# Patient Record
Sex: FEMALE | Race: WHITE | Employment: OTHER | URBAN - METROPOLITAN AREA
[De-identification: names, ages, dates, MRNs, and addresses within clinical notes are randomized per-mention and may not be internally consistent; named-entity substitution may affect disease eponyms.]

---

## 2017-02-03 DIAGNOSIS — I25.83 CORONARY ARTERY DISEASE DUE TO LIPID RICH PLAQUE: Primary | ICD-10-CM

## 2017-02-03 DIAGNOSIS — I25.10 CORONARY ARTERY DISEASE DUE TO LIPID RICH PLAQUE: Primary | ICD-10-CM

## 2017-02-06 RX ORDER — LISINOPRIL 20 MG/1
20 TABLET ORAL DAILY
Qty: 90 TABLET | Refills: 1 | Status: SHIPPED | OUTPATIENT
Start: 2017-02-06 | End: 2017-07-18

## 2017-02-20 DIAGNOSIS — J44.9 CHRONIC OBSTRUCTIVE PULMONARY DISEASE, UNSPECIFIED COPD TYPE (H): Primary | ICD-10-CM

## 2017-02-20 NOTE — TELEPHONE ENCOUNTER
tiotropium (SPIRIVA HANDIHALER) 18 MCG inhalation capsule       Last Written Prescription Date: 8/11/16  Last Fill Quantity: 90, # refills: 1    Last Office Visit with G, P or [facility] prescribing provider:  10/17/16   Future Office Visit:       Date of Last Asthma Action Plan Letter:   There are no preventive care reminders to display for this patient.   Asthma Control Test: No flowsheet data found.    Date of Last Spirometry Test:   No results found for this or any previous visit.

## 2017-02-21 RX ORDER — TIOTROPIUM BROMIDE 18 UG/1
CAPSULE ORAL; RESPIRATORY (INHALATION)
Qty: 90 CAPSULE | Refills: 1 | Status: SHIPPED | OUTPATIENT
Start: 2017-02-21 | End: 2017-09-09

## 2017-03-28 ENCOUNTER — TRANSFERRED RECORDS (OUTPATIENT)
Dept: HEALTH INFORMATION MANAGEMENT | Facility: CLINIC | Age: 67
End: 2017-03-28

## 2017-05-16 ENCOUNTER — TRANSFERRED RECORDS (OUTPATIENT)
Dept: HEALTH INFORMATION MANAGEMENT | Facility: CLINIC | Age: 67
End: 2017-05-16

## 2017-07-18 DIAGNOSIS — I25.10 CORONARY ARTERY DISEASE DUE TO LIPID RICH PLAQUE: ICD-10-CM

## 2017-07-18 DIAGNOSIS — I25.83 CORONARY ARTERY DISEASE DUE TO LIPID RICH PLAQUE: ICD-10-CM

## 2017-07-18 NOTE — TELEPHONE ENCOUNTER
LAST VISIT    11/4/15  NEXT VISIT     9/19/17  PLAN                ASSESSMENT AND PLAN:   1. CAD: Single vessel CAD, dating back to 2003 at which time she had PCI of RCA. Since that time, she has been asymptomatic. We are continuing aspirin and plavix given her history of PAD and CAD. She is on a statin.  2. LE PAD: Stable mild claudication s/p bilateral aorto-iliac stenting years ago. ABIs in 2012 showed mild PAD. We're continuing with rehab, there is no claudication with her current exercise, she is [place] stage 0 - 1  3. Carotid disease.  Repeat NICS today with <70% disease in her common carotids, L subclavian with >70% stenosis as known before. No change.  4. HTN: BP in left arm is FALSELY low, presumably due to left subclavian artery stenosis. She is hypertensive today, she was hospitalized in July for hypovolemic hyponatremia and MICHELLE and her ACEI and HCTZ were held and never restarted, her creatinine improved to 1.22, we will repeat it today and add on lisinopril 20mg again and then have her repeat her BP and BMP in one week with her PCP.  5. Left subclavian artery stenosis. There is a 57 mm Hg pressure gradient between the right and left arms.  CT angiogram showed subtotal occlusion of the ostium of the left subclavian artery.   She is asymptomatic and there is no evidence of a subclavian steal phenomenon, no need for intervention at this time, we will continue to monitor  6. HLD:  Repeat lipid panel pending. She is on crestor.  7. Tobacco use: counseled, will rx nicotine patch today     Follow up 1 year  Linwood Will  Cardiology fellow     Cardiology Staff: I supervised the cardiology fellow and reviewed the issues above with the patient.  Plan on NICS to reassess LF subclavian artery stenosis.  The emphasis at this time will have to be smoking cessation as this is clearly worsening the PAD / carotid disease.     NICS (11/4/15):  Impression:  1. Right side: 50-69% stenosis in the proximal ICA,  slightly progressed since prior study.  2. Left side: Less than 50% stenosis in the ICA.  3. Persistent stenosis at the origin of the left subclavian artery. Retrograde flow in the vertebral artery, previously bidirectional  suggestive of worsening subclavian steal.     ADDENDUM (11/6/15): NICS noted.  The patient has LF subclavian artery stenosis but does not demonstrate neurologic symptoms to suggest subclavian steal.  Similarly, there is 50-60% stenosis in the RT ICA but there are no neurologic symptoms.  I would therefore not recommend carotid endarterectomy / stenting.

## 2017-07-19 RX ORDER — LISINOPRIL 20 MG/1
20 TABLET ORAL DAILY
Qty: 90 TABLET | Refills: 1 | Status: SHIPPED | OUTPATIENT
Start: 2017-07-19 | End: 2017-09-19

## 2017-08-16 ENCOUNTER — RADIANT APPOINTMENT (OUTPATIENT)
Dept: MAMMOGRAPHY | Facility: CLINIC | Age: 67
End: 2017-08-16
Attending: PHYSICIAN ASSISTANT
Payer: COMMERCIAL

## 2017-08-16 DIAGNOSIS — Z12.31 VISIT FOR SCREENING MAMMOGRAM: ICD-10-CM

## 2017-08-16 PROCEDURE — G0202 SCR MAMMO BI INCL CAD: HCPCS

## 2017-09-09 DIAGNOSIS — J44.9 CHRONIC OBSTRUCTIVE PULMONARY DISEASE, UNSPECIFIED COPD TYPE (H): ICD-10-CM

## 2017-09-10 NOTE — TELEPHONE ENCOUNTER
SPIRIVA 18 MCG CP-HANDIHALER         Last Written Prescription Date:Medication Detail      Disp Refills Start End DAYAN   tiotropium (SPIRIVA HANDIHALER) 18 MCG capsule 90 capsule 1 2/21/2017  No   Sig: Inhale contents of one capsule daily.     Last Office Visit with [facility], [facility] or [facility] prescribing provider:  10/17/16   Future Office Visit:   N/A    Date of Last Asthma Action Plan Letter:   There are no preventive care reminders to display for this patient.   Asthma Control Test: No flowsheet data found.    Date of Last Spirometry Test:   No results found for this or any previous visit.

## 2017-09-11 RX ORDER — TIOTROPIUM BROMIDE 18 UG/1
CAPSULE ORAL; RESPIRATORY (INHALATION)
Qty: 60 CAPSULE | Refills: 0 | Status: SHIPPED | OUTPATIENT
Start: 2017-09-11 | End: 2017-09-19

## 2017-09-13 ENCOUNTER — PRE VISIT (OUTPATIENT)
Dept: CARDIOLOGY | Facility: CLINIC | Age: 67
End: 2017-09-13

## 2017-09-13 DIAGNOSIS — E78.5 HYPERLIPIDEMIA LDL GOAL <100: ICD-10-CM

## 2017-09-13 DIAGNOSIS — I10 HYPERTENSION GOAL BP (BLOOD PRESSURE) < 140/90: Primary | ICD-10-CM

## 2017-09-19 ENCOUNTER — OFFICE VISIT (OUTPATIENT)
Dept: CARDIOLOGY | Facility: CLINIC | Age: 67
End: 2017-09-19
Attending: INTERNAL MEDICINE
Payer: COMMERCIAL

## 2017-09-19 VITALS
WEIGHT: 200.5 LBS | HEIGHT: 62 IN | HEART RATE: 63 BPM | DIASTOLIC BLOOD PRESSURE: 79 MMHG | SYSTOLIC BLOOD PRESSURE: 161 MMHG | OXYGEN SATURATION: 95 % | BODY MASS INDEX: 36.9 KG/M2

## 2017-09-19 DIAGNOSIS — E78.5 HYPERLIPIDEMIA LDL GOAL <70: ICD-10-CM

## 2017-09-19 DIAGNOSIS — I73.9 PERIPHERAL VASCULAR DISEASE, UNSPECIFIED (H): ICD-10-CM

## 2017-09-19 DIAGNOSIS — I10 HYPERTENSION GOAL BP (BLOOD PRESSURE) < 140/90: ICD-10-CM

## 2017-09-19 DIAGNOSIS — E78.5 HYPERLIPIDEMIA LDL GOAL <100: ICD-10-CM

## 2017-09-19 DIAGNOSIS — I25.2 OLD MYOCARDIAL INFARCTION: ICD-10-CM

## 2017-09-19 DIAGNOSIS — J44.9 CHRONIC OBSTRUCTIVE PULMONARY DISEASE, UNSPECIFIED COPD TYPE (H): ICD-10-CM

## 2017-09-19 DIAGNOSIS — I25.83 CORONARY ARTERY DISEASE DUE TO LIPID RICH PLAQUE: Primary | ICD-10-CM

## 2017-09-19 DIAGNOSIS — I25.10 CORONARY ARTERY DISEASE DUE TO LIPID RICH PLAQUE: Primary | ICD-10-CM

## 2017-09-19 LAB
CHOLEST SERPL-MCNC: 142 MG/DL
HDLC SERPL-MCNC: 63 MG/DL
LDLC SERPL CALC-MCNC: 49 MG/DL
NONHDLC SERPL-MCNC: 78 MG/DL
TRIGL SERPL-MCNC: 144 MG/DL

## 2017-09-19 PROCEDURE — 93005 ELECTROCARDIOGRAM TRACING: CPT | Mod: ZF

## 2017-09-19 PROCEDURE — 99213 OFFICE O/P EST LOW 20 MIN: CPT | Mod: ZF

## 2017-09-19 PROCEDURE — 93010 ELECTROCARDIOGRAM REPORT: CPT | Mod: ZP | Performed by: INTERNAL MEDICINE

## 2017-09-19 PROCEDURE — 99214 OFFICE O/P EST MOD 30 MIN: CPT | Mod: ZP | Performed by: INTERNAL MEDICINE

## 2017-09-19 PROCEDURE — 80061 LIPID PANEL: CPT | Performed by: INTERNAL MEDICINE

## 2017-09-19 PROCEDURE — 36415 COLL VENOUS BLD VENIPUNCTURE: CPT | Performed by: INTERNAL MEDICINE

## 2017-09-19 RX ORDER — DILTIAZEM HYDROCHLORIDE 240 MG/1
240 CAPSULE, COATED, EXTENDED RELEASE ORAL DAILY
Qty: 90 CAPSULE | Refills: 3 | Status: SHIPPED | OUTPATIENT
Start: 2017-09-19 | End: 2019-02-27

## 2017-09-19 RX ORDER — TIOTROPIUM BROMIDE 18 UG/1
18 CAPSULE ORAL; RESPIRATORY (INHALATION) DAILY
Qty: 90 CAPSULE | Refills: 3 | Status: SHIPPED | OUTPATIENT
Start: 2017-09-19 | End: 2018-12-03

## 2017-09-19 RX ORDER — CLOPIDOGREL BISULFATE 75 MG/1
75 TABLET ORAL DAILY
Qty: 90 TABLET | Refills: 3 | Status: SHIPPED | OUTPATIENT
Start: 2017-09-19 | End: 2018-10-07

## 2017-09-19 RX ORDER — ROSUVASTATIN CALCIUM 40 MG/1
40 TABLET, COATED ORAL DAILY
Qty: 90 TABLET | Refills: 3 | Status: SHIPPED | OUTPATIENT
Start: 2017-09-19 | End: 2018-12-03

## 2017-09-19 RX ORDER — LISINOPRIL 20 MG/1
20 TABLET ORAL DAILY
Qty: 90 TABLET | Refills: 3 | Status: SHIPPED | OUTPATIENT
Start: 2017-09-19 | End: 2018-03-03

## 2017-09-19 RX ORDER — METOPROLOL SUCCINATE 100 MG/1
100 TABLET, EXTENDED RELEASE ORAL DAILY
Qty: 90 TABLET | Refills: 3 | Status: ON HOLD | OUTPATIENT
Start: 2017-09-19 | End: 2018-05-11

## 2017-09-19 ASSESSMENT — PAIN SCALES - GENERAL: PAINLEVEL: NO PAIN (0)

## 2017-09-19 NOTE — NURSING NOTE
Chief Complaint   Patient presents with     Follow Up For     management of CAD,PAD and HTN/Last visit 11/4/15 /EKG and labs prior     Vitals were taken and medication were reconciled. EKG performed.    Gloria Cook CMA    10:23 AM

## 2017-09-19 NOTE — PROGRESS NOTES
CARDIOLOGY CLINIC FOLLOW UP    HPI: Porsche Manning is a 67 year old female, being seen today for recheck of CAD/PAD.   Her risk factors include hypertension, hypercholesteraemia, and tobacco abuse.  Her cardiac hx dates back to [facility] in October 2003 with an acute coronary syndrome. She underwent a coronary angiogram which revealed a single-vessel coronary disease in her right coronary artery with a 90% stenosis in the mid-segment. She underwent successful bare metal stenting in this right coronary artery. During that procedure we discovered a 90% stenosis of the ostium of the right common iliac artery and 70% stenosis of the ostium of the left common iliac artery.   The patient underwent an MRA that revealed no significant renal artery disease and bilateral focal stenosis of both common iliac arteries, which was worse on the right side. There was a good runoff with both lower extremities having significant disease only in the distal anterior tibial artery bilaterally.   Mrs. Manning underwent aortography, peripheral angiography and bilateral aorto-iliac stenting on 4/30/05. She underwent kissing stenting of the aorto-iliac vessels with a Lumidex 8x60 on the right and a Lumidex 10x60 on the left. Following the procedure, the ABIs were 0.88 on the Rt PT, 1.00 on the Rt DP, 1.00 on the Lf DP, and 1.00 on the Lf PT.   In Oct 2006, she presented to [facility] with recurrent claudication and underwent repeat angiography and intervention. The left common iliac artery stent was thrombosed and she was given thrombolytic therapy. She underwent stenting of left common iliac artery with 8 x 60 mm covered stent. She subsequently underwent mechanical embolectomy of left peroneal trunk and placement of infusion catheter in left posterior tibial artery with infusion of thrombolytic therapy (10/20/2006).   She had subsequent ABIs performed in 2012 that showed 0.93 on both legs at rest and 0.86 and 0.79 on the right and  left, respectively, with exercise. Currently, she denies any CP, MORALES/SOB, PND, orthopnea, LE edema, palpitations, syncope.  She has stable claudication, with absolute claudication distance of 3 blocks. It is primarily in left buttocks.  She is iraj Class 1.  No change  She has known subclavian stenosis on the left, she has no evidence of steal phenomenon and asymptomatic with this. NICS today with <70% in the carotids, left subclavian > 70% as known before.  She resumed tobacco use in the fall of 2012 but QUIT again and has not smoked since March 29, 2013.  She resumed tobacco and has a pattern of quitting and resuming.  She is able to hold off on smoking around her friends who smoke.  However, she has difficulty when alone and continues to have craving.    Her BP has been under good control in the ambulatory setting (i.e. When she donates blood).     No neurologic symptoms related to Lf subclavian disease.      She is headed to [place], [place] for the winter.    PAST MEDICAL HISTORY:  Past Medical History:   Diagnosis Date     Brachial neuritis or radiculitis NOS      Chronic obstructive pulmonary disease, unspecified COPD type (H) 3/8/2016     Coronary artery disease     Doing fine     H/O tobacco use, presenting hazards to health      Hyperlipidemia LDL goal < 100      Hypertension goal BP (blood pressure) < 140/90      Malignant neoplasm of other specified sites of cervix      Old myocardial infarction      Osteopenia      Peripheral vascular disease, unspecified (H)      Type 2 diabetes, HbA1c goal < 7% (H)        CURRENT MEDICATIONS:  Current Outpatient Prescriptions   Medication Sig Dispense Refill     SPIRIVA HANDIHALER 18 MCG capsule INHALE 1 CAPSULE DAILY. 60 capsule 0     lisinopril (PRINIVIL/ZESTRIL) 20 MG tablet Take 1 tablet (20 mg) by mouth daily Patient needs to see cardiology for further refills. 90 tablet 1     rosuvastatin (CRESTOR) 40 MG tablet Take 1 tablet (40 mg) by mouth daily 90 tablet 3      diltiazem (CARDIZEM CD) 240 MG 24 hr capsule Take 1 capsule (240 mg) by mouth daily 90 capsule 3     metoprolol (LOPRESSOR) 100 MG tablet Take 1 tablet (100 mg) by mouth daily 90 tablet 3     clopidogrel (PLAVIX) 75 MG tablet Take 1 tablet (75 mg) by mouth daily 90 tablet 3     Cholecalciferol (VITAMIN D) 1000 UNIT capsule Take 1 capsule by mouth daily. Take one tablet daily       ASPIRIN 325 MG OR TBEC 1 tab po QD (Once per day) 100 3     order for DME Equipment being ordered: Nebulizer (Patient not taking: Reported on 9/19/2017) 1 Device 0     nicotine (NICODERM CQ) 21 MG/24HR patch 2h hr Place 1 patch onto the skin every 24 hours (Patient not taking: Reported on 9/19/2017) 30 patch 0       PAST SURGICAL HISTORY:  Past Surgical History:   Procedure Laterality Date     BIOPSY      Sample taken from back     CARDIAC SURGERY      Stents     COLONOSCOPY  1996    Results were ok     HYSTERECTOMY, PAP NO LONGER INDICATED  1989    ovaries only, no cervix per pt     SURGICAL HISTORY OF -   1995    bunionectomy     SURGICAL HISTORY OF -   10/10/03    cardiac stenting     SURGICAL HISTORY OF -       stent placed in both of her legs for pad     VASCULAR SURGERY      PAD       ALLERGIES  Morphine and Penicillins    FAMILY HX:  Family History   Problem Relation Age of Onset     C.A.D. Mother      Breast Cancer Mother      C.A.D. Father      DIABETES Father      Hypertension Father      C.A.D. Maternal Grandfather      C.A.D. Paternal Grandfather      Neurologic Disorder Brother      epilepsy       SOCIAL HX:  History     Social History     Marital Status: Single     Spouse Name: N/A     Number of Children: 0     Years of Education: 12     Occupational History     telecom assoc      [employer]     Social History Main Topics     Smoking status: Former Smoker -- 0.25 packs/day for 40 years     Types: Cigarettes     Quit date: 09/28/2006     Smokeless tobacco: Not on file     Alcohol Use: Yes      Comment: 2 to  "4 beers or mixed drinks weekly     Drug Use: No     Sexual Activity: Not Currently     Other Topics Concern     Parent/Sibling W/ Cabg, Mi Or Angioplasty Before 65f 55m? No     Social History Narrative    12/22/09    Balanced Diet - No    Osteoporosis Prevention Measures - Dairy servings per day: 0-1 and Medication/Supplements (See current meds)    Regular Exercise -  No Describe None    Dental Exam - YES - Date: 12/2009    Eye Exam - YES - Date: 2 years ago    Self Breast Exam - Yes, on a monthly basis    Abuse: Current or Past (Physical, Sexual or Emotional)- No    Do you feel safe in your environment - Yes    Guns stored in the home - No    Sunscreen used - Yes    Seatbelts used - Yes    Lipids -  YES - Date: 10/23/08    Glucose -  YES - Date: 10/23/08    Colon Cancer Screening - Colonoscopy 7/27/06(date completed)    Hemoccults - YES - Date: 7/11/06    Pap Test -  YES - Date: 10/23/08, Pt has Hx of abnormal paps.    Do you have any concerns about STD's -  No    Mammography - YES - Date: 10/23/08    DEXA - YES - Date: 11/29/07    Immunizations reviewed and up to date - Yes, Tdap given 10/18/07    Phillip Loo MA                       ROS:  Answers for HPI/ROS submitted by the patient on 9/16/2017   General Symptoms: No  Skin Symptoms: No  HENT Symptoms: No  EYE SYMPTOMS: No  HEART SYMPTOMS: No  LUNG SYMPTOMS: No  INTESTINAL SYMPTOMS: No  URINARY SYMPTOMS: No  GYNECOLOGIC SYMPTOMS: No  BREAST SYMPTOMS: No  SKELETAL SYMPTOMS: No  BLOOD SYMPTOMS: No  NERVOUS SYSTEM SYMPTOMS: No  MENTAL HEALTH SYMPTOMS: No    VITAL SIGNS:  /79 (BP Location: Right arm, Patient Position: Chair, Cuff Size: Adult Large)  Pulse 63  Ht 1.575 m (5' 2\")  Wt 90.9 kg (200 lb 8 oz)  SpO2 95%  BMI 36.67 kg/m2  Body mass index is 36.67 kg/(m^2).  Wt Readings from Last 2 Encounters:   09/19/17 90.9 kg (200 lb 8 oz)   10/17/16 86.9 kg (191 lb 8 oz)       PHYSICAL EXAM  Porsche Manning is a 64 year old female.in no acute distress.  " HEENT: Eyes Nonicteric.  Neck: JVP normal.  Carotids +3/3 bilaterally without bruits.  Lungs: CTA.  Cor: RRR. Normal S1 and S2.  No murmur, rub, or gallop.  PMI in Lf 5th ICS.  Abd: Soft, nontender, nondistended.  NABS.  No pulsatile mass.  Extremities: No C/C/E.  Pulses +1/3 symmetric at PT bilaterally.  Doppler signals Rt fem +2 with bruit.  Rt pop +2 RT DP 0, Rt PT +2.  Rt DP  Weakdoppler signal.  Lf fem +1  Lf pop +1. Lf DP 0  Lf PT 0.  Lf DP moderate signal.  Lf PT moderate signal.  Neuro: Grossly intact.  Psych: A&O x 3.  Skin: No rash.    LABS  Recent Labs   Lab Test  10/21/06   0720  10/20/06   0755   WBC  9.0  8.7   HGB  11.4*  11.7   HCT  32.5*  33.5*   PLT  201  200     Recent Labs   Lab Test  13   0927  11   0720   NA  140  139   POTASSIUM  4.2  4.4   CHLORIDE  97  99   CO2  26  26   GLC  127*  160*   BUN  15  18   CR  0.84  1.07*   VIANEY  9.6  10.1     Recent Labs   Lab Test  10/17/16   0825  11/04/15   1142  07/09/15   1013   CHOL  159  141  125   HDL  62  71  61   LDL  66  48  35   TRIG  153*  112  145   CHOLHDLRATIO   --   2.0  2.0       EC17  NSR with 1st degree AV block.  AL 0.23  No ST shift, TWI, or Q waves.    PROCEDURES:   CT angiogram of right and left upper extremities (2013)  1. Subtotal stenosis of the origin of the left subclavian artery. Remainder of the upper extremity vasculature is patent.   2. 70% stenosis of the right internal carotid artery. Less than 10% stenosis of the left internal carotid artery. Retropharyngeal course of the carotid arteries.  US CAROTID BILAT (2012)  1. Right side: 50-69% stenosis.  2. Left side: <50% stenosis.   3. Elevated velocity in the left subclavian artery origin with post obstructive waveforms and bidirectional waveforms in the vertebral artery. Findings concerning for subclavian artery stenosis at the origin of the vessel.     REST and EXERCISE CLAUDINE (2012)  1. Right lower extremity: Resting CLAUDINE 0.93, which is in the  borderline category. Decrease in CLAUDINE to 0.80 at 3 minutes post exercise, with recovery of CLAUDINE to 0.86 at 10 minutes.   2. Left lower extremity: Resting CLAUDINE 0.93, which is in the borderline category. Decrease in CLAUDINE to 0.79 at 2 minutes post exercise with recovery back to baseline by 10 minutes post exercise.    Coronary Angiography with PCI (10/10/2003)  1. Pt has a normal L Main and has about 25-50% lesions in the PLAD which are considered not be significant hemodynamically.  2. Pt has mild disease in his Cx  3. RCA had a 70-90% lesion in the Mid segement which was stented - 3.0 x 23 mm Cypher drug eluting stent was placed and post dilated using a 3.0 x 20mm power sail balloon.  Aortgraphy reveals a 90% stenosis at the origin of the right common iliac and a 70% stenosis at the origin of the left common iliac.    NICS (11/4/15):  Impression:  1. Right side: 50-69% stenosis in the proximal ICA, slightly progressed since prior study.  2. Left side: Less than 50% stenosis in the ICA.  3. Persistent stenosis at the origin of the left subclavian artery. Retrograde flow in the vertebral artery, previously bidirectional  suggestive of worsening subclavian steal.    ASSESSMENT AND PLAN:   1. CAD: Single vessel CAD, dating back to 2003 at which time she had PCI of RCA. Since that time, she has been asymptomatic. We are continuing aspirin and plavix given her history of PAD and CAD. She is on a statin.  2. LE PAD: Stable mild claudication s/p bilateral aorto-iliac stenting years ago. ABIs in 2012 showed mild PAD. We're continuing with rehab, there is no claudication with her current exercise, she is [place] stage 0 - 1.  No change.  I recommended repeat rest and exercise ABIs and ultrasound to assess pelvic arteries and BLE runoff.  PAtient would like to defer to next year but will notify us if claudication worsens.  3. Carotid disease.  NICS (2016) with <70% disease in her common carotids, L subclavian with >70% stenosis  "as known before. No change.  4. HTN: BP in left arm is FALSELY low, presumably due to left subclavian artery stenosis. We will need to check if patient is on Toprol XL or Lopressor.  She will continue on Lisinopril 20 qd and Diltiazem 240 CD qd.  Check ambulatory BP  5. Left subclavian artery stenosis. There was a 57 mm Hg pressure gradient between the right and left arms.  CT angiogram showed subtotal occlusion of the ostium of the left subclavian artery.   She is asymptomatic and there is no evidence of a subclavian steal phenomenon, no need for intervention at this time, we will continue to monitor  6. HLD:  Repeat lipid panel pending. She is on crestor 40 mg qd  7. Tobacco use: Counseled.  She would like to quit on her own and does not want to retry nicotine patches today    Follow up 1 year    ADDENDUM  (10/27/18) :She will follow up with up pulmonary regarding follow up CT scan for evaluation of pulmonary nodule.      CTA Abdomen/Pelvis:  10/24/18  CTA: \"Double barrel shotgun\" kissing aortobiiliac stents extend from the distal aorta at the level of L3 into their respective common iliac arteries. The right iliac stent starts to the left of the left iliac stent and crosses anteriorly over the left iliac stent into the right iliac artery. The top of the right iliac stent is compressed by the adjacent left iliac stent (series 4, image 37) and the mid right iliac stent is compressed as it crosses anterior to the left iliac stent, but the right iliac stent appears widely patent in the right common iliac artery. Common iliac portions of the bilateral stents are widely patent with symmetric opacification. No large collaterals to suggest significant stenosis.    Diffuse and eccentric atherosclerotic calcified plaques from the aorta through the femoral arteries without more than 50% diameter narrowing.    Multifocal calcified plaques cause less than 50% diameter narrowing through the iliac and femoral arteries. " Bilateral common, internal, and external iliac arteries are patent. Bilateral common femoral arteries are patent.     Celiac, superior mesenteric, single right renal, and inferior mesenteric arteries are patent. Calcified plaque at the left renal artery ostium causes less than 50% diameter narrowing.    CT: 12 mm segment 5 hepatic hypodensity was diagnosed as a simple cyst by MRI. Colonic diverticulosis without CT evidence for diverticulitis.    6 x 9 mm left lower lobe pleural-based posterior pulmonary nodule, unchanged from 5/9/2018. Upper lobe nodules were excluded from this study.    Spine degenerative changes are similar in appearance.    IMPRESSION:  1. Aortobiiliac stents. Right iliac stent compressed superiorly by the adjacent left iliac stent. Mid right iliac stent compressed as it crosses anterior to the left iliac stent. Common iliac portions of the stents are widely patent with symmetric opacification. No enlarged collaterals to suggest hemodynamically significant stenosis.    2. Unchanged 6 x 9 mm left lower lobe pleural-based pulmonary nodule. Upper pole nodules were excluded from this study. Continued follow-up to document two-year stability from the 5/9/2018 CT suggested.        Brayan Uriostegui MD    Divisions of Cardiology  [facility]  Patient Care Team:  Danuta Tan PA-C as PCP - General (Physician Assistant)  REFERRING DR, UNKNOWN    Answers for HPI/ROS submitted by the patient on 9/16/2017   General Symptoms: No  Skin Symptoms: No  HENT Symptoms: No  EYE SYMPTOMS: No  HEART SYMPTOMS: No  LUNG SYMPTOMS: No  INTESTINAL SYMPTOMS: No  URINARY SYMPTOMS: No  GYNECOLOGIC SYMPTOMS: No  BREAST SYMPTOMS: No  SKELETAL SYMPTOMS: No  BLOOD SYMPTOMS: No  NERVOUS SYSTEM SYMPTOMS: No  MENTAL HEALTH SYMPTOMS: No

## 2017-09-19 NOTE — MR AVS SNAPSHOT
After Visit Summary   9/19/2017    Porsche Manning    MRN: [id number]           Patient Information     Date Of Birth          1950        Visit Information        Provider Department      9/19/2017 10:30 AM Brayan Uriostegui MD [facility]        Today's Diagnoses     Coronary artery disease due to lipid rich plaque    -  1    Hypertension: MEASURE BP IN RIGHT ARM ONLY        OLD MYOCARDIAL INFARCT        Hyperlipidemia LDL goal <70        Peripheral vascular disease, unspecified (H)        Chronic obstructive pulmonary disease, unspecified COPD type (H)          Care Instructions      It was a pleasure to see you in the cardiology clinic today.    If you have any questions, you can reach my nurse, Minerva Chawla, at [phone].  Press Option #1 for the [facility], and then press Option #3 for nursing.    Note the new medications: Toprol XL (Metroprolol) 100 mg, once a day    Stop the following medications: Lopressor (Metroprolol) 100 mg (short acting)    The results from today include: None    Tests ordered: Fasting Lipid Panel      I would like you to follow up with Dr. Uriostegui in 1 year.    I refilled all of your prescriptions.    Sincerely,      Brayan Uriostegui MD               Follow-ups after your visit        Your next 10 appointments already scheduled     Sep 19, 2017 11:25 AM CDT   Lab with  LAB   [facility] ([facility])    [address] [place] [id number] [phone]              Future tests that were ordered for you today     Open Future Orders        Priority Expected Expires Ordered    Lipid panel reflex to direct LDL Routine  9/19/2018 9/19/2017            Who to contact     If you have questions or need follow up information about today's clinic visit or your schedule please contact [facility] directly at [phone].  Normal or non-critical lab and imaging results will  "be communicated to you by Domobioshart, letter or phone within 4 business days after the clinic has received the results. If you do not hear from us within 7 days, please contact the clinic through Reclamador or phone. If you have a critical or abnormal lab result, we will notify you by phone as soon as possible.  Submit refill requests through Reclamador or call your pharmacy and they will forward the refill request to us. Please allow 3 business days for your refill to be completed.          Additional Information About Your Visit        Reclamador Information     Reclamador gives you secure access to your electronic health record. If you see a primary care provider, you can also send messages to your care team and make appointments. If you have questions, please call your primary care clinic.  If you do not have a primary care provider, please call [phone] and they will assist you.        Care EveryWhere ID     This is your Care EveryWhere ID. This could be used by other organizations to access your [facility] medical records  [id number]        Your Vitals Were     Pulse Height Pulse Oximetry BMI (Body Mass Index)          63 1.575 m (5' 2\") 95% 36.67 kg/m2         Blood Pressure from Last 3 Encounters:   09/19/17 161/79   10/17/16 136/82   04/19/16 130/80    Weight from Last 3 Encounters:   09/19/17 90.9 kg (200 lb 8 oz)   10/17/16 86.9 kg (191 lb 8 oz)   04/19/16 85.7 kg (189 lb)              We Performed the Following     EKG 12-lead, tracing only (Future)          Today's Medication Changes          These changes are accurate as of: 9/19/17 11:15 AM.  If you have any questions, ask your nurse or doctor.               Start taking these medicines.        Dose/Directions    metoprolol 100 MG 24 hr tablet   Commonly known as:  TOPROL-XL   Used for:  Hypertension goal BP (blood pressure) < 140/90, Coronary artery disease due to lipid rich plaque   Started by:  Brayan Uriostegui MD        Dose:  100 mg   Take 1 tablet (100 " mg) by mouth daily   Quantity:  90 tablet   Refills:  3         These medicines have changed or have updated prescriptions.        Dose/Directions    lisinopril 20 MG tablet   Commonly known as:  PRINIVIL/ZESTRIL   This may have changed:  additional instructions   Used for:  Coronary artery disease due to lipid rich plaque   Changed by:  Brayan Uriostegui MD        Dose:  20 mg   Take 1 tablet (20 mg) by mouth daily   Quantity:  90 tablet   Refills:  3       tiotropium 18 MCG capsule   Commonly known as:  SPIRIVA HANDIHALER   This may have changed:  See the new instructions.   Used for:  Chronic obstructive pulmonary disease, unspecified COPD type (H)   Changed by:  Brayan Uriostegui MD        Dose:  18 mcg   Inhale 1 capsule (18 mcg) into the lungs daily   Quantity:  90 capsule   Refills:  3         Stop taking these medicines if you haven't already. Please contact your care team if you have questions.     metoprolol 100 MG tablet   Commonly known as:  LOPRESSOR   Stopped by:  Brayan Uriostegui MD                Where to get your medicines      These medications were sent to [facility]/pharmacy #[id number] [place], [place]  [address] [place] [address]     Phone:  [phone]     clopidogrel 75 MG tablet    diltiazem 240 MG 24 hr capsule    lisinopril 20 MG tablet    metoprolol 100 MG 24 hr tablet    rosuvastatin 40 MG tablet    tiotropium 18 MCG capsule                Primary Care Provider Office Phone # Fax #    Danuta Tan PA-C [phone]       [address] [facility] [address]        Equal Access to Services     [facility]: Hadii steph ku hadasho Soomaali, waaxda luqadaha, qaybta kaalmada adeegyada, waxay olga weaver. So [facility] [phone].    ATENCIÓN: Si habla español, tiene a antunez disposición servicios gratuitos de asistencia lingüística. Llame al [phone].    We comply with applicable [facility] civil  rights laws and [place] laws. We do not discriminate on the basis of race, color, national origin, age, disability sex, sexual orientation or gender identity.            Thank you!     Thank you for choosing [facility]  for your care. Our goal is always to provide you with excellent care. Hearing back from our patients is one way we can continue to improve our services. Please take a few minutes to complete the written survey that you may receive in the mail after your visit with us. Thank you!             Your Updated Medication List - Protect others around you: Learn how to safely use, store and throw away your medicines at www.disposemymeds.org.          This list is accurate as of: 9/19/17 11:15 AM.  Always use your most recent med list.                   Brand Name Dispense Instructions for use Diagnosis    aspirin 325 MG EC tablet     100    1 tab po QD (Once per day)    Other specified pre-operative examination       clopidogrel 75 MG tablet    PLAVIX    90 tablet    Take 1 tablet (75 mg) by mouth daily    Peripheral vascular disease, unspecified (H)       diltiazem 240 MG 24 hr capsule    CARDIZEM CD    90 capsule    Take 1 capsule (240 mg) by mouth daily    Old myocardial infarction       lisinopril 20 MG tablet    PRINIVIL/ZESTRIL    90 tablet    Take 1 tablet (20 mg) by mouth daily    Coronary artery disease due to lipid rich plaque       metoprolol 100 MG 24 hr tablet    TOPROL-XL    90 tablet    Take 1 tablet (100 mg) by mouth daily    Hypertension goal BP (blood pressure) < 140/90, Coronary artery disease due to lipid rich plaque       nicotine 21 MG/24HR 24 hr patch    NICODERM CQ    30 patch    Place 1 patch onto the skin every 24 hours    Cigarette nicotine dependence without complication       order for DME     1 Device    Equipment being ordered: Nebulizer    COPD exacerbation (H), SOB (shortness of breath)       rosuvastatin 40 MG tablet    CRESTOR    90 tablet    Take 1 tablet (40 mg)  by mouth daily    Hyperlipidemia LDL goal <70       tiotropium 18 MCG capsule    SPIRIVA HANDIHALER    90 capsule    Inhale 1 capsule (18 mcg) into the lungs daily    Chronic obstructive pulmonary disease, unspecified COPD type (H)       vitamin D 1000 UNITS capsule      Take 1 capsule by mouth daily. Take one tablet daily

## 2017-09-19 NOTE — PATIENT INSTRUCTIONS
It was a pleasure to see you in the cardiology clinic today.    If you have any questions, you can reach my nurse, Minerva Chawla, at [phone].  Press Option #1 for the [facility], and then press Option #3 for nursing.    Note the new medications: Toprol XL (Metroprolol) 100 mg, once a day    Stop the following medications: Lopressor (Metroprolol) 100 mg (short acting)    The results from today include: None    Tests ordered: Fasting Lipid Panel      I would like you to follow up with Dr. Uriostegui in 1 year.    I refilled all of your prescriptions.    Sincerely,      Brayan Uriostegui MD

## 2017-09-19 NOTE — LETTER
9/19/2017      RE: Porsche Manning  [address]  [facility] [address]       Dear Colleague,    Thank you for the opportunity to participate in the care of your patient, Porsche Manning, at the [facility]. Please see a copy of my visit note below.    CARDIOLOGY CLINIC FOLLOW UP    HPI: Porsche Manning is a 67 year old female, being seen today for recheck of CAD/PAD.   Her risk factors include hypertension, hypercholesteraemia, and tobacco abuse.  Her cardiac hx dates back to [facility] in October 2003 with an acute coronary syndrome. She underwent a coronary angiogram which revealed a single-vessel coronary disease in her right coronary artery with a 90% stenosis in the mid-segment. She underwent successful bare metal stenting in this right coronary artery. During that procedure we discovered a 90% stenosis of the ostium of the right common iliac artery and 70% stenosis of the ostium of the left common iliac artery.   The patient underwent an MRA that revealed no significant renal artery disease and bilateral focal stenosis of both common iliac arteries, which was worse on the right side. There was a good runoff with both lower extremities having significant disease only in the distal anterior tibial artery bilaterally.   Mrs. Manning underwent aortography, peripheral angiography and bilateral aorto-iliac stenting on 4/30/05. She underwent kissing stenting of the aorto-iliac vessels with a Lumidex 8x60 on the right and a Lumidex 10x60 on the left. Following the procedure, the ABIs were 0.88 on the Rt PT, 1.00 on the Rt DP, 1.00 on the Lf DP, and 1.00 on the Lf PT.   In Oct 2006, she presented to [facility] with recurrent claudication and underwent repeat angiography and intervention. The left common iliac artery stent was thrombosed and she was given thrombolytic therapy. She underwent stenting of left common iliac artery with 8 x 60 mm  covered stent. She subsequently underwent mechanical embolectomy of left peroneal trunk and placement of infusion catheter in left posterior tibial artery with infusion of thrombolytic therapy (10/20/2006).   She had subsequent ABIs performed in 2012 that showed 0.93 on both legs at rest and 0.86 and 0.79 on the right and left, respectively, with exercise. Currently, she denies any CP, MORALES/SOB, PND, orthopnea, LE edema, palpitations, syncope.  She has stable claudication, with absolute claudication distance of 3 blocks. It is primarily in left buttocks.  She is iraj Class 1.  No change  She has known subclavian stenosis on the left, she has no evidence of steal phenomenon and asymptomatic with this. NICS today with <70% in the carotids, left subclavian > 70% as known before.  She resumed tobacco use in the fall of 2012 but QUIT again and has not smoked since March 29, 2013.  She resumed tobacco and has a pattern of quitting and resuming.  She is able to hold off on smoking around her friends who smoke.  However, she has difficulty when alone and continues to have craving.    Her BP has been under good control in the ambulatory setting (i.e. When she donates blood).     No neurologic symptoms related to Lf subclavian disease.      She is headed to [place], [place] for the winter.    PAST MEDICAL HISTORY:  Past Medical History:   Diagnosis Date     Brachial neuritis or radiculitis NOS      Chronic obstructive pulmonary disease, unspecified COPD type (H) 3/8/2016     Coronary artery disease     Doing fine     H/O tobacco use, presenting hazards to health      Hyperlipidemia LDL goal < 100      Hypertension goal BP (blood pressure) < 140/90      Malignant neoplasm of other specified sites of cervix      Old myocardial infarction      Osteopenia      Peripheral vascular disease, unspecified (H)      Type 2 diabetes, HbA1c goal < 7% (H)        CURRENT MEDICATIONS:  Current Outpatient Prescriptions   Medication Sig Dispense  Refill     SPIRIVA HANDIHALER 18 MCG capsule INHALE 1 CAPSULE DAILY. 60 capsule 0     lisinopril (PRINIVIL/ZESTRIL) 20 MG tablet Take 1 tablet (20 mg) by mouth daily Patient needs to see cardiology for further refills. 90 tablet 1     rosuvastatin (CRESTOR) 40 MG tablet Take 1 tablet (40 mg) by mouth daily 90 tablet 3     diltiazem (CARDIZEM CD) 240 MG 24 hr capsule Take 1 capsule (240 mg) by mouth daily 90 capsule 3     metoprolol (LOPRESSOR) 100 MG tablet Take 1 tablet (100 mg) by mouth daily 90 tablet 3     clopidogrel (PLAVIX) 75 MG tablet Take 1 tablet (75 mg) by mouth daily 90 tablet 3     Cholecalciferol (VITAMIN D) 1000 UNIT capsule Take 1 capsule by mouth daily. Take one tablet daily       ASPIRIN 325 MG OR TBEC 1 tab po QD (Once per day) 100 3     order for DME Equipment being ordered: Nebulizer (Patient not taking: Reported on 9/19/2017) 1 Device 0     nicotine (NICODERM CQ) 21 MG/24HR patch 2h hr Place 1 patch onto the skin every 24 hours (Patient not taking: Reported on 9/19/2017) 30 patch 0       PAST SURGICAL HISTORY:  Past Surgical History:   Procedure Laterality Date     BIOPSY      Sample taken from back     CARDIAC SURGERY      Stents     COLONOSCOPY  1996    Results were ok     HYSTERECTOMY, PAP NO LONGER INDICATED  1989    ovaries only, no cervix per pt     SURGICAL HISTORY OF -   1995    bunionectomy     SURGICAL HISTORY OF -   10/10/03    cardiac stenting     SURGICAL HISTORY OF -       stent placed in both of her legs for pad     VASCULAR SURGERY      PAD       ALLERGIES  Morphine and Penicillins    FAMILY HX:  Family History   Problem Relation Age of Onset     C.A.D. Mother      Breast Cancer Mother      C.A.D. Father      DIABETES Father      Hypertension Father      C.A.D. Maternal Grandfather      C.A.D. Paternal Grandfather      Neurologic Disorder Brother      epilepsy       SOCIAL HX:  History     Social History     Marital Status: Single     Spouse Name: N/A     Number of  "Children: 0     Years of Education: 12     Occupational History     telecom assoc      [employer]     Social History Main Topics     Smoking status: Former Smoker -- 0.25 packs/day for 40 years     Types: Cigarettes     Quit date: 09/28/2006     Smokeless tobacco: Not on file     Alcohol Use: Yes      Comment: 2 to 4 beers or mixed drinks weekly     Drug Use: No     Sexual Activity: Not Currently     Other Topics Concern     Parent/Sibling W/ Cabg, Mi Or Angioplasty Before 65f 55m? No     Social History Narrative    12/22/09    Balanced Diet - No    Osteoporosis Prevention Measures - Dairy servings per day: 0-1 and Medication/Supplements (See current meds)    Regular Exercise -  No Describe None    Dental Exam - YES - Date: 12/2009    Eye Exam - YES - Date: 2 years ago    Self Breast Exam - Yes, on a monthly basis    Abuse: Current or Past (Physical, Sexual or Emotional)- No    Do you feel safe in your environment - Yes    Guns stored in the home - No    Sunscreen used - Yes    Seatbelts used - Yes    Lipids -  YES - Date: 10/23/08    Glucose -  YES - Date: 10/23/08    Colon Cancer Screening - Colonoscopy 7/27/06(date completed)    Hemoccults - YES - Date: 7/11/06    Pap Test -  YES - Date: 10/23/08, Pt has Hx of abnormal paps.    Do you have any concerns about STD's -  No    Mammography - YES - Date: 10/23/08    DEXA - YES - Date: 11/29/07    Immunizations reviewed and up to date - Yes, Tdap given 10/18/07    Phillip Loo MA                       ROS:    VITAL SIGNS:  /79 (BP Location: Right arm, Patient Position: Chair, Cuff Size: Adult Large)  Pulse 63  Ht 1.575 m (5' 2\")  Wt 90.9 kg (200 lb 8 oz)  SpO2 95%  BMI 36.67 kg/m2  Body mass index is 36.67 kg/(m^2).  Wt Readings from Last 2 Encounters:   09/19/17 90.9 kg (200 lb 8 oz)   10/17/16 86.9 kg (191 lb 8 oz)       PHYSICAL EXAM  Porsche Manning is a 64 year old female.in no acute distress.  HEENT: Eyes Nonicteric.  Neck: JVP normal.  " Carotids +3/3 bilaterally without bruits.  Lungs: CTA.  Cor: RRR. Normal S1 and S2.  No murmur, rub, or gallop.  PMI in Lf 5th ICS.  Abd: Soft, nontender, nondistended.  NABS.  No pulsatile mass.  Extremities: No C/C/E.  Pulses +1/3 symmetric at PT bilaterally.  Doppler signals Rt fem +2 with bruit.  Rt pop +2 RT DP 0, Rt PT +2.  Rt DP  Weakdoppler signal.  Lf fem +1  Lf pop +1. Lf DP 0  Lf PT 0.  Lf DP moderate signal.  Lf PT moderate signal.  Neuro: Grossly intact.  Psych: A&O x 3.  Skin: No rash.    LABS  Recent Labs   Lab Test  10/21/06   0720  10/20/06   0755   WBC  9.0  8.7   HGB  11.4*  11.7   HCT  32.5*  33.5*   PLT  201  200     Recent Labs   Lab Test  13   0927  11   0720   NA  140  139   POTASSIUM  4.2  4.4   CHLORIDE  97  99   CO2  26  26   GLC  127*  160*   BUN  15  18   CR  0.84  1.07*   VIANEY  9.6  10.1     Recent Labs   Lab Test  10/17/16   0825  11/04/15   1142  07/09/15   1013   CHOL  159  141  125   HDL  62  71  61   LDL  66  48  35   TRIG  153*  112  145   CHOLHDLRATIO   --   2.0  2.0       EC17  NSR with 1st degree AV block.  OH 0.23  No ST shift, TWI, or Q waves.    PROCEDURES:   CT angiogram of right and left upper extremities (2013)  1. Subtotal stenosis of the origin of the left subclavian artery. Remainder of the upper extremity vasculature is patent.   2. 70% stenosis of the right internal carotid artery. Less than 10% stenosis of the left internal carotid artery. Retropharyngeal course of the carotid arteries.  US CAROTID BILAT (2012)  1. Right side: 50-69% stenosis.  2. Left side: <50% stenosis.   3. Elevated velocity in the left subclavian artery origin with post obstructive waveforms and bidirectional waveforms in the vertebral artery. Findings concerning for subclavian artery stenosis at the origin of the vessel.     REST and EXERCISE CLAUDINE (2012)  1. Right lower extremity: Resting CLAUDINE 0.93, which is in the borderline category. Decrease in CLAUDINE to 0.80 at  3 minutes post exercise, with recovery of CLAUDINE to 0.86 at 10 minutes.   2. Left lower extremity: Resting CLAUDINE 0.93, which is in the borderline category. Decrease in CLAUDINE to 0.79 at 2 minutes post exercise with recovery back to baseline by 10 minutes post exercise.    Coronary Angiography with PCI (10/10/2003)  1. Pt has a normal L Main and has about 25-50% lesions in the PLAD which are considered not be significant hemodynamically.  2. Pt has mild disease in his Cx  3. RCA had a 70-90% lesion in the Mid segement which was stented - 3.0 x 23 mm Cypher drug eluting stent was placed and post dilated using a 3.0 x 20mm power sail balloon.  Aortgraphy reveals a 90% stenosis at the origin of the right common iliac and a 70% stenosis at the origin of the left common iliac.    NICS (11/4/15):  Impression:  1. Right side: 50-69% stenosis in the proximal ICA, slightly progressed since prior study.  2. Left side: Less than 50% stenosis in the ICA.  3. Persistent stenosis at the origin of the left subclavian artery. Retrograde flow in the vertebral artery, previously bidirectional  suggestive of worsening subclavian steal.    ASSESSMENT AND PLAN:   1. CAD: Single vessel CAD, dating back to 2003 at which time she had PCI of RCA. Since that time, she has been asymptomatic. We are continuing aspirin and plavix given her history of PAD and CAD. She is on a statin.  2. LE PAD: Stable mild claudication s/p bilateral aorto-iliac stenting years ago. ABIs in 2012 showed mild PAD. We're continuing with rehab, there is no claudication with her current exercise, she is Edmunds stage 0 - 1.  No change.  I recommended repeat rest and exercise ABIs and ultrasound to assess pelvic arteries and BLE runoff.  PAtient would like to defer to next year but will notify us if claudication worsens.  3. Carotid disease.  NICS (2016) with <70% disease in her common carotids, L subclavian with >70% stenosis as known before. No change.  4. HTN: BP in left  arm is FALSELY low, presumably due to left subclavian artery stenosis. We will need to check if patient is on Toprol XL or Lopressor.  She will continue on Lisinopril 20 qd and Diltiazem 240 CD qd.  Check ambulatory BP  5. Left subclavian artery stenosis. There was a 57 mm Hg pressure gradient between the right and left arms.  CT angiogram showed subtotal occlusion of the ostium of the left subclavian artery.   She is asymptomatic and there is no evidence of a subclavian steal phenomenon, no need for intervention at this time, we will continue to monitor  6. HLD:  Repeat lipid panel pending. She is on crestor 40 mg qd  7. Tobacco use: Counseled.  She would like to quit on her own and does not want to retry nicotine patches today    Follow up 1 year  .  Brayan Uriostegui MD    Divisions of Cardiology  [place], [place]      CC  Patient Care Team:  Danuta Tan PA-C as PCP - General (Physician Assistant)  REFERRING HERBER LONG

## 2017-09-20 LAB — INTERPRETATION ECG - MUSE: NORMAL

## 2017-10-24 ENCOUNTER — OFFICE VISIT (OUTPATIENT)
Dept: FAMILY MEDICINE | Facility: CLINIC | Age: 67
End: 2017-10-24
Payer: COMMERCIAL

## 2017-10-24 ENCOUNTER — RADIANT APPOINTMENT (OUTPATIENT)
Dept: BONE DENSITY | Facility: CLINIC | Age: 67
End: 2017-10-24
Attending: PHYSICIAN ASSISTANT
Payer: COMMERCIAL

## 2017-10-24 VITALS
BODY MASS INDEX: 36.8 KG/M2 | WEIGHT: 200 LBS | OXYGEN SATURATION: 94 % | DIASTOLIC BLOOD PRESSURE: 78 MMHG | RESPIRATION RATE: 16 BRPM | HEART RATE: 60 BPM | HEIGHT: 62 IN | TEMPERATURE: 97.6 F | SYSTOLIC BLOOD PRESSURE: 162 MMHG

## 2017-10-24 DIAGNOSIS — E11.22 TYPE 2 DIABETES MELLITUS WITH STAGE 3 CHRONIC KIDNEY DISEASE, WITHOUT LONG-TERM CURRENT USE OF INSULIN (H): ICD-10-CM

## 2017-10-24 DIAGNOSIS — Z23 NEED FOR PROPHYLACTIC VACCINATION AND INOCULATION AGAINST INFLUENZA: ICD-10-CM

## 2017-10-24 DIAGNOSIS — Z23 NEED FOR VACCINATION: ICD-10-CM

## 2017-10-24 DIAGNOSIS — M85.89 OSTEOPENIA OF MULTIPLE SITES: ICD-10-CM

## 2017-10-24 DIAGNOSIS — Z00.00 ROUTINE GENERAL MEDICAL EXAMINATION AT A HEALTH CARE FACILITY: Primary | ICD-10-CM

## 2017-10-24 DIAGNOSIS — I10 HYPERTENSION GOAL BP (BLOOD PRESSURE) < 140/90: ICD-10-CM

## 2017-10-24 DIAGNOSIS — Z12.11 SPECIAL SCREENING FOR MALIGNANT NEOPLASMS, COLON: ICD-10-CM

## 2017-10-24 DIAGNOSIS — J44.9 CHRONIC OBSTRUCTIVE PULMONARY DISEASE, UNSPECIFIED COPD TYPE (H): ICD-10-CM

## 2017-10-24 DIAGNOSIS — N18.30 TYPE 2 DIABETES MELLITUS WITH STAGE 3 CHRONIC KIDNEY DISEASE, WITHOUT LONG-TERM CURRENT USE OF INSULIN (H): ICD-10-CM

## 2017-10-24 LAB
CREAT UR-MCNC: 13 MG/DL
HBA1C MFR BLD: 6.3 % (ref 4.3–6)
MICROALBUMIN UR-MCNC: 70 MG/L
MICROALBUMIN/CREAT UR: 540 MG/G CR (ref 0–25)

## 2017-10-24 PROCEDURE — 82043 UR ALBUMIN QUANTITATIVE: CPT | Performed by: PHYSICIAN ASSISTANT

## 2017-10-24 PROCEDURE — 90736 HZV VACCINE LIVE SUBQ: CPT | Performed by: PHYSICIAN ASSISTANT

## 2017-10-24 PROCEDURE — G0008 ADMIN INFLUENZA VIRUS VAC: HCPCS | Performed by: PHYSICIAN ASSISTANT

## 2017-10-24 PROCEDURE — 77080 DXA BONE DENSITY AXIAL: CPT | Performed by: INTERNAL MEDICINE

## 2017-10-24 PROCEDURE — 90732 PPSV23 VACC 2 YRS+ SUBQ/IM: CPT | Performed by: PHYSICIAN ASSISTANT

## 2017-10-24 PROCEDURE — 83036 HEMOGLOBIN GLYCOSYLATED A1C: CPT | Performed by: PHYSICIAN ASSISTANT

## 2017-10-24 PROCEDURE — 36415 COLL VENOUS BLD VENIPUNCTURE: CPT | Performed by: PHYSICIAN ASSISTANT

## 2017-10-24 PROCEDURE — 99207 C FOOT EXAM  NO CHARGE: CPT | Mod: 25 | Performed by: PHYSICIAN ASSISTANT

## 2017-10-24 PROCEDURE — 90662 IIV NO PRSV INCREASED AG IM: CPT | Performed by: PHYSICIAN ASSISTANT

## 2017-10-24 PROCEDURE — G0009 ADMIN PNEUMOCOCCAL VACCINE: HCPCS | Performed by: PHYSICIAN ASSISTANT

## 2017-10-24 PROCEDURE — 90472 IMMUNIZATION ADMIN EACH ADD: CPT | Performed by: PHYSICIAN ASSISTANT

## 2017-10-24 PROCEDURE — 80053 COMPREHEN METABOLIC PANEL: CPT | Performed by: PHYSICIAN ASSISTANT

## 2017-10-24 PROCEDURE — 90715 TDAP VACCINE 7 YRS/> IM: CPT | Performed by: PHYSICIAN ASSISTANT

## 2017-10-24 PROCEDURE — 90471 IMMUNIZATION ADMIN: CPT | Performed by: PHYSICIAN ASSISTANT

## 2017-10-24 PROCEDURE — 99397 PER PM REEVAL EST PAT 65+ YR: CPT | Mod: 25 | Performed by: PHYSICIAN ASSISTANT

## 2017-10-24 PROCEDURE — 84443 ASSAY THYROID STIM HORMONE: CPT | Performed by: PHYSICIAN ASSISTANT

## 2017-10-24 NOTE — MR AVS SNAPSHOT
After Visit Summary   10/24/2017    Porsche Manning    MRN: [id number]           Patient Information     Date Of Birth          1950        Visit Information        Provider Department      10/24/2017 9:40 AM Danuta Tan PA-C [facility]        Today's Diagnoses     Routine general medical examination at a health care facility    -  1    Type 2 diabetes mellitus with stage 3 chronic kidney disease, without long-term current use of insulin (H)        Hypertension: MEASURE BP IN RIGHT ARM ONLY        Chronic obstructive pulmonary disease, unspecified COPD type (H)        Need for vaccination        Special screening for malignant neoplasms, colon          Care Instructions    Encouraged mucinex/guafenisin, warm salt water gargles, cepacol spray, soothers/lozenges, sinus rinses (neilmed), vitamin c, fluids and rest.  May alternate tylenol and NSAIDS (ibuprofen, advil, aleve type products) every 4-6 hours for the next few days as needed.    Return to clinic with any worsening or changes in symptoms.         Preventive Health Recommendations    Female Ages 65 +    Yearly exam:     See your health care provider every year in order to  o Review health changes.   o Discuss preventive care.    o Review your medicines if your doctor has prescribed any.      You no longer need a yearly Pap test unless you've had an abnormal Pap test in the past 10 years. If you have vaginal symptoms, such as bleeding or discharge, be sure to talk with your provider about a Pap test.      Every 1 to 2 years, have a mammogram.  If you are over 69, talk with your health care provider about whether or not you want to continue having screening mammograms.      Every 10 years, have a colonoscopy. Or, have a yearly FIT test (stool test). These exams will check for colon cancer.       Have a cholesterol test every 5 years, or more often if your doctor advises it.       Have a diabetes test (fasting  glucose) every three years. If you are at risk for diabetes, you should have this test more often.       At age 65, have a bone density scan (DEXA) to check for osteoporosis (brittle bone disease).    Shots:    Get a flu shot each year.    Get a tetanus shot every 10 years.    Talk to your doctor about your pneumonia vaccines. There are now two you should receive - Pneumovax (PPSV 23) and Prevnar (PCV 13).    Talk to your doctor about the shingles vaccine.    Talk to your doctor about the hepatitis B vaccine.    Nutrition:     Eat at least 5 servings of fruits and vegetables each day.      Eat whole-grain bread, whole-wheat pasta and brown rice instead of white grains and rice.      Talk to your provider about Calcium and Vitamin D.     Lifestyle    Exercise at least 150 minutes a week (30 minutes a day, 5 days a week). This will help you control your weight and prevent disease.      Limit alcohol to one drink per day.      No smoking.       Wear sunscreen to prevent skin cancer.       See your dentist twice a year for an exam and cleaning.      See your eye doctor every 1 to 2 years to screen for conditions such as glaucoma, macular degeneration and cataracts.    Call Central Scheduling [phone] to schedule your colonoscopy.            Follow-ups after your visit        Additional Services     GASTROENTEROLOGY ADULT REF PROCEDURE ONLY       Last Lab Result: Creatinine (mg/dL)       Date                     Value                 10/17/2016               1.07 (H)         ----------  Body mass index is 36.58 kg/(m^2).     Needed:  No  Language:  English    Patient will be contacted to schedule procedure.     Please be aware that coverage of these services is subject to the terms and limitations of your health insurance plan.  Call member services at your health plan with any benefit or coverage questions.  Any procedures must be performed at a [facility] facility OR coordinated by your clinic's referral  office.    Please bring the following with you to your appointment:    (1) Any X-Rays, CTs or MRIs which have been performed.  Contact the facility where they were done to arrange for  prior to your scheduled appointment.    (2) List of current medications   (3) This referral request   (4) Any documents/labs given to you for this referral                  Your next 10 appointments already scheduled     Oct 24, 2017 10:30 AM CDT   DX HIP/PELVIS/SPINE with HWDX1   [facility] ([facility])    [address] [place] [id number] [phone]           Please do not take any of the following 24 hours prior to the day of your exam: vitamins, calcium tablets, antacids.  If possible, please wear clothes without metal (snaps, zippers). A sweatsuit works well.              Who to contact     If you have questions or need follow up information about today's clinic visit or your schedule please contact [facility] directly at [phone].  Normal or non-critical lab and imaging results will be communicated to you by MyChart, letter or phone within 4 business days after the clinic has received the results. If you do not hear from us within 7 days, please contact the clinic through [facility] or phone. If you have a critical or abnormal lab result, we will notify you by phone as soon as possible.  Submit refill requests through [facility] or call your pharmacy and they will forward the refill request to us. Please allow 3 business days for your refill to be completed.          Additional Information About Your Visit        [facility] Information     [facility] gives you secure access to your electronic health record. If you see a primary care provider, you can also send messages to your care team and make appointments. If you have questions, please call your primary care clinic.  If you do not have a primary care provider, please call [phone] and they will assist you.    "     Care EveryWhere ID     This is your Care EveryWhere ID. This could be used by other organizations to access your [facility] medical records  [id number]        Your Vitals Were     Pulse Temperature Respirations Height Pulse Oximetry BMI (Body Mass Index)    66 97.6  F (36.4  C) (Oral) 16 5' 2\" (1.575 m) 94% 36.58 kg/m2       Blood Pressure from Last 3 Encounters:   10/24/17 177/82   09/19/17 161/79   10/17/16 136/82    Weight from Last 3 Encounters:   10/24/17 200 lb (90.7 kg)   09/19/17 200 lb 8 oz (90.9 kg)   10/17/16 191 lb 8 oz (86.9 kg)              We Performed the Following     Albumin Random Urine Quantitative with Creat Ratio     Comprehensive metabolic panel     FOOT EXAM     GASTROENTEROLOGY ADULT REF PROCEDURE ONLY     Hemoglobin A1c     PAF COMPLETED     TDAP (ADACEL)     TSH with free T4 reflex     ZOSTER VACC LIVE SUBQ NJX          Today's Medication Changes          These changes are accurate as of: 10/24/17 10:22 AM.  If you have any questions, ask your nurse or doctor.               Stop taking these medicines if you haven't already. Please contact your care team if you have questions.     nicotine 21 MG/24HR 24 hr patch   Commonly known as:  NICODERM CQ   Stopped by:  Danuta Tan PA-C           order for DME   Stopped by:  Danuta Tan PA-C                    Primary Care Provider Office Phone # Fax #    Danuta Tan PA-C [phone]       [address]  [place] [address]        Equal Access to Services     RONAN NIELSEN : Hadii steph ku hadasho Soomaali, waaxda luqadaha, qaybta kaalmada adeegyada, waxrandy idiin hayaan adeeg kharash la'aan . So [facility] [phone].    ATENCIÓN: Si habla español, tiene a antunez disposición servicios gratuitos de asistencia lingüística. Llame al [phone].    We comply with applicable federal civil rights laws and [place] laws. We do not discriminate on the basis of race, color, national origin, age, " disability, sex, sexual orientation, or gender identity.            Thank you!     Thank you for choosing [facility]  for your care. Our goal is always to provide you with excellent care. Hearing back from our patients is one way we can continue to improve our services. Please take a few minutes to complete the written survey that you may receive in the mail after your visit with us. Thank you!             Your Updated Medication List - Protect others around you: Learn how to safely use, store and throw away your medicines at www.disposemymeds.org.          This list is accurate as of: 10/24/17 10:22 AM.  Always use your most recent med list.                   Brand Name Dispense Instructions for use Diagnosis    aspirin 325 MG EC tablet     100    1 tab po QD (Once per day)    Other specified pre-operative examination       clopidogrel 75 MG tablet    PLAVIX    90 tablet    Take 1 tablet (75 mg) by mouth daily    Peripheral vascular disease, unspecified       diltiazem 240 MG 24 hr capsule    CARDIZEM CD    90 capsule    Take 1 capsule (240 mg) by mouth daily    Old myocardial infarction       lisinopril 20 MG tablet    PRINIVIL/ZESTRIL    90 tablet    Take 1 tablet (20 mg) by mouth daily    Coronary artery disease due to lipid rich plaque       metoprolol 100 MG 24 hr tablet    TOPROL-XL    90 tablet    Take 1 tablet (100 mg) by mouth daily    Hypertension goal BP (blood pressure) < 140/90, Coronary artery disease due to lipid rich plaque       rosuvastatin 40 MG tablet    CRESTOR    90 tablet    Take 1 tablet (40 mg) by mouth daily    Hyperlipidemia LDL goal <70       tiotropium 18 MCG capsule    SPIRIVA HANDIHALER    90 capsule    Inhale 1 capsule (18 mcg) into the lungs daily    Chronic obstructive pulmonary disease, unspecified COPD type (H)       vitamin D 1000 UNITS capsule      Take 1 capsule by mouth daily. Take one tablet daily

## 2017-10-24 NOTE — NURSING NOTE
"Chief Complaint   Patient presents with     Physical       Initial /82 (BP Location: Right arm, Patient Position: Sitting, Cuff Size: Adult Large)  Pulse 66  Temp 97.6  F (36.4  C) (Oral)  Resp 16  Ht 5' 2\" (1.575 m)  Wt 200 lb (90.7 kg)  SpO2 94%  BMI 36.58 kg/m2 Estimated body mass index is 36.58 kg/(m^2) as calculated from the following:    Height as of this encounter: 5' 2\" (1.575 m).    Weight as of this encounter: 200 lb (90.7 kg).  Medication Reconciliation: complete     Yan Meier CMA  "

## 2017-10-24 NOTE — PROGRESS NOTES
SUBJECTIVE:   Porsche Manning is a 67 year old female who presents for Preventive Visit.    Are you in the first 12 months of your Medicare coverage?  Yes,  Visual Acuity:  Right Eye:    Left Eye:   Both Eyes:  (Patient had her eye exam done at her eye doctor 3/28/17 and deferred eye exam today in clinic.)    Physical   Annual:     Getting at least 3 servings of Calcium per day::  Yes    Bi-annual eye exam::  Yes    Dental care twice a year::  Yes    Sleep apnea or symptoms of sleep apnea::  None    Diet::  Low fat/cholesterol and Breakfast skipped    Frequency of exercise::  2-3 days/week    Duration of exercise::  30-45 minutes    Taking medications regularly::  Yes    Medication side effects::  None    Additional concerns today::  YES    Other:  Left arm pain for a few months, hurts when pulling and reaching; getting better slowly but wondering what it could be.    COGNITIVE SCREEN  1) Repeat 3 items (Banana, Sunrise, Chair)    2) Clock draw: ABNORMAL  3) 3 item recall: Recalls 3 objects  Results: ABNORMAL clock, 3 items recalled: PROBABLE COGNITIVE IMPAIRMENT, **INFORM PROVIDER**    Mini-CogTM Copyright S Janie. Licensed by the author for use in [facility]; reprinted with permission (jaime@[facility]). All rights reserved.          Reviewed and updated as needed this visit by clinical staffTobacco  Allergies  Meds  Problems  Med Hx  Surg Hx  Fam Hx  Soc Hx          Reviewed and updated as needed this visit by Provider  Allergies  Meds  Problems        Social History   Substance Use Topics     Smoking status: Current Some Day Smoker     Packs/day: 0.50     Years: 40.00     Types: Cigarettes     Last attempt to quit: 3/5/2016     Smokeless tobacco: Never Used     Alcohol use Yes      Comment: 2 to 4 beers or mixed drinks weekly       The patient does not drink >3 drinks per day nor >7 drinks per week.          Today's PHQ-2 Score:   PHQ-2 ( 1999 Pfizer) 10/23/2017   Q1: Little interest or  pleasure in doing things 0   Q2: Feeling down, depressed or hopeless 0   PHQ-2 Score 0   Q1: Little interest or pleasure in doing things Not at all   Q2: Feeling down, depressed or hopeless Not at all   PHQ-2 Score 0       Do you feel safe in your environment - Yes    Do you have a Health Care Directive?: Patient said she had one on file years ago but it is not in patient's chart.     Current providers sharing in care for this patient include:   Patient Care Team:  Danuta Tan PA-C as PCP - General (Physician Assistant)      Hearing impairment: No    Ability to successfully perform activities of daily living: Yes, no assistance needed     Fall risk:  Fallen 2 or more times in the past year?: No  Any fall with injury in the past year?: No      Home safety:  none identified      The following health maintenance items are reviewed in Epic and correct as of today:  Health Maintenance   Topic Date Due     DEXA SCAN SCREENING (SYSTEM ASSIGNED)  03/28/2016     COLON CANCER SCREEN (SYSTEM ASSIGNED)  07/27/2016     ADVANCE DIRECTIVE PLANNING Q5 YRS  12/15/2016     COPD ACTION PLAN Q1 YR  03/08/2017     FALL RISK ASSESSMENT  10/17/2017     EYE EXAM Q1 YEAR  03/28/2018     A1C Q6 MO  04/24/2018     LIPID MONITORING Q1 YEAR  09/19/2018     FOOT EXAM Q1 YEAR  10/24/2018     CREATININE Q1 YEAR  10/24/2018     MICROALBUMIN Q1 YEAR  10/24/2018     MAMMO SCREEN Q2 YR (SYSTEM ASSIGNED)  08/16/2019     TSH W/ FREE T4 REFLEX Q2 YEAR  10/24/2019     TETANUS IMMUNIZATION (SYSTEM ASSIGNED)  10/24/2027     INFLUENZA VACCINE (SYSTEM ASSIGNED)  Completed     SPIROMETRY ONETIME  Completed     PNEUMOCOCCAL  Completed     HEPATITIS C SCREENING  Completed     Labs reviewed in EPIC  Patient Active Problem List   Diagnosis     Old myocardial infarction     Malignant neoplasm of other specified sites of cervix     Peripheral vascular disease (H)     HYPERLIPIDEMIA LDL GOAL <100     Hypertension: MEASURE BP IN RIGHT ARM ONLY      Osteopenia     Vitamin D deficiency disease     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)     Health Care Home     Advanced directives, counseling/discussion     Subclavian artery stenosis, left (H)     Hyponatremia     CKD (chronic kidney disease) stage 3, GFR 30-59 ml/min     Chronic obstructive pulmonary disease, unspecified COPD type (H)     Past Surgical History:   Procedure Laterality Date     BIOPSY      Sample taken from back     CARDIAC SURGERY      Stents     COLONOSCOPY  1996    Results were ok     HYSTERECTOMY, PAP NO LONGER INDICATED  1989    ovaries only, no cervix per pt     SURGICAL HISTORY OF - 1995    bunionectomy     SURGICAL HISTORY OF -   10/10/03    cardiac stenting     SURGICAL HISTORY OF -     stent placed in both of her legs for pad     VASCULAR SURGERY      PAD       Social History   Substance Use Topics     Smoking status: Current Some Day Smoker     Packs/day: 0.50     Years: 40.00     Types: Cigarettes     Last attempt to quit: 3/5/2016     Smokeless tobacco: Never Used     Alcohol use Yes      Comment: 2 to 4 beers or mixed drinks weekly     Family History   Problem Relation Age of Onset     C.A.D. Mother      Breast Cancer Mother      C.A.D. Father      DIABETES Father      Hypertension Father      C.A.D. Maternal Grandfather      C.A.D. Paternal Grandfather      Neurologic Disorder Brother      epilepsy         Current Outpatient Prescriptions   Medication Sig Dispense Refill     metoprolol (TOPROL-XL) 100 MG 24 hr tablet Take 1 tablet (100 mg) by mouth daily 90 tablet 3     diltiazem (CARDIZEM CD) 240 MG 24 hr capsule Take 1 capsule (240 mg) by mouth daily 90 capsule 3     lisinopril (PRINIVIL/ZESTRIL) 20 MG tablet Take 1 tablet (20 mg) by mouth daily 90 tablet 3     rosuvastatin (CRESTOR) 40 MG tablet Take 1 tablet (40 mg) by mouth daily 90 tablet 3     clopidogrel (PLAVIX) 75 MG tablet Take 1 tablet (75 mg) by mouth daily 90 tablet 3     tiotropium (SPIRIVA  "HANDIHALER) 18 MCG capsule Inhale 1 capsule (18 mcg) into the lungs daily 90 capsule 3     Cholecalciferol (VITAMIN D) 1000 UNIT capsule Take 1 capsule by mouth daily. Take one tablet daily       ASPIRIN 325 MG OR TBEC 1 tab po QD (Once per day) 100 3     Allergies   Allergen Reactions     Morphine Nausea and Vomiting     Penicillins Nausea and Vomiting         Pneumonia Vaccine:Adults age 65+ who have not received previous Pneumovax (PPSV23) or PCV13 as an adult: Should first be given PCV13 AND then should be given PPSV23 6-12 months after PCV13  Mammogram Screening: Patient over age 50, mutual decision to screen reflected in health maintenance.  Review of Systems  Constitutional, HEENT, cardiovascular, pulmonary, gi and gu systems are negative, except as otherwise noted.      OBJECTIVE:   /82 (BP Location: Right arm, Patient Position: Sitting, Cuff Size: Adult Large)  Pulse 66  Temp 97.6  F (36.4  C) (Oral)  Resp 16  Ht 5' 2\" (1.575 m)  Wt 200 lb (90.7 kg)  SpO2 94%  BMI 36.58 kg/m2 Estimated body mass index is 36.58 kg/(m^2) as calculated from the following:    Height as of this encounter: 5' 2\" (1.575 m).    Weight as of this encounter: 200 lb (90.7 kg).  Physical Exam  GENERAL: healthy, alert and no distress  EYES: Eyes grossly normal to inspection, PERRL and conjunctivae and sclerae normal  HENT: ear canals and TM's normal, nose and mouth without ulcers or lesions  NECK: no adenopathy, no asymmetry, masses, or scars and thyroid normal to palpation  RESP: lungs clear to auscultation - no rales, rhonchi or wheezes  BREAST: normal without masses, tenderness or nipple discharge and no palpable axillary masses or adenopathy  CV: regular rate and rhythm, normal S1 S2, no S3 or S4, no murmur, click or rub, no peripheral edema and peripheral pulses strong  ABDOMEN: soft, nontender, no hepatosplenomegaly, no masses and bowel sounds normal  MS: no gross musculoskeletal defects noted, no edema  SKIN: no " "suspicious lesions or rashes  NEURO: Normal strength and tone, mentation intact and speech normal  PSYCH: mentation appears normal, affect normal/bright    ASSESSMENT / PLAN:       ICD-10-CM    1. Routine general medical examination at a health care facility Z00.00    2. Type 2 diabetes mellitus with stage 3 chronic kidney disease, without long-term current use of insulin (H) E11.22 Hemoglobin A1c    N18.3 Comprehensive metabolic panel     Albumin Random Urine Quantitative with Creat Ratio     TSH with free T4 reflex     FOOT EXAM   3. Hypertension: MEASURE BP IN RIGHT ARM ONLY I10 Albumin Random Urine Quantitative with Creat Ratio   4. Chronic obstructive pulmonary disease, unspecified COPD type (H) J44.9    5. Need for vaccination Z23 TDAP (ADACEL)     ZOSTER VACC LIVE SUBQ NJX     PNEUMOCOCCAL VACCINE,ADULT,SQ OR IM     EA ADD'L VACCINE     CANCELED: EA ADD'L VACCINE   6. Need for prophylactic vaccination and inoculation against influenza Z23 FLU VACCINE, INCREASED ANTIGEN, PRESV FREE, AGE 65+ [32770]     Vaccine Administration, Initial [50386]   7. Special screening for malignant neoplasms, colon Z12.11 GASTROENTEROLOGY ADULT REF PROCEDURE ONLY       End of Life Planning:  Patient currently has an advanced directive: No.  I have verified the patient's ablity to prepare an advanced directive/make health care decisions.  Literature was provided to assist patient in preparing an advanced directive.    COUNSELING:  Reviewed preventive health counseling, as reflected in patient instructions       Regular exercise       Healthy diet/nutrition       Vision screening       Colon cancer screening        Estimated body mass index is 36.58 kg/(m^2) as calculated from the following:    Height as of this encounter: 5' 2\" (1.575 m).    Weight as of this encounter: 200 lb (90.7 kg).  Weight management plan: Discussed healthy diet and exercise guidelines and patient will follow up in 6 months in clinic to re-evaluate.   reports " that she has been smoking Cigarettes.  She has a 20.00 pack-year smoking history. She has never used smokeless tobacco.  Tobacco Cessation Action Plan: Information offered: Patient not interested at this time    Appropriate preventive services were discussed with this patient, including applicable screening as appropriate for cardiovascular disease, diabetes, osteopenia/osteoporosis, and glaucoma.  As appropriate for age/gender, discussed screening for colorectal cancer, prostate cancer, breast cancer, and cervical cancer. Checklist reviewing preventive services available has been given to the patient.    Reviewed patients plan of care and provided an AVS. The Basic Care Plan (routine screening as documented in Health Maintenance) for Porsche meets the Care Plan requirement. This Care Plan has been established and reviewed with the Patient.    Counseling Resources:  ATP IV Guidelines  Pooled Cohorts Equation Calculator  Breast Cancer Risk Calculator  FRAX Risk Assessment  ICSI Preventive Guidelines  Dietary Guidelines for Americans, 2010  [employer]'s MyPlate  ASA Prophylaxis  Lung CA Screening    Danuta Tan PA-C  [facility]  Answers for HPI/ROS submitted by the patient on 10/23/2017   PHQ-2 Score: 0    Injectable Influenza Immunization Documentation    1.  Is the person to be vaccinated sick today?   No    2. Does the person to be vaccinated have an allergy to a component   of the vaccine?   No  Egg Allergy Algorithm Link    3. Has the person to be vaccinated ever had a serious reaction   to influenza vaccine in the past?   No    4. Has the person to be vaccinated ever had Guillain-Barré syndrome?   No    Form completed by patient    Yan Meier CMA

## 2017-10-24 NOTE — NURSING NOTE
"Vitals:    10/24/17 0946 10/24/17 1123   BP: 177/82 162/78   BP Location: Right arm Right arm   Patient Position: Sitting Sitting   Cuff Size: Adult Large Adult Large   Pulse: 66 60   Resp: 16    Temp: 97.6  F (36.4  C)    TempSrc: Oral    SpO2: 94%    Weight: 200 lb (90.7 kg)    Height: 5' 2\" (1.575 m)          Yan Meier CMA  "

## 2017-10-24 NOTE — PATIENT INSTRUCTIONS
Encouraged mucinex/guafenisin, warm salt water gargles, cepacol spray, soothers/lozenges, sinus rinses (neilmed), vitamin c, fluids and rest.  May alternate tylenol and NSAIDS (ibuprofen, advil, aleve type products) every 4-6 hours for the next few days as needed.    Return to clinic with any worsening or changes in symptoms.         Preventive Health Recommendations    Female Ages 65 +    Yearly exam:     See your health care provider every year in order to  o Review health changes.   o Discuss preventive care.    o Review your medicines if your doctor has prescribed any.      You no longer need a yearly Pap test unless you've had an abnormal Pap test in the past 10 years. If you have vaginal symptoms, such as bleeding or discharge, be sure to talk with your provider about a Pap test.      Every 1 to 2 years, have a mammogram.  If you are over 69, talk with your health care provider about whether or not you want to continue having screening mammograms.      Every 10 years, have a colonoscopy. Or, have a yearly FIT test (stool test). These exams will check for colon cancer.       Have a cholesterol test every 5 years, or more often if your doctor advises it.       Have a diabetes test (fasting glucose) every three years. If you are at risk for diabetes, you should have this test more often.       At age 65, have a bone density scan (DEXA) to check for osteoporosis (brittle bone disease).    Shots:    Get a flu shot each year.    Get a tetanus shot every 10 years.    Talk to your doctor about your pneumonia vaccines. There are now two you should receive - Pneumovax (PPSV 23) and Prevnar (PCV 13).    Talk to your doctor about the shingles vaccine.    Talk to your doctor about the hepatitis B vaccine.    Nutrition:     Eat at least 5 servings of fruits and vegetables each day.      Eat whole-grain bread, whole-wheat pasta and brown rice instead of white grains and rice.      Talk to your provider about Calcium and  Vitamin D.     Lifestyle    Exercise at least 150 minutes a week (30 minutes a day, 5 days a week). This will help you control your weight and prevent disease.      Limit alcohol to one drink per day.      No smoking.       Wear sunscreen to prevent skin cancer.       See your dentist twice a year for an exam and cleaning.      See your eye doctor every 1 to 2 years to screen for conditions such as glaucoma, macular degeneration and cataracts.    Call Central Scheduling [phone] to schedule your colonoscopy.

## 2017-10-24 NOTE — NURSING NOTE
Screening Questionnaire for Adult Immunization     Are you sick today?   No    Do you have allergies to medications, food or any vaccine?   Yes    Have you ever had a serious reaction after receiving a vaccination?   No    Do you have a long-term health problem with heart disease, lung disease,  asthma, kidney disease, diabetes, anemia, metabolic or blood disease?   Yes    Do you have cancer, leukemia, AIDS, or any immune system problem?   No    Do you take cortisone, prednisone, other steroids, or anticancer drugs, or  have you had any x-ray (radiation) treatments?   No    Have you had a seizure, brain, or other nervous system problem?   No    During the past year, have you received a transfusion of blood or blood       products, or been given a medicine called immune (gamma) globulin?   No    For women: Are you pregnant or is there a chance you could become         pregnant during the next month?   No    Have you received any vaccinations in the past 4 weeks?   No     Immunization questionnaire was positive for at least one answer.  Notified PLosser.      MNVFC doesn't apply on this patient      Per orders of Plosser, injection of tdap, shingle, flu and pneumovax 23 given by Yan Meier. Patient instructed to remain in clinic for 20 minutes afterwards, and to report any adverse reaction to me immediately.    Prior to injection verified patient identity using patient's name and date of birth.         Screening performed by Yan Meier, CMA

## 2017-10-25 LAB
ALBUMIN SERPL-MCNC: 3.6 G/DL (ref 3.4–5)
ALP SERPL-CCNC: 108 U/L (ref 40–150)
ALT SERPL W P-5'-P-CCNC: 21 U/L (ref 0–50)
ANION GAP SERPL CALCULATED.3IONS-SCNC: 6 MMOL/L (ref 3–14)
AST SERPL W P-5'-P-CCNC: 15 U/L (ref 0–45)
BILIRUB SERPL-MCNC: 0.4 MG/DL (ref 0.2–1.3)
BUN SERPL-MCNC: 10 MG/DL (ref 7–30)
CALCIUM SERPL-MCNC: 9.4 MG/DL (ref 8.5–10.1)
CHLORIDE SERPL-SCNC: 102 MMOL/L (ref 94–109)
CO2 SERPL-SCNC: 27 MMOL/L (ref 20–32)
CREAT SERPL-MCNC: 0.95 MG/DL (ref 0.52–1.04)
GFR SERPL CREATININE-BSD FRML MDRD: 59 ML/MIN/1.7M2
GLUCOSE SERPL-MCNC: 121 MG/DL (ref 70–99)
POTASSIUM SERPL-SCNC: 4.1 MMOL/L (ref 3.4–5.3)
PROT SERPL-MCNC: 7.3 G/DL (ref 6.8–8.8)
SODIUM SERPL-SCNC: 135 MMOL/L (ref 133–144)
TSH SERPL DL<=0.005 MIU/L-ACNC: 0.93 MU/L (ref 0.4–4)

## 2017-10-26 ENCOUNTER — TELEPHONE (OUTPATIENT)
Dept: FAMILY MEDICINE | Facility: CLINIC | Age: 67
End: 2017-10-26

## 2017-10-26 NOTE — TELEPHONE ENCOUNTER
10/26/2017      Patient is aware and will call back to schedule Colon in spring 2018. Provider is fine with decision per patient, no more calls required.        Outreach ,  Ranjeet Herring

## 2017-10-26 NOTE — PROGRESS NOTES
"Daisy Porsche  Your attached labs are normal/stable, but your HgA1C, also called glycosylated hemoglobin, which measures the level of sugar in your blood over the past few months, is a bit more elevated, so we need to find a way to control your blood sugar better.  It is still in the prediabetes range though.    Approximately 57 million Americans have pre-diabetes - blood glucose levels that are higher than normal but not yet high enough to be diagnosed as diabetes.  By taking action to manage blood sugar and other contributing risk factors, those with pre-diabetes can delay or prevent type 2 diabetes from developing.       We'll also continue to monitor your blood sugar and Hemoglobin A1C (a test assessing overall blood sugar control for the last 3 months).    Your microalbumen is still elevated. This means you have some protein in the urine. It indicates that your kidneys are being affected by diabetes and/or hypertension. Keeping blood pressure and blood fats low is the best treatment in order to keep this stable. People with microalbumen should avoid anti-inflamatory agents such as motrin, alleve and ibuprofen as much as possible. Anybody that has this should be on lisinopril-as you already are-since this is protective for the kidney's. We will continue to monitor this regularly.     Please contact the office with any questions or concerns.    Danuta Martinez \"Eduard\" PHILL Tan  "

## 2017-11-02 NOTE — PROGRESS NOTES
"Daisy Morrell  Your attached DEXA scan is suggestive of Osteopenia.  This is an intermediate category that is in between normal and osteoporosis.  People with osteopenia should work on taking at least 0857-1108 mg of calcium with vitamin D daily. They should also be getting daily weight bearing exercise (walking works).     Please contact the office with any questions or concerns.    Danuta Martinez \"Eduard\" PHILL Tan  "

## 2018-01-09 ENCOUNTER — ALLIED HEALTH/NURSE VISIT (OUTPATIENT)
Dept: NURSING | Facility: CLINIC | Age: 68
End: 2018-01-09
Payer: COMMERCIAL

## 2018-01-09 VITALS — SYSTOLIC BLOOD PRESSURE: 156 MMHG | DIASTOLIC BLOOD PRESSURE: 70 MMHG | HEART RATE: 78 BPM

## 2018-01-09 DIAGNOSIS — I10 HYPERTENSION GOAL BP (BLOOD PRESSURE) < 140/90: Primary | ICD-10-CM

## 2018-01-09 PROCEDURE — 99207 ZZC NO CHARGE NURSE ONLY: CPT

## 2018-01-09 NOTE — PROGRESS NOTES
"Patient followed by cardiology, Dr. Uriostegui, last seen 9/2017.  Patient will get in contact with them to determine next best step for medicine adjustment.  Consider increase in lisinopril vs diltiazem, but defer to cardiology.  Knows to continue to work on diet and exercise as well though.  Thanks  Danuta \"Eduard\" PHILL Tan   "

## 2018-01-09 NOTE — MR AVS SNAPSHOT
After Visit Summary   1/9/2018    Porsche Manning    MRN: [id number]           Patient Information     Date Of Birth          1950        Visit Information        Provider Department      1/9/2018 2:00 PM HW MEDICAL ASSISTANT [facility]        Today's Diagnoses     Hypertension: MEASURE BP IN RIGHT ARM ONLY    -  1       Follow-ups after your visit        Who to contact     If you have questions or need follow up information about today's clinic visit or your schedule please contact [facility] directly at [phone].  Normal or non-critical lab and imaging results will be communicated to you by Tradehill[employer], letter or phone within 4 business days after the clinic has received the results. If you do not hear from us within 7 days, please contact the clinic through [employer] or phone. If you have a critical or abnormal lab result, we will notify you by phone as soon as possible.  Submit refill requests through [employer] or call your pharmacy and they will forward the refill request to us. Please allow 3 business days for your refill to be completed.          Additional Information About Your Visit        MyChart Information     [employer] gives you secure access to your electronic health record. If you see a primary care provider, you can also send messages to your care team and make appointments. If you have questions, please call your primary care clinic.  If you do not have a primary care provider, please call [phone] and they will assist you.        Care EveryWhere ID     This is your Care EveryWhere ID. This could be used by other organizations to access your [place] medical records  [id number]        Your Vitals Were     Pulse                   78            Blood Pressure from Last 3 Encounters:   01/09/18 156/70   10/24/17 162/78   09/19/17 161/79    Weight from Last 3 Encounters:   10/24/17 200 lb (90.7 kg)   09/19/17 200 lb 8 oz (90.9 kg)   10/17/16 191 lb 8  oz (86.9 kg)              Today, you had the following     No orders found for display       Primary Care Provider Office Phone # Fax #    Danuta Tan PA-C [phone]       [address]  [facility] [address]        Equal Access to Services     RONAN NIELSEN : Hadii aad ku hadasho Soomaali, waaxda luqadaha, qaybta kaalmada adeegyada, waxay pilloin hayaan adeeg ermachelsy lavilma weaver. So [facility] [phone].    ATENCIÓN: Si habla español, tiene a antunez disposición servicios gratuitos de asistencia lingüística. Junaid al [phone].    We comply with applicable federal civil rights laws and [place] laws. We do not discriminate on the basis of race, color, national origin, age, disability, sex, sexual orientation, or gender identity.            Thank you!     Thank you for choosing [facility]  for your care. Our goal is always to provide you with excellent care. Hearing back from our patients is one way we can continue to improve our services. Please take a few minutes to complete the written survey that you may receive in the mail after your visit with us. Thank you!             Your Updated Medication List - Protect others around you: Learn how to safely use, store and throw away your medicines at www.disposemymeds.org.          This list is accurate as of: 1/9/18  2:22 PM.  Always use your most recent med list.                   Brand Name Dispense Instructions for use Diagnosis    aspirin 325 MG EC tablet     100    1 tab po QD (Once per day)    Other specified pre-operative examination       clopidogrel 75 MG tablet    PLAVIX    90 tablet    Take 1 tablet (75 mg) by mouth daily    Peripheral vascular disease, unspecified       diltiazem 240 MG 24 hr capsule    CARDIZEM CD    90 capsule    Take 1 capsule (240 mg) by mouth daily    Old myocardial infarction       lisinopril 20 MG tablet    PRINIVIL/ZESTRIL    90 tablet    Take 1 tablet (20 mg) by mouth daily    Coronary  artery disease due to lipid rich plaque       metoprolol 100 MG 24 hr tablet    TOPROL-XL    90 tablet    Take 1 tablet (100 mg) by mouth daily    Hypertension goal BP (blood pressure) < 140/90, Coronary artery disease due to lipid rich plaque       rosuvastatin 40 MG tablet    CRESTOR    90 tablet    Take 1 tablet (40 mg) by mouth daily    Hyperlipidemia LDL goal <70       tiotropium 18 MCG capsule    SPIRIVA HANDIHALER    90 capsule    Inhale 1 capsule (18 mcg) into the lungs daily    Chronic obstructive pulmonary disease, unspecified COPD type (H)       vitamin D 1000 UNITS capsule      Take 1 capsule by mouth daily. Take one tablet daily

## 2018-01-15 DIAGNOSIS — I25.2 OLD MYOCARDIAL INFARCTION: ICD-10-CM

## 2018-01-15 RX ORDER — METOPROLOL TARTRATE 100 MG
TABLET ORAL
Qty: 90 TABLET | Refills: 3 | Status: SHIPPED | OUTPATIENT
Start: 2018-01-15 | End: 2018-03-03 | Stop reason: ALTCHOICE

## 2018-02-24 DIAGNOSIS — I25.2 OLD MYOCARDIAL INFARCTION: ICD-10-CM

## 2018-02-26 RX ORDER — DILTIAZEM HYDROCHLORIDE 240 MG/1
CAPSULE, COATED, EXTENDED RELEASE ORAL
Qty: 90 CAPSULE | Refills: 1 | Status: SHIPPED | OUTPATIENT
Start: 2018-02-26 | End: 2018-05-09

## 2018-03-03 ENCOUNTER — CARE COORDINATION (OUTPATIENT)
Dept: CARDIOLOGY | Facility: CLINIC | Age: 68
End: 2018-03-03

## 2018-03-03 DIAGNOSIS — I25.2 OLD MYOCARDIAL INFARCTION: ICD-10-CM

## 2018-03-03 DIAGNOSIS — I25.10 CORONARY ARTERY DISEASE DUE TO LIPID RICH PLAQUE: ICD-10-CM

## 2018-03-03 DIAGNOSIS — I25.83 CORONARY ARTERY DISEASE DUE TO LIPID RICH PLAQUE: ICD-10-CM

## 2018-03-03 RX ORDER — LISINOPRIL 40 MG/1
40 TABLET ORAL DAILY
Qty: 90 TABLET | Refills: 3 | Status: SHIPPED | OUTPATIENT
Start: 2018-03-03 | End: 2018-05-09

## 2018-03-03 NOTE — PROGRESS NOTES
Date: 3/3/2018    Time of Call: 9:24 AM     Diagnosis:  hypertension     [ TORB ] Ordering provider: Dr. Brayan Uriostegui   Order: increase lisinopril to 40 mg every day     Order received by: Minerva Chawla RN     Follow-up/additional notes: patient understands and agrees to the plan.

## 2018-03-13 ENCOUNTER — MYC MEDICAL ADVICE (OUTPATIENT)
Dept: CARDIOLOGY | Facility: CLINIC | Age: 68
End: 2018-03-13

## 2018-03-20 ENCOUNTER — TELEPHONE (OUTPATIENT)
Dept: GASTROENTEROLOGY | Facility: OUTPATIENT CENTER | Age: 68
End: 2018-03-20

## 2018-03-20 NOTE — TELEPHONE ENCOUNTER
Patient taking any blood thinners ? Advised patient to contact her provider about stopping Plavix 5 days prior to exam    Heart disease ? Stenting x 7 years ago    Lung disease ? denies      Sleep apnea ? denies    Diabetic ? denies    Kidney disease ? denies    Dialysis ? n/a    Electronic implanted medical devices ? denies    Are you taking any narcotic pain medication ?  no What is your daily dosage ?    PTSD ? n/a    Prep instructions reviewed with patient ? Instructions,  policy, MAC sedation plan reviewed. Advised patient to have someone stay with her post exam    Pharm[facility] : [facility]    Indication for procedure : Screening colonoscopy    Referring provider :Danuta Tan PA-C     Arrival Time : Patient will arrive at 10 AM

## 2018-03-27 ENCOUNTER — TRANSFERRED RECORDS (OUTPATIENT)
Dept: HEALTH INFORMATION MANAGEMENT | Facility: CLINIC | Age: 68
End: 2018-03-27

## 2018-03-27 ENCOUNTER — DOCUMENTATION ONLY (OUTPATIENT)
Dept: GASTROENTEROLOGY | Facility: OUTPATIENT CENTER | Age: 68
End: 2018-03-27
Payer: COMMERCIAL

## 2018-03-27 DIAGNOSIS — Z12.11 ENCOUNTER FOR SCREENING COLONOSCOPY: Primary | ICD-10-CM

## 2018-04-04 ENCOUNTER — TRANSFERRED RECORDS (OUTPATIENT)
Dept: HEALTH INFORMATION MANAGEMENT | Facility: CLINIC | Age: 68
End: 2018-04-04

## 2018-05-01 ENCOUNTER — OFFICE VISIT (OUTPATIENT)
Dept: FAMILY MEDICINE | Facility: CLINIC | Age: 68
End: 2018-05-01
Payer: COMMERCIAL

## 2018-05-01 ENCOUNTER — TELEPHONE (OUTPATIENT)
Dept: FAMILY MEDICINE | Facility: CLINIC | Age: 68
End: 2018-05-01

## 2018-05-01 VITALS
RESPIRATION RATE: 18 BRPM | TEMPERATURE: 98.9 F | DIASTOLIC BLOOD PRESSURE: 70 MMHG | OXYGEN SATURATION: 92 % | SYSTOLIC BLOOD PRESSURE: 134 MMHG | HEART RATE: 101 BPM

## 2018-05-01 DIAGNOSIS — J20.9 ACUTE BRONCHITIS, UNSPECIFIED ORGANISM: ICD-10-CM

## 2018-05-01 DIAGNOSIS — I10 HYPERTENSION GOAL BP (BLOOD PRESSURE) < 140/90: ICD-10-CM

## 2018-05-01 DIAGNOSIS — J44.9 CHRONIC OBSTRUCTIVE PULMONARY DISEASE, UNSPECIFIED COPD TYPE (H): Primary | ICD-10-CM

## 2018-05-01 PROCEDURE — 99214 OFFICE O/P EST MOD 30 MIN: CPT | Mod: 25 | Performed by: PHYSICIAN ASSISTANT

## 2018-05-01 PROCEDURE — 94640 AIRWAY INHALATION TREATMENT: CPT | Performed by: PHYSICIAN ASSISTANT

## 2018-05-01 RX ORDER — ALBUTEROL SULFATE 90 UG/1
2 AEROSOL, METERED RESPIRATORY (INHALATION) EVERY 6 HOURS PRN
Qty: 1 INHALER | Refills: 3 | Status: SHIPPED | OUTPATIENT
Start: 2018-05-01 | End: 2019-01-29

## 2018-05-01 RX ORDER — IPRATROPIUM BROMIDE AND ALBUTEROL SULFATE 2.5; .5 MG/3ML; MG/3ML
1 SOLUTION RESPIRATORY (INHALATION) ONCE
Qty: 3 ML | Refills: 0 | Status: CANCELLED | OUTPATIENT
Start: 2018-05-01 | End: 2018-05-01

## 2018-05-01 RX ORDER — AZITHROMYCIN 250 MG/1
TABLET, FILM COATED ORAL
Qty: 6 TABLET | Refills: 0 | Status: SHIPPED | OUTPATIENT
Start: 2018-05-01 | End: 2018-05-09

## 2018-05-01 RX ORDER — ALBUTEROL SULFATE 0.83 MG/ML
1 SOLUTION RESPIRATORY (INHALATION) EVERY 6 HOURS PRN
Qty: 25 VIAL | Refills: 1 | Status: SHIPPED | OUTPATIENT
Start: 2018-05-01 | End: 2018-09-20

## 2018-05-01 RX ORDER — IPRATROPIUM BROMIDE AND ALBUTEROL SULFATE 2.5; .5 MG/3ML; MG/3ML
1 SOLUTION RESPIRATORY (INHALATION) ONCE
Qty: 3 ML | Refills: 0
Start: 2018-05-01 | End: 2018-05-01

## 2018-05-01 RX ORDER — PREDNISONE 20 MG/1
40 TABLET ORAL DAILY
Qty: 10 TABLET | Refills: 0 | Status: SHIPPED | OUTPATIENT
Start: 2018-05-01 | End: 2018-05-06

## 2018-05-01 NOTE — TELEPHONE ENCOUNTER
Pt got a cough on 4/28/18.  Wheezing heard on phone.  Chills.  PT describes weakness.    I discussed limitations of treatment in clinic.  Based on sx, pt may need to go to ER.  PT didn't want to go to ER.    PT was sick like this a few years ago, she needed a neb.  Pt made appt to be seen in one hour.  PT doesn't have anyone to drive her. She will drive herself.  DONNA Lazar

## 2018-05-01 NOTE — PATIENT INSTRUCTIONS
Encouraged mucinex/guafenisin, warm salt water gargles, cepacol spray, soothers/lozenges, sinus rinses (neilmed), vitamin c, fluids and rest.  May alternate tylenol and NSAIDS (ibuprofen, advil, aleve type products) every 4-6 hours for the next few days as needed.    Prescription for zpack (antibiotic) and oral prednisone (x 3-5 days) sent to pharmacy - TAKE WITH FOOD and earlier in the day.  Prescription for nebulizer printed as well.  Return to clinic with any worsening or changes in symptoms.

## 2018-05-01 NOTE — MR AVS SNAPSHOT
After Visit Summary   5/1/2018    Porsche Manning    MRN: [id number]           Patient Information     Date Of Birth          1950        Visit Information        Provider Department      5/1/2018 3:20 PM Danuta Tan PA-C [facility]        Today's Diagnoses     Chronic obstructive pulmonary disease, unspecified COPD type (H)    -  1    Acute bronchitis, unspecified organism        Hypertension: MEASURE BP IN RIGHT ARM ONLY          Care Instructions    Encouraged mucinex/guafenisin, warm salt water gargles, cepacol spray, soothers/lozenges, sinus rinses (neilmed), vitamin c, fluids and rest.  May alternate tylenol and NSAIDS (ibuprofen, advil, aleve type products) every 4-6 hours for the next few days as needed.    Prescription for zpack (antibiotic) and oral prednisone (x 3-5 days) sent to pharmacy - TAKE WITH FOOD and earlier in the day.  Prescription for nebulizer printed as well.  Return to clinic with any worsening or changes in symptoms.           Follow-ups after your visit        Follow-up notes from your care team     Return if symptoms worsen or fail to improve.      Your next 10 appointments already scheduled     Sep 25, 2018  8:30 AM CDT   (Arrive by 8:15 AM)   Return Visit with Brayan Uriostegui MD   [facility] ([facility])    [address] [place] [address]  [address] [place] [address] [phone]              Who to contact     If you have questions or need follow up information about today's clinic visit or your schedule please contact [facility] directly at [phone].  Normal or non-critical lab and imaging results will be communicated to you by MyChart, letter or phone within 4 business days after the clinic has received the results. If you do not hear from us within 7 days, please contact the clinic through MyChart or phone. If you have a critical or abnormal lab result, we will notify you by  phone as soon as possible.  Submit refill requests through HRBoss or call your pharmacy and they will forward the refill request to us. Please allow 3 business days for your refill to be completed.          Additional Information About Your Visit        [email or web address] Information     HR[employer] gives you secure access to your electronic health record. If you see a primary care provider, you can also send messages to your care team and make appointments. If you have questions, please call your primary care clinic.  If you do not have a primary care provider, please call [phone] and they will assist you.        Care EveryWhere ID     This is your Care EveryWhere ID. This could be used by other organizations to access your [place] medical records  [id number]        Your Vitals Were     Pulse Temperature Respirations Pulse Oximetry          101 98.9  F (37.2  C) (Oral) 18 86%         Blood Pressure from Last 3 Encounters:   05/01/18 134/70   01/09/18 156/70   10/24/17 162/78    Weight from Last 3 Encounters:   10/24/17 200 lb (90.7 kg)   09/19/17 200 lb 8 oz (90.9 kg)   10/17/16 191 lb 8 oz (86.9 kg)              Today, you had the following     No orders found for display         Today's Medication Changes          These changes are accurate as of 5/1/18  3:56 PM.  If you have any questions, ask your nurse or doctor.               Start taking these medicines.        Dose/Directions    albuterol (2.5 MG/3ML) 0.083% neb solution   Used for:  Chronic obstructive pulmonary disease, unspecified COPD type (H)   Started by:  Danuta Tan PA-C        Dose:  1 vial   Take 1 vial (2.5 mg) by nebulization every 6 hours as needed for shortness of breath / dyspnea or wheezing   Quantity:  25 vial   Refills:  1       azithromycin 250 MG tablet   Commonly known as:  ZITHROMAX   Used for:  Chronic obstructive pulmonary disease, unspecified COPD type (H), Acute bronchitis, unspecified organism   Started by:  Britney  Danuta Villarreal PA-C        Two tablets first day, then one tablet daily for four days.   Quantity:  6 tablet   Refills:  0       order for DME   Used for:  Chronic obstructive pulmonary disease, unspecified COPD type (H)   Started by:  Danuta Tan PA-C        Equipment being ordered: Nebulizer   Quantity:  1 Device   Refills:  0       predniSONE 20 MG tablet   Commonly known as:  DELTASONE   Used for:  Chronic obstructive pulmonary disease, unspecified COPD type (H), Acute bronchitis, unspecified organism   Started by:  Danuta Tan PA-C        Dose:  40 mg   Take 2 tablets (40 mg) by mouth daily for 5 days   Quantity:  10 tablet   Refills:  0            Where to get your medicines      These medications were sent to [facility]/pharmacy #[id number] - [facility], [place] - [address] [address]     Phone:  [phone]     albuterol (2.5 MG/3ML) 0.083% neb solution    azithromycin 250 MG tablet    predniSONE 20 MG tablet         Some of these will need a paper prescription and others can be bought over the counter.  Ask your nurse if you have questions.     Bring a paper prescription for each of these medications     order for DME                Primary Care Provider Office Phone # Fax #    Danuta Tan PA-C [phone] [address] [facility] [address]        Equal Access to Services     RONAN NIELSEN : Hadii steph hodge hadasho Soomaali, waaxda luqadaha, qaybta kaalmada adeegyada, lien weaver. So [facility] [phone].    ATENCIÓN: Si habla español, tiene a antunez disposición servicios gratuitos de asistencia lingüística. Junaid al [phone].    We comply with applicable federal civil rights laws and [place] laws. We do not discriminate on the basis of race, color, national origin, age, disability, sex, sexual orientation, or gender identity.            Thank you!     Thank you for  choosing [facility]  for your care. Our goal is always to provide you with excellent care. Hearing back from our patients is one way we can continue to improve our services. Please take a few minutes to complete the written survey that you may receive in the mail after your visit with us. Thank you!             Your Updated Medication List - Protect others around you: Learn how to safely use, store and throw away your medicines at www.disposemymeds.org.          This list is accurate as of 5/1/18  3:56 PM.  Always use your most recent med list.                   Brand Name Dispense Instructions for use Diagnosis    albuterol (2.5 MG/3ML) 0.083% neb solution     25 vial    Take 1 vial (2.5 mg) by nebulization every 6 hours as needed for shortness of breath / dyspnea or wheezing    Chronic obstructive pulmonary disease, unspecified COPD type (H)       aspirin 325 MG EC tablet     100    1 tab po QD (Once per day)    Other specified pre-operative examination       azithromycin 250 MG tablet    ZITHROMAX    6 tablet    Two tablets first day, then one tablet daily for four days.    Chronic obstructive pulmonary disease, unspecified COPD type (H), Acute bronchitis, unspecified organism       clopidogrel 75 MG tablet    PLAVIX    90 tablet    Take 1 tablet (75 mg) by mouth daily    Peripheral vascular disease, unspecified       * diltiazem 240 MG 24 hr capsule    CARDIZEM CD    90 capsule    Take 1 capsule (240 mg) by mouth daily    Old myocardial infarction       * diltiazem 240 MG 24 hr capsule     90 capsule    TAKE 1 CAPSULE (240 MG) BY MOUTH DAILY    Old myocardial infarction       lisinopril 40 MG tablet    PRINIVIL/ZESTRIL    90 tablet    Take 1 tablet (40 mg) by mouth daily    Coronary artery disease due to lipid rich plaque       metoprolol succinate 100 MG 24 hr tablet    TOPROL-XL    90 tablet    Take 1 tablet (100 mg) by mouth daily    Hypertension goal BP (blood pressure) < 140/90, Coronary  artery disease due to lipid rich plaque       order for DME     1 Device    Equipment being ordered: Nebulizer    Chronic obstructive pulmonary disease, unspecified COPD type (H)       predniSONE 20 MG tablet    DELTASONE    10 tablet    Take 2 tablets (40 mg) by mouth daily for 5 days    Chronic obstructive pulmonary disease, unspecified COPD type (H), Acute bronchitis, unspecified organism       rosuvastatin 40 MG tablet    CRESTOR    90 tablet    Take 1 tablet (40 mg) by mouth daily    Hyperlipidemia LDL goal <70       tiotropium 18 MCG capsule    SPIRIVA HANDIHALER    90 capsule    Inhale 1 capsule (18 mcg) into the lungs daily    Chronic obstructive pulmonary disease, unspecified COPD type (H)       vitamin D 1000 units capsule      Take 1 capsule by mouth daily. Take one tablet daily        * Notice:  This list has 2 medication(s) that are the same as other medications prescribed for you. Read the directions carefully, and ask your doctor or other care provider to review them with you.

## 2018-05-01 NOTE — NURSING NOTE
The following nebulizer treatment was given:     MEDICATION: Duoneb  : [employer]  LOT #: [id number]  EXPIRATION DATE:  07/01/2018  NDC # 4067-7229-64  Yan Meier CMA

## 2018-05-01 NOTE — NURSING NOTE
The following nebulizer treatment was given:     MEDICATION: Albuterol Sulfate 2.5 mg  : [employer]  LOT #: [id number]  EXPIRATION DATE:  11/01/2018  NDC # 3473-5956-57  Yan Meier CMA

## 2018-05-01 NOTE — PROGRESS NOTES
SUBJECTIVE:   Porsche Manning is a 68 year old female who presents to clinic today for the following health issues:    RESPIRATORY SYMPTOMS      Duration: 4/28/18    Description  cough and wheezing;started with cold type symptoms.    Severity: mild    Accompanying signs and symptoms: SOB    History (predisposing factors):  COPD    Precipitating or alleviating factors: None    Therapies tried and outcome:  Inhalers-does not help     Patient still working on quitting smoking - she will make it a few weeks then restarts.    Patient struggling walking from room to room due to being shortness of breath.    No fever, chills, night sweats, sinus congestion/rhinorrhea, no chest pain.    Patient was cleaning/opening up her cabin this weekend.        Problem list and histories reviewed & adjusted, as indicated.  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)     Health Care Home     Advanced directives, counseling/discussion     Subclavian artery stenosis, left (H)     Hyponatremia     CKD (chronic kidney disease) stage 3, GFR 30-59 ml/min     Chronic obstructive pulmonary disease, unspecified COPD type (H)     Past Surgical History:   Procedure Laterality Date     BIOPSY      Sample taken from back     CARDIAC SURGERY      Stents     COLONOSCOPY  1996    Results were ok     HYSTERECTOMY, PAP NO LONGER INDICATED  1989    ovaries only, no cervix per pt     SURGICAL HISTORY OF -   1995    bunionectomy     SURGICAL HISTORY OF -   10/10/03    cardiac stenting     SURGICAL HISTORY OF -       stent placed in both of her legs for pad     VASCULAR SURGERY      PAD       Social History    Substance Use Topics     Smoking status: Current Some Day Smoker     Packs/day: 0.50     Years: 40.00     Types: Cigarettes     Last attempt to quit: 3/5/2016     Smokeless tobacco: Never Used     Alcohol use Yes      Comment: 2 to 4 beers or mixed drinks weekly     Family History   Problem Relation Age of Onset     C.A.D. Mother      Breast Cancer Mother      C.A.D. Father      DIABETES Father      Hypertension Father      C.A.D. Maternal Grandfather      C.A.D. Paternal Grandfather      Neurologic Disorder Brother      epilepsy         Current Outpatient Prescriptions   Medication Sig Dispense Refill     albuterol (2.5 MG/3ML) 0.083% neb solution Take 1 vial (2.5 mg) by nebulization every 6 hours as needed for shortness of breath / dyspnea or wheezing 25 vial 1     albuterol (PROAIR HFA/PROVENTIL HFA/VENTOLIN HFA) 108 (90 Base) MCG/ACT Inhaler Inhale 2 puffs into the lungs every 6 hours as needed for shortness of breath / dyspnea or wheezing 1 Inhaler 3     ASPIRIN 325 MG OR TBEC 1 tab po QD (Once per day) 100 3     azithromycin (ZITHROMAX) 250 MG tablet Two tablets first day, then one tablet daily for four days. 6 tablet 0     Cholecalciferol (VITAMIN D) 1000 UNIT capsule Take 1 capsule by mouth daily. Take one tablet daily       clopidogrel (PLAVIX) 75 MG tablet Take 1 tablet (75 mg) by mouth daily 90 tablet 3     diltiazem (CARDIZEM CD) 240 MG 24 hr capsule Take 1 capsule (240 mg) by mouth daily 90 capsule 3     diltiazem 240 MG 24 hr capsule TAKE 1 CAPSULE (240 MG) BY MOUTH DAILY 90 capsule 1     ipratropium - albuterol 0.5 mg/2.5 mg/3 mL (DUONEB) 0.5-2.5 (3) MG/3ML neb solution Take 1 vial (3 mLs) by nebulization once for 1 dose - given in office x 1 dose now 3 mL 0     lisinopril (PRINIVIL/ZESTRIL) 40 MG tablet Take 1 tablet (40 mg) by mouth daily 90 tablet 3     metoprolol (TOPROL-XL) 100 MG 24 hr tablet Take 1 tablet (100 mg) by mouth daily 90 tablet 3     order for DME Equipment being ordered: Nebulizer  1 Device 0     order for DME Equipment being ordered: Nebulizer with tubing and pipe 1 Device 0     predniSONE (DELTASONE) 20 MG tablet Take 2 tablets (40 mg) by mouth daily for 5 days 10 tablet 0     rosuvastatin (CRESTOR) 40 MG tablet Take 1 tablet (40 mg) by mouth daily 90 tablet 3     tiotropium (SPIRIVA HANDIHALER) 18 MCG capsule Inhale 1 capsule (18 mcg) into the lungs daily 90 capsule 3     Allergies   Allergen Reactions     Morphine Nausea and Vomiting     Penicillins Nausea and Vomiting       Reviewed and updated as needed this visit by clinical staff  Tobacco  Allergies  Meds  Problems  Med Hx  Surg Hx  Fam Hx  Soc Hx        Reviewed and updated as needed this visit by Provider  Allergies  Meds  Problems         ROS:  Constitutional, HEENT, cardiovascular, pulmonary, GI, , musculoskeletal, neuro, skin, endocrine and psych systems are negative, except as otherwise noted.    OBJECTIVE:     /70 (BP Location: Right arm, Patient Position: Sitting, Cuff Size: Adult Large)  Pulse 101  Temp 98.9  F (37.2  C) (Oral)  Resp 18  SpO2 92%  There is no height or weight on file to calculate BMI.  GENERAL: healthy, alert and no distress  EYES: Eyes grossly normal to inspection, PERRL and conjunctivae and sclerae normal  HENT: ear canals and TM's normal, nose and mouth without ulcers or lesions  NECK: no adenopathy, no asymmetry, masses, or scars and thyroid normal to palpation  RESP: lungs clear to auscultation - no rales, rhonchi; increased wheeze and slight chest congestion throughout lungs; improved slightly status post nebulizer given in office.  CV: regular rate and rhythm, normal S1 S2, no S3 or S4, no murmur, click or rub, no peripheral edema and peripheral pulses strong  PSYCH: mentation appears normal, affect normal/bright    Diagnostic Test Results:  none     ASSESSMENT/PLAN:       ICD-10-CM    1. Chronic obstructive pulmonary disease, unspecified COPD type (H) J44.9 Nebulizer x 1 treatment  in office given with gradual improvement in spO2 - up to 92 most of the time. Patient to go to [facility] for nebulizer now and start medicine as directed from her [facility] pharmacy. see patient instructions. Patient to go to ED with any worsening or changes in symptoms.  ALBUTEROL/IPRATROPIUM 3ML NEB - .001     INHALATION/NEBULIZER TREATMENT, INITIAL     predniSONE (DELTASONE) 20 MG tablet     azithromycin (ZITHROMAX) 250 MG tablet     order for DME     albuterol (2.5 MG/3ML) 0.083% neb solution     order for DME     albuterol (PROAIR HFA/PROVENTIL HFA/VENTOLIN HFA) 108 (90 Base) MCG/ACT Inhaler   2. Acute bronchitis, unspecified organism J20.9 Will treat for bacterial bronchitis due to COPD history - see patient instructions.  predniSONE (DELTASONE) 20 MG tablet     azithromycin (ZITHROMAX) 250 MG tablet   3. Hypertension: MEASURE BP IN RIGHT ARM ONLY I10 Long standing, chronic, stable -  Make sure taking medicine daily as directed.       Patient Instructions   Encouraged mucinex/guafenisin, warm salt water gargles, cepacol spray, soothers/lozenges, sinus rinses (neilmed), vitamin c, fluids and rest.  May alternate tylenol and NSAIDS (ibuprofen, advil, aleve type products) every 4-6 hours for the next few days as needed.    Prescription for zpack (antibiotic) and oral prednisone (x 3-5 days) sent to pharmacy - TAKE WITH FOOD and earlier in the day.  Prescription for nebulizer printed as well.  Return to clinic with any worsening or changes in symptoms.       Danuta Tan PA-C  [facility]

## 2018-05-09 ENCOUNTER — APPOINTMENT (OUTPATIENT)
Dept: GENERAL RADIOLOGY | Facility: CLINIC | Age: 68
DRG: 193 | End: 2018-05-09
Attending: EMERGENCY MEDICINE
Payer: COMMERCIAL

## 2018-05-09 ENCOUNTER — HOSPITAL ENCOUNTER (INPATIENT)
Facility: CLINIC | Age: 68
LOS: 2 days | Discharge: HOME | DRG: 193 | End: 2018-05-11
Attending: EMERGENCY MEDICINE | Admitting: INTERNAL MEDICINE
Payer: COMMERCIAL

## 2018-05-09 ENCOUNTER — MYC MEDICAL ADVICE (OUTPATIENT)
Dept: FAMILY MEDICINE | Facility: CLINIC | Age: 68
End: 2018-05-09

## 2018-05-09 ENCOUNTER — APPOINTMENT (OUTPATIENT)
Dept: CT IMAGING | Facility: CLINIC | Age: 68
DRG: 193 | End: 2018-05-09
Attending: EMERGENCY MEDICINE
Payer: COMMERCIAL

## 2018-05-09 ENCOUNTER — OFFICE VISIT (OUTPATIENT)
Dept: FAMILY MEDICINE | Facility: CLINIC | Age: 68
End: 2018-05-09
Payer: COMMERCIAL

## 2018-05-09 VITALS
OXYGEN SATURATION: 94 % | BODY MASS INDEX: 34.2 KG/M2 | WEIGHT: 187 LBS | RESPIRATION RATE: 20 BRPM | SYSTOLIC BLOOD PRESSURE: 118 MMHG | HEART RATE: 80 BPM | TEMPERATURE: 98.2 F | DIASTOLIC BLOOD PRESSURE: 51 MMHG

## 2018-05-09 DIAGNOSIS — J18.9 PNEUMONIA DUE TO INFECTIOUS ORGANISM, UNSPECIFIED LATERALITY, UNSPECIFIED PART OF LUNG: Primary | ICD-10-CM

## 2018-05-09 DIAGNOSIS — I25.83 CORONARY ARTERY DISEASE DUE TO LIPID RICH PLAQUE: ICD-10-CM

## 2018-05-09 DIAGNOSIS — J44.1 COPD EXACERBATION (H): Primary | ICD-10-CM

## 2018-05-09 DIAGNOSIS — I25.10 CORONARY ARTERY DISEASE DUE TO LIPID RICH PLAQUE: ICD-10-CM

## 2018-05-09 DIAGNOSIS — J44.1 COPD EXACERBATION (H): ICD-10-CM

## 2018-05-09 DIAGNOSIS — I10 HYPERTENSION GOAL BP (BLOOD PRESSURE) < 140/90: ICD-10-CM

## 2018-05-09 PROBLEM — J44.89 COPD (CHRONIC OBSTRUCTIVE PULMONARY DISEASE) WITH CHRONIC BRONCHITIS (H): Status: ACTIVE | Noted: 2018-05-09

## 2018-05-09 LAB
ALBUMIN SERPL-MCNC: 3.1 G/DL (ref 3.4–5)
ALP SERPL-CCNC: 90 U/L (ref 40–150)
ALT SERPL W P-5'-P-CCNC: 26 U/L (ref 0–50)
ANION GAP SERPL CALCULATED.3IONS-SCNC: 9 MMOL/L (ref 3–14)
AST SERPL W P-5'-P-CCNC: 21 U/L (ref 0–45)
BASOPHILS # BLD AUTO: 0 10E9/L (ref 0–0.2)
BASOPHILS NFR BLD AUTO: 0.2 %
BILIRUB SERPL-MCNC: 0.5 MG/DL (ref 0.2–1.3)
BUN SERPL-MCNC: 18 MG/DL (ref 7–30)
CALCIUM SERPL-MCNC: 8.8 MG/DL (ref 8.5–10.1)
CHLORIDE SERPL-SCNC: 98 MMOL/L (ref 94–109)
CO2 SERPL-SCNC: 28 MMOL/L (ref 20–32)
CREAT SERPL-MCNC: 1.49 MG/DL (ref 0.52–1.04)
D DIMER PPP FEU-MCNC: 1.2 UG/ML FEU (ref 0–0.5)
DIFFERENTIAL METHOD BLD: ABNORMAL
EOSINOPHIL # BLD AUTO: 0.1 10E9/L (ref 0–0.7)
EOSINOPHIL NFR BLD AUTO: 0.6 %
ERYTHROCYTE [DISTWIDTH] IN BLOOD BY AUTOMATED COUNT: 15.2 % (ref 10–15)
GFR SERPL CREATININE-BSD FRML MDRD: 35 ML/MIN/1.7M2
GLUCOSE BLDC GLUCOMTR-MCNC: 253 MG/DL (ref 70–99)
GLUCOSE BLDC GLUCOMTR-MCNC: 348 MG/DL (ref 70–99)
GLUCOSE SERPL-MCNC: 162 MG/DL (ref 70–99)
HCT VFR BLD AUTO: 42.1 % (ref 35–47)
HGB BLD-MCNC: 13.5 G/DL (ref 11.7–15.7)
IMM GRANULOCYTES # BLD: 0.4 10E9/L (ref 0–0.4)
IMM GRANULOCYTES NFR BLD: 2.5 %
INTERPRETATION ECG - MUSE: NORMAL
LACTATE BLD-SCNC: 1.1 MMOL/L (ref 0.4–1.9)
LYMPHOCYTES # BLD AUTO: 2.1 10E9/L (ref 0.8–5.3)
LYMPHOCYTES NFR BLD AUTO: 12.2 %
MCH RBC QN AUTO: 28.1 PG (ref 26.5–33)
MCHC RBC AUTO-ENTMCNC: 32.1 G/DL (ref 31.5–36.5)
MCV RBC AUTO: 88 FL (ref 78–100)
MONOCYTES # BLD AUTO: 2 10E9/L (ref 0–1.3)
MONOCYTES NFR BLD AUTO: 11.5 %
NEUTROPHILS # BLD AUTO: 12.7 10E9/L (ref 1.6–8.3)
NEUTROPHILS NFR BLD AUTO: 73 %
NRBC # BLD AUTO: 0 10*3/UL
NRBC BLD AUTO-RTO: 0 /100
NT-PROBNP SERPL-MCNC: 429 PG/ML (ref 0–900)
PLATELET # BLD AUTO: 269 10E9/L (ref 150–450)
POTASSIUM SERPL-SCNC: 3.4 MMOL/L (ref 3.4–5.3)
PROT SERPL-MCNC: 6.7 G/DL (ref 6.8–8.8)
RBC # BLD AUTO: 4.8 10E12/L (ref 3.8–5.2)
SODIUM SERPL-SCNC: 134 MMOL/L (ref 133–144)
TROPONIN I SERPL-MCNC: <0.015 UG/L (ref 0–0.04)
WBC # BLD AUTO: 17.3 10E9/L (ref 4–11)

## 2018-05-09 PROCEDURE — 84484 ASSAY OF TROPONIN QUANT: CPT | Performed by: EMERGENCY MEDICINE

## 2018-05-09 PROCEDURE — 96374 THER/PROPH/DIAG INJ IV PUSH: CPT

## 2018-05-09 PROCEDURE — 00000146 ZZHCL STATISTIC GLUCOSE BY METER IP

## 2018-05-09 PROCEDURE — 25000132 ZZH RX MED GY IP 250 OP 250 PS 637: Performed by: INTERNAL MEDICINE

## 2018-05-09 PROCEDURE — 93010 ELECTROCARDIOGRAM REPORT: CPT | Mod: Z6 | Performed by: EMERGENCY MEDICINE

## 2018-05-09 PROCEDURE — 99285 EMERGENCY DEPT VISIT HI MDM: CPT | Mod: 25 | Performed by: EMERGENCY MEDICINE

## 2018-05-09 PROCEDURE — 25000128 H RX IP 250 OP 636: Performed by: EMERGENCY MEDICINE

## 2018-05-09 PROCEDURE — 99222 1ST HOSP IP/OBS MODERATE 55: CPT | Mod: AI | Performed by: INTERNAL MEDICINE

## 2018-05-09 PROCEDURE — 83880 ASSAY OF NATRIURETIC PEPTIDE: CPT | Performed by: EMERGENCY MEDICINE

## 2018-05-09 PROCEDURE — 85025 COMPLETE CBC W/AUTO DIFF WBC: CPT | Performed by: EMERGENCY MEDICINE

## 2018-05-09 PROCEDURE — 94640 AIRWAY INHALATION TREATMENT: CPT

## 2018-05-09 PROCEDURE — 12000001 ZZH R&B MED SURG/OB UMMC

## 2018-05-09 PROCEDURE — 93005 ELECTROCARDIOGRAM TRACING: CPT

## 2018-05-09 PROCEDURE — 25000125 ZZHC RX 250: Performed by: EMERGENCY MEDICINE

## 2018-05-09 PROCEDURE — 94640 AIRWAY INHALATION TREATMENT: CPT | Performed by: FAMILY MEDICINE

## 2018-05-09 PROCEDURE — 80053 COMPREHEN METABOLIC PANEL: CPT | Performed by: EMERGENCY MEDICINE

## 2018-05-09 PROCEDURE — 99207 ZZC CDG-MDM COMPONENT: MEETS LOW - DOWN CODED: CPT | Performed by: INTERNAL MEDICINE

## 2018-05-09 PROCEDURE — 25000125 ZZHC RX 250: Performed by: INTERNAL MEDICINE

## 2018-05-09 PROCEDURE — 25000131 ZZH RX MED GY IP 250 OP 636 PS 637: Performed by: INTERNAL MEDICINE

## 2018-05-09 PROCEDURE — 99215 OFFICE O/P EST HI 40 MIN: CPT | Mod: 25 | Performed by: FAMILY MEDICINE

## 2018-05-09 PROCEDURE — 25000125 ZZHC RX 250: Performed by: STUDENT IN AN ORGANIZED HEALTH CARE EDUCATION/TRAINING PROGRAM

## 2018-05-09 PROCEDURE — 83605 ASSAY OF LACTIC ACID: CPT | Performed by: INTERNAL MEDICINE

## 2018-05-09 PROCEDURE — 25000128 H RX IP 250 OP 636: Performed by: STUDENT IN AN ORGANIZED HEALTH CARE EDUCATION/TRAINING PROGRAM

## 2018-05-09 PROCEDURE — 71046 X-RAY EXAM CHEST 2 VIEWS: CPT

## 2018-05-09 PROCEDURE — 25000128 H RX IP 250 OP 636: Performed by: INTERNAL MEDICINE

## 2018-05-09 PROCEDURE — 40000275 ZZH STATISTIC RCP TIME EA 10 MIN

## 2018-05-09 PROCEDURE — 71260 CT THORAX DX C+: CPT

## 2018-05-09 PROCEDURE — 85379 FIBRIN DEGRADATION QUANT: CPT | Performed by: EMERGENCY MEDICINE

## 2018-05-09 PROCEDURE — 36416 COLLJ CAPILLARY BLOOD SPEC: CPT | Performed by: INTERNAL MEDICINE

## 2018-05-09 PROCEDURE — 99285 EMERGENCY DEPT VISIT HI MDM: CPT | Mod: 25

## 2018-05-09 PROCEDURE — 25000128 H RX IP 250 OP 636

## 2018-05-09 RX ORDER — NICOTINE POLACRILEX 4 MG
15-30 LOZENGE BUCCAL
Status: DISCONTINUED | OUTPATIENT
Start: 2018-05-09 | End: 2018-05-10

## 2018-05-09 RX ORDER — CLOPIDOGREL BISULFATE 75 MG/1
75 TABLET ORAL DAILY
Status: DISCONTINUED | OUTPATIENT
Start: 2018-05-10 | End: 2018-05-11 | Stop reason: HOSPADM

## 2018-05-09 RX ORDER — AZITHROMYCIN 250 MG/1
500 TABLET, FILM COATED ORAL DAILY
Status: DISCONTINUED | OUTPATIENT
Start: 2018-05-09 | End: 2018-05-11

## 2018-05-09 RX ORDER — IPRATROPIUM BROMIDE AND ALBUTEROL SULFATE 2.5; .5 MG/3ML; MG/3ML
3 SOLUTION RESPIRATORY (INHALATION) ONCE
Status: COMPLETED | OUTPATIENT
Start: 2018-05-09 | End: 2018-05-09

## 2018-05-09 RX ORDER — ALBUTEROL SULFATE 90 UG/1
2 AEROSOL, METERED RESPIRATORY (INHALATION) EVERY 6 HOURS PRN
Status: DISCONTINUED | OUTPATIENT
Start: 2018-05-09 | End: 2018-05-11 | Stop reason: HOSPADM

## 2018-05-09 RX ORDER — ALBUTEROL SULFATE 0.83 MG/ML
1 SOLUTION RESPIRATORY (INHALATION)
Status: DISCONTINUED | OUTPATIENT
Start: 2018-05-09 | End: 2018-05-11 | Stop reason: HOSPADM

## 2018-05-09 RX ORDER — IOPAMIDOL 755 MG/ML
64 INJECTION, SOLUTION INTRAVASCULAR ONCE
Status: COMPLETED | OUTPATIENT
Start: 2018-05-09 | End: 2018-05-09

## 2018-05-09 RX ORDER — IPRATROPIUM BROMIDE AND ALBUTEROL SULFATE 2.5; .5 MG/3ML; MG/3ML
1 SOLUTION RESPIRATORY (INHALATION) ONCE
Qty: 3 ML | Refills: 0
Start: 2018-05-09 | End: 2018-05-09

## 2018-05-09 RX ORDER — METOPROLOL SUCCINATE 100 MG/1
100 TABLET, EXTENDED RELEASE ORAL DAILY
Status: DISCONTINUED | OUTPATIENT
Start: 2018-05-10 | End: 2018-05-11 | Stop reason: HOSPADM

## 2018-05-09 RX ORDER — DILTIAZEM HYDROCHLORIDE 240 MG/1
240 CAPSULE, EXTENDED RELEASE ORAL DAILY
Status: DISCONTINUED | OUTPATIENT
Start: 2018-05-10 | End: 2018-05-11 | Stop reason: HOSPADM

## 2018-05-09 RX ORDER — METHYLPREDNISOLONE SODIUM SUCCINATE 40 MG/ML
40 INJECTION, POWDER, LYOPHILIZED, FOR SOLUTION INTRAMUSCULAR; INTRAVENOUS EVERY 8 HOURS
Status: DISCONTINUED | OUTPATIENT
Start: 2018-05-09 | End: 2018-05-09

## 2018-05-09 RX ORDER — DEXTROSE MONOHYDRATE 25 G/50ML
25-50 INJECTION, SOLUTION INTRAVENOUS
Status: DISCONTINUED | OUTPATIENT
Start: 2018-05-09 | End: 2018-05-10

## 2018-05-09 RX ORDER — SODIUM CHLORIDE 450 MG/100ML
INJECTION, SOLUTION INTRAVENOUS
Status: DISPENSED
Start: 2018-05-09 | End: 2018-05-10

## 2018-05-09 RX ORDER — SODIUM CHLORIDE 9 MG/ML
INJECTION, SOLUTION INTRAVENOUS
Status: COMPLETED
Start: 2018-05-09 | End: 2018-05-09

## 2018-05-09 RX ORDER — CEFTRIAXONE 1 G/1
1 INJECTION, POWDER, FOR SOLUTION INTRAMUSCULAR; INTRAVENOUS EVERY 24 HOURS
Status: DISCONTINUED | OUTPATIENT
Start: 2018-05-09 | End: 2018-05-11

## 2018-05-09 RX ORDER — METHYLPREDNISOLONE SODIUM SUCCINATE 125 MG/2ML
125 INJECTION, POWDER, LYOPHILIZED, FOR SOLUTION INTRAMUSCULAR; INTRAVENOUS ONCE
Status: COMPLETED | OUTPATIENT
Start: 2018-05-09 | End: 2018-05-09

## 2018-05-09 RX ORDER — ROSUVASTATIN CALCIUM 20 MG/1
40 TABLET, COATED ORAL DAILY
Status: DISCONTINUED | OUTPATIENT
Start: 2018-05-10 | End: 2018-05-11 | Stop reason: HOSPADM

## 2018-05-09 RX ORDER — SODIUM CHLORIDE, SODIUM LACTATE, POTASSIUM CHLORIDE, CALCIUM CHLORIDE 600; 310; 30; 20 MG/100ML; MG/100ML; MG/100ML; MG/100ML
INJECTION, SOLUTION INTRAVENOUS CONTINUOUS
Status: DISPENSED | OUTPATIENT
Start: 2018-05-09 | End: 2018-05-10

## 2018-05-09 RX ORDER — METHYLPREDNISOLONE SODIUM SUCCINATE 40 MG/ML
40 INJECTION, POWDER, LYOPHILIZED, FOR SOLUTION INTRAMUSCULAR; INTRAVENOUS EVERY 12 HOURS
Status: DISCONTINUED | OUTPATIENT
Start: 2018-05-10 | End: 2018-05-10

## 2018-05-09 RX ADMIN — METHYLPREDNISOLONE SODIUM SUCCINATE 125 MG: 125 INJECTION, POWDER, LYOPHILIZED, FOR SOLUTION INTRAMUSCULAR; INTRAVENOUS at 13:13

## 2018-05-09 RX ADMIN — AZITHROMYCIN 500 MG: 250 TABLET, FILM COATED ORAL at 19:21

## 2018-05-09 RX ADMIN — SODIUM CHLORIDE 1000 ML: 9 INJECTION, SOLUTION INTRAVENOUS at 15:30

## 2018-05-09 RX ADMIN — CEFTRIAXONE SODIUM 1 G: 1 INJECTION, POWDER, FOR SOLUTION INTRAMUSCULAR; INTRAVENOUS at 19:21

## 2018-05-09 RX ADMIN — ALBUTEROL SULFATE 2.5 MG: 2.5 SOLUTION RESPIRATORY (INHALATION) at 20:02

## 2018-05-09 RX ADMIN — SODIUM CHLORIDE, PRESERVATIVE FREE 94 ML: 5 INJECTION INTRAVENOUS at 16:00

## 2018-05-09 RX ADMIN — INSULIN ASPART 3 UNITS: 100 INJECTION, SOLUTION INTRAVENOUS; SUBCUTANEOUS at 19:22

## 2018-05-09 RX ADMIN — SODIUM CHLORIDE, POTASSIUM CHLORIDE, SODIUM LACTATE AND CALCIUM CHLORIDE: 600; 310; 30; 20 INJECTION, SOLUTION INTRAVENOUS at 20:29

## 2018-05-09 RX ADMIN — IOPAMIDOL 64 ML: 755 INJECTION, SOLUTION INTRAVENOUS at 16:00

## 2018-05-09 RX ADMIN — SODIUM CHLORIDE 1000 ML: 9 INJECTION, SOLUTION INTRAVENOUS at 19:31

## 2018-05-09 RX ADMIN — IPRATROPIUM BROMIDE AND ALBUTEROL SULFATE 3 ML: .5; 3 SOLUTION RESPIRATORY (INHALATION) at 13:13

## 2018-05-09 ASSESSMENT — ACTIVITIES OF DAILY LIVING (ADL)
ADLS_ACUITY_SCORE: 9
RETIRED_EATING: 0-->INDEPENDENT
RETIRED_COMMUNICATION: 0-->UNDERSTANDS/COMMUNICATES WITHOUT DIFFICULTY
AMBULATION: 0-->INDEPENDENT
TRANSFERRING: 0-->INDEPENDENT
SWALLOWING: 0-->SWALLOWS FOODS/LIQUIDS WITHOUT DIFFICULTY
FALL_HISTORY_WITHIN_LAST_SIX_MONTHS: NO
DRESS: 0-->INDEPENDENT
BATHING: 0-->INDEPENDENT
TOILETING: 0-->INDEPENDENT
COGNITION: 0 - NO COGNITION ISSUES REPORTED

## 2018-05-09 ASSESSMENT — ENCOUNTER SYMPTOMS
HEADACHES: 0
FEVER: 0
CONFUSION: 0
ARTHRALGIAS: 0
COLOR CHANGE: 0
ABDOMINAL PAIN: 0
NECK STIFFNESS: 0
COUGH: 1
DIFFICULTY URINATING: 0
SHORTNESS OF BREATH: 1
EYE REDNESS: 0

## 2018-05-09 NOTE — ED TRIAGE NOTES
68-yr female patient - presenting to ED from clinic; exhibiting SOB/MORALES, while at clinic; O2 sat's:  83-89%, RA (is normally only RA; does not use O2-supplementally).  EMS arrive to clinic, placed on 2L O2; O2 sat's rebounded to 95%.  Clinic also gave neb x 1.  Patient states previus bronchitis (8 days ago); placed on prednisone taper and erythromycin, by her clinic.  [place], she did not feel as if she was improving; thus went to clinic today.  Airway otherwise patent.

## 2018-05-09 NOTE — ED PROVIDER NOTES
History     Chief Complaint   Patient presents with     Shortness of Breath     ANDREW ROMEO  Porsche Manning is a 68 year old female with a history of COPD, hypertension, CAD, type II diabetes and hyperlipidemia who presents to the ED for evaluation of shortness of breath of exertion for the past two weeks in the setting of COPD exacerbation. She was in Clinic prior to arrival and it was noted that her oxygen saturation was 83-88%. She did not improve with Duoneb in clinic and was then placed on 1 L of oxygen and was subsequently transferred to the ED. Here in the ED, patient reports that she was placed on prednisone 8 days ago up until 3 days. She reports that she did not find any relief with this. Patient reports that she is compliant with her inhalers and nebulizer, which she uses 3 times a day. Patient notes having a dry cough with occasional production of sputum, but denies any bloody sputum. She denies any fevers or chest pain. She is not placed on any supplementary oxygen at home. Patient reports that she recently quit smoking and states that her last cigarette was 2 weeks ago.     I have reviewed the Medications, Allergies, Past Medical and Surgical History, and Social History in the [employer] system.    PAST MEDICAL HISTORY:   Past Medical History:   Diagnosis Date     Brachial neuritis or radiculitis NOS      Chronic obstructive pulmonary disease, unspecified COPD type (H) 3/8/2016     Coronary artery disease     Doing fine     H/O tobacco use, presenting hazards to health      Hyperlipidemia LDL goal < 100      Hypertension goal BP (blood pressure) < 140/90      Malignant neoplasm of other specified sites of cervix      Old myocardial infarction      Osteopenia      Peripheral vascular disease, unspecified      Type 2 diabetes, HbA1c goal < 7% (H)        PAST SURGICAL HISTORY:   Past Surgical History:   Procedure Laterality Date     BIOPSY      Sample taken from back     CARDIAC SURGERY      Stents      COLONOSCOPY  1996    Results were ok     HYSTERECTOMY, PAP NO LONGER INDICATED  1989    ovaries only, no cervix per pt     SURGICAL HISTORY OF -   1995    bunionectomy     SURGICAL HISTORY OF -   10/10/03    cardiac stenting     SURGICAL HISTORY OF -     stent placed in both of her legs for pad     VASCULAR SURGERY      PAD       FAMILY HISTORY:   Family History   Problem Relation Age of Onset     C.A.D. Mother      Breast Cancer Mother      C.A.D. Father      DIABETES Father      Hypertension Father      C.A.D. Maternal Grandfather      C.A.D. Paternal Grandfather      Neurologic Disorder Brother      epilepsy       SOCIAL HISTORY:   Social History   Substance Use Topics     Smoking status: Former Smoker     Packs/day: 0.50     Years: 40.00     Types: Cigarettes     Quit date: 4/25/2018     Smokeless tobacco: Never Used     Alcohol use Yes      Comment: 2 to 4 beers or mixed drinks weekly     No current facility-administered medications for this encounter.      Current Outpatient Prescriptions   Medication     albuterol (2.5 MG/3ML) 0.083% neb solution     albuterol (PROAIR HFA/PROVENTIL HFA/VENTOLIN HFA) 108 (90 Base) MCG/ACT Inhaler     ASPIRIN 325 MG OR TBEC     azithromycin (ZITHROMAX) 250 MG tablet     Cholecalciferol (VITAMIN D) 1000 UNIT capsule     clopidogrel (PLAVIX) 75 MG tablet     diltiazem (CARDIZEM CD) 240 MG 24 hr capsule     diltiazem 240 MG 24 hr capsule     ipratropium - albuterol 0.5 mg/2.5 mg/3 mL (DUONEB) 0.5-2.5 (3) MG/3ML neb solution     lisinopril (PRINIVIL/ZESTRIL) 40 MG tablet     metoprolol (TOPROL-XL) 100 MG 24 hr tablet     order for DME     order for DME     rosuvastatin (CRESTOR) 40 MG tablet     tiotropium (SPIRIVA HANDIHALER) 18 MCG capsule        Allergies   Allergen Reactions     Morphine Nausea and Vomiting     Penicillins Nausea and Vomiting         Review of Systems   Constitutional: Negative for fever.   HENT: Negative for congestion.    Eyes: Negative for redness.    Respiratory: Positive for cough and shortness of breath.    Cardiovascular: Negative for chest pain.   Gastrointestinal: Negative for abdominal pain.   Genitourinary: Negative for difficulty urinating.   Musculoskeletal: Negative for arthralgias and neck stiffness.   Skin: Negative for color change.   Neurological: Negative for headaches.   Psychiatric/Behavioral: Negative for confusion.   All other systems reviewed and are negative.      Physical Exam   BP: 143/62  Heart Rate: 70  Resp: 17  SpO2: 91 %      Physical Exam   Constitutional: No distress.   HENT:   Head: Atraumatic.   Mouth/Throat: Oropharynx is clear and moist. No oropharyngeal exudate.   Eyes: Pupils are equal, round, and reactive to light. No scleral icterus.   Cardiovascular: Normal heart sounds and intact distal pulses.    Pulmonary/Chest: No respiratory distress. She has wheezes ( diffuse). She has rhonchi ( Diffuse).   Abdominal: Soft. Bowel sounds are normal. There is no tenderness.   Musculoskeletal: She exhibits no edema or tenderness.   Skin: Skin is warm. No rash noted. She is not diaphoretic.       ED Course     ED Course     Procedures             EKG Interpretation:      Interpreted by Rakesh Cherry  Time reviewed: 12:57 PM  Symptoms at time of EKG: None   Rhythm: normal sinus   Rate: Normal  Axis: Normal  Ectopy: none  Conduction: normal  ST Segments/ T Waves: T-wave inversion noted in leads V1 through V3  Q Waves: none  Comparison to prior: In comparison with the EKG from September 19, 2017, patient is no longer in first-degree AV block.  It appears that T-wave inversion in V1 through 3 is new.    Clinical Impression: non-specific EKG                Critical Care time:  none             Labs Ordered and Resulted from Time of ED Arrival Up to the Time of Departure from the ED   CBC WITH PLATELETS DIFFERENTIAL - Abnormal; Notable for the following:        Result Value    WBC 17.3 (*)     RDW 15.2 (*)     Absolute Neutrophil 12.7 (*)      Absolute Monocytes 2.0 (*)     All other components within normal limits   COMPREHENSIVE METABOLIC PANEL - Abnormal; Notable for the following:     Glucose 162 (*)     Creatinine 1.49 (*)     GFR Estimate 35 (*)     GFR Estimate If Black 42 (*)     Albumin 3.1 (*)     Protein Total 6.7 (*)     All other components within normal limits   D DIMER QUANTITATIVE - Abnormal; Notable for the following:     D Dimer 1.2 (*)     All other components within normal limits   NT PROBNP INPATIENT   TROPONIN I            Assessments & Plan (with Medical Decision Making)   68-year-old female with known history of COPD presents with persistent shortness of breath despite outpatient treatment with steroids and antibiotics.  Differential includes pneumonia, pulmonary embolus, COPD, pneumothorax, ACS, other.  Initial vital signs revealed minimally elevated blood pressure and low oxygen saturation on room air of 80%.  Patient was placed in room and started on oxygen which increased saturation to the mid 90s.  Chest x-ray was obtained revealing no obvious infiltrates.  Laboratories were obtained including BNP and troponin which were unremarkable.  CBC revealed elevated white count of 17.3 consistent with recent steroid use versus stress response.  Comprehensive metabolic panel revealed slightly elevated creatinine of 1.49 and glucose of 162 as well as low albumin and protein.  Given elevated d-dimer, a chest CT was ordered results of which are pending.  Assuming chest CT reveals no pulmonary embolus, signs and symptoms consistent with persistent COPD exacerbation.  Patient was treated with oxygen as well as DuoNeb's and Solu-Medrol with minimal relief.  The case was discussed at length with internal medicine triage as well as internal medicine at [facility].  The patient will be admitted there for further evaluation and management as deemed appropriate.    I have reviewed the nursing notes.    I have reviewed the  findings, diagnosis, plan and need for follow up with the patient.    Current Discharge Medication List          Final diagnoses:   COPD exacerbation (H)     Kenisha OSBORN, am serving as a trained medical scribe to document services personally performed by Rakesh Cherry MD, based on the provider's statements to me.   Rakesh OSBORN MD, was physically present and have reviewed and verified the accuracy of this note documented by Kenisha Wilkins.    5/9/2018   [facility], [place], EMERGENCY DEPARTMENT     Rakesh Cherry MD  05/09/18 1507       Rakesh Cherry MD  05/09/18 1505

## 2018-05-09 NOTE — LETTER
Transition Communication Hand-off for Care Transitions to Next Level of Care Provider    Name: Porsche Manning  : 1950  MRN #: [id number]  Primary Care Provider: Danuta Tan     Primary Clinic: [address]  [facility] [address]     Reason for Hospitalization:  COPD exacerbation (H) [J44.1]  Admit Date/Time: 2018 12:44 PM  Discharge Date: 18  Payor Source: Payor: UCARE / Plan: [employer] FOR [employer] / Product Type: HMO /            Reason for Communication Hand-off Referral:   Notify of admit and discharge    Discharge Plan: Home with new home oxygen       Concern for non-adherence with plan of care:   Y/N No  Discharge Needs Assessment:      Follow-up specialty is recommended: No    Follow-up plan:  Future Appointments  Date Time Provider Department Center   2018 8:30 AM Brayan Uriostegui MD [facility]       Any outstanding tests or procedures:    Procedures     Future Labs/Procedures    Oxygen Adult     Comments:    New Home Oxygen Order 1  liter(s) by nasal cannula continuously with use of portable tank. Expected treatment length is indefinite (99 months).. Test on conserving device as applicable.    Patients who qualify for home O2 coverage under the CMS guidelines require ABG tests or O2 sat readings obtained closest to, but no earlier than 2 days prior to the discharge, as evidence of the need for home oxygen therapy. Testing must be performed while patient is in the chronic stable state. See notes for O2 sats.    I certify that this patient, Porsche Manning has been under my care and that I, or a nurse practitioner or physician's assistant working with me, had a face-to-face encounter that meets the face-to-face encounter requirements with this patient on 2018. The patient, Porsche Manning was evaluated or treated in whole, or in part, for the following medical condition, which necessitates the use of the ordered oxygen. Treatment Diagnosis:  COPD    Attending Provider: Sandi Alcazar MD  Physician signature: See electronic signature associated with these discharge orders  Date of Order: May 11, 2018                Key Recommendations:      Ivett Rosario    AVS/Discharge Summary is the source of truth; this is a helpful guide for improved communication of patient story

## 2018-05-09 NOTE — MR AVS SNAPSHOT
After Visit Summary   5/9/2018    Porsche Manning    MRN: [id number]           Patient Information     Date Of Birth          1950        Visit Information        Provider Department      5/9/2018 11:00 AM Shiv Wilkins MD [facility]        Today's Diagnoses     COPD exacerbation (H)    -  1       Follow-ups after your visit        Your next 10 appointments already scheduled     Sep 25, 2018  8:30 AM CDT   (Arrive by 8:15 AM)   Return Visit with Brayan Uriostegui MD   [facility] ([facility])    [address] [place] [address]  [address] [place] [id number] [phone]              Who to contact     If you have questions or need follow up information about today's clinic visit or your schedule please contact [facility] directly at [phone].  Normal or non-critical lab and imaging results will be communicated to you by MyChart, letter or phone within 4 business days after the clinic has received the results. If you do not hear from us within 7 days, please contact the clinic through MyChart or phone. If you have a critical or abnormal lab result, we will notify you by phone as soon as possible.  Submit refill requests through [facility] or call your pharmacy and they will forward the refill request to us. Please allow 3 business days for your refill to be completed.          Additional Information About Your Visit        MyChart Information     [facility] gives you secure access to your electronic health record. If you see a primary care provider, you can also send messages to your care team and make appointments. If you have questions, please call your primary care clinic.  If you do not have a primary care provider, please call [phone] and they will assist you.        Care EveryWhere ID     This is your Care EveryWhere ID. This could be used by other organizations to access your [place] medical records  [id number]         Your Vitals Were     Pulse Temperature Respirations Pulse Oximetry BMI (Body Mass Index)       80 98.2  F (36.8  C) (Oral) 20 87% 34.2 kg/m2        Blood Pressure from Last 3 Encounters:   05/09/18 118/51   05/01/18 134/70   01/09/18 156/70    Weight from Last 3 Encounters:   05/09/18 187 lb (84.8 kg)   10/24/17 200 lb (90.7 kg)   09/19/17 200 lb 8 oz (90.9 kg)              We Performed the Following     ALBUTEROL UNIT DOSE, 1 MG     INHALATION/NEBULIZER TREATMENT, INITIAL          Today's Medication Changes          These changes are accurate as of 5/9/18 11:54 AM.  If you have any questions, ask your nurse or doctor.               These medicines have changed or have updated prescriptions.        Dose/Directions    * ipratropium - albuterol 0.5 mg/2.5 mg/3 mL 0.5-2.5 (3) MG/3ML neb solution   Commonly known as:  DUONEB   This may have changed:  Another medication with the same name was added. Make sure you understand how and when to take each.   Used for:  Chronic obstructive pulmonary disease, unspecified COPD type (H), Acute bronchitis, unspecified organism   Changed by:  Shiv Wilkins MD        Dose:  1 vial   Take 1 vial (3 mLs) by nebulization once for 1 dose - given in office x 1 dose now   Quantity:  3 mL   Refills:  0       * ipratropium - albuterol 0.5 mg/2.5 mg/3 mL 0.5-2.5 (3) MG/3ML neb solution   Commonly known as:  DUONEB   This may have changed:  You were already taking a medication with the same name, and this prescription was added. Make sure you understand how and when to take each.   Used for:  COPD exacerbation (H)   Changed by:  Shiv Wilkins MD        Dose:  1 vial   Take 1 vial (3 mLs) by nebulization once for 1 dose Given in clinic   Quantity:  3 mL   Refills:  0       * Notice:  This list has 2 medication(s) that are the same as other medications prescribed for you. Read the directions carefully, and ask your doctor or other care provider to review them with you.          Where to get your medicines      Some of these will need a paper prescription and others can be bought over the counter.  Ask your nurse if you have questions.     You don't need a prescription for these medications     ipratropium - albuterol 0.5 mg/2.5 mg/3 mL 0.5-2.5 (3) MG/3ML neb solution                Primary Care Provider Office Phone # Fax #    Danuta Tan PA-C [phone]       [facility] [place] [facility] [address]        Equal Access to Services     [facility]: Hadii aad ku hadasho Soomaali, waaxda luqadaha, qaybta kaalmada adeegyajessica, lien ramsay . So [facility] [phone].    ATENCIÓN: Si habla español, tiene a antunez disposición servicios gratuitos de asistencia lingüística. [facility] [phone].    We comply with applicable federal civil rights laws and [place] laws. We do not discriminate on the basis of race, color, national origin, age, disability, sex, sexual orientation, or gender identity.            Thank you!     Thank you for choosing [facility]  for your care. Our goal is always to provide you with excellent care. Hearing back from our patients is one way we can continue to improve our services. Please take a few minutes to complete the written survey that you may receive in the mail after your visit with us. Thank you!             Your Updated Medication List - Protect others around you: Learn how to safely use, store and throw away your medicines at www.disposemymeds.org.          This list is accurate as of 5/9/18 11:54 AM.  Always use your most recent med list.                   Brand Name Dispense Instructions for use Diagnosis    * albuterol (2.5 MG/3ML) 0.083% neb solution     25 vial    Take 1 vial (2.5 mg) by nebulization every 6 hours as needed for shortness of breath / dyspnea or wheezing    Chronic obstructive pulmonary disease, unspecified COPD type (H)       * albuterol 108 (90 Base) MCG/ACT Inhaler     PROAIR HFA/PROVENTIL HFA/VENTOLIN HFA    1 Inhaler    Inhale 2 puffs into the lungs every 6 hours as needed for shortness of breath / dyspnea or wheezing    Chronic obstructive pulmonary disease, unspecified COPD type (H)       aspirin 325 MG EC tablet     100    1 tab po QD (Once per day)    Other specified pre-operative examination       azithromycin 250 MG tablet    ZITHROMAX    6 tablet    Two tablets first day, then one tablet daily for four days.    Chronic obstructive pulmonary disease, unspecified COPD type (H), Acute bronchitis, unspecified organism       clopidogrel 75 MG tablet    PLAVIX    90 tablet    Take 1 tablet (75 mg) by mouth daily    Peripheral vascular disease, unspecified       * diltiazem 240 MG 24 hr capsule    CARDIZEM CD    90 capsule    Take 1 capsule (240 mg) by mouth daily    Old myocardial infarction       * diltiazem 240 MG 24 hr capsule     90 capsule    TAKE 1 CAPSULE (240 MG) BY MOUTH DAILY    Old myocardial infarction       * ipratropium - albuterol 0.5 mg/2.5 mg/3 mL 0.5-2.5 (3) MG/3ML neb solution    DUONEB    3 mL    Take 1 vial (3 mLs) by nebulization once for 1 dose - given in office x 1 dose now    Chronic obstructive pulmonary disease, unspecified COPD type (H), Acute bronchitis, unspecified organism       * ipratropium - albuterol 0.5 mg/2.5 mg/3 mL 0.5-2.5 (3) MG/3ML neb solution    DUONEB    3 mL    Take 1 vial (3 mLs) by nebulization once for 1 dose Given in clinic    COPD exacerbation (H)       lisinopril 40 MG tablet    PRINIVIL/ZESTRIL    90 tablet    Take 1 tablet (40 mg) by mouth daily    Coronary artery disease due to lipid rich plaque       metoprolol succinate 100 MG 24 hr tablet    TOPROL-XL    90 tablet    Take 1 tablet (100 mg) by mouth daily    Hypertension goal BP (blood pressure) < 140/90, Coronary artery disease due to lipid rich plaque       order for DME     1 Device    Equipment being ordered: Nebulizer    Chronic obstructive pulmonary disease,  unspecified COPD type (H)       order for DME     1 Device    Equipment being ordered: Nebulizer with tubing and pipe    Chronic obstructive pulmonary disease, unspecified COPD type (H)       rosuvastatin 40 MG tablet    CRESTOR    90 tablet    Take 1 tablet (40 mg) by mouth daily    Hyperlipidemia LDL goal <70       tiotropium 18 MCG capsule    SPIRIVA HANDIHALER    90 capsule    Inhale 1 capsule (18 mcg) into the lungs daily    Chronic obstructive pulmonary disease, unspecified COPD type (H)       vitamin D 1000 units capsule      Take 1 capsule by mouth daily. Take one tablet daily        * Notice:  This list has 6 medication(s) that are the same as other medications prescribed for you. Read the directions carefully, and ask your doctor or other care provider to review them with you.

## 2018-05-09 NOTE — ED NOTES
Bed: ED04  Expected date: 5/9/18  Expected time: 12:38 PM  Means of arrival: Ambulance  Comments:  Nanci Philippe    67 y/o Female    SOB    Triaged:  Yellow

## 2018-05-09 NOTE — IP AVS SNAPSHOT
MRN:[id number]                      After Visit Summary   5/9/2018    Porsche Manning    MRN: [id number]           Thank you!     Thank you for choosing [facility] for your care. Our goal is always to provide you with excellent care. Hearing back from our patients is one way we can continue to improve our services. Please take a few minutes to complete the written survey that you may receive in the mail after you visit with us. Thank you!        Patient Information     Date Of Birth          1950        Designated Caregiver       Most Recent Value    Caregiver    Will someone help with your care after discharge? no      About your hospital stay     You were admitted on:  May 9, 2018 You last received care in the:  [facility]    You were discharged on:  May 11, 2018        Reason for your hospital stay       You were admitted with shortness of breath and pneumonia . You had low 02 and required 02 . You have had high Blood sugars in the hospital                  Who to Call     For medical emergencies, please call 911.  For non-urgent questions about your medical care, please call your primary care provider or clinic, [phone]          Attending Provider     Provider Specialty    Rakesh Cherry MD Emergency Medicine    Han, Jud Read MD Emergency Medicine    Morgan, Valerio Godoy MD Internal Medicine    [facility] Sandi Carrillo MD Internal Medicine       Primary Care Provider Office Phone # Fax #    Danuta Tan PA-C [phone]      After Care Instructions     Activity       As tolerated            Diet       Regular diet            Oxygen Adult       [facility] Order 1  liter(s) by nasal cannula continuously with use of portable tank. Expected treatment length is indefinite (99 months).. Test on conserving device as applicable.    Patients who qualify for home O2 coverage under the CMS guidelines require ABG tests or O2 sat readings obtained closest  to, but no earlier than 2 days prior to the discharge, as evidence of the need for home oxygen therapy. Testing must be performed while patient is in the chronic stable state. See notes for O2 sats.    I certify that this patient, Porsche Manning has been under my care and that I, or a nurse practitioner or physician's assistant working with me, had a face-to-face encounter that meets the face-to-face encounter requirements with this patient on 5/11/2018. The patient, Porsche Manning was evaluated or treated in whole, or in part, for the following medical condition, which necessitates the use of the ordered oxygen. Treatment Diagnosis: COPD    Attending Provider: Sandi Alcazar MD  Physician signature: See electronic signature associated with these discharge orders  Date of Order: May 11, 2018            Oxygen Adult       1-2 liters                  Follow-up Appointments     Adult [facility] Follow-up and recommended labs and tests       PCP follow up in 3-5 days for post hospital dc follow up   Cbc and bmp at PCP visit  .   Pulmonary rehab referral   PFTs as out patient      Appointments on [place] and/or [facility] (with [facility] or [facility] provider or service). Call [phone] if you haven't heard regarding these appointments within 7 days of discharge.                  Your next 10 appointments already scheduled     Sep 25, 2018  8:30 AM CDT   (Arrive by 8:15 AM)   Return Visit with Brayan Uriostegui MD   [facility] ([facility] [place])    [address] [place] [address]  [address] [place] [id number] [phone]              Pending Results     No orders found from 5/7/2018 to 5/10/2018.            Statement of Approval     Ordered          05/11/18 1144  I have reviewed and agree with all the recommendations and orders detailed in this document.  EFFECTIVE NOW     Approved and electronically signed by:  Sandi Alcazar MD             Admission Information     Date &  "Time Provider Department Dept. Phone    5/9/2018 Sandi Alcazar MD [facility] [phone]      Your Vitals Were     Blood Pressure Pulse Temperature Respirations Height Weight    158/66 (BP Location: Right arm) 78 96.6  F (35.9  C) (Oral) 18 1.575 m (5' 2\") 84.8 kg (187 lb)    Pulse Oximetry BMI (Body Mass Index)                97% 34.2 kg/m2          [employer] Information     [employer] gives you secure access to your electronic health record. If you see a primary care provider, you can also send messages to your care team and make appointments. If you have questions, please call your primary care clinic.  If you do not have a primary care provider, please call [phone] and they will assist you.        Care EveryWhere ID     This is your Care EveryWhere ID. This could be used by other organizations to access your [facility] medical records  [id number]        Equal Access to Services     RONAN NIELSEN AH: Dickson Martinez, wachantelda shai, qaybta kaalmajessica fatima, lien weaver. So [facility] [phone].    ATENCIÓN: Si habla español, tiene a antunez disposición servicios gratuitos de asistencia lingüística. Llame al [phone].    We comply with applicable federal civil rights laws and [place] laws. We do not discriminate on the basis of race, color, national origin, age, disability, sex, sexual orientation, or gender identity.               Review of your medicines      START taking        Dose / Directions    doxycycline 100 MG capsule   Commonly known as:  VIBRAMYCIN   Indication:  Community Acquired Pneumonia   Used for:  Pneumonia due to infectious organism, unspecified laterality, unspecified part of lung        Dose:  100 mg   Take 1 capsule (100 mg) by mouth every 12 hours   Quantity:  20 capsule   Refills:  0       fluticasone-vilanterol 100-25 MCG/INH oral inhaler   Commonly known as:  BREO ELLIPTA   Used for:  COPD exacerbation (H)        Dose:  1 puff   Inhale " 1 puff into the lungs daily   Quantity:  1 Inhaler   Refills:  1       nicotine 14 MG/24HR 24 hr patch   Commonly known as:  NICODERM CQ   Used for:  COPD exacerbation (H)        Dose:  1 patch   Place 1 patch onto the skin daily   Quantity:  30 patch   Refills:  0       predniSONE 20 MG tablet   Commonly known as:  DELTASONE   Used for:  COPD exacerbation (H)        Dose:  40 mg   Take 2 tablets (40 mg) by mouth daily for 5 days   Quantity:  10 tablet   Refills:  0       varenicline 0.5 MG tablet   Commonly known as:  CHANTIX   Used for:  COPD exacerbation (H)        Dose:  0.5 mg   Take 1 tablet (0.5 mg) by mouth daily   Quantity:  30 tablet   Refills:  0         CONTINUE these medicines which may have CHANGED, or have new prescriptions. If we are uncertain of the size of tablets/capsules you have at home, strength may be listed as something that might have changed.        Dose / Directions    metoprolol succinate 100 MG 24 hr tablet   Commonly known as:  TOPROL-XL   This may have changed:  how much to take   Used for:  Hypertension goal BP (blood pressure) < 140/90, Coronary artery disease due to lipid rich plaque        Dose:  150 mg   Take 1.5 tablets (150 mg) by mouth daily   Quantity:  90 tablet   Refills:  3         CONTINUE these medicines which have NOT CHANGED        Dose / Directions    * albuterol (2.5 MG/3ML) 0.083% neb solution   Used for:  Chronic obstructive pulmonary disease, unspecified COPD type (H)        Dose:  1 vial   Take 1 vial (2.5 mg) by nebulization every 6 hours as needed for shortness of breath / dyspnea or wheezing   Quantity:  25 vial   Refills:  1       * albuterol 108 (90 Base) MCG/ACT Inhaler   Commonly known as:  PROAIR HFA/PROVENTIL HFA/VENTOLIN HFA   Used for:  Chronic obstructive pulmonary disease, unspecified COPD type (H)        Dose:  2 puff   Inhale 2 puffs into the lungs every 6 hours as needed for shortness of breath / dyspnea or wheezing   Quantity:  1 Inhaler    Refills:  3       aspirin 325 MG EC tablet   Used for:  Other specified pre-operative examination        1 tab po QD (Once per day)   Quantity:  100   Refills:  3       CALCIUM 1200 PO        Dose:  1200 mg   Take 1,200 mg by mouth daily   Refills:  0       clopidogrel 75 MG tablet   Commonly known as:  PLAVIX   Used for:  Peripheral vascular disease, unspecified        Dose:  75 mg   Take 1 tablet (75 mg) by mouth daily   Quantity:  90 tablet   Refills:  3       diltiazem 240 MG 24 hr capsule   Commonly known as:  CARDIZEM CD   Used for:  Old myocardial infarction        Dose:  240 mg   Take 1 capsule (240 mg) by mouth daily   Quantity:  90 capsule   Refills:  3       order for DME   Used for:  Chronic obstructive pulmonary disease, unspecified COPD type (H)        Equipment being ordered: Nebulizer   Quantity:  1 Device   Refills:  0       order for DME   Used for:  Chronic obstructive pulmonary disease, unspecified COPD type (H)        Equipment being ordered: Nebulizer with tubing and pipe   Quantity:  1 Device   Refills:  0       rosuvastatin 40 MG tablet   Commonly known as:  CRESTOR   Used for:  Hyperlipidemia LDL goal <70        Dose:  40 mg   Take 1 tablet (40 mg) by mouth daily   Quantity:  90 tablet   Refills:  3       tiotropium 18 MCG capsule   Commonly known as:  SPIRIVA HANDIHALER   Used for:  Chronic obstructive pulmonary disease, unspecified COPD type (H)        Dose:  18 mcg   Inhale 1 capsule (18 mcg) into the lungs daily   Quantity:  90 capsule   Refills:  3       vitamin D 1000 units capsule        Dose:  1 capsule   Take 1 capsule by mouth daily. Take one tablet daily   Refills:  0       * Notice:  This list has 2 medication(s) that are the same as other medications prescribed for you. Read the directions carefully, and ask your doctor or other care provider to review them with you.      STOP taking     LISINOPRIL PO                Where to get your medicines      These medications were sent  to [facility]/pharmacy #[id number] - [place], [place] - [address] [facility]  [address] [place], [place] [address] [address]     Phone:  [phone]     doxycycline 100 MG capsule    fluticasone-vilanterol 100-25 MCG/INH oral inhaler    metoprolol succinate 100 MG 24 hr tablet    nicotine 14 MG/24HR 24 hr patch    predniSONE 20 MG tablet    varenicline 0.5 MG tablet                Protect others around you: Learn how to safely use, store and throw away your medicines at www.disposemymeds.org.        ANTIBIOTIC INSTRUCTION     You've Been Prescribed an Antibiotic - Now What?  Your healthcare team thinks that you or your loved one might have an infection. Some infections can be treated with antibiotics, which are powerful, life-saving drugs. Like all medications, antibiotics have side effects and should only be used when necessary. There are some important things you should know about your antibiotic treatment.      Your healthcare team may run tests before you start taking an antibiotic.    Your team may take samples (e.g., from your blood, urine or other areas) to run tests to look for bacteria. These test can be important to determine if you need an antibiotic at all and, if you do, which antibiotic will work best.      Within a few days, your healthcare team might change or even stop your antibiotic.    Your team may start you on an antibiotic while they are working to find out what is making you sick.    Your team might change your antibiotic because test results show that a different antibiotic would be better to treat your infection.    In some cases, once your team has more information, they learn that you do not need an antibiotic at all. They may find out that you don't have an infection, or that the antibiotic you're taking won't work against your infection. For example, an infection caused by a virus can't be treated with antibiotics. Staying on an antibiotic when you don't need it is more likely to be harmful  than helpful.      You may experience side effects from your antibiotic.    Like all medications, antibiotics have side effects. Some of these can be serious.    Let you healthcare team know if you have any known allergies when you are admitted to the hospital.    One significant side effect of nearly all antibiotics is the risk of severe and sometimes deadly diarrhea caused by Clostridium difficile (C. Difficile). This occurs when a person takes antibiotics because some good germs are destroyed. Antibiotic use allows C. diificile to take over, putting patients at high risk for this serious infection.    As a patient or caregiver, it is important to understand your or your loved one's antibiotic treatment. It is especially important for caregivers to speak up when patients can't speak for themselves. Here are some important questions to ask your healthcare team.    What infection is this antibiotic treating and how do you know I have that infection?    What side effects might occur from this antibiotic?    How long will I need to take this antibiotic?    Is it safe to take this antibiotic with other medications or supplements (e.g., vitamins) that I am taking?     Are there any special directions I need to know about taking this antibiotic? For example, should I take it with food?    How will I be monitored to know whether my infection is responding to the antibiotic?    What tests may help to make sure the right antibiotic is prescribed for me?      Information provided by:  www.cdc.gov/getsmart  U.S. Department of Health and Human Services  Centers for disease Control and Prevention  National Center for Emerging and Zoonotic Infectious Diseases  Division of Healthcare Quality Promotion             Medication List: This is a list of all your medications and when to take them. Check marks below indicate your daily home schedule. Keep this list as a reference.      Medications           Morning Afternoon Evening  Bedtime As Needed    * albuterol (2.5 MG/3ML) 0.083% neb solution   Take 1 vial (2.5 mg) by nebulization every 6 hours as needed for shortness of breath / dyspnea or wheezing   Last time this was given:  2.5 mg on 5/11/2018  7:57 AM                                * albuterol 108 (90 Base) MCG/ACT Inhaler   Commonly known as:  PROAIR HFA/PROVENTIL HFA/VENTOLIN HFA   Inhale 2 puffs into the lungs every 6 hours as needed for shortness of breath / dyspnea or wheezing   Last time this was given:  2 puffs on 5/11/2018  3:10 AM                                aspirin 325 MG EC tablet   1 tab po QD (Once per day)   Last time this was given:  325 mg on 5/11/2018  8:35 AM                                CALCIUM 1200 PO   Take 1,200 mg by mouth daily                                clopidogrel 75 MG tablet   Commonly known as:  PLAVIX   Take 1 tablet (75 mg) by mouth daily   Last time this was given:  75 mg on 5/11/2018  8:35 AM                                diltiazem 240 MG 24 hr capsule   Commonly known as:  CARDIZEM CD   Take 1 capsule (240 mg) by mouth daily                                doxycycline 100 MG capsule   Commonly known as:  VIBRAMYCIN   Take 1 capsule (100 mg) by mouth every 12 hours   Last time this was given:  100 mg on 5/11/2018  9:16 AM                                fluticasone-vilanterol 100-25 MCG/INH oral inhaler   Commonly known as:  BREO ELLIPTA   Inhale 1 puff into the lungs daily                                metoprolol succinate 100 MG 24 hr tablet   Commonly known as:  TOPROL-XL   Take 1.5 tablets (150 mg) by mouth daily   Last time this was given:  100 mg on 5/11/2018  8:35 AM                                nicotine 14 MG/24HR 24 hr patch   Commonly known as:  NICODERM CQ   Place 1 patch onto the skin daily                                order for DME   Equipment being ordered: Nebulizer                                order for DME   Equipment being ordered: Nebulizer with tubing and pipe                                 predniSONE 20 MG tablet   Commonly known as:  DELTASONE   Take 2 tablets (40 mg) by mouth daily for 5 days   Last time this was given:  40 mg on 5/11/2018  8:35 AM                                rosuvastatin 40 MG tablet   Commonly known as:  CRESTOR   Take 1 tablet (40 mg) by mouth daily   Last time this was given:  40 mg on 5/11/2018  8:35 AM                                tiotropium 18 MCG capsule   Commonly known as:  SPIRIVA HANDIHALER   Inhale 1 capsule (18 mcg) into the lungs daily                                varenicline 0.5 MG tablet   Commonly known as:  CHANTIX   Take 1 tablet (0.5 mg) by mouth daily                                vitamin D 1000 units capsule   Take 1 capsule by mouth daily. Take one tablet daily                                * Notice:  This list has 2 medication(s) that are the same as other medications prescribed for you. Read the directions carefully, and ask your doctor or other care provider to review them with you.

## 2018-05-09 NOTE — ED NOTES
[facility], [place]   ED Nurse to Floor Handoff     Porsche Manning is a 68 year old female who speaks English and lives with a spouse,  in a home  They arrived in the ED by ambulance from emergency room    ED Chief Complaint: Shortness of Breath (MORALES)    ED Dx;   Final diagnoses:   COPD exacerbation (H)         Needed?: No    Allergies:   Allergies   Allergen Reactions     Morphine Nausea and Vomiting     Penicillins Nausea and Vomiting   .  Past Medical Hx:   Past Medical History:   Diagnosis Date     Brachial neuritis or radiculitis NOS      Chronic obstructive pulmonary disease, unspecified COPD type (H) 3/8/2016     Coronary artery disease     Doing fine     H/O tobacco use, presenting hazards to health      Hyperlipidemia LDL goal < 100      Hypertension goal BP (blood pressure) < 140/90      Malignant neoplasm of other specified sites of cervix      Old myocardial infarction      Osteopenia      Peripheral vascular disease, unspecified      Type 2 diabetes, HbA1c goal < 7% (H)       Baseline Mental status: WDL  Current Mental Status changes: at basesline    Infection present or suspected this encounter: no  Sepsis suspected: No  Isolation type: No active isolations     Activity level - Baseline/Home:  Independent  Activity Level - Current:   Stand with Assist    Bariatric equipment needed?: No    In the ED these meds were given:   Medications   ipratropium - albuterol 0.5 mg/2.5 mg/3 mL (DUONEB) neb solution 3 mL (3 mLs Nebulization Given 5/9/18 1313)   methylPREDNISolone sodium succinate (solu-MEDROL) injection 125 mg (125 mg Intravenous Given 5/9/18 1313)       Drips running?  No    Home pump  No    Current LDAs  Peripheral IV 05/09/18 Right Hand (Active)   Site Assessment WDL 5/9/2018  1:04 PM   Line Status Saline locked 5/9/2018  1:04 PM   Phlebitis Scale 0-->no symptoms 5/9/2018  1:04 PM   Number of days:0       Peripheral IV 05/09/18 Left Upper forearm (Active)    Number of days:0       Labs results:   Labs Ordered and Resulted from Time of ED Arrival Up to the Time of Departure from the ED   CBC WITH PLATELETS DIFFERENTIAL - Abnormal; Notable for the following:        Result Value    WBC 17.3 (*)     RDW 15.2 (*)     Absolute Neutrophil 12.7 (*)     Absolute Monocytes 2.0 (*)     All other components within normal limits   COMPREHENSIVE METABOLIC PANEL - Abnormal; Notable for the following:     Glucose 162 (*)     Creatinine 1.49 (*)     GFR Estimate 35 (*)     GFR Estimate If Black 42 (*)     Albumin 3.1 (*)     Protein Total 6.7 (*)     All other components within normal limits   D DIMER QUANTITATIVE - Abnormal; Notable for the following:     D Dimer 1.2 (*)     All other components within normal limits   NT PROBNP INPATIENT   TROPONIN I       Imaging Studies: No results found for this or any previous visit (from the past 24 hour(s)).    Recent vital signs:   /50  Resp 19  SpO2 92%    Cardiac Rhythm: Normal Sinus  Pt needs tele? Yes  Skin/wound Issues: Bruise to right forearm    Code Status: Full Code    Pain control: good    Nausea control: pt had none    Abnormal labs/tests/findings requiring intervention: WBC elevated, Ddimer elevated-CT chest pending    Family present during ED course? No   Family Comments/Social Situation comments: N/A    Tasks needing completion: None    Katalina Mathias, RN    [phone] [facility]

## 2018-05-09 NOTE — IP AVS SNAPSHOT
[facility] [address]    [address] [place] [address]    Phone:  [phone]                                       After Visit Summary   5/9/2018    Porsche Manning    MRN: [id number]           After Visit Summary Signature Page     I have received my discharge instructions, and my questions have been answered. I have discussed any challenges I see with this plan with the nurse or doctor.    ..........................................................................................................................................  Patient/Patient Representative Signature      ..........................................................................................................................................  Patient Representative Print Name and Relationship to Patient    ..................................................               ................................................  Date                                            Time    ..........................................................................................................................................  Reviewed by Signature/Title    ...................................................              ..............................................  Date                                                            Time

## 2018-05-09 NOTE — H&P
Admitted:     05/09/2018      CHIEF COMPLAINT:  Shortness of breath.      HISTORY OF PRESENT ILLNESS:  Ms. Porsche Manning is a 68-year-old  lady with history of COPD for the last 3 years, not on home oxygen, hypertension, history of coronary artery disease with a heart attack 20 years ago, diet-controlled diabetes and hyperlipidemia who presented to the ED earlier today for evaluation of shortness of breath.  She had visited her primary care physician in the morning and was noted to have a pulse ox of 83-88% on room air, given an albuterol nebulization and subsequently transferred to the ED.  In the ED, she was given a DuoNeb and a liter of fluid and subsequently she did not feel better and is being admitted for COPD exacerbation.      On questioning by me today, the patient tells me that she has been feeling ill for about 2-1/2 weeks.  [place] and [place] the floors where she thought that she may have gotten a cold, did take 5 days of Zithromax with not much relief and also completed a Medrol Dosepak.  She has been a smoker for the last 40 years and has intermittently quit for a few weeks here now and then but admits that she needs to quit.  She denies any chest pain.  She has had some chills but no fevers.  She denies any swelling of her ankles.  She denies any gurgling in her chest.  She denies any recent sick contacts.  She denies any recent travel.  She denies any weight loss.  She denies any bowel or urinary complaints.  She denies any seizure disease or headaches.      REVIEW OF SYSTEMS:  All 10 systems were reviewed.  The positives are mentioned above, the rest are negative.      PAST MEDICAL HISTORY:  COPD.  This was diagnosed in 2016.  Coronary artery disease in the remote past, history of tobacco abuse, hyperlipidemia, hypertension, osteopenia, peripheral vascular disease, unspecified.      PAST SURGICAL HISTORY:  Cardiac stents put in 1996,  hysterectomy, PID vascular surgery in 2016, stent has been placed in both her legs.  This was in 2006.      FAMILY HISTORY:  Positive for diabetes and hypertension and coronary artery disease.      SOCIAL HISTORY:  She has a history of 40 years of smoking.  She smokes about half a pack a day.  She does not drink any alcohol.  She is .  She used to work in the [employer] department of the [employer].      HOME MEDICATIONS:   1.  Albuterol inhaler.   2.  Aspirin 325 mg daily.   3.  Calcium carbonate.   4.  Plavix 75 mg daily.   5.  Diltiazem 240 mg daily.   6.  Lisinopril 40 mg daily.   7.  Metoprolol 100 mg daily.   8.  Crestor 40 mg daily.   9.  Spiriva 18 mcg daily.      ALLERGIES:  AUGMENTIN and MORPHINE.  She says they make her nauseous and throw up.  There is no history of hives or shortness of breath with that.      PHYSICAL EXAMINATION:   GENERAL:  She is alert and oriented in no acute distress, pleasant  lady.   VITAL SIGNS:  Blood pressure 140/67, pulse is 83, temperature 96.6, pulse ox is 94% on 2 liters.   HEENT:  Atraumatic, normocephalic.  Pupils are equal, round, react to light.  EOMI.  Mouth mucosa is dry.   NECK:  Supple.   CHEST:  Reduced breath sounds at the bases with occasional rhonchi.  Air entry is equal bilaterally.   CARDIOVASCULAR:  S1, S2, no murmurs, rubs or gallops.   ABDOMEN:  Soft, nontender, bowel sounds are positive.   EXTREMITIES:  No edema.   SPINE/CVA:  No tenderness.   NEUROLOGIC:  Cranial nerves II through XII grossly intact.  Power, tone and bulk are equal bilaterally.  There is no facial asymmetry.   SKIN:  No purpura or rashes are noted.   LYMPHATICS:  No lymphadenopathy noted in the cervical and inguinal area.      LABORATORY:  Labs on admission are notable for a serum creatinine of 1.49.  .  Troponin 0.015.  CBC:  White cell count of 17.3, hemoglobin of 13.5 and platelet count of 269.      IMAGING:  Chest x-ray was done, which showed COPD with possible  fibrotic change in the bases.  No focal airspace opacity, subsequently followed by a CT chest which was done 05/09/2018 which showed:  Impression:  1) No pulmonary embolism.  2) Centrilobular nodules and tree-in-bud opacities scattered about both lungs with an upper lobe predominance, compatible with multifocal pneumonia, discrete 9 mm pulmonary nodule in the right upper lobe may also be related to infection, although followup resolution is recommended.  3) A 50% stenosis of the origin of the left subclavian artery and 20 mm indeterminate density in the left adrenal nodule, both of these need followup.      ASSESSMENT AND PLAN:  A 68-year-old lady with a history of COPD, hypertension, coronary artery disease, hyperlipidemia, diabetes mellitus, obesity, tobacco abuse, coming in with shortness of breath.      1.  Acute respiratory failure likely due to an exacerbation of her COPD and superimposed pneumonia:  Will admit to general medical floor.  Start the patient on Solu-Medrol 60 mg IV b.i.d.  Scheduled DuoNebs every 4 hours while awake.  Albuterol inhaler as needed.  Spiriva inhaler.  Ceftriaxone 1 gram IV daily.  Zithromax 500 mg followed 250 mg daily.  Obtain a sputum CNS.  COPD consultation.   2.  Hypertension:  Will continue the patient on her Cardizem and metoprolol.  Hold the lisinopril.   3.  MICHELLE:  Likely secondary to dehydration.  Will hold the lisinopril.  Will repeat a BMP in the morning.   4.  Diet-controlled diabetes:  The patient was put on sliding scale insulin as she is on high-dose steroids at this point.   5.  Deep venous thrombosis prophylaxis:  She is ambulatory, already on aspirin and Plavix.  Therefore, we will run mechanical prophylaxis for now.  May consider Lovenox if she is in-house for more than a couple of days.   6.  Tobacco abuse:  She refuses nicotine patch.   7.  Coronary artery disease:  She has a history of stenting in the past, has no history suggestive of angina and heart failure.   Continue the aspirin, Plavix, Cardizem and metoprolol.   8.  Pulmonary nodule and adrenal nodule:  This can be followed up as an outpatient.  The patient was discussed with the patient's bedside RN.     9.  Pain:  She has no pain.      CODE STATUS:  Full code.      DISPOSITION:  Back to her home once she is able to be weaned off oxygen and her pneumonia has improved.         HALIMA RETANA MD             D: 2018   T: 2018   MT: FEI      Name:     SAYDA FALLON   MRN:      [id number]        Account:      [id number]   :      1950        Admitted:     2018                   Document: [id number]

## 2018-05-09 NOTE — PROGRESS NOTES
SUBJECTIVE:   Porsche Manning is a 68 year old female who presents to clinic today for the following health issues:      COPD exacerbation - Symptoms have been present for two weeks. Recently tx for a COPD flare and symptoms not improving. She is taking Albuterol PRN. In clinic her initial Spo2 was 87%. She was given Duoneb without improvement of symptoms. On ambulation her Spo2 was down to 83%.   n  Problem list and histories reviewed & adjusted, as indicated.  Additional history: as documented    Labs reviewed in EPIC    Reviewed and updated as needed this visit by clinical staff       Reviewed and updated as needed this visit by Provider         ROS:  Constitutional, HEENT, cardiovascular, pulmonary, GI, , musculoskeletal, neuro, skin, endocrine and psych systems are negative, except as otherwise noted.    OBJECTIVE:     /51 (BP Location: Right arm, Patient Position: Sitting, Cuff Size: Adult Large)  Pulse 80  Temp 98.2  F (36.8  C) (Oral)  Resp 20  Wt 187 lb (84.8 kg)  SpO2 (!) 87%  BMI 34.2 kg/m2  Body mass index is 34.2 kg/(m^2).  GENERAL: healthy, alert and no distress  EYES: Eyes grossly normal to inspection  HENT:nose and mouth without ulcers or lesions  RESP: + diffuse wheezes  CV: regular rate and rhythm, normal S1 S2  MS:  no edema    Diagnostic Test Results:  none     ASSESSMENT/PLAN:     1. COPD exacerbation (H)  - ipratropium - albuterol 0.5 mg/2.5 mg/3 mL (DUONEB) 0.5-2.5 (3) MG/3ML neb solution; Take 1 vial (3 mLs) by nebulization once for 1 dose Given in clinic  Dispense: 3 mL; Refill: 0  - INHALATION/NEBULIZER TREATMENT, INITIAL  - Pt SpO2 ranged from 83-88%. No improvement with duoneb that she was given in clinic. She was placed on oxygen 1 L and EMS was called as pt would need further evaluation at the ER.     Shiv Wilkins MD  [facility]

## 2018-05-09 NOTE — PHARMACY-ADMISSION MEDICATION HISTORY
Admission medication history interview status for the 5/9/2018 admission is complete. See Epic admission navigator for allergy information, pharmacy, prior to admission medications and immunization status.     Medication history interview sources: patient    Changes made to PTA medication list (reason)  Added: calcium carbonate (per patient report)  Deleted: Duoneb for 1 dose (given in clinic, patient does not take at home), azithromycin 250 mg for 5 days  (therapy complete)  Changed: lisinopril 40 mg daily --> 20 mg twice daily (per patient report her MD decided to split the dose to better control BP)    Additional medication history information:  -The patient was a good historian and able to recall the strength, last dose, and frequency of all medications.  -She has received her influenza vaccine (10/2017) and denied any additional over-the-counter/prescription products.    Prior to Admission medications    Medication Sig Last Dose Taking? Auth Provider   albuterol (2.5 MG/3ML) 0.083% neb solution Take 1 vial (2.5 mg) by nebulization every 6 hours as needed for shortness of breath / dyspnea or wheezing 5/9/2018 at Unknown time Yes Danuta Tan PA-C   albuterol (PROAIR HFA/PROVENTIL HFA/VENTOLIN HFA) 108 (90 Base) MCG/ACT Inhaler Inhale 2 puffs into the lungs every 6 hours as needed for shortness of breath / dyspnea or wheezing 5/9/2018 at Unknown time Yes Danuta Tan PA-C   ASPIRIN 325 MG OR TBEC 1 tab po QD (Once per day) 5/9/2018 at AM Yes Reva Stahl MD   Calcium Carbonate-Vit D-Min (CALCIUM 1200 PO) Take 1,200 mg by mouth daily 5/8/2018 at AM Yes Unknown, Entered By History   Cholecalciferol (VITAMIN D) 1000 UNIT capsule Take 1 capsule by mouth daily. Take one tablet daily 5/9/2018 at AM Yes Reported, Patient   clopidogrel (PLAVIX) 75 MG tablet Take 1 tablet (75 mg) by mouth daily 5/9/2018 at AM Yes Brayan Uriostegui MD   diltiazem (CARDIZEM CD) 240 MG 24 hr capsule Take 1  capsule (240 mg) by mouth daily 5/9/2018 at AM Yes Brayan Uriostegui MD   LISINOPRIL PO Take 20 mg by mouth 2 times daily 5/9/2018 at AM Yes Unknown, Entered By History   metoprolol (TOPROL-XL) 100 MG 24 hr tablet Take 1 tablet (100 mg) by mouth daily 5/9/2018 at AM Yes Brayan Uriostegui MD   rosuvastatin (CRESTOR) 40 MG tablet Take 1 tablet (40 mg) by mouth daily 5/9/2018 at AM Yes Brayan Uriostegui MD   tiotropium (SPIRIVA HANDIHALER) 18 MCG capsule Inhale 1 capsule (18 mcg) into the lungs daily 5/9/2018 at AM Yes Brayan Uriostegui MD   order for DME Equipment being ordered: Nebulizer   Danuta Tan PA-C   order for DME Equipment being ordered: Nebulizer with tubing and pipe   Danuta Tan PA-C     Medication history completed by: Niharika Huynh, Pharm.D.

## 2018-05-10 ENCOUNTER — ANESTHESIA EVENT (OUTPATIENT)
Dept: MEDSURG UNIT | Facility: CLINIC | Age: 68
DRG: 193 | End: 2018-05-10
Payer: COMMERCIAL

## 2018-05-10 ENCOUNTER — ANESTHESIA (OUTPATIENT)
Dept: MEDSURG UNIT | Facility: CLINIC | Age: 68
DRG: 193 | End: 2018-05-10
Payer: COMMERCIAL

## 2018-05-10 LAB
ANION GAP SERPL CALCULATED.3IONS-SCNC: 10 MMOL/L (ref 3–14)
ANION GAP SERPL CALCULATED.3IONS-SCNC: 8 MMOL/L (ref 3–14)
BUN SERPL-MCNC: 18 MG/DL (ref 7–30)
BUN SERPL-MCNC: 20 MG/DL (ref 7–30)
CALCIUM SERPL-MCNC: 8.1 MG/DL (ref 8.5–10.1)
CALCIUM SERPL-MCNC: 8.6 MG/DL (ref 8.5–10.1)
CHLORIDE SERPL-SCNC: 104 MMOL/L (ref 94–109)
CHLORIDE SERPL-SCNC: 105 MMOL/L (ref 94–109)
CO2 SERPL-SCNC: 23 MMOL/L (ref 20–32)
CO2 SERPL-SCNC: 26 MMOL/L (ref 20–32)
CREAT SERPL-MCNC: 1.19 MG/DL (ref 0.52–1.04)
CREAT SERPL-MCNC: 1.22 MG/DL (ref 0.52–1.04)
ERYTHROCYTE [DISTWIDTH] IN BLOOD BY AUTOMATED COUNT: 14.7 % (ref 10–15)
GFR SERPL CREATININE-BSD FRML MDRD: 44 ML/MIN/1.7M2
GFR SERPL CREATININE-BSD FRML MDRD: 45 ML/MIN/1.7M2
GLUCOSE BLDC GLUCOMTR-MCNC: 271 MG/DL (ref 70–99)
GLUCOSE BLDC GLUCOMTR-MCNC: 304 MG/DL (ref 70–99)
GLUCOSE BLDC GLUCOMTR-MCNC: 304 MG/DL (ref 70–99)
GLUCOSE BLDC GLUCOMTR-MCNC: 442 MG/DL (ref 70–99)
GLUCOSE SERPL-MCNC: 266 MG/DL (ref 70–99)
GLUCOSE SERPL-MCNC: 312 MG/DL (ref 70–99)
HCT VFR BLD AUTO: 35.1 % (ref 35–47)
HGB BLD-MCNC: 11.2 G/DL (ref 11.7–15.7)
MCH RBC QN AUTO: 27.3 PG (ref 26.5–33)
MCHC RBC AUTO-ENTMCNC: 31.9 G/DL (ref 31.5–36.5)
MCV RBC AUTO: 86 FL (ref 78–100)
PLATELET # BLD AUTO: 232 10E9/L (ref 150–450)
POTASSIUM SERPL-SCNC: 3.6 MMOL/L (ref 3.4–5.3)
POTASSIUM SERPL-SCNC: 3.6 MMOL/L (ref 3.4–5.3)
RBC # BLD AUTO: 4.1 10E12/L (ref 3.8–5.2)
SODIUM SERPL-SCNC: 137 MMOL/L (ref 133–144)
SODIUM SERPL-SCNC: 139 MMOL/L (ref 133–144)
WBC # BLD AUTO: 13.2 10E9/L (ref 4–11)

## 2018-05-10 PROCEDURE — 40000671 ZZH STATISTIC ANESTHESIA CASE

## 2018-05-10 PROCEDURE — 94640 AIRWAY INHALATION TREATMENT: CPT

## 2018-05-10 PROCEDURE — 00000146 ZZHCL STATISTIC GLUCOSE BY METER IP

## 2018-05-10 PROCEDURE — 25000131 ZZH RX MED GY IP 250 OP 636 PS 637: Performed by: INTERNAL MEDICINE

## 2018-05-10 PROCEDURE — 25000128 H RX IP 250 OP 636: Performed by: INTERNAL MEDICINE

## 2018-05-10 PROCEDURE — 12000001 ZZH R&B MED SURG/OB UMMC

## 2018-05-10 PROCEDURE — 99207 ZZC CDG-MDM COMPONENT: MEETS LOW - DOWN CODED: CPT | Performed by: INTERNAL MEDICINE

## 2018-05-10 PROCEDURE — 25000128 H RX IP 250 OP 636

## 2018-05-10 PROCEDURE — 25000125 ZZHC RX 250: Performed by: INTERNAL MEDICINE

## 2018-05-10 PROCEDURE — 80048 BASIC METABOLIC PNL TOTAL CA: CPT | Performed by: INTERNAL MEDICINE

## 2018-05-10 PROCEDURE — 94640 AIRWAY INHALATION TREATMENT: CPT | Mod: 76

## 2018-05-10 PROCEDURE — 36415 COLL VENOUS BLD VENIPUNCTURE: CPT | Performed by: INTERNAL MEDICINE

## 2018-05-10 PROCEDURE — 99232 SBSQ HOSP IP/OBS MODERATE 35: CPT | Performed by: INTERNAL MEDICINE

## 2018-05-10 PROCEDURE — 40000275 ZZH STATISTIC RCP TIME EA 10 MIN

## 2018-05-10 PROCEDURE — 25000132 ZZH RX MED GY IP 250 OP 250 PS 637: Performed by: INTERNAL MEDICINE

## 2018-05-10 PROCEDURE — 85027 COMPLETE CBC AUTOMATED: CPT | Performed by: INTERNAL MEDICINE

## 2018-05-10 RX ORDER — HYDRALAZINE HYDROCHLORIDE 20 MG/ML
10 INJECTION INTRAMUSCULAR; INTRAVENOUS EVERY 6 HOURS PRN
Status: DISCONTINUED | OUTPATIENT
Start: 2018-05-10 | End: 2018-05-11 | Stop reason: HOSPADM

## 2018-05-10 RX ORDER — PREDNISONE 20 MG/1
40 TABLET ORAL DAILY
Status: DISCONTINUED | OUTPATIENT
Start: 2018-05-11 | End: 2018-05-11 | Stop reason: HOSPADM

## 2018-05-10 RX ORDER — DEXTROSE MONOHYDRATE 25 G/50ML
25-50 INJECTION, SOLUTION INTRAVENOUS
Status: DISCONTINUED | OUTPATIENT
Start: 2018-05-10 | End: 2018-05-11 | Stop reason: HOSPADM

## 2018-05-10 RX ORDER — SODIUM CHLORIDE 9 MG/ML
INJECTION, SOLUTION INTRAVENOUS
Status: COMPLETED
Start: 2018-05-10 | End: 2018-05-10

## 2018-05-10 RX ORDER — NICOTINE POLACRILEX 4 MG
15-30 LOZENGE BUCCAL
Status: DISCONTINUED | OUTPATIENT
Start: 2018-05-10 | End: 2018-05-11 | Stop reason: HOSPADM

## 2018-05-10 RX ORDER — PREDNISONE 20 MG/1
40 TABLET ORAL DAILY
Status: DISCONTINUED | OUTPATIENT
Start: 2018-05-10 | End: 2018-05-10

## 2018-05-10 RX ORDER — SODIUM CHLORIDE 9 MG/ML
INJECTION, SOLUTION INTRAVENOUS
Status: DISPENSED
Start: 2018-05-10 | End: 2018-05-11

## 2018-05-10 RX ADMIN — AZITHROMYCIN 500 MG: 250 TABLET, FILM COATED ORAL at 08:01

## 2018-05-10 RX ADMIN — SODIUM CHLORIDE 1000 ML: 9 INJECTION, SOLUTION INTRAVENOUS at 13:23

## 2018-05-10 RX ADMIN — ALBUTEROL SULFATE 2.5 MG: 2.5 SOLUTION RESPIRATORY (INHALATION) at 12:39

## 2018-05-10 RX ADMIN — INSULIN HUMAN 8 UNITS: 100 INJECTION, SUSPENSION SUBCUTANEOUS at 14:35

## 2018-05-10 RX ADMIN — CALCIUM CARBONATE 600 MG (1,500 MG)-VITAMIN D3 400 UNIT TABLET 1 TABLET: at 08:01

## 2018-05-10 RX ADMIN — DILTIAZEM HYDROCHLORIDE 240 MG: 240 CAPSULE, EXTENDED RELEASE ORAL at 08:01

## 2018-05-10 RX ADMIN — METHYLPREDNISOLONE SODIUM SUCCINATE 40 MG: 40 INJECTION, POWDER, FOR SOLUTION INTRAMUSCULAR; INTRAVENOUS at 06:40

## 2018-05-10 RX ADMIN — ALBUTEROL SULFATE 2.5 MG: 2.5 SOLUTION RESPIRATORY (INHALATION) at 16:17

## 2018-05-10 RX ADMIN — ALBUTEROL SULFATE 2.5 MG: 2.5 SOLUTION RESPIRATORY (INHALATION) at 08:34

## 2018-05-10 RX ADMIN — VITAMIN D, TAB 1000IU (100/BT) 1000 UNITS: 25 TAB at 08:01

## 2018-05-10 RX ADMIN — RANITIDINE 150 MG: 150 TABLET, FILM COATED ORAL at 19:05

## 2018-05-10 RX ADMIN — CEFTRIAXONE SODIUM 1 G: 1 INJECTION, POWDER, FOR SOLUTION INTRAMUSCULAR; INTRAVENOUS at 19:06

## 2018-05-10 RX ADMIN — Medication 1000 ML: at 13:23

## 2018-05-10 RX ADMIN — ALBUTEROL SULFATE 2.5 MG: 2.5 SOLUTION RESPIRATORY (INHALATION) at 20:13

## 2018-05-10 RX ADMIN — METOPROLOL SUCCINATE 100 MG: 100 TABLET, EXTENDED RELEASE ORAL at 08:01

## 2018-05-10 RX ADMIN — CLOPIDOGREL BISULFATE 75 MG: 75 TABLET, FILM COATED ORAL at 08:01

## 2018-05-10 RX ADMIN — UMECLIDINIUM 1 PUFF: 62.5 AEROSOL, POWDER ORAL at 08:02

## 2018-05-10 RX ADMIN — ROSUVASTATIN CALCIUM 40 MG: 20 TABLET ORAL at 08:01

## 2018-05-10 RX ADMIN — RANITIDINE 150 MG: 150 TABLET, FILM COATED ORAL at 10:23

## 2018-05-10 RX ADMIN — SODIUM CHLORIDE 500 ML: 9 INJECTION, SOLUTION INTRAVENOUS at 19:05

## 2018-05-10 RX ADMIN — ASPIRIN 325 MG: 325 TABLET, DELAYED RELEASE ORAL at 08:02

## 2018-05-10 ASSESSMENT — ACTIVITIES OF DAILY LIVING (ADL)
ADLS_ACUITY_SCORE: 9

## 2018-05-10 NOTE — PLAN OF CARE
Problem: Patient Care Overview  Goal: Plan of Care/Patient Progress Review  Outcome: No Change  Pt arrived on unit from [place] [facility] by cart at 17:15.  Alert and oriented x4.  Afebrile. VSS with elevated BP. SpO2 mid 90's on 2L O2.  Lungs clear, + MORALES and SOB.   Denies chest pain and other pain. No coughing.  Oriented room and call light.  Encouraged IS use.  PCD's on for DVT ppx.  LR infusing at 75ml . Ongoing IV abxs and PO Zithromax for pneumonia.  Neg PE per chest CT.  Sepsis protocol triggered, Lactic acid 1.1  Pt received scheduled Nebs, continuous pulse oximeter on.   BG monitored with IV solumedrol 40mg  Q12hr.  Insulin given per SS.  Tolerates regular diet well.  LBM today .  no diarrhea or constipation. Denies nausea or reflux. Voiding w/o difficulty.  Will continue to monitor with plan of care

## 2018-05-10 NOTE — PLAN OF CARE
Problem: Patient Care Overview  Goal: Plan of Care/Patient Progress Review  A/O x 4. Resting in bed. Up with SBA to bathroom due to needs to manage IV pole an cords. No report of SOB or CP.  Lungs clear. Pt has scheduled nebs and will get solumedrol later in shift. Cont to assess.     0651: Pt up the bathroom. Reports continued SOB with exertion when up to bathroom. O2 sats drop into the mid 80's without O2 in place. Pt otherwise stable

## 2018-05-10 NOTE — PLAN OF CARE
Problem: Patient Care Overview  Goal: Plan of Care/Patient Progress Review  Outcome: Improving  A/O x4 VSS. Pt states she still feels SOB especially with activity. Pt is on 1L nc, O2 sats 95-96% at rest, 91-93% after activity. LS diminished, expiratory wheezes. Pt has non-productive, infrequent cough, sputum specimen still needed. Pt is on scheduled nebs. Voiding without difficutly. Tolerating reg diet, no nausea, IVF dc'd.  BG elevated, 266, insulin coverage changed to high dosing coverage and pt received 6 u novolog.     1340. , notified Dr. Alcazar. Pt given 10 units insulin, IVF bolus ordered. BMP ordered for 1800. Pt able to make needs known, continue POC.

## 2018-05-10 NOTE — PROGRESS NOTES
"Feels bit better   Still sob   No fever   No chills  On Exam ;   Alert and oriented . No acute distress  Vital signs:  Temp: 95.6  F (35.3  C) Temp src: Oral BP: 160/70 Pulse: 79 Heart Rate: 82 Resp: 18 SpO2: 96 % O2 Device: Nasal cannula Oxygen Delivery: 2 LPM Height: 157.5 cm (5' 2\") Weight: 84.8 kg (187 lb)  Estimated body mass index is 34.2 kg/(m^2) as calculated from the following:    Height as of this encounter: 1.575 m (5' 2\").    Weight as of this encounter: 84.8 kg (187 lb).  Neck ; supple , no JVD  Chest ; occ rhonchi  CVS; RRR, no murmur /rubs or gallops  GI ; soft abdomen, non tender, BS positive  Ext ; no edema , no cynosis  Neuro ; CN 2 to 12 grossly intact , No Facial asymmetry noticed  .  Psych ; appropriate mood and effect  Skin ; no rash or purpura on exposed skin   Labs; creatinine 1.22 ( 1.49 )   Wbc 13.2 ( 17.3 ) hb 11.5  ASSESSMENT AND PLAN:  A 68-year-old lady with a history of COPD, hypertension, coronary artery disease, hyperlipidemia, diabetes mellitus, obesity, tobacco abuse, coming in with shortness of breath.       1.  Acute respiratory failure likely due to an exacerbation of her COPD and superimposed pneumonia: .  Start prednisone 40 mg 5/10  b.i.d.  Scheduled DuoNebs every 4 hours while awake.  Albuterol inhaler as needed.  Spiriva inhaler.  Ceftriaxone 1 gram IV daily.  Zithromax 500 mg followed 250 mg daily.  Obtain a sputum C/S.  COPD consultation.   2.  Hypertension:   continue the patient on her Cardizem and metoprolol.  Hold the lisinopril. Prn hydralazine added 5/10  3.  MICHELLE:  Likely prerenal.   hold the lisinopril.  Will repeat a BMP in the morning. Ct IVF  4.  Diet-controlled diabetes:  The patient is  on sliding scale insulin as she is on high-dose steroids at this point. At home she doesn't take any medication  5.  Deep venous thrombosis prophylaxis:  She is ambulatory, already on aspirin and Plavix.  Therefore,  mechanical prophylaxis for now.    6.  Tobacco abuse:  She " refuses nicotine patch.   7.  Coronary artery disease:  She has a history of stenting in the past, has no history suggestive of angina and heart failure.  Continue the aspirin, Plavix, Cardizem and metoprolol.   8.  Pulmonary nodule and adrenal nodule:  This can be followed up as an outpatient.  The patient was discussed with the patient's bedside RN.     9.  Pain:  She has no pain.   10.Dispo ; depends on breathing improved , 02 requirements/improving wbc    1-2 days  CODE STATUS:  Full code.

## 2018-05-10 NOTE — PROGRESS NOTES
Care Coordinator Progress Note    Admission Date/Time:  5/9/2018  Attending MD:  Sandi Alcazar MD    Data  Chart reviewed, discussed with interdisciplinary team.   Patient was admitted for: COPD exacerbation (H).    Concerns with insurance coverage for discharge needs: None.  Current Living Situation: Patient lives alone.  Support System: Supportive  Services Involved: No services prior to admit  Transportation at Discharge: Family or friend will provide  Transportation to Medical Appointments:   - Not applicable  Barriers to Discharge: Medical Clearance           Assessment  This RNCC met with patient to go over discharge needs, patient states she lives alone and is independent with all of her ADL's; her plan is to return home as soon as her pneumonia clears.  Patient states she has never been on oxygen before and feels as soon as her pneumonia clears she will be okay. Patient denied any significant concerns related to discharge and sates a friend will provide transportation when discharge is imminent.     Plan  Anticipated Discharge Date:  TBD  Anticipated Discharge Plan: Home to previous living situation    Kiya MANCUSO RN CCM    RN Care Coordinator 10A  E-mail: [email or web address]  Phone: [phone]  Pager: [phone]

## 2018-05-11 ENCOUNTER — DOCUMENTATION ONLY (OUTPATIENT)
Dept: MEDSURG UNIT | Facility: CLINIC | Age: 68
End: 2018-05-11

## 2018-05-11 VITALS
WEIGHT: 187 LBS | OXYGEN SATURATION: 97 % | BODY MASS INDEX: 34.41 KG/M2 | HEIGHT: 62 IN | RESPIRATION RATE: 18 BRPM | HEART RATE: 78 BPM | TEMPERATURE: 96.6 F | SYSTOLIC BLOOD PRESSURE: 158 MMHG | DIASTOLIC BLOOD PRESSURE: 66 MMHG

## 2018-05-11 LAB
ANION GAP SERPL CALCULATED.3IONS-SCNC: 7 MMOL/L (ref 3–14)
BUN SERPL-MCNC: 20 MG/DL (ref 7–30)
CALCIUM SERPL-MCNC: 8.2 MG/DL (ref 8.5–10.1)
CHLORIDE SERPL-SCNC: 108 MMOL/L (ref 94–109)
CO2 SERPL-SCNC: 27 MMOL/L (ref 20–32)
CREAT SERPL-MCNC: 1.19 MG/DL (ref 0.52–1.04)
ERYTHROCYTE [DISTWIDTH] IN BLOOD BY AUTOMATED COUNT: 15.2 % (ref 10–15)
GFR SERPL CREATININE-BSD FRML MDRD: 45 ML/MIN/1.7M2
GLUCOSE BLDC GLUCOMTR-MCNC: 293 MG/DL (ref 70–99)
GLUCOSE SERPL-MCNC: 233 MG/DL (ref 70–99)
HBA1C MFR BLD: 7.2 % (ref 0–5.6)
HCT VFR BLD AUTO: 34.6 % (ref 35–47)
HGB BLD-MCNC: 10.9 G/DL (ref 11.7–15.7)
MCH RBC QN AUTO: 27.3 PG (ref 26.5–33)
MCHC RBC AUTO-ENTMCNC: 31.5 G/DL (ref 31.5–36.5)
MCV RBC AUTO: 87 FL (ref 78–100)
PLATELET # BLD AUTO: 236 10E9/L (ref 150–450)
POTASSIUM SERPL-SCNC: 3.8 MMOL/L (ref 3.4–5.3)
RBC # BLD AUTO: 3.99 10E12/L (ref 3.8–5.2)
SODIUM SERPL-SCNC: 142 MMOL/L (ref 133–144)
WBC # BLD AUTO: 16.9 10E9/L (ref 4–11)

## 2018-05-11 PROCEDURE — 94640 AIRWAY INHALATION TREATMENT: CPT | Mod: 76

## 2018-05-11 PROCEDURE — 80048 BASIC METABOLIC PNL TOTAL CA: CPT | Performed by: INTERNAL MEDICINE

## 2018-05-11 PROCEDURE — G0463 HOSPITAL OUTPT CLINIC VISIT: HCPCS

## 2018-05-11 PROCEDURE — 00000146 ZZHCL STATISTIC GLUCOSE BY METER IP

## 2018-05-11 PROCEDURE — 25000125 ZZHC RX 250: Performed by: INTERNAL MEDICINE

## 2018-05-11 PROCEDURE — 83036 HEMOGLOBIN GLYCOSYLATED A1C: CPT | Performed by: INTERNAL MEDICINE

## 2018-05-11 PROCEDURE — 40000989 ZZH STATISTIC CHRONIC PULMONARY DISEASE SPECIALIST

## 2018-05-11 PROCEDURE — 85027 COMPLETE CBC AUTOMATED: CPT | Performed by: INTERNAL MEDICINE

## 2018-05-11 PROCEDURE — 36415 COLL VENOUS BLD VENIPUNCTURE: CPT | Performed by: INTERNAL MEDICINE

## 2018-05-11 PROCEDURE — 40000962 ZZH STATISTIC CHRONIC DISEASE SPECIALIST RT CONSULT

## 2018-05-11 PROCEDURE — 25000132 ZZH RX MED GY IP 250 OP 250 PS 637: Performed by: INTERNAL MEDICINE

## 2018-05-11 PROCEDURE — 99239 HOSP IP/OBS DSCHRG MGMT >30: CPT | Performed by: INTERNAL MEDICINE

## 2018-05-11 PROCEDURE — 99406 BEHAV CHNG SMOKING 3-10 MIN: CPT

## 2018-05-11 PROCEDURE — 40000275 ZZH STATISTIC RCP TIME EA 10 MIN

## 2018-05-11 PROCEDURE — 94640 AIRWAY INHALATION TREATMENT: CPT

## 2018-05-11 RX ORDER — NICOTINE 21 MG/24HR
1 PATCH, TRANSDERMAL 24 HOURS TRANSDERMAL DAILY
Qty: 30 PATCH | Refills: 0 | Status: SHIPPED | OUTPATIENT
Start: 2018-05-11 | End: 2018-09-20

## 2018-05-11 RX ORDER — VARENICLINE TARTRATE 0.5 MG/1
0.5 TABLET, FILM COATED ORAL DAILY
Status: DISCONTINUED | OUTPATIENT
Start: 2018-05-11 | End: 2018-05-11 | Stop reason: HOSPADM

## 2018-05-11 RX ORDER — VARENICLINE TARTRATE 0.5 MG/1
0.5 TABLET, FILM COATED ORAL DAILY
Qty: 30 TABLET | Refills: 0 | Status: SHIPPED | OUTPATIENT
Start: 2018-05-11 | End: 2018-06-19

## 2018-05-11 RX ORDER — DOXYCYCLINE 100 MG/1
100 CAPSULE ORAL EVERY 12 HOURS SCHEDULED
Status: DISCONTINUED | OUTPATIENT
Start: 2018-05-11 | End: 2018-05-11 | Stop reason: HOSPADM

## 2018-05-11 RX ORDER — METOPROLOL SUCCINATE 100 MG/1
150 TABLET, EXTENDED RELEASE ORAL DAILY
Qty: 90 TABLET | Refills: 3 | Status: SHIPPED | OUTPATIENT
Start: 2018-05-11 | End: 2019-01-29

## 2018-05-11 RX ORDER — PREDNISONE 20 MG/1
40 TABLET ORAL DAILY
Qty: 10 TABLET | Refills: 0 | Status: SHIPPED | OUTPATIENT
Start: 2018-05-11 | End: 2018-05-16

## 2018-05-11 RX ORDER — DOXYCYCLINE 100 MG/1
100 CAPSULE ORAL EVERY 12 HOURS
Qty: 20 CAPSULE | Refills: 0 | Status: SHIPPED | OUTPATIENT
Start: 2018-05-11 | End: 2018-09-20

## 2018-05-11 RX ORDER — NICOTINE 21 MG/24HR
1 PATCH, TRANSDERMAL 24 HOURS TRANSDERMAL DAILY
Status: DISCONTINUED | OUTPATIENT
Start: 2018-05-11 | End: 2018-05-11 | Stop reason: HOSPADM

## 2018-05-11 RX ORDER — VARENICLINE TARTRATE 0.5 MG/1
0.5 TABLET, FILM COATED ORAL 2 TIMES DAILY
Status: DISCONTINUED | OUTPATIENT
Start: 2018-05-11 | End: 2018-05-11

## 2018-05-11 RX ADMIN — RANITIDINE 150 MG: 150 TABLET, FILM COATED ORAL at 08:35

## 2018-05-11 RX ADMIN — DILTIAZEM HYDROCHLORIDE 240 MG: 240 CAPSULE, EXTENDED RELEASE ORAL at 08:35

## 2018-05-11 RX ADMIN — CLOPIDOGREL BISULFATE 75 MG: 75 TABLET, FILM COATED ORAL at 08:35

## 2018-05-11 RX ADMIN — ALBUTEROL SULFATE 2.5 MG: 2.5 SOLUTION RESPIRATORY (INHALATION) at 07:57

## 2018-05-11 RX ADMIN — DOXYCYCLINE HYCLATE 100 MG: 100 CAPSULE ORAL at 09:16

## 2018-05-11 RX ADMIN — METOPROLOL SUCCINATE 100 MG: 100 TABLET, EXTENDED RELEASE ORAL at 08:35

## 2018-05-11 RX ADMIN — PREDNISONE 40 MG: 20 TABLET ORAL at 08:35

## 2018-05-11 RX ADMIN — UMECLIDINIUM 1 PUFF: 62.5 AEROSOL, POWDER ORAL at 08:34

## 2018-05-11 RX ADMIN — ASPIRIN 325 MG: 325 TABLET, DELAYED RELEASE ORAL at 08:35

## 2018-05-11 RX ADMIN — ALBUTEROL SULFATE 2 PUFF: 90 AEROSOL, METERED RESPIRATORY (INHALATION) at 03:10

## 2018-05-11 RX ADMIN — ROSUVASTATIN CALCIUM 40 MG: 20 TABLET ORAL at 08:35

## 2018-05-11 RX ADMIN — VITAMIN D, TAB 1000IU (100/BT) 1000 UNITS: 25 TAB at 08:35

## 2018-05-11 RX ADMIN — CALCIUM CARBONATE 600 MG (1,500 MG)-VITAMIN D3 400 UNIT TABLET 1 TABLET: at 08:35

## 2018-05-11 ASSESSMENT — ACTIVITIES OF DAILY LIVING (ADL)
ADLS_ACUITY_SCORE: 10

## 2018-05-11 NOTE — PROGRESS NOTES
RA - at rest  Pulse oximetry SPO2 92 %  *   RA - during activity/with exercise SPO2  85%  *   O2 at  1  liters/minute (at rest) ...SPO2  95 %  *   O2 at  1  liters/minute (during activity/with exercise) ...SPO2   90%

## 2018-05-11 NOTE — PROGRESS NOTES
Care Coordinator - Discharge Planning    Admission Date/Time:  5/9/2018  Attending MD:  Sandi Alcazar MD     Data  Date of initial CC assessment:  Previous note  Chart reviewed, discussed with interdisciplinary team.   Patient was admitted for:   1. Pneumonia due to infectious organism, unspecified laterality, unspecified part of lung    2. COPD exacerbation (H)    3. Hypertension: MEASURE BP IN RIGHT ARM ONLY    4. Coronary artery disease due to lipid rich plaque         Assessment   Full assessment completed in previous note    Coordination of Care and Referrals: Provided patient/family with options for DME.  This RNCC met with patient to discuss home oxygen use, patient  Does not have preference on oxygen provider and okay to use [facility] for O2. Patient states she is waiting for friend to provide transportation.    Referral forwarded to [facility], Saji confirmed that everything is in place, has faxed order to Dr. Felicity christian to sign and send back.      Plan  Anticipated Discharge Date:  5/11/18  Anticipated Discharge Plan:  Home with home oxygen    CTS Handoff completed:  YES    Kiya MANCUSO RN CCM    RN Care Coordinator 10A  E-mail: [email or web address]  Phone: [phone]  Pager: [phone]

## 2018-05-11 NOTE — PROGRESS NOTES
Smoking Treatment Plan     Congratulations on your decision to quit smoking!      Establish [employer] Quit Date (TQD). Your OFFICIAL QUIT DATE:  5/11/18        If taking Chantix, take with  food and 8-12 oz. of water to avoid side effects. Start medication one week prior to the 5/11/18   -Start with 0.5 mg once daily for 3 days, then 0.5 mg twice daily for  4 days.   -ON TQD STOP SMOKING AND Take 1.0 mg twice daily for 11 weeks   -If not smoking at the end of 12 weeks, may continue at 1.0 twice  daily for an additional 12 weeks. May stop abruptly, no need to  Taper.       If taking the Nicotine Patch, apply 21 mg patch anywhere on upper body.  Each dose lasts 24 hours. Apply new 21 mg patch daily. Rotate the patch site each time a new patch is applied.       After 4-6 weeks of smoking abstinence, taper every 2-4 weeks in 7-14 mg steps as tolerated.       If using 2/4mg Nicotene lozenges, take first thing in the morning and/as needed for cravings and urges to smoke. Allow lozenge to dissolve completely. Do not bite, chew, or swallow.    Use at least 8-9 lozenges a day initially and may be used every 1-2  hours. Taper as tolerated. Do not eat or drink 15 minutes before  taking.     During this time you may experience symptoms of withdrawal such as irritability, sleeplessness, anxiety, anger, frustration, and increased appetite. These are all normal and to be expected.       Remember your 5 top reasons quit. Refer to your strategies for coping for stressful situations.      If you slip and smoke, it DOES NOT MEAN you have to start smoking again. Use it as a learning experience and renew your commitment to stop smoking!      Above all, Celebrate the freedom of being a NONSMOKER!              -Remember and Imagine what your future will look like without smoking.  -Remember and imagine your power and resiliency!  -Remember and imagine your inner strength.

## 2018-05-11 NOTE — PROGRESS NOTES
COPD Initial Interview and Consult    2018    Patient: Porsche Manning      :  1950                    MRN:[id number]      Reason for Consult:  Patient with severe COPD being followed by COPD Readmission Reduction Program    History of Present Illness: Ms. Porsche Manning is a 68-year-old  lady with history of COPD for the last 3 years, not on home oxygen, hypertension, history of coronary artery disease with a heart attack 20 years ago, diet-controlled diabetes and hyperlipidemia who presented to the ED earlier today for evaluation of shortness of breath. She had visited her primary care physician in the morning and was noted to have a pulse ox of 83-88% on room air, given an albuterol nebulization and subsequently transferred to the ED.  In the ED, she was given a DuoNeb and a liter of fluid and subsequently she did not feel better and is being admitted for COPD exacerbation. She has been trying to quit smoking for while and has been on and off, and has not smoke for three weeks. She would like to try using pharmacotherapy along with nicotine replacement.          Last PFT on 16 FEV1 62%, FEV1/FVC 52%.     Home respiratory medications include:  -Albuterol inhaler PRN  -Albuterol solution nebulizer Q4   -Spiriva Daily   -Needs additional ICS/LABA       Assessment: Patient is on 1 lpm of nasal cannula Sats 91%. BS are wheezes and diminished.        Recommendations:  -Continue with current respiratory medication regimen add ICS/LABA     -Establish care with a Pulmonologist and get complete PFT's    -Use Aerobika Oscillating PEP Device at least twice daily for 5-10 min/QID with Nebs to open smaller airways, improve mucus clearance, to decrease cough frequency, and to improve exercise tolerance    -Use Aerochamber with MDI's for better drug deposition.    -Patient needs continued reinforcement and continued education on inhaler use and breath recovery techniques    -21 mg Nicotine Patch  to help reduce symptoms of withdrawal and cravings     -Smoking Cessation Counseling and Relapse Prevention    -2mg/4mg Nicotine Lozenges/Gum for cravings and urges    -Medication Therapy Management Referral as outpatient to reinforce patient on the proper use of inhalers, nebulizer's, and other home medications    -PT/OT consult to assess safety with returning home, functional abilities and limitations, home care needs and cognitive evaluation    -Highly recommend referral for outpatient Pulmonary Rehab to support and provide education on disease management and smoking cessation     -Walk test to determine O2 needs at rest and with exertion/activity    -Patient is good candidate for COPD Action Plan due to high readmission risk    -At discharge continue with patient's home regimen of respiratory medications      Will continue to follow and support patient as needed. Will follow up with phone call 48 hours after discharge.     I spent 45 minutes with the patient.

## 2018-05-11 NOTE — PLAN OF CARE
Problem: Patient Care Overview  Goal: Plan of Care/Patient Progress Review  Outcome: No Change    Shift report from [id number]    VS:   VSS, afebrile, dyspnea on exertion - inhaler left at bedside for pt, on 1LPM and O2 @ 91-92%, occasional cough noted, denies CP   Output:   Voiding without difficulty in BR, no BM this shift,    Activity:   Up with SBA, ambulating, repositions self in bed   Skin: Intact with some bruising   Pain:   Denies pain   Neuro/CMS:   Baseline numbness in all extremities, able to move all extremities, no overt deficits noted,    Dressing(s):   none   Diet:   Regular diet, BG monitored and insulin before meals and at bedtime, BG was 293@ 0200- Moonlighter notified and 4 units Novolog given once, no swallowing difficulty noticed, denies nausea,    LDA:   PIV-SL   Equipment:   O2 tubing    Plan:   Continue to monitor BG and O2 sats   Additional Info:   Offer supportive care, pt also requested more education on respiratory meds and how to use new inhalers.

## 2018-05-11 NOTE — PLAN OF CARE
Problem: Patient Care Overview  Goal: Plan of Care/Patient Progress Review  Outcome: Improving  VSS, O2 sats at 91-92% on 1.5 L NC.  Lung sounds diminished with wheezing.  Patient dyspneic with activity, otherwise reports being fine.  Of note, extension tubing was found to be disconnected at one point.  Patient urinating without difficulty.  Patient is passing gas and had BM yesterday.  CMS/Neuro status is intact; patient denies numbness/tingling.  Patient is tolerating regular diet.

## 2018-05-11 NOTE — PLAN OF CARE
Problem: Patient Care Overview  Goal: Plan of Care/Patient Progress Review  Outcome: Adequate for Discharge Date Met: 05/11/18  D: Pt  A/O x4. . VSS . Lungs- decreased/ expiatory wheeze., no c/o chest pain/ SOB. sats= 94 % 1 Lt NC O2. ( see notes).  Bowel+. PP- +. +2 edema- feet/legs. CMS- intact. Base line N/T  Feet.. Pt taking PO REG  food/ fluids well. Voiding Good amounts. Up in room INDEPENDENTLY. Pain/CAPA - denies. All DC meds/ instructions reviewed and pt will be picking up DC meds AT [facility].  A: con't to monitor. Call light in reach. Pt able to make needs known. Await transport for DC.

## 2018-05-11 NOTE — DISCHARGE SUMMARY
Internal Medicine Discharge Summary       Porsche Manning MRN# [id number]   YOB: 1950 Age: 68 year old     Date of Admission:  5/9/2018  Date of Discharge:  5/11  Admitting Physician:  Sandi Alcazar MD  Discharge Physician:  Sandi Alcazar  Discharging Service:  Internal Medicine     Primary Provider: Danuta Tan              Discharge Diagnosis:   Acute Hypoxic respiratory failure due to COPD exacerbation and pneumonia   Hyperglycemia due to Diabetes and steroids  MICHELLE   CKD  Hypertension   CAD  Pulmonary and Adrenal nodule           Consultations:   COPD team          Hospital Course by Problem:      A 68-year-old lady with a history of COPD, hypertension, coronary artery disease, hyperlipidemia, diabetes mellitus, obesity, tobacco abuse, coming in with shortness of breath.       1.  Acute respiratory failure likely due to an exacerbation of her COPD and superimposed pneumonia: .  Started prednisone 40 mg 5/10  b.i.d.  Scheduled DuoNebs every 4 hours while awake.  Albuterol inhaler as needed.  Spiriva inhaler.  Ceftriaxone 1 gram IV daily.  Zithromax 500 mg followed 250 mg daily.    COPD consultation. Recommended pulmonary rehab , and long acting steroids and PFTS  . She was willing to try Chantix and nicotine patch to help with quit smoking   Switched to doxy on dc   She was requiring 1-2 litres of 02 , during hospitalization   2.  Hypertension:   continue the patient on her Cardizem and metoprolol.  Held the lisinopril.  Metoprolol was increased to 150 mg on DC due to kidney function still being off   3.  MICHELLE: held lisinopril and not resumed at dc   4.  Diet-controlled diabetes:  BG were elevated in hospital and she did not wish to be on a diabetic medicine  . Will follow up with PCP in this regard   5.  Deep venous thrombosis prophylaxis:  She was ambulatory, already on aspirin and Plavix.  Therefore,  mechanical prophylaxis was used in hospital   6.  Tobacco abuse:  She refused  "nicotine patch, but later agreed   7.  Coronary artery disease:  She has a history of stenting in the past, has no history suggestive of angina and heart failure.  Continue the aspirin, Plavix, Cardizem and metoprolol.       On Exam ;   Alert and oriented . No acute distress  Vital signs:  Temp: 96.6  F (35.9  C) Temp src: Oral BP: 158/66 Pulse: 78 Heart Rate: 77 Resp: 18 SpO2: 97 % O2 Device: Nasal cannula Oxygen Delivery: 1 LPM Height: 157.5 cm (5' 2\") Weight: 84.8 kg (187 lb)  Estimated body mass index is 34.2 kg/(m^2) as calculated from the following:    Height as of this encounter: 1.575 m (5' 2\").    Weight as of this encounter: 84.8 kg (187 lb).  Neck ; supple , no JVD  Chest ; equal BS bilaterally , reduced bilaterally with occ rhonchi  CVS; RRR, no murmur /rubs or gallops  GI ; soft abdomen, non tender, BS positive  Ext ; no edema , no cynosis  Neuro ; CN 2 to 12 grossly intact , No Facial asymmetry noticed  .  Psych ; appropriate mood and effect  Skin ; no rash or purpura on exposed skin     ROUTINE IP LABS (Last four results)  BMP  Recent Labs  Lab 05/11/18  0619 05/10/18  1808 05/10/18  0735 05/09/18  1306    137 139 134   POTASSIUM 3.8 3.6 3.6 3.4   CHLORIDE 108 104 105 98   VIANEY 8.2* 8.1* 8.6 8.8   CO2 27 23 26 28   BUN 20 20 18 18   CR 1.19* 1.19* 1.22* 1.49*   * 312* 266* 162*     CBC  Recent Labs  Lab 05/11/18  0619 05/10/18  0735 05/09/18  1306   WBC 16.9* 13.2* 17.3*   RBC 3.99 4.10 4.80   HGB 10.9* 11.2* 13.5   HCT 34.6* 35.1 42.1   MCV 87 86 88   MCH 27.3 27.3 28.1   MCHC 31.5 31.9 32.1   RDW 15.2* 14.7 15.2*    232 269     INRNo lab results found in last 7 days.    Results for orders placed or performed during the hospital encounter of 05/09/18   XR Chest 2 Views    Narrative    XR CHEST 2 VW  5/9/2018 1:30 PM      HISTORY: Cough, shortness of breath;     COMPARISON: Chest x-ray 3/5/2016    TECHNIQUE: Upright PA and lateral radiographs of the chest    FINDINGS: Lungs are " slightly hyperinflated with stable interstitial  thickening in the bases. No focal airspace opacity, pneumothorax, or  pleural effusion. Cardiac mediastinal silhouette is within normal  limits. Trachea is midline. Upper abdomen is unremarkable.  Atheromatous calcifications of the aorta. No significant degenerative  change of the spine. 3 mm calcification right upper lobe stable since  2016 is likely a calcific granuloma.      Impression    IMPRESSION: COPD with possibly fibrotic changes in the bases. No focal  airspace opacity.    I have personally reviewed the examination and initial interpretation  and I agree with the findings.    CLAUDIA POTTER MD   Chest CT, IV contrast only - PE protocol     Value    Radiologist flags Pulmonary nodule, adrenal nodule    Narrative    EXAMINATION: CT CHEST PULMONARY EMBOLISM W CONTRAST, 5/9/2018 4:19 PM    TECHNIQUE:  Helical CT images from the lung apices through the upper  abdomen were obtained with IV contrast.  Contrast: iopamidol (ISOVUE-370) solution 64 mL    COMPARISON: None available    HISTORY: Rule out pulmonary embolus, History of COPD, worsening  shortness breath;     FINDINGS:    Pulmonary angiogram: The contrast bolus is adequate for evaluation of  the pulmonary arteries and no pulmonary embolism is identified. The  main pulmonary artery is normal in caliber.    Chest: Partially visualized thyroid gland is normal. Normal heart  size. No pericardial effusion. Moderate coronary artery calcification.  Mild prostatic calcification of the aortic arch. Focally advanced  calcification of the origin of the left subclavian artery contributes  to at least 50% stenosis. Mildly enlarged mediastinal lymph nodes  including a 13 mm precarinal lymph node (series 6 image 96) and a 10  mm upper paratracheal lymph node (series 6 image 61). No pleural  effusion or pneumothorax. Central tracheobronchial tree is clear.    Lungs: There are multiple patchy foci of tree-in-bud nodularity  and  perilymphatic nodules scattered about both lungs with an upper lobe  predominance. For example, clusters of nodules are seen series 9 image  263, 213, 162, and 127. In one of these clusters is a discretely  larger pulmonary nodule in the right upper lobe measuring 9 mm (series  9 image 263).    Upper abdomen: Limited evaluation of the upper abdomen. Nodular  thickening of the left adrenal gland measures up to 2.0 cm in diameter  and greater than 20 Hounsfield units in density (series 6 image 271).    Bones and soft tissues: No acute or suspicious osseous lesion. Mild  thoracic spine degenerative changes.      Impression    IMPRESSION:  1. No pulmonary embolism.  2. Centrilobular nodules and tree-in-bud opacities scattered about  both lungs with an upper lobe predominance, compatible with multifocal  pneumonia. There is a perilymphatic component to these nodules with  interstitial thickening that can also be seen in lymphangitic  carcinomatosis or sarcoidosis-like reaction.  3. Discrete 9 mm pulmonary nodule in the right upper lobe may also be  related to infection although follow-up to resolution is recommended.  Follow-up CT chest  following treatment for infection to confirm  resolution or PET CT  is recommended  4. At least 50% stenosis of the origin of the left subclavian artery  secondary to atherosclerotic plaque.  5. 20 mm indeterminate density left adrenal nodule. Recommend further  evaluation with CT adrenal protocol.    [Consider Follow Up: Pulmonary nodule, adrenal nodule]    This report will be copied to the [facility] to ensure a  provider acknowledges the finding.        I have personally reviewed the examination and initial interpretation  and I agree with the findings.    CLAUDIA POTTER MD                Discharge Medications:     Current Discharge Medication List      START taking these medications    Details   doxycycline (VIBRAMYCIN) 100 MG capsule Take 1 capsule (100 mg) by mouth  every 12 hours  Qty: 20 capsule, Refills: 0    Associated Diagnoses: Pneumonia due to infectious organism, unspecified laterality, unspecified part of lung      fluticasone-vilanterol (BREO ELLIPTA) 100-25 MCG/INH oral inhaler Inhale 1 puff into the lungs daily  Qty: 1 Inhaler, Refills: 1    Associated Diagnoses: COPD exacerbation (H)      nicotine (NICODERM CQ) 14 MG/24HR 24 hr patch Place 1 patch onto the skin daily  Qty: 30 patch, Refills: 0    Associated Diagnoses: COPD exacerbation (H)      predniSONE (DELTASONE) 20 MG tablet Take 2 tablets (40 mg) by mouth daily for 5 days  Qty: 10 tablet, Refills: 0    Associated Diagnoses: COPD exacerbation (H)      Respiratory Therapy Supplies ([employer]) JESSICA 1 applicator 2 times daily  Qty: 2 each, Refills: 0    Associated Diagnoses: COPD exacerbation (H)      varenicline (CHANTIX) 0.5 MG tablet Take 1 tablet (0.5 mg) by mouth daily  Qty: 30 tablet, Refills: 0    Associated Diagnoses: COPD exacerbation (H)         CONTINUE these medications which have CHANGED    Details   metoprolol succinate (TOPROL-XL) 100 MG 24 hr tablet Take 1.5 tablets (150 mg) by mouth daily  Qty: 90 tablet, Refills: 3    Associated Diagnoses: Hypertension goal BP (blood pressure) < 140/90; Coronary artery disease due to lipid rich plaque         CONTINUE these medications which have NOT CHANGED    Details   albuterol (2.5 MG/3ML) 0.083% neb solution Take 1 vial (2.5 mg) by nebulization every 6 hours as needed for shortness of breath / dyspnea or wheezing  Qty: 25 vial, Refills: 1    Associated Diagnoses: Chronic obstructive pulmonary disease, unspecified COPD type (H)      albuterol (PROAIR HFA/PROVENTIL HFA/VENTOLIN HFA) 108 (90 Base) MCG/ACT Inhaler Inhale 2 puffs into the lungs every 6 hours as needed for shortness of breath / dyspnea or wheezing  Qty: 1 Inhaler, Refills: 3    Associated Diagnoses: Chronic obstructive pulmonary disease, unspecified COPD type (H)      ASPIRIN 325 MG OR TBEC 1 tab  po QD (Once per day)  Qty: 100, Refills: 3    Associated Diagnoses: Other specified pre-operative examination      Calcium Carbonate-Vit D-Min (CALCIUM 1200 PO) Take 1,200 mg by mouth daily      Cholecalciferol (VITAMIN D) 1000 UNIT capsule Take 1 capsule by mouth daily. Take one tablet daily      clopidogrel (PLAVIX) 75 MG tablet Take 1 tablet (75 mg) by mouth daily  Qty: 90 tablet, Refills: 3    Associated Diagnoses: Peripheral vascular disease, unspecified      diltiazem (CARDIZEM CD) 240 MG 24 hr capsule Take 1 capsule (240 mg) by mouth daily  Qty: 90 capsule, Refills: 3    Associated Diagnoses: Old myocardial infarction      rosuvastatin (CRESTOR) 40 MG tablet Take 1 tablet (40 mg) by mouth daily  Qty: 90 tablet, Refills: 3    Associated Diagnoses: Hyperlipidemia LDL goal <70      tiotropium (SPIRIVA HANDIHALER) 18 MCG capsule Inhale 1 capsule (18 mcg) into the lungs daily  Qty: 90 capsule, Refills: 3    Associated Diagnoses: Chronic obstructive pulmonary disease, unspecified COPD type (H)      !! order for DME Equipment being ordered: Nebulizer  Qty: 1 Device, Refills: 0    Associated Diagnoses: Chronic obstructive pulmonary disease, unspecified COPD type (H)      !! order for DME Equipment being ordered: Nebulizer with tubing and pipe  Qty: 1 Device, Refills: 0    Associated Diagnoses: Chronic obstructive pulmonary disease, unspecified COPD type (H)       !! - Potential duplicate medications found. Please discuss with provider.      STOP taking these medications       LISINOPRIL PO Comments:   Reason for Stopping:                    Discharge Instructions and Follow-Up:     Discharge Procedure Orders  Reason for your hospital stay   Order Comments: You were admitted with shortness of breath and pneumonia . You had low 02 and required 02 . You have had high Blood sugars in the hospital     Activity   Order Comments: As tolerated   Order Specific Question Answer Comments   Is discharge order? Yes      Adult  [facility] Follow-up and recommended labs and tests   Order Comments: PCP follow up in 3-5 days for post hospital dc follow up   Cbc and bmp at PCP visit  .   Pulmonary rehab referral   PFTs as out patient      Appointments on [place] and/or [facility] (with [facility] or [facility] provider or service). Call [phone] if you haven't heard regarding these appointments within 7 days of discharge.     Oxygen Adult   Order Comments: New Home Oxygen Order 1  liter(s) by nasal cannula continuously with use of portable tank. Expected treatment length is indefinite (99 months).. Test on conserving device as applicable.    Patients who qualify for home O2 coverage under the CMS guidelines require ABG tests or O2 sat readings obtained closest to, but no earlier than 2 days prior to the discharge, as evidence of the need for home oxygen therapy. Testing must be performed while patient is in the chronic stable state. See notes for O2 sats.    I certify that this patient, Porsche Manning has been under my care and that I, or a nurse practitioner or physician's assistant working with me, had a face-to-face encounter that meets the face-to-face encounter requirements with this patient on 5/11/2018. The patient, Porsche Manning was evaluated or treated in whole, or in part, for the following medical condition, which necessitates the use of the ordered oxygen. Treatment Diagnosis: COPD    Attending Provider: Sandi Alcazar MD  Physician signature: See electronic signature associated with these discharge orders  Date of Order: May 11, 2018     Oxygen Adult   Order Comments: 1-2 liters     Diet   Order Comments: Regular diet   Order Specific Question Answer Comments   Is discharge order? Yes                  Discharge Disposition:   45  minutes spent in discharge, including >50% in counseling and coordination of care, medication review and plan of care recommended on follow up. Questions were answered to angel Junior  Lorena Alcazar  Internal Medicine Hospitalist & Staff Physician  [facility]

## 2018-05-11 NOTE — PROGRESS NOTES
Patient has been assessed for Home Oxygen needs.  Oxygen readings:   *   RA - at rest  Pulse oximetry SPO2 93 %  *   RA - during activity/with exercise SPO2 92 %  *   O2 at  1  liters/minute (at rest) ...SPO2  91 %  *   O2 at  1  liters/minute (during activity/with exercise) ...SPO2  92 %

## 2018-05-11 NOTE — PROGRESS NOTES
My Individualized COPD Treatment Plan    Name:    Porsche Manning                                                   Date:  5/11/2018        My Clinic:  [address] [facility] [address]    My Primary Care Provider:  Danuta Tan      My Pulmonologist:  Will establish care with [facility]     My Chronic Pulmonary Disease Specialists:  Office  [phone]       - Gillian Jauregui      (Available M - F  8:00 - 4:30PM)  Call with any questions or concerns    My Pharmacy:     [id number] [place] [facility], [place] - [address]  [facility]  [id number] [place] [place] - [address]      My controller medicines(s) frequency:  Tiotropium (Spiriva)  Vilanterol/fluticasone (Breo Ellipta)    My rescue medicine(s) frequency:  Albuterol nebulizer solution and Albuterol inhaler PRN     Oxygen use/settings:  1 Liters continuously and with portability    DME (name/phone number):  [facility] [phone]    Aerobika/frequency:  Use Aerobika Oscillating PEP Device at least twice daily for 5-10 min/QID with Nebs to open smaller airways, improve mucus clearance, to decrease cough frequency, and to improve exercise tolerance     Follow-up appointments will include Pulmonologist, PCP and out patient pulmonary rehab.

## 2018-05-11 NOTE — PROGRESS NOTES
Patient has been assessed for Home Oxygen needs.  Oxygen readings:     *   RA - at rest  Pulse oximetry... SPO2 88%  *   RA - during activity/with exercise... SPO2 84%  *   O2 at  3lpm/NC (at rest) ...SPO2 92%  *   O2 at  8lpm/NC (during activity/with exercise) ...SPO2 90%

## 2018-05-11 NOTE — PROGRESS NOTES
Received intake call for home oxygen at 11:40AM. Reviewed patient's chart; Patient qualifies under Medicare guidelines and all documentation is in the chart except signed order by doctor.   11:50AM- Spoke with care coordinator, sara, confirmed we received oxygen referral, but need signed order by doctor. She also informed me that patient will not need portable tank to get home and this was ok'd by doctor. Sara will inform doctor to sign order.   11:52AM- Called to offer choice and patient is okay with [facility] setting them up. Discussed equipment with patient and informed patient we would deliver oxygen once we have signed order.  Patient stated that it was ok'd by doctor to be discharged home without portable tank and we will meet patient at home. I directed patient to call me prior to leaving hospital she stated that she would rather call once she is home and she has number at home for us from nebulizer machine.   11:54am- Received vm from Sara to reach out to doctor to sign order at [phone]. Called doctor and faxed rx to fax number requested.   12:03pm- Received signed order by doctor called and inform Sara we are good to go and just waiting for patient to call to deliver to home.   .

## 2018-05-14 ENCOUNTER — PATIENT OUTREACH (OUTPATIENT)
Dept: CARE COORDINATION | Facility: CLINIC | Age: 68
End: 2018-05-14

## 2018-05-14 DIAGNOSIS — J44.89 COPD (CHRONIC OBSTRUCTIVE PULMONARY DISEASE) WITH CHRONIC BRONCHITIS (H): Primary | ICD-10-CM

## 2018-05-14 NOTE — LETTER
[facility]  Complex Care Plan  About Me  Patient Name:  Porsche Manning    YOB: 1950  Age:     68 year old   Lucía MRN:   [id number] Telephone Information:    Home Phone [phone]   Mobile [phone]       Address:    [address]  [facility] [address] Email address:  [email or web address]      Emergency Contact(s)  Name Relationship Lgl Grd Work Phone Home Phone Mobile Phone   1. NEL CELESTE Friend   [phone]    2. NO SECONDARY C* Other   None            Primary language:  English     needed? No   [place] [employer]:  [phone] op. 1  Other communication barriers:    Preferred Method of Communication:  Mail  Current living arrangement: I live alone, I live in a private home  Mobility Status/ Medical Equipment: Independent    Health Maintenance  Health Maintenance Reviewed: Not assessed    My Access Plan  Medical Emergency 911   Primary Clinic Line [facility] [phone]   24 Hour Appointment Line [phone] or  [phone]) (toll-free)   24 Hour Nurse Line [phone] (toll-free)   Preferred Urgent Care     Preferred Hospital [facility]  [phone]   Preferred Pharmacy [facility] [address] [place] [facility] [address] [place] [address] [address]     Behavioral Health Crisis Line The National Suicide Prevention Lifeline at 1-710.536.6064 or 911     My Care Team Members    Patient Care Team       Relationship Specialty Notifications Start End    Danuta Tan PA-C PCP - General Physician Assistant  7/16/15     Phone: [phone] Fax: [phone]         [address] [facility] [address]    Echo Galindo, RN Lead Care Coordinator Primary Care - CC  5/14/18     Phone: [phone] Fax: [phone]                My Care Plans  Self Management and Treatment Plan  Goals and (Comments)  Goals        General    Medical (pt-stated)      "Notes - Note created  5/14/2018 11:07 AM by Echo Galindo, RN    Goal Statement: I will follow the COPD  Plan   Measure of Success: I will increase energy for my daily activities   Supportive Steps to Achieve:   I will rinse my mouth after my......inhaler to keep the lining of my mouth healthy   I will use my air conditioner and rest more on humid days   I will get a flu shot this year   I will schedule my Pneumonia shot   I will write down questions to bring to my next appointment   I will call my clinic if I start to use my rescue inhaler more often due to breathing problems   I will use my nebulizer as directed even when I am feeling well since I know that it helps to prevent future problems   I will use my inhalers as prescribed   I will use my inhalers as directed even when I am feeling well since I know it will prevent future problems   I will rest between activities such as bathing,cooking,cleaning to prevent feeling of \"running out of breath\"  I will continue smoking cessation   I will make sure I have a source of protein at each meal.   This could include meat,eggs,yougurt or cheese to help keep  my energy up.  I will eat..smaller meals a day to help me digest food better and maintain my weight   I will call my clinic with the first signs of a respiratory infection such as fever,chills or increased cough   I will finish my antibiotic even though I a feeling better   I will drink 6-8 ...glasses of fluids per day to help keep my secretions liquid   I will share my action plan with my family members or friend so they can help me recognize early problems also   I will put my action plan in an easy to see are and review routinely to make sure I am staying in the green zone     Barriers: Deconditioned/weak   Strengths: Patient has quit smoking for 3 weeks   Date to Achieve By: Ongoing          Lifestyle    Quit smoking / using tobacco     Notes - Note created  5/14/2018 11:04 AM by Echo Galindo, RN    Goal " "Statement: I will continue my smoking cessation   Measure of Success: More energy and able to take deeper breaths  Supportive Steps to Achieve:   I will use Chantix as directed and Nicoderm patch as directed   Barriers:Statement \"I think of my self as always smoking\"   Strengths: Has not smoked for 3 weeks   Date to Achieve By: To be determined              Action Plans on File: COPD                      Advance Care Plans/Directives Type: None       My Medical and Care Information  Problem List   Patient Active Problem List   Diagnosis     Old myocardial infarction     Malignant neoplasm of other specified sites of cervix     Peripheral vascular disease (H)     HYPERLIPIDEMIA LDL GOAL <100     Hypertension: MEASURE BP IN RIGHT ARM ONLY     Osteopenia     Vitamin D deficiency disease     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    Care Coordination Start Date: 5/14/2018   Frequency of Care Coordination: 2 weeks   Form Last Updated: 05/14/2018       "

## 2018-05-14 NOTE — LETTER
[facility]  [address] [address]  [phone]      5/14/2018       Porsche Manning  [address]  [facility] [address]        Dear Porsche,      It was a pleasure to speak with you over the phone on Monday .  I would like to provide you with the enclosed information for your records.  As part of care coordination, we are developing care plans to assist in accomplishing your health care goals.  When we speak next, please feel free to let me know if you want to add or change any information on your care plans.    As always, feel free to contact me if you have any questions or concerns.  I look forward to working with you in the effort to achieve your health care and wellness goals .        Sincerely,        Echo Galindo RN, Care Coordinator  [facility]   [email or web address]   [phone]

## 2018-05-14 NOTE — PROGRESS NOTES
Clinic Care Coordination Contact    Clinic Care Coordination Contact  OUTREACH    Referral Information:  Referral Source: IP Handoff    Primary Diagnosis: COPD    Chief Complaint   Patient presents with     Clinic Care Coordination - Post Hospital     Clinic Care Coordination RN         Universal Utilization: [facility] admission 5/9-5/11/2018  Acute Hypoxic respiratory failure due to COPD exacerbation and pneumonia   Hyperglycemia due to Diabetes and steroids  MICHELLE   CKD  Hypertension   CAD  Pulmonary and Adrenal nodule  Clinic Utilization  Difficulty keeping appointments:: No  Utilization    Last refreshed: 5/14/2018 10:56 AM:  No Show Count (past year) 0       Last refreshed: 5/14/2018 10:56 AM:  ED visits 0       Last refreshed: 5/14/2018 10:56 AM:  Hospital admissions 1          Current as of: 5/14/2018 10:56 AM             Clinical Concerns:  Current Medical Concerns:  New diagnosis of Diabetes in the hospital        Education Provided to patient: CC encourage patient to seek medical help with the start of increased symptoms of concern .  Patient agrees with the plan      Health Maintenance Reviewed: Not assessed  Clinical Pathway: Clinic Care Coordination COPD Assessment    Discharge:      Day of hospital discharge: 5/9/18  What recommendations were made for follow up after your recent hospitalization? Finish course of Prednisone and Antibiotic therapy   Have the follow up appointments been scheduled? No CC transferred patient to the appointment line     Is there a referral for Pulmonary Rehab? Patient will discuss at future PCP visit     Symptom Review:    How have you been feeling now that you are home? A little fatigue   Are you having any increased shortness of breath? No  Symptoms  Anxiety: No  Chills: No  Fever: No  Fatigue: Yes  Increased sputum: Yes  Night sweats: No  Weight change: : No  Wheezing: No  COPD symptoms limiting activities?: Yes  What COPD zone are you currently in?:  Green  Taking COPD medications as prescribed: : Yes  Took steroids (by mouth) for COPD: Yes  Overall your COPD symptoms are (GOAL):: Stable    Do you have a COPD Action Plan? No CC will send pt a copy       Is the COPD Action Plan on refrigerator or available: No  CC reviewed  What number would you call if you were in the YELLOW zones:  [phone]    Medications:    Were you started on any new medications?  Yes, Doxycycline and Prednisone course   Were any of your previous medications changed? No  Do you have all of your medications? Yes,   Have you had trouble filling your prescriptions? No  Are you medications effective in controlling your symptoms? Yes,   Are you currently on Prednisone (* does pt understand the tapering instructions)?  Yes  5 day course   For patients with DM:     Are you monitoring your blood sugars, if so how are your blood sugars? No ---Hgb A1 C 7.2  Patient states she has been told in the past that she was on the borderline .  Patient will discuss future at hospital follow up     Medication reconciliation completed? Yes  Was MTM or Diabetic Education referral placed (*Make sure to put transitions as reason for referral)? Patient will need a future CDE appointment     Oxygen/DME:    Are you currently on oxygen?  Yes   Is this new for you? Yes   Is it set up at home? Yes   What liter flow were you instructed to use and how often do you use it? 1-2 prn  Who is your supply company? [employer]   Review with patient how to use/maintain nebulizers and inhalers: Yes  Patient encouraged to rinse mouth after inhaler and Nebulizer use     Activity:    How much activity can you do before you are SOB? Patient is pacing her activity   Have you had to reduce your activities because of dyspnea or other symptoms? Again needs to pace activity and take rest periods   Diet:    Do you weigh yourself daily? No    Are there any current diet restrictions or changes per discharge instructions?  No    Emotions/Lifestyle:    Do you smoke?  reports that she quit smoking about 2 weeks ago. Her smoking use included Cigarettes. She has a 20.00 pack-year smoking history. She has never used smokeless tobacco.  Would you like to try to quit smoking? Patient has stopped smoking for 3 weeks and is using Chantix and Nicoderm patch if needed       Medication Management:  Reviewed     Functional Status:  Dependent ADLs:: Ambulation-no assistive device  Bed or wheelchair confined:: No  Mobility Status: Independent    Living Situation:  Current living arrangement:: I live alone, I live in a private home    Diet/Exercise/Sleep:  Diet:: Regular  Food Insecurity: No  Tube Feeding: No  Exercise:: Currently not exercising  Significant changes in sleep pattern (GOAL): No       Psychosocial:  Informal Support system:: Friends       Equipment Currently Used at Home: none    Advance Care Plan/Directive  Advanced Care Plans/Directives on file:: No          Goals:   Goals        General    Medical (pt-stated)     Notes - Note created  5/14/2018 11:07 AM by Echo Galindo RN    Goal Statement: I will follow the COPD  Plan   Measure of Success: I will increase energy for my daily activities   Supportive Steps to Achieve:   I will rinse my mouth after my......inhaler to keep the lining of my mouth healthy   I will use my air conditioner and rest more on humid days   I will get a flu shot this year   I will schedule my Pneumonia shot   I will write down questions to bring to my next appointment   I will call my clinic if I start to use my rescue inhaler more often due to breathing problems   I will use my nebulizer as directed even when I am feeling well since I know that it helps to prevent future problems   I will use my inhalers as prescribed   I will use my inhalers as directed even when I am feeling well since I know it will prevent future problems   I will rest between activities such as bathing,cooking,cleaning to prevent  "feeling of \"running out of breath\"  I will continue smoking cessation   I will make sure I have a source of protein at each meal.   This could include meat,eggs,yougurt or cheese to help keep  my energy up.  I will eat..smaller meals a day to help me digest food better and maintain my weight   I will call my clinic with the first signs of a respiratory infection such as fever,chills or increased cough   I will finish my antibiotic even though I a feeling better   I will drink 6-8 ...glasses of fluids per day to help keep my secretions liquid   I will share my action plan with my family members or friend so they can help me recognize early problems also   I will put my action plan in an easy to see are and review routinely to make sure I am staying in the green zone     Barriers: Deconditioned/weak   Strengths: Patient has quit smoking for 3 weeks   Date to Achieve By: Ongoing          Lifestyle    Quit smoking / using tobacco     Notes - Note created  5/14/2018 11:04 AM by Echo Galindo RN    Goal Statement: I will continue my smoking cessation   Measure of Success: More energy and able to take deeper breaths  Supportive Steps to Achieve:   I will use Chantix as directed and Nicoderm patch as directed   Barriers:Statement \"I think of my self as always smoking\"   Strengths: Has not smoked for 3 weeks   Date to Achieve By: To be determined               Patient/Caregiver understanding: Patient expresses good understanding of discharge instructions     Outreach Frequency: 2 weeks  Future Appointments              In 4 months Brayan Uriostegui MD [facility], [facility]          Plan: CC will follow up in 1-2 weeks       Echo Galindo RN / Clinical Care Coordinator     [address] [address]  [email or web address]  Office :  [phone] / Fax :  [phone]  "

## 2018-05-16 DIAGNOSIS — J44.9 COPD (CHRONIC OBSTRUCTIVE PULMONARY DISEASE) (H): Primary | ICD-10-CM

## 2018-05-17 ENCOUNTER — OFFICE VISIT (OUTPATIENT)
Dept: FAMILY MEDICINE | Facility: CLINIC | Age: 68
End: 2018-05-17
Payer: COMMERCIAL

## 2018-05-17 VITALS
WEIGHT: 188 LBS | HEART RATE: 62 BPM | SYSTOLIC BLOOD PRESSURE: 128 MMHG | TEMPERATURE: 97.1 F | RESPIRATION RATE: 18 BRPM | DIASTOLIC BLOOD PRESSURE: 60 MMHG | OXYGEN SATURATION: 94 % | BODY MASS INDEX: 34.39 KG/M2

## 2018-05-17 DIAGNOSIS — J44.9 CHRONIC OBSTRUCTIVE PULMONARY DISEASE, UNSPECIFIED COPD TYPE (H): Primary | ICD-10-CM

## 2018-05-17 DIAGNOSIS — E11.22 TYPE 2 DIABETES MELLITUS WITH STAGE 3 CHRONIC KIDNEY DISEASE, WITHOUT LONG-TERM CURRENT USE OF INSULIN (H): ICD-10-CM

## 2018-05-17 DIAGNOSIS — N18.30 CKD (CHRONIC KIDNEY DISEASE) STAGE 3, GFR 30-59 ML/MIN (H): ICD-10-CM

## 2018-05-17 DIAGNOSIS — N18.30 TYPE 2 DIABETES MELLITUS WITH STAGE 3 CHRONIC KIDNEY DISEASE, WITHOUT LONG-TERM CURRENT USE OF INSULIN (H): ICD-10-CM

## 2018-05-17 PROCEDURE — 99495 TRANSJ CARE MGMT MOD F2F 14D: CPT | Performed by: PHYSICIAN ASSISTANT

## 2018-05-17 NOTE — PATIENT INSTRUCTIONS
Repeat non-fasting labs in 3 months with lab only appointment at your convenience.  Complete previous oral antibiotic and continue with metoprolol 150 mg daily. Ok to continue to hold lisinopril at this time.  Patient to start previous prescription for Breo, daily inhaled medicine, as well.  Return to clinic with any worsening or changes in symptoms and follow up for routine care.

## 2018-05-17 NOTE — PROGRESS NOTES
SUBJECTIVE:   Porsche Manning is a 68 year old female who presents to clinic today for the following health issues:      Hospital Follow-up Visit:    Hospital/Nursing Home/IP Rehab Facility: [facility]  Date of Admission: 5/9/18  Date of Discharge: 5/11/18  Reason(s) for Admission: COPD with pneumonia and uncontrolled HTN            Problems taking medications regularly:  None       Medication changes since discharge: yes pt was on prednisone (finished yesterday) and doxycycline (a couple days left)         Problems adhering to non-medication therapy:  None  Summary of hospitalization:  [facility] discharge summary reviewed  Diagnostic Tests/Treatments reviewed.  Follow up needed: pulmonology - has been 3 years  Other Healthcare Providers Involved in Patient s Care:   None  Update since discharge: improved.   Post Discharge Medication Reconciliation: discharge medications reconciled, continue medications without change.  Plan of care communicated with patient     Coding guidelines for this visit:  Type of Medical   Decision Making Face-to-Face Visit       within 7 Days of discharge Face-to-Face Visit        within 14 days of discharge   Moderate Complexity [id number]   High Complexity [id number]          Held Lisinopril due to kidney function, metoprolol increased to 150 mg; needs to check blood glucoses.  Given new daily inhaler, breo, hasn't started yet.  Patient feeling much better, slowly but surely.  Patient on chantix now too with no issues but still craving occasionally.  Patient has not smoking for a few weeks now.      Problem list and histories reviewed & adjusted, as indicated.  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)     Health Care Home     Advanced directives, counseling/discussion     Subclavian artery stenosis, left (H)     Hyponatremia     CKD (chronic kidney disease) stage 3, GFR 30-59 ml/min     Chronic obstructive pulmonary disease, unspecified COPD type (H)     COPD (chronic obstructive pulmonary disease) with chronic bronchitis (H)     Past Surgical History:   Procedure Laterality Date     BIOPSY      Sample taken from back     CARDIAC SURGERY      Stents     COLONOSCOPY  1996    Results were ok     HYSTERECTOMY, PAP NO LONGER INDICATED  1989    ovaries only, no cervix per pt     SURGICAL HISTORY OF - 1995    bunionectomy     SURGICAL HISTORY OF -   10/10/03    cardiac stenting     SURGICAL HISTORY OF -     stent placed in both of her legs for pad     VASCULAR SURGERY      PAD       Social History   Substance Use Topics     Smoking status: Former Smoker     Packs/day: 0.50     Years: 40.00     Types: Cigarettes     Quit date: 4/25/2018     Smokeless tobacco: Never Used     Alcohol use Yes      Comment: 2 to 4 beers or mixed drinks weekly     Family History   Problem Relation Age of Onset     C.A.D. Mother      Breast Cancer Mother      C.A.D. Father      DIABETES Father      Hypertension Father      C.A.D. Maternal Grandfather      C.A.D. Paternal Grandfather      Neurologic Disorder Brother      epilepsy         Current Outpatient Prescriptions   Medication Sig Dispense Refill     albuterol (2.5 MG/3ML) 0.083% neb solution Take 1 vial (2.5 mg) by nebulization every 6 hours as needed for shortness of breath / dyspnea or wheezing 25 vial 1     albuterol (PROAIR HFA/PROVENTIL HFA/VENTOLIN HFA) 108 (90 Base) MCG/ACT Inhaler Inhale 2 puffs into the lungs every 6 hours as needed for shortness of breath / dyspnea or wheezing 1 Inhaler 3     ASPIRIN 325 MG OR TBEC 1 tab po QD (Once per day) 100 3     Calcium Carbonate-Vit  D-Min (CALCIUM 1200 PO) Take 1,200 mg by mouth daily       Cholecalciferol (VITAMIN D) 1000 UNIT capsule Take 1 capsule by mouth daily. Take one tablet daily       clopidogrel (PLAVIX) 75 MG tablet Take 1 tablet (75 mg) by mouth daily 90 tablet 3     diltiazem (CARDIZEM CD) 240 MG 24 hr capsule Take 1 capsule (240 mg) by mouth daily 90 capsule 3     doxycycline (VIBRAMYCIN) 100 MG capsule Take 1 capsule (100 mg) by mouth every 12 hours 20 capsule 0     fluticasone-vilanterol (BREO ELLIPTA) 100-25 MCG/INH oral inhaler Inhale 1 puff into the lungs daily 1 Inhaler 1     metoprolol succinate (TOPROL-XL) 100 MG 24 hr tablet Take 1.5 tablets (150 mg) by mouth daily 90 tablet 3     nicotine (NICODERM CQ) 14 MG/24HR 24 hr patch Place 1 patch onto the skin daily 30 patch 0     order for DME Equipment being ordered: Nebulizer 1 Device 0     order for DME Equipment being ordered: Nebulizer with tubing and pipe 1 Device 0     Respiratory Therapy Supplies ([employer]) JESSICA 1 applicator 2 times daily 2 each 0     rosuvastatin (CRESTOR) 40 MG tablet Take 1 tablet (40 mg) by mouth daily 90 tablet 3     tiotropium (SPIRIVA HANDIHALER) 18 MCG capsule Inhale 1 capsule (18 mcg) into the lungs daily 90 capsule 3     varenicline (CHANTIX) 0.5 MG tablet Take 1 tablet (0.5 mg) by mouth daily 30 tablet 0     Allergies   Allergen Reactions     Morphine Nausea and Vomiting     Penicillins Nausea and Vomiting     BP Readings from Last 3 Encounters:   05/17/18 128/60   05/11/18 158/66   05/09/18 118/51    Wt Readings from Last 3 Encounters:   05/17/18 188 lb (85.3 kg)   05/09/18 187 lb (84.8 kg)   05/09/18 187 lb (84.8 kg)               Reviewed and updated as needed this visit by clinical staff  Tobacco  Allergies  Meds  Problems  Med Hx  Surg Hx  Fam Hx  Soc Hx        Reviewed and updated as needed this visit by Provider  Allergies  Meds  Problems         ROS:  Constitutional, HEENT, cardiovascular, pulmonary, GI, , musculoskeletal,  neuro, skin, endocrine and psych systems are negative, except as otherwise noted.    OBJECTIVE:     /60 (BP Location: Left arm, Patient Position: Chair, Cuff Size: Adult Large)  Pulse 62  Temp 97.1  F (36.2  C) (Oral)  Resp 18  Wt 188 lb (85.3 kg)  SpO2 94%  BMI 34.39 kg/m2  Body mass index is 34.39 kg/(m^2).  GENERAL: healthy, alert and no distress  RESP: lungs clear to auscultation - no rales, rhonchi or wheezes  CV: regular rate and rhythm, normal S1 S2, no S3 or S4, no murmur, click or rub, no peripheral edema and peripheral pulses strong  PSYCH: mentation appears normal, affect normal/bright    Diagnostic Test Results:  none     ASSESSMENT/PLAN:       ICD-10-CM    1. Chronic obstructive pulmonary disease, unspecified COPD type (H) J44.9    2. Type 2 diabetes mellitus with stage 3 chronic kidney disease, without long-term current use of insulin (H) E11.22 Comprehensive metabolic panel    N18.3 Hemoglobin A1c   3. CKD (chronic kidney disease) stage 3, GFR 30-59 ml/min N18.3 Comprehensive metabolic panel       Patient Instructions   Repeat non-fasting labs in 3 months with lab only appointment at your convenience.  Complete previous oral antibiotic and continue with metoprolol 150 mg daily. Ok to continue to hold lisinopril at this time.  Patient to start previous prescription for Breo, daily inhaled medicine, as well.  Return to clinic with any worsening or changes in symptoms and follow up for routine care.         Danuta Tan PA-C  [facility]

## 2018-05-17 NOTE — MR AVS SNAPSHOT
After Visit Summary   5/17/2018    Porsche Manning    MRN: [id number]           Patient Information     Date Of Birth          1950        Visit Information        Provider Department      5/17/2018 11:40 AM Danuta Tan PA-C [facility]        Today's Diagnoses     Chronic obstructive pulmonary disease, unspecified COPD type (H)    -  1    Type 2 diabetes mellitus with stage 3 chronic kidney disease, without long-term current use of insulin (H)        CKD (chronic kidney disease) stage 3, GFR 30-59 ml/min          Care Instructions    Repeat non-fasting labs in 3 months with lab only appointment at your convenience.  Complete previous oral antibiotic and continue with metoprolol 150 mg daily. Ok to continue to hold lisinopril at this time.  Patient to start previous prescription for Breo, daily inhaled medicine, as well.  Return to clinic with any worsening or changes in symptoms and follow up for routine care.             Follow-ups after your visit        Your next 10 appointments already scheduled     Jun 21, 2018  1:00 PM CDT   FULL PULMONARY FUNCTION with  PFL [facility] Pulmonary Function Testing ([facility])    [address] [place] [id number]   [phone]            Jun 21, 2018  2:00 PM CDT   (Arrive by 1:45 PM)   New Patient Visit with Lori Lorenzo MD   [facility] ([facility])    [address] [place] [address]  [address] [place] [id number]   [phone]            Sep 25, 2018  8:30 AM CDT   (Arrive by 8:15 AM)   Return Visit with Brayan Uriostegui MD   [facility] ([facility])    [address] [place] [address]  [address] [place] [id number]   [phone]              Future tests that were ordered for you today     Open Future Orders        Priority Expected Expires Ordered    Comprehensive metabolic panel  Routine  5/17/2019 5/17/2018    Hemoglobin A1c Routine  5/17/2019 5/17/2018    Pulmonary Function Test Routine 5/16/2018 10/15/2018 5/16/2018            Who to contact     If you have questions or need follow up information about today's clinic visit or your schedule please contact [facility] directly at [phone].  Normal or non-critical lab and imaging results will be communicated to you by MyChart, letter or phone within 4 business days after the clinic has received the results. If you do not hear from us within 7 days, please contact the clinic through [employer] or phone. If you have a critical or abnormal lab result, we will notify you by phone as soon as possible.  Submit refill requests through [employer] or call your pharmacy and they will forward the refill request to us. Please allow 3 business days for your refill to be completed.          Additional Information About Your Visit        [employer] Information     [employer] gives you secure access to your electronic health record. If you see a primary care provider, you can also send messages to your care team and make appointments. If you have questions, please call your primary care clinic.  If you do not have a primary care provider, please call [phone] and they will assist you.        Care EveryWhere ID     This is your Care EveryWhere ID. This could be used by other organizations to access your [facility] medical records  [id number]        Your Vitals Were     Pulse Temperature Respirations Pulse Oximetry BMI (Body Mass Index)       62 97.1  F (36.2  C) (Oral) 18 94% 34.39 kg/m2        Blood Pressure from Last 3 Encounters:   05/17/18 128/60   05/11/18 158/66   05/09/18 118/51    Weight from Last 3 Encounters:   05/17/18 188 lb (85.3 kg)   05/09/18 187 lb (84.8 kg)   05/09/18 187 lb (84.8 kg)               Primary Care Provider Office Phone # Fax #    Danuta Tan PA-C [phone]       [address]  "[address]  [facility] [address]        Goals        General    Medical (pt-stated)     Notes - Note created  5/14/2018 11:07 AM by Echo Galindo RN    Goal Statement: I will follow the COPD  Plan   Measure of Success: I will increase energy for my daily activities   Supportive Steps to Achieve:   I will rinse my mouth after my......inhaler to keep the lining of my mouth healthy   I will use my air conditioner and rest more on humid days   I will get a flu shot this year   I will schedule my Pneumonia shot   I will write down questions to bring to my next appointment   I will call my clinic if I start to use my rescue inhaler more often due to breathing problems   I will use my nebulizer as directed even when I am feeling well since I know that it helps to prevent future problems   I will use my inhalers as prescribed   I will use my inhalers as directed even when I am feeling well since I know it will prevent future problems   I will rest between activities such as bathing,cooking,cleaning to prevent feeling of \"running out of breath\"  I will continue smoking cessation   I will make sure I have a source of protein at each meal.   This could include meat,eggs,yougurt or cheese to help keep  my energy up.  I will eat..smaller meals a day to help me digest food better and maintain my weight   I will call my clinic with the first signs of a respiratory infection such as fever,chills or increased cough   I will finish my antibiotic even though I a feeling better   I will drink 6-8 ...glasses of fluids per day to help keep my secretions liquid   I will share my action plan with my family members or friend so they can help me recognize early problems also   I will put my action plan in an easy to see are and review routinely to make sure I am staying in the green zone     Barriers: Deconditioned/weak   Strengths: Patient has quit smoking for 3 weeks   Date to Achieve By: Ongoing          Lifestyle    Quit smoking / " "using tobacco     Notes - Note created  5/14/2018 11:04 AM by Echo Galindo, RN    Goal Statement: I will continue my smoking cessation   Measure of Success: More energy and able to take deeper breaths  Supportive Steps to Achieve:   I will use Chantix as directed and Nicoderm patch as directed   Barriers:Statement \"I think of my self as always smoking\"   Strengths: Has not smoked for 3 weeks   Date to Achieve By: To be determined         Equal Access to Services     RONAN NIELSEN AH: Hadii steph ku hadasho Soomaali, waaxda luqadaha, qaybta kaalmada adeegyada, lien wardcharchelsy ramsay . So [facility] [phone].    ATENCIÓN: Si habla español, tiene a antunez disposición servicios gratuitos de asistencia lingüística. Llame al [phone].    We comply with applicable federal civil rights laws and [place] laws. We do not discriminate on the basis of race, color, national origin, age, disability, sex, sexual orientation, or gender identity.            Thank you!     Thank you for choosing [facility]  for your care. Our goal is always to provide you with excellent care. Hearing back from our patients is one way we can continue to improve our services. Please take a few minutes to complete the written survey that you may receive in the mail after your visit with us. Thank you!             Your Updated Medication List - Protect others around you: Learn how to safely use, store and throw away your medicines at www.disposemymeds.org.          This list is accurate as of 5/17/18 11:58 AM.  Always use your most recent med list.                   Brand Name Dispense Instructions for use Diagnosis    AEROBIKA Cindi     2 each    1 applicator 2 times daily    COPD exacerbation (H)       * albuterol (2.5 MG/3ML) 0.083% neb solution     25 vial    Take 1 vial (2.5 mg) by nebulization every 6 hours as needed for shortness of breath / dyspnea or wheezing    Chronic obstructive pulmonary disease, unspecified COPD " type (H)       * albuterol 108 (90 Base) MCG/ACT Inhaler    PROAIR HFA/PROVENTIL HFA/VENTOLIN HFA    1 Inhaler    Inhale 2 puffs into the lungs every 6 hours as needed for shortness of breath / dyspnea or wheezing    Chronic obstructive pulmonary disease, unspecified COPD type (H)       aspirin 325 MG EC tablet     100    1 tab po QD (Once per day)    Other specified pre-operative examination       CALCIUM 1200 PO      Take 1,200 mg by mouth daily        clopidogrel 75 MG tablet    PLAVIX    90 tablet    Take 1 tablet (75 mg) by mouth daily    Peripheral vascular disease, unspecified       diltiazem 240 MG 24 hr capsule    CARDIZEM CD    90 capsule    Take 1 capsule (240 mg) by mouth daily    Old myocardial infarction       doxycycline 100 MG capsule    VIBRAMYCIN    20 capsule    Take 1 capsule (100 mg) by mouth every 12 hours    Pneumonia due to infectious organism, unspecified laterality, unspecified part of lung       fluticasone-vilanterol 100-25 MCG/INH oral inhaler    BREO ELLIPTA    1 Inhaler    Inhale 1 puff into the lungs daily    COPD exacerbation (H)       metoprolol succinate 100 MG 24 hr tablet    TOPROL-XL    90 tablet    Take 1.5 tablets (150 mg) by mouth daily    Hypertension goal BP (blood pressure) < 140/90, Coronary artery disease due to lipid rich plaque       nicotine 14 MG/24HR 24 hr patch    NICODERM CQ    30 patch    Place 1 patch onto the skin daily    COPD exacerbation (H)       order for DME     1 Device    Equipment being ordered: Nebulizer    Chronic obstructive pulmonary disease, unspecified COPD type (H)       order for DME     1 Device    Equipment being ordered: Nebulizer with tubing and pipe    Chronic obstructive pulmonary disease, unspecified COPD type (H)       rosuvastatin 40 MG tablet    CRESTOR    90 tablet    Take 1 tablet (40 mg) by mouth daily    Hyperlipidemia LDL goal <70       tiotropium 18 MCG capsule    SPIRIVA HANDIHALER    90 capsule    Inhale 1 capsule (18 mcg)  into the lungs daily    Chronic obstructive pulmonary disease, unspecified COPD type (H)       varenicline 0.5 MG tablet    CHANTIX    30 tablet    Take 1 tablet (0.5 mg) by mouth daily    COPD exacerbation (H)       vitamin D 1000 units capsule      Take 1 capsule by mouth daily. Take one tablet daily        * Notice:  This list has 2 medication(s) that are the same as other medications prescribed for you. Read the directions carefully, and ask your doctor or other care provider to review them with you.

## 2018-05-24 ENCOUNTER — PATIENT OUTREACH (OUTPATIENT)
Dept: CARE COORDINATION | Facility: CLINIC | Age: 68
End: 2018-05-24

## 2018-05-24 DIAGNOSIS — J44.89 COPD (CHRONIC OBSTRUCTIVE PULMONARY DISEASE) WITH CHRONIC BRONCHITIS (H): Primary | ICD-10-CM

## 2018-05-24 NOTE — PROGRESS NOTES
Clinic Care Coordination Contact    Clinic Care Coordination Contact  OUTREACH    Referral Information:            Chief Complaint   Patient presents with     Clinic Care Coordination - Follow-up     Clinic Care Coordination RN         Universal Utilization: [facility] admission 5/9-5/11/2018  Acute Hypoxic respiratory failure due to COPD exacerbation and pneumonia   Hyperglycemia due to Diabetes and steroids  MICHELLE   CKD  Hypertension   CAD  Pulmonary and Adrenal nodule     Utilization    Last refreshed: 5/24/2018 10:02 AM:  No Show Count (past year) 0       Last refreshed: 5/24/2018 10:02 AM:  ED visits 0       Last refreshed: 5/24/2018 10:02 AM:  Hospital admissions 1          Current as of: 5/24/2018 10:02 AM             Clinical Concerns:       Health Maintenance Reviewed: Not assessed  Clinical Pathway: Clinic Care Coordination COPD Assessment      Symptom Review:    How have you been feeling now that you are home? Patient reports her blood pressure is lower today  113//55 pulse 87-88.     Denies feeling dizzy.  Patient is new on Metoprolol and wondering if these readings are too low . CC encourage patient to drink plenty of water to keep hydrated.  Patient would like Danuta DINERO advice.  Patient would like a call before 12:00 tomorrow because she is going out of town   Are you having any increased shortness of breath? No       Do you have a COPD Action Plan? Yes        Is the COPD Action Plan on refrigerator or available: Yes    What number would you call if you were in the YELLOW zones:  [phone]    Medications:    Were you started on any new medications?  No  Were any of your previous medications changed? No  Do you have all of your medications? Yes,   Are you medications effective in controlling your symptoms? Yes,       Oxygen/DME:    Are you currently on oxygen?  No     Activity:      Have you had to reduce your activities because of dyspnea or other symptoms? Pacing  "activity           Goals:   Goals        General    Medical (pt-stated)     Notes - Note created  5/14/2018 11:07 AM by Echo Galindo RN    Goal Statement: I will follow the COPD  Plan   Measure of Success: I will increase energy for my daily activities   Supportive Steps to Achieve:   I will rinse my mouth after my......inhaler to keep the lining of my mouth healthy   I will use my air conditioner and rest more on humid days   I will get a flu shot this year   I will schedule my Pneumonia shot   I will write down questions to bring to my next appointment   I will call my clinic if I start to use my rescue inhaler more often due to breathing problems   I will use my nebulizer as directed even when I am feeling well since I know that it helps to prevent future problems   I will use my inhalers as prescribed   I will use my inhalers as directed even when I am feeling well since I know it will prevent future problems   I will rest between activities such as bathing,cooking,cleaning to prevent feeling of \"running out of breath\"  I will continue smoking cessation   I will make sure I have a source of protein at each meal.   This could include meat,eggs,yougurt or cheese to help keep  my energy up.  I will eat..smaller meals a day to help me digest food better and maintain my weight   I will call my clinic with the first signs of a respiratory infection such as fever,chills or increased cough   I will finish my antibiotic even though I a feeling better   I will drink 6-8 ...glasses of fluids per day to help keep my secretions liquid   I will share my action plan with my family members or friend so they can help me recognize early problems also   I will put my action plan in an easy to see are and review routinely to make sure I am staying in the green zone     Barriers: Deconditioned/weak   Strengths: Patient has quit smoking for 3 weeks   Date to Achieve By: Ongoing          Lifestyle    Quit smoking / using tobacco  " "   Notes - Note created  5/14/2018 11:04 AM by Echo Galindo RN    Goal Statement: I will continue my smoking cessation   Measure of Success: More energy and able to take deeper breaths  Supportive Steps to Achieve:   I will use Chantix as directed and Nicoderm patch as directed   Barriers:Statement \"I think of my self as always smoking\"   Strengths: Has not smoked for 3 weeks   Date to Achieve By: To be determined                      Future Appointments              In 4 weeks  PFL [facility] Pulmonary Function Testing, [facility]    In 4 weeks Lori Lorenzo MD [facility], [facility]    In 4 months Brayan Uriostegui MD [facility], [facility]          Plan: CC will request advice on lower blood pressure     Echo Galindo RN / Clinical Care Coordinator     [address] [address]  [email or web address]  Office :  [phone] / Fax :  [phone]    "

## 2018-05-24 NOTE — PROGRESS NOTES
"Continue to monitor BP and if remain low or lower and/or develop any associated symptoms - lightheadedness, dizziness ect - can decrease metoprolol to just one tablet (looks like she's taking 1.5 tabs daily) instead.  Thanks  Danuta \"Eduard\" PHILL Tan   "

## 2018-05-25 NOTE — PROGRESS NOTES
Patient informed as below.  Reports she has only been taking 1 tab of the Metoprolol already, did not realize she was supposed to be taking 1.5 tabs  Will continue with just 1 tab and was instructed to continue to monitor BP and if it is remaining low or going lower and/or develops any associated symptoms - lightheadedness, dizziness ect -should call the clinic.    Informed there will be an on-call available all weekend.  Patient is going to [place] for the weekend.  Saundra Gomez RN

## 2018-05-29 LAB — RADIOLOGIST FLAGS: NORMAL

## 2018-05-31 ENCOUNTER — PATIENT OUTREACH (OUTPATIENT)
Dept: CARE COORDINATION | Facility: CLINIC | Age: 68
End: 2018-05-31

## 2018-05-31 DIAGNOSIS — J44.89 COPD (CHRONIC OBSTRUCTIVE PULMONARY DISEASE) WITH CHRONIC BRONCHITIS (H): Primary | ICD-10-CM

## 2018-05-31 NOTE — PROGRESS NOTES
Clinic Care Coordination Contact    Clinic Care Coordination Contact  OUTREACH    Referral Information:            Chief Complaint   Patient presents with     Clinic Care Coordination - Follow-up     Clinic Care Coordination        Universal Utilization: [facility] admission 5/9-5/11/2018  Acute Hypoxic respiratory failure due to COPD exacerbation and pneumonia   Hyperglycemia due to Diabetes and steroids  MICHELLE   CKD  Hypertension   CAD  Pulmonary and Adrenal nodule     Utilization    Last refreshed: 5/29/2018  1:32 PM:  No Show Count (past year) 0       Last refreshed: 5/29/2018  1:32 PM:  ED visits 0       Last refreshed: 5/29/2018  1:32 PM:  Hospital admissions 1          Current as of: 5/29/2018  1:32 PM             Clinical Concerns:  Pain  Chronic pain (GOAL):: No  Health Maintenance Reviewed: Not assessed  Clinical Pathway: Clinic Care Coordination COPD Assessment    Discharge:    Transportation concerns (GOAL):: No    Symptom Review:    How have you been feeling now that you are home? Still pacing activity   Are you having any increased shortness of breath? No       Do you have a COPD Action Plan? Yes       Is the COPD Action Plan on refrigerator or available: Yes    What number would you call if you were in the YELLOW zones:  [phone]    Medications:    Were you started on any new medications?  No  Were any of your previous medications changed? Patient is taking Metoprolol 100 mg daily  Blood pressure reading are better at 120-125/60    Are you medications effective in controlling your symptoms? Yes,   Are you currently on Prednisone ?  No  F  Medication reconciliation completed? Yes    Was MTM or Diabetic Education referral placed (*Make sure to put transitions as reason for referral)? No    Oxygen/DME:    Are you currently on oxygen?  No   Review with patient how to use/maintain nebulizers and inhalers: Yes    Activity:    Have you had to reduce your activities because of dyspnea or  "other symptoms? Pacing activities      Diet:    Do you weigh yourself daily? No        Emotions/Lifestyle:    Do you smoke?  reports that she quit smoking about 5 weeks ago. Her smoking use included Cigarettes. She has a 20.00 pack-year smoking history. She has never used smokeless tobacco.  Would you like to try to quit smoking? Patient has quit for 1 month using the Chantix       Functional Status: Independent        Living Situation: Lives alone in private home        Transportation:  Transportation concerns (GOAL):: No        Goals:   Goals        General    Medical (pt-stated)     Notes - Note created  5/14/2018 11:07 AM by Echo Galindo RN    Goal Statement: I will follow the COPD  Plan   Measure of Success: I will increase energy for my daily activities   Supportive Steps to Achieve:   I will rinse my mouth after my......inhaler to keep the lining of my mouth healthy   I will use my air conditioner and rest more on humid days   I will get a flu shot this year   I will schedule my Pneumonia shot   I will write down questions to bring to my next appointment   I will call my clinic if I start to use my rescue inhaler more often due to breathing problems   I will use my nebulizer as directed even when I am feeling well since I know that it helps to prevent future problems   I will use my inhalers as prescribed   I will use my inhalers as directed even when I am feeling well since I know it will prevent future problems   I will rest between activities such as bathing,cooking,cleaning to prevent feeling of \"running out of breath\"  I will continue smoking cessation   I will make sure I have a source of protein at each meal.   This could include meat,eggs,yougurt or cheese to help keep  my energy up.  I will eat..smaller meals a day to help me digest food better and maintain my weight   I will call my clinic with the first signs of a respiratory infection such as fever,chills or increased cough   I will finish my " "antibiotic even though I a feeling better   I will drink 6-8 ...glasses of fluids per day to help keep my secretions liquid   I will share my action plan with my family members or friend so they can help me recognize early problems also   I will put my action plan in an easy to see are and review routinely to make sure I am staying in the green zone     Barriers: Deconditioned/weak   Strengths: Patient has quit smoking for 3 weeks   Date to Achieve By: Ongoing          Lifestyle    Quit smoking / using tobacco     Notes - Note created  5/14/2018 11:04 AM by Echo Galindo RN    Goal Statement: I will continue my smoking cessation   Measure of Success: More energy and able to take deeper breaths  Supportive Steps to Achieve:   I will use Chantix as directed and Nicoderm patch as directed   Barriers:Statement \"I think of my self as always smoking\"   Strengths: Has not smoked for 3 weeks   Date to Achieve By: To be determined           Future Appointments              In 3 weeks  PFL [facility] Pulmonary Function Testing, [facility]    In 3 weeks Lori Lorenzo MD [facility], [facility]    In 3 months Brayan Uriostegui MD [facility], [facility]          Plan:   CC will follow up in 1-2 weeks   CC will get PCP advice on a future Pulmonary Rehabilitation order   Patient has CC contact information for questions or concerns     Echo Galindo RN / Clinical Care Coordinator     [facility]   [email or web address]  Office :  [phone] / Fax :  [phone]    "

## 2018-05-31 NOTE — PROGRESS NOTES
CC left a message on patients VM  to call CC back if she is interested in Pulmonary RehabilitationReferral    Echo Galindo RN / Clinical Care Coordinator     [facility]   [email or web address]  Office :  [phone] / Fax :  [phone]

## 2018-05-31 NOTE — TELEPHONE ENCOUNTER
FUTURE VISIT INFORMATION      FUTURE VISIT INFORMATION:    Date: 6.21.18    Time: 2:00 Pm    Location: [facility]  REFERRAL INFORMATION:    Referring provider:  Dr. Alcazar    Referring providers clinic:  [facility]    Reason for visit/diagnosis  COPD Return    RECORDS REQUESTED FROM:       Clinic name Comments Records Status Imaging Status   [facility] Referral and discharge summary received PACS   [facility]  Received PACS                             RECORDS STATUS      RECORDS RECEIVED FROM: [facility] and [facility]   DATE RECEIVED: 6.21.18   NOTES STATUS DETAILS   OFFICE NOTE from referring provider Internal 5.9.18 Referral, Dr. Alcazar   OFFICE NOTE from other specialist Internal 5.9.18 Dr. Wilkins,   5.1.18 Britney    DISCHARGE SUMMARY from hospital Internal 5.9.18 [facility]   DISCHARGE REPORT from the ER N/A    OPERATIVE REPORT N/A    MEDICATION LIST Internal    IMAGING  (NEED IMAGES AND REPORTS)     CT SCAN Internal 5.9.18   CHEST XRAY (CXR) Internal 5.9.18   TESTS     PULMONARY FUNCTION TESTING (PFT) In process Scheduled for 6.21.18   FLOW LOOP VOLUME (FVL) In process Scheduled for 6.21.18   CYSTIC FIBROSIS     CF SPUTUM CULTURE N/A

## 2018-06-14 ENCOUNTER — PATIENT OUTREACH (OUTPATIENT)
Dept: CARE COORDINATION | Facility: CLINIC | Age: 68
End: 2018-06-14

## 2018-06-14 DIAGNOSIS — J44.89 COPD (CHRONIC OBSTRUCTIVE PULMONARY DISEASE) WITH CHRONIC BRONCHITIS (H): Primary | ICD-10-CM

## 2018-06-14 NOTE — PROGRESS NOTES
Clinic Care Coordination Contact  [facility]/Voicemail       Clinical Data: Care Coordinator Outreach  [place] Utilization: [facility] admission 5/9-5/11/2018  Acute Hypoxic respiratory failure due to COPD exacerbation and pneumonia   Hyperglycemia due to Diabetes and steroids  MICHELLE   CKD  Hypertension   CAD  Pulmonary and Adrenal nodule  Outreach attempted x 1.  Left message on voicemail with call back information and requested return call.  Plan:  Care Coordinator will try to reach patient again in 3-5 business days.  Echo Galindo RN / Clinical Care Coordinator     [facility]   [email or web address]  Office :  [phone] / Fax :  [phone]

## 2018-06-19 ENCOUNTER — TRANSFERRED RECORDS (OUTPATIENT)
Dept: HEALTH INFORMATION MANAGEMENT | Facility: CLINIC | Age: 68
End: 2018-06-19

## 2018-06-19 ENCOUNTER — PATIENT OUTREACH (OUTPATIENT)
Dept: CARE COORDINATION | Facility: CLINIC | Age: 68
End: 2018-06-19

## 2018-06-19 DIAGNOSIS — J44.1 COPD EXACERBATION (H): ICD-10-CM

## 2018-06-19 DIAGNOSIS — J44.89 COPD (CHRONIC OBSTRUCTIVE PULMONARY DISEASE) WITH CHRONIC BRONCHITIS (H): Primary | ICD-10-CM

## 2018-06-19 RX ORDER — VARENICLINE TARTRATE 0.5 MG/1
0.5 TABLET, FILM COATED ORAL DAILY
Qty: 30 TABLET | Refills: 0 | Status: SHIPPED | OUTPATIENT
Start: 2018-06-19 | End: 2018-07-24

## 2018-06-19 NOTE — PROGRESS NOTES
Clinic Care Coordination Contact    Clinic Care Coordination Contact  OUTREACH    Referral Information:            Chief Complaint   Patient presents with     Clinic Care Coordination - Follow-up     jud Care Coordination RN         [place] Utilization:  [facility] admission 5/9-5/11/2018  Acute Hypoxic respiratory failure due to COPD exacerbation and pneumonia   Hyperglycemia due to Diabetes and steroids  MICHELLE   CKD  Hypertension   CAD  Pulmonary and Adrenal nodule  Clinic Utilization  Difficulty keeping appointments:: No  Utilization    Last refreshed: 6/15/2018  5:49 PM:  No Show Count (past year) 0       Last refreshed: 6/15/2018  5:49 PM:  ED visits 0       Last refreshed: 6/15/2018  5:49 PM:  Hospital admissions 1          Current as of: 6/15/2018  5:49 PM             Clinical Concerns:     Health Maintenance Reviewed: Not assessed  Clinical Pathway: Clinic Care Coordination COPD Assessment    Discharge:      Symptom Review:    How have you been feeling now ? [place] riding .  Are you having any increased shortness of breath? No  Symptoms  Anxiety: No  Cough: No  Fatigue: Yes  Increased sputum: No  What COPD zone are you currently in?: Green  Taking COPD medications as prescribed: : Yes  Overall your COPD symptoms are (GOAL):: Stable    Do you have a COPD Action Plan? Yes     Is the COPD Action Plan on refrigerator or available: Yes    What number would you call if you were in the YELLOW zones:  [phone]    Medications:    Were you started on any new medications?  No  Are you medications effective in controlling your symptoms? Yes,       Oxygen/DME:    Are you currently on oxygen?  No     Review with patient how to use/maintain nebulizers and inhalers: yes    Activity:      Have you had to reduce your activities because of dyspnea or other symptoms? Patient is pacing activity     Diet:    Do you weigh yourself daily?  "No        Emotions/Lifestyle:    Do you smoke?  reports that she quit smoking about 7 weeks ago. Her smoking use included Cigarettes. She has a 20.00 pack-year smoking history. She has never used smokeless tobacco.  Would you like to try to quit smoking? Patient reports she sn[place] a few cigarettes [place] and is requesting another RX for Chantix      Transportation:  Transportation concerns (GOAL):: No          Goals:   Goals        General    Medical (pt-stated)     Notes - Note created  5/14/2018 11:07 AM by Echo Galindo RN    Goal Statement: I will follow the COPD  Plan   Measure of Success: I will increase energy for my daily activities   Supportive Steps to Achieve:   I will rinse my mouth after my......inhaler to keep the lining of my mouth healthy   I will use my air conditioner and rest more on humid days   I will get a flu shot this year   I will schedule my Pneumonia shot   I will write down questions to bring to my next appointment   I will call my clinic if I start to use my rescue inhaler more often due to breathing problems   I will use my nebulizer as directed even when I am feeling well since I know that it helps to prevent future problems   I will use my inhalers as prescribed   I will use my inhalers as directed even when I am feeling well since I know it will prevent future problems   I will rest between activities such as bathing,cooking,cleaning to prevent feeling of \"running out of breath\"  I will continue smoking cessation   I will make sure I have a source of protein at each meal.   This could include meat,eggs,yougurt or cheese to help keep  my energy up.  I will eat..smaller meals a day to help me digest food better and maintain my weight   I will call my clinic with the first signs of a respiratory infection such as fever,chills or increased cough   I will finish my antibiotic even though I a feeling better   I will drink 6-8 ...glasses of fluids per day to help keep my secretions " "liquid   I will share my action plan with my family members or friend so they can help me recognize early problems also   I will put my action plan in an easy to see are and review routinely to make sure I am staying in the green zone     Barriers: Deconditioned/weak   Strengths: Patient has quit smoking for 3 weeks   Date to Achieve By: Ongoing          Lifestyle    Quit smoking / using tobacco     Notes - Note created  5/14/2018 11:04 AM by Echo Galinod RN    Goal Statement: I will continue my smoking cessation   Measure of Success: More energy and able to take deeper breaths  Supportive Steps to Achieve:   I will use Chantix as directed and Nicoderm patch as directed   Barriers:Statement \"I think of my self as always smoking\"   Strengths: Has not smoked for 3 weeks   Date to Achieve By: To be determined                 Future Appointments              In 2 days  PFL [facility] Pulmonary Function Testing, [facility]    In 2 days Lori Lorenzo MD [facility], [facility]    In 3 months Brayan Uriostegui MD [facility], [facility]          Plan: CC will follow up monthly     Echo Galindo RN / Clinical Care Coordinator     Formerly named [facility]   [email or web address]  Office :  [phone] / Fax :  [phone]      "

## 2018-06-21 ENCOUNTER — PRE VISIT (OUTPATIENT)
Dept: PULMONOLOGY | Facility: CLINIC | Age: 68
End: 2018-06-21

## 2018-06-21 ENCOUNTER — RADIANT APPOINTMENT (OUTPATIENT)
Dept: CT IMAGING | Facility: CLINIC | Age: 68
End: 2018-06-21
Payer: COMMERCIAL

## 2018-06-21 ENCOUNTER — OFFICE VISIT (OUTPATIENT)
Dept: PULMONOLOGY | Facility: CLINIC | Age: 68
End: 2018-06-21
Attending: INTERNAL MEDICINE
Payer: COMMERCIAL

## 2018-06-21 VITALS
HEART RATE: 85 BPM | DIASTOLIC BLOOD PRESSURE: 81 MMHG | OXYGEN SATURATION: 94 % | BODY MASS INDEX: 36.25 KG/M2 | WEIGHT: 197 LBS | SYSTOLIC BLOOD PRESSURE: 179 MMHG | RESPIRATION RATE: 18 BRPM | HEIGHT: 62 IN

## 2018-06-21 DIAGNOSIS — R91.8 PULMONARY NODULES: ICD-10-CM

## 2018-06-21 DIAGNOSIS — J43.2 CENTRILOBULAR EMPHYSEMA (H): ICD-10-CM

## 2018-06-21 DIAGNOSIS — J43.2 CENTRILOBULAR EMPHYSEMA (H): Primary | ICD-10-CM

## 2018-06-21 DIAGNOSIS — J44.9 COPD (CHRONIC OBSTRUCTIVE PULMONARY DISEASE) (H): ICD-10-CM

## 2018-06-21 LAB — RADIOLOGIST FLAGS: NORMAL

## 2018-06-21 PROCEDURE — G0463 HOSPITAL OUTPT CLINIC VISIT: HCPCS | Mod: ZF

## 2018-06-21 ASSESSMENT — PAIN SCALES - GENERAL: PAINLEVEL: NO PAIN (0)

## 2018-06-21 NOTE — Clinical Note
Please call Ms. Manning and let her know results of CT -- 1) upper lobe inflammatory changes consistent with smoking related lung disease -- pls reinforce smoking cessation 2) 6 mm pulmonary nodule seen but is below threshold size for biopsy at this time 3) Recommend f/u CT in 3 mo in conjunction w/ RV with me in clinic.  (CT ordered, will need to change 6 mo RV to 3 mo) Thanks!

## 2018-06-21 NOTE — MR AVS SNAPSHOT
After Visit Summary   6/21/2018    Porsche Manning    MRN: [id number]           Patient Information     Date Of Birth          1950        Visit Information        Provider Department      6/21/2018 2:00 PM Lori Lorenzo MD [facility]        Today's Diagnoses     Centrilobular emphysema (H)    -  1    Pulmonary nodules           Follow-ups after your visit        Follow-up notes from your care team     Return in about 6 months (around 12/21/2018).      Your next 10 appointments already scheduled     Sep 25, 2018  8:30 AM CDT   (Arrive by 8:15 AM)   Return Visit with Brayan Uriostegui MD   [facility] ([facility])    [address] [place] [address]  [address] [place] [id number] [phone]              Future tests that were ordered for you today     Open Future Orders        Priority Expected Expires Ordered    CT Chest w/o contrast Routine  6/22/2019 6/22/2018    General PFT Lab (Please always keep checked) Routine  6/21/2019 6/21/2018    Pulmonary Function Test Routine  6/21/2019 6/21/2018            Who to contact     If you have questions or need follow up information about today's clinic visit or your schedule please contact [facility] directly at [phone].  Normal or non-critical lab and imaging results will be communicated to you by [employer]hart, letter or phone within 4 business days after the clinic has received the results. If you do not hear from us within 7 days, please contact the clinic through [employer]hart or phone. If you have a critical or abnormal lab result, we will notify you by phone as soon as possible.  Submit refill requests through [employer] or call your pharmacy and they will forward the refill request to us. Please allow 3 business days for your refill to be completed.          Additional Information About Your Visit        [employer] Information     [employer] gives you secure  "access to your electronic health record. If you see a primary care provider, you can also send messages to your care team and make appointments. If you have questions, please call your primary care clinic.  If you do not have a primary care provider, please call [phone] and they will assist you.        Care EveryWhere ID     This is your Care EveryWhere ID. This could be used by other organizations to access your [place] medical records  [id number]        Your Vitals Were     Pulse Respirations Height Pulse Oximetry BMI (Body Mass Index)       85 18 1.575 m (5' 2\") 94% 36.03 kg/m2        Blood Pressure from Last 3 Encounters:   06/21/18 179/81   05/17/18 128/60   05/11/18 158/66    Weight from Last 3 Encounters:   06/21/18 89.4 kg (197 lb)   05/17/18 85.3 kg (188 lb)   05/09/18 84.8 kg (187 lb)                 Today's Medication Changes          These changes are accurate as of 6/21/18 11:59 PM.  If you have any questions, ask your nurse or doctor.               These medicines have changed or have updated prescriptions.        Dose/Directions    metoprolol succinate 100 MG 24 hr tablet   Commonly known as:  TOPROL-XL   This may have changed:  how much to take   Used for:  Hypertension goal BP (blood pressure) < 140/90, Coronary artery disease due to lipid rich plaque        Dose:  150 mg   Take 1.5 tablets (150 mg) by mouth daily   Quantity:  90 tablet   Refills:  3                Primary Care Provider Office Phone # Fax #    Danuta Tan PA-C [phone]       [address] [place] [id number]        Goals        General    Medical (pt-stated)     Notes - Note created  5/14/2018 11:07 AM by Echo Galindo, DONNA    Goal Statement: I will follow the COPD  Plan   Measure of Success: I will increase energy for my daily activities   Supportive Steps to Achieve:   I will rinse my mouth after my......inhaler to keep the lining of my mouth healthy   I will use my air " "conditioner and rest more on humid days   I will get a flu shot this year   I will schedule my Pneumonia shot   I will write down questions to bring to my next appointment   I will call my clinic if I start to use my rescue inhaler more often due to breathing problems   I will use my nebulizer as directed even when I am feeling well since I know that it helps to prevent future problems   I will use my inhalers as prescribed   I will use my inhalers as directed even when I am feeling well since I know it will prevent future problems   I will rest between activities such as bathing,cooking,cleaning to prevent feeling of \"running out of breath\"  I will continue smoking cessation   I will make sure I have a source of protein at each meal.   This could include meat,eggs,yougurt or cheese to help keep  my energy up.  I will eat..smaller meals a day to help me digest food better and maintain my weight   I will call my clinic with the first signs of a respiratory infection such as fever,chills or increased cough   I will finish my antibiotic even though I a feeling better   I will drink 6-8 ...glasses of fluids per day to help keep my secretions liquid   I will share my action plan with my family members or friend so they can help me recognize early problems also   I will put my action plan in an easy to see are and review routinely to make sure I am staying in the green zone     Barriers: Deconditioned/weak   Strengths: Patient has quit smoking for 3 weeks   Date to Achieve By: Ongoing          Lifestyle    Quit smoking / using tobacco     Notes - Note created  5/14/2018 11:04 AM by Echo Galindo RN    Goal Statement: I will continue my smoking cessation   Measure of Success: More energy and able to take deeper breaths  Supportive Steps to Achieve:   I will use Chantix as directed and Nicoderm patch as directed   Barriers:Statement \"I think of my self as always smoking\"   Strengths: Has not smoked for 3 weeks   Date to " Achieve By: To be determined         Equal Access to Services     RONAN NIELSEN : Hadii aad ku hadarvinanum Martinez, bijan gibbons, shyannlien couch. So [facility] [phone].    ATENCIÓN: Si habla español, tiene a antunez disposición servicios gratuitos de asistencia lingüística. Llame al [phone].    We comply with applicable federal civil rights laws and [place] laws. We do not discriminate on the basis of race, color, national origin, age, disability, sex, sexual orientation, or gender identity.            Thank you!     Thank you for choosing [facility]  for your care. Our goal is always to provide you with excellent care. Hearing back from our patients is one way we can continue to improve our services. Please take a few minutes to complete the written survey that you may receive in the mail after your visit with us. Thank you!             Your Updated Medication List - Protect others around you: Learn how to safely use, store and throw away your medicines at www.disposemymeds.org.          This list is accurate as of 6/21/18 11:59 PM.  Always use your most recent med list.                   Brand Name Dispense Instructions for use Diagnosis    AEROBIKA Cindi     2 each    1 applicator 2 times daily    COPD exacerbation (H)       * albuterol (2.5 MG/3ML) 0.083% neb solution     25 vial    Take 1 vial (2.5 mg) by nebulization every 6 hours as needed for shortness of breath / dyspnea or wheezing    Chronic obstructive pulmonary disease, unspecified COPD type (H)       * albuterol 108 (90 Base) MCG/ACT Inhaler    PROAIR HFA/PROVENTIL HFA/VENTOLIN HFA    1 Inhaler    Inhale 2 puffs into the lungs every 6 hours as needed for shortness of breath / dyspnea or wheezing    Chronic obstructive pulmonary disease, unspecified COPD type (H)       aspirin 325 MG EC tablet     100    1 tab po QD (Once per day)    Other specified pre-operative  examination       CALCIUM 1200 PO      Take 1,200 mg by mouth daily        clopidogrel 75 MG tablet    PLAVIX    90 tablet    Take 1 tablet (75 mg) by mouth daily    Peripheral vascular disease, unspecified       diltiazem 240 MG 24 hr capsule    CARDIZEM CD    90 capsule    Take 1 capsule (240 mg) by mouth daily    Old myocardial infarction       doxycycline 100 MG capsule    VIBRAMYCIN    20 capsule    Take 1 capsule (100 mg) by mouth every 12 hours    Pneumonia due to infectious organism, unspecified laterality, unspecified part of lung       fluticasone-vilanterol 100-25 MCG/INH oral inhaler    BREO ELLIPTA    1 Inhaler    Inhale 1 puff into the lungs daily    COPD exacerbation (H)       metoprolol succinate 100 MG 24 hr tablet    TOPROL-XL    90 tablet    Take 1.5 tablets (150 mg) by mouth daily    Hypertension goal BP (blood pressure) < 140/90, Coronary artery disease due to lipid rich plaque       nicotine 14 MG/24HR 24 hr patch    NICODERM CQ    30 patch    Place 1 patch onto the skin daily    COPD exacerbation (H)       order for DME     1 Device    Equipment being ordered: Nebulizer    Chronic obstructive pulmonary disease, unspecified COPD type (H)       order for DME     1 Device    Equipment being ordered: Nebulizer with tubing and pipe    Chronic obstructive pulmonary disease, unspecified COPD type (H)       rosuvastatin 40 MG tablet    CRESTOR    90 tablet    Take 1 tablet (40 mg) by mouth daily    Hyperlipidemia LDL goal <70       tiotropium 18 MCG capsule    SPIRIVA HANDIHALER    90 capsule    Inhale 1 capsule (18 mcg) into the lungs daily    Chronic obstructive pulmonary disease, unspecified COPD type (H)       varenicline 0.5 MG tablet    CHANTIX    30 tablet    Take 1 tablet (0.5 mg) by mouth daily    COPD exacerbation (H), COPD (chronic obstructive pulmonary disease) with chronic bronchitis (H)       vitamin D 1000 units capsule      Take 1 capsule by mouth daily. Take one tablet daily        *  Notice:  This list has 2 medication(s) that are the same as other medications prescribed for you. Read the directions carefully, and ask your doctor or other care provider to review them with you.

## 2018-06-21 NOTE — LETTER
"6/21/2018       RE: Porsche Manning  [address]  [facility] [address]     Dear Colleague,    Thank you for referring your patient, Porsche Manning, to the [facility] at [facility]. Please see a copy of my visit note below.    Pulmonary Clinic New Patient Consult  Reason for Consult: f/u Hosp stay; COPD    History of Present Illness  Ms. Manning is a 68-year-old female with a history of tobacco abuse and COPD who presents to pulmonary clinic today to establish care for COPD.  She was recently hospitalized from May 9-11 with acute exacerbation of COPD.  She was treated with a course of prednisone and doxycycline and discharged on Chantix for smoking cessation as well as 3 oh ellipse, Spiriva, and albuterol as needed.  CT scan obtained during this admission showed centrilobular nodules and tree in bud opacities in an upper lobe predominant fashion consistent with multifocal pneumonia.  There is also mention of mild perilymphatic interstitial thickening and a 9 mm pulmonary nodule.    Ms. Manning tells me she was diagnosed with COPD approximately 3 years ago.  This occurred in the setting of worsening shortness of breath.  She was started on Spiriva and albuterol as needed at that time.  She had no major issues with recurrent flares of COPD or episodes of pneumonia in the intervening time between her diagnosis and her most recent admission.  She does state that she is slowing down and feels that she has become more out of shape since [facility].  She denies any functional limitations though and is still able to mow her own yard.  She describes her breathing as \"okay\".  She is still using Chantix for smoking cessation.  She had been off the cigarettes since her admission but relapsed over this last weekend.  She has smoked 2 packs per day for approximately 40 years although notes that she has quit off and on in between.  She has " never required ICU stay or mechanical ventilation for an acute exacerbation of COPD.  She denies any cough, sputum production, hemoptysis, fevers, chills, or significant fluctuations in weight.    She lives in [place], [place] in house that was built in 1922.  There is no mold or asbestos that she knows of.  She has no pets at home.  She further denies any significant exposure to birds, pillows or comforter stuffed with down feathers, recent travel outside the area, or hot tub or Jacuzzis uses on a regular basis.    Family history is notable for a father with possible lung disease (uses nebulizers, unsure of diagnosis).          Review of Systems:  10 of 14 systems reviewed and are negative unless otherwise stated in HPI.    Past Medical History:   Diagnosis Date     Brachial neuritis or radiculitis NOS      Chronic obstructive pulmonary disease, unspecified COPD type (H) 3/8/2016     Coronary artery disease     Doing fine     H/O tobacco use, presenting hazards to health      Hyperlipidemia LDL goal < 100      Hypertension goal BP (blood pressure) < 140/90      Malignant neoplasm of other specified sites of cervix      Old myocardial infarction      Osteopenia      Peripheral vascular disease, unspecified      Type 2 diabetes, HbA1c goal < 7% (H)        Past Surgical History:   Procedure Laterality Date     BIOPSY      Sample taken from back     CARDIAC SURGERY      Stents     COLONOSCOPY  1996    Results were ok     HYSTERECTOMY, PAP NO LONGER INDICATED  1989    ovaries only, no cervix per pt     SURGICAL HISTORY OF -   1995    bunionectomy     SURGICAL HISTORY OF -   10/10/03    cardiac stenting     SURGICAL HISTORY OF -       stent placed in both of her legs for pad     VASCULAR SURGERY      PAD       Family History   Problem Relation Age of Onset     C.A.D. Mother      Breast Cancer Mother      C.A.JOE. Father      Diabetes Father      Hypertension Father      OREN.SARITHA. Maternal Grandfather      CMargaretteACHERIE.  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     Social History Main Topics     Smoking status: Former Smoker     Packs/day: 0.50     Years: 40.00     Types: Cigarettes     Quit date: 4/25/2018     Smokeless tobacco: Never Used     Alcohol use Yes      Comment: 2 to 4 beers or mixed drinks weekly     Drug use: No     Sexual activity: No     Other Topics Concern     Parent/Sibling W/ Cabg, Mi Or Angioplasty Before 65f 55m? No     Social History Narrative    12/22/09    Balanced Diet - No    Osteoporosis Prevention Measures - Dairy servings per day: 0-1 and Medication/Supplements (See current meds)    Regular Exercise -  No Describe None    Dental Exam - YES - Date: 12/2009    Eye Exam - YES - Date: 2 years ago    Self Breast Exam - Yes, on a monthly basis    Abuse: Current or Past (Physical, Sexual or Emotional)- No    Do you feel safe in your environment - Yes    Guns stored in the home - No    Sunscreen used - Yes    Seatbelts used - Yes    Lipids -  YES - Date: 10/23/08    Glucose -  YES - Date: 10/23/08    Colon Cancer Screening - Colonoscopy 7/27/06(date completed)    Hemoccults - YES - Date: 7/11/06    Pap Test -  YES - Date: 10/23/08, Pt has Hx of abnormal paps.    Do you have any concerns about STD's -  No    Mammography - YES - Date: 10/23/08    DEXA - YES - Date: 11/29/07    Immunizations reviewed and up to date - Yes, Tdap given 10/18/07    Phillip Loo MA                         Allergies   Allergen Reactions     Morphine Nausea and Vomiting     Penicillins Nausea and Vomiting         Current Outpatient Prescriptions:      albuterol (PROAIR HFA/PROVENTIL HFA/VENTOLIN HFA) 108 (90 Base) MCG/ACT Inhaler, Inhale 2 puffs into the lungs every 6 hours as needed for shortness of  breath / dyspnea or wheezing, Disp: 1 Inhaler, Rfl: 3     ASPIRIN 325 MG OR TBEC, 1 tab po QD (Once per day), Disp: 100, Rfl: 3     Calcium Carbonate-Vit D-Min (CALCIUM 1200 PO), Take 1,200 mg by mouth daily, Disp: , Rfl:      Cholecalciferol (VITAMIN D) 1000 UNIT capsule, Take 1 capsule by mouth daily. Take one tablet daily, Disp: , Rfl:      clopidogrel (PLAVIX) 75 MG tablet, Take 1 tablet (75 mg) by mouth daily, Disp: 90 tablet, Rfl: 3     diltiazem (CARDIZEM CD) 240 MG 24 hr capsule, Take 1 capsule (240 mg) by mouth daily, Disp: 90 capsule, Rfl: 3     doxycycline (VIBRAMYCIN) 100 MG capsule, Take 1 capsule (100 mg) by mouth every 12 hours, Disp: 20 capsule, Rfl: 0     fluticasone-vilanterol (BREO ELLIPTA) 100-25 MCG/INH oral inhaler, Inhale 1 puff into the lungs daily, Disp: 1 Inhaler, Rfl: 1     metoprolol succinate (TOPROL-XL) 100 MG 24 hr tablet, Take 1.5 tablets (150 mg) by mouth daily (Patient taking differently: Take 100 mg by mouth daily ), Disp: 90 tablet, Rfl: 3     order for DME, Equipment being ordered: Nebulizer, Disp: 1 Device, Rfl: 0     order for DME, Equipment being ordered: Nebulizer with tubing and pipe, Disp: 1 Device, Rfl: 0     Respiratory Therapy Supplies (AEROBIKA) JESSICA, 1 applicator 2 times daily, Disp: 2 each, Rfl: 0     rosuvastatin (CRESTOR) 40 MG tablet, Take 1 tablet (40 mg) by mouth daily, Disp: 90 tablet, Rfl: 3     tiotropium (SPIRIVA HANDIHALER) 18 MCG capsule, Inhale 1 capsule (18 mcg) into the lungs daily, Disp: 90 capsule, Rfl: 3     varenicline (CHANTIX) 0.5 MG tablet, Take 1 tablet (0.5 mg) by mouth daily, Disp: 30 tablet, Rfl: 0     albuterol (2.5 MG/3ML) 0.083% neb solution, Take 1 vial (2.5 mg) by nebulization every 6 hours as needed for shortness of breath / dyspnea or wheezing (Patient not taking: Reported on 6/21/2018), Disp: 25 vial, Rfl: 1     nicotine (NICODERM CQ) 14 MG/24HR 24 hr patch, Place 1 patch onto the skin daily (Patient not taking: Reported on 6/21/2018),  "Disp: 30 patch, Rfl: 0      Physical Exam:  /81 (BP Location: Right arm, Patient Position: Sitting, Cuff Size: Adult Large)  Pulse 85  Resp 18  Ht 1.575 m (5' 2\")  Wt 89.4 kg (197 lb)  SpO2 94%  BMI 36.03 kg/m2  GENERAL: Well developed, well nourished, alert, and in no apparent distress.  HEENT: Normocephalic, atraumatic. PERRL, EOMI. Oral mucosa is moist. No perioral cyanosis.  NECK: supple, no masses, no thyromegaly.  RESP:  Normal respiratory effort. Diminished breath sounds bilaterally with occasional low pitched wheeze.    No cyanosis or clubbing.  CV: Normal S1, S2, regular rhythm, normal rate. No murmur.  No LE edema.   ABDOMEN:  Soft, non-tender, non-distended.   SKIN: warm and dry. No rash.  NEURO: AAOx3.  Normal gait.  No focal neuro deficits.  PSYCH: mentation appears normal. and affect normal/bright    Results:  PFTs: Ratio 53%, FEV1 59%, FVC 57%, %, %, DLCO 59%.  Negative bronchodilator challenge testing.  Study demonstrates a moderately severe obstructive ventilatory defect with moderate impairment in gas exchange.  There is hyperinflation and air trapping in keeping with her known diagnosis of COPD.  Imaging (personally reviewed in clinic today):  CT 5/9:   1. No pulmonary embolism.  2. Centrilobular nodules and tree-in-bud opacities scattered about both lungs with an upper lobe predominance, compatible with multifocal pneumonia. There is a perilymphatic component to these nodules with interstitial thickening that can also be seen in lymphangitic carcinomatosis or sarcoidosis-like reaction.  3. Discrete 9 mm pulmonary nodule in the right upper lobe may also be related to infection although follow-up to resolution is recommended. Follow-up CT chest following treatment for infection to confirm resolution or PET CT  is recommended  4. At least 50% stenosis of the origin of the left subclavian artery secondary to atherosclerotic plaque.  5. 20 mm indeterminate density left adrenal " nodule. Recommend further evaluation with CT adrenal protocol.    Assessment and Plan:   Porsche Manning is a 68 year old female with a history of COPD who presents to pulmonary clinic today for hospital follow up and to establish care for COPD.  She has done well since her discharge in early May on a maintenance regimen of Spiriva, Breo-Ellipta and albuterol as needed.  The importance of smoking cessation was again enforced with her today as smoking cessation is one of only two things shown to prolong life in patients with COPD.  She is motivated to quit and will continue to use Chantix to help achieve this goal.  She has not historically had problems with recurrent COPD exacerbations, which is reassuring. We will continue to monitor exacerbation frequency going forward, but it is reasonable to continue her on her new maintenance inhaler regimen at this time.  Pulmonary rehab referral was considered but deferred as she currently maintains what sounds like a pretty normal functional status.  Benefit to pulmonary rehab can be revisited in the future.  In regards to abnormal CT findings, it would be reasonable to repeat CT today to look for resolution of previously seen multi-focal pneumonia as we are now about 6 weeks out from hospitalization and she feels that she has returned to her respiratory baseline.  She will also need continued surveillance of the 9 mm nodule seen in the RUL.  Repeat CT will be obtained today and we will follow up with her pending the results to determine next steps.  The above findings and plan were discussed with Ms. Manning at length. Questions and concerns were answered to her satisfaction.  She was provided with my contact information should new questions or concerns arise in the interim.  she should return to clinic in 6 months with repeat PFTs.  She is up to date on prevnar (2015) and pneumovax (2017).    Yanique Lorenzo MD  Pulmonary and Critical Care Medicine    The above note  was dictated using voice recognition software and may include typographical errors. Please contact the author for any clarifications.    ADDENDUM:  CT Result:   1. Diffuse perilymphatic ground glass nodules, may represent respiratory bronchiolitis, infection or malignancy in this patient with history of smoking. Recommend continued follow up .  2. 6 mm subpleural left lower lobe pulmonary nodule with mildly spiculated margin. Recommend for biopsy or PET/CT. Few sub-5 mm pulmonary nodules as detailed above.  3. Indeterminate 1 cm low attenuated hepatic foci. Recommend for abdominal ultrasound for better characterization.    Imaging additionally reviewed with IP service. They confirm that  6 mm subpleural nodule is below threshold for PET or biopsy.  Upper lobe findings most suggestive of respiratory bronchiolitis in the this patient who continues to smoke.  Strongly recommend smoking cessation but would not recommend anything further at this time is she is relatively asymptomatic.  Will follow nodule + continued abnormalities w/ CT in 3 months.        Again, thank you for allowing me to participate in the care of your patient.      Sincerely,    Lori Lorenzo MD

## 2018-06-22 ENCOUNTER — TELEPHONE (OUTPATIENT)
Dept: PULMONOLOGY | Facility: CLINIC | Age: 68
End: 2018-06-22

## 2018-06-22 NOTE — PROGRESS NOTES
"Pulmonary Clinic New Patient Consult  Reason for Consult: f/u Hosp stay; COPD    History of Present Illness  Ms. Manning is a 68-year-old female with a history of tobacco abuse and COPD who presents to pulmonary clinic today to establish care for COPD.  She was recently hospitalized from May 9-11 with acute exacerbation of COPD.  She was treated with a course of prednisone and doxycycline and discharged on Chantix for smoking cessation as well as 3 oh ellipse, Spiriva, and albuterol as needed.  CT scan obtained during this admission showed centrilobular nodules and tree in bud opacities in an upper lobe predominant fashion consistent with multifocal pneumonia.  There is also mention of mild perilymphatic interstitial thickening and a 9 mm pulmonary nodule.    Ms. Manning tells me she was diagnosed with COPD approximately 3 years ago.  This occurred in the setting of worsening shortness of breath.  She was started on Spiriva and albuterol as needed at that time.  She had no major issues with recurrent flares of COPD or episodes of pneumonia in the intervening time between her diagnosis and her most recent admission.  She does state that she is slowing down and feels that she has become more out of shape since [facility].  She denies any functional limitations though and is still able to mow her own yard.  She describes her breathing as \"okay\".  She is still using Chantix for smoking cessation.  She had been off the cigarettes since her admission but relapsed over this last weekend.  She has smoked 2 packs per day for approximately 40 years although notes that she has quit off and on in between.  She has never required ICU stay or mechanical ventilation for an acute exacerbation of COPD.  She denies any cough, sputum production, hemoptysis, fevers, chills, or significant fluctuations in weight.    She lives in [place], [place] in house that was built in 1922.  There is no mold or asbestos that she knows of.  " She has no pets at home.  She further denies any significant exposure to birds, pillows or comforter stuffed with down feathers, recent travel outside the area, or hot tub or Jacuzzis uses on a regular basis.    Family history is notable for a father with possible lung disease (uses nebulizers, unsure of diagnosis).          Review of Systems:  10 of 14 systems reviewed and are negative unless otherwise stated in HPI.    Past Medical History:   Diagnosis Date     Brachial neuritis or radiculitis NOS      Chronic obstructive pulmonary disease, unspecified COPD type (H) 3/8/2016     Coronary artery disease     Doing fine     H/O tobacco use, presenting hazards to health      Hyperlipidemia LDL goal < 100      Hypertension goal BP (blood pressure) < 140/90      Malignant neoplasm of other specified sites of cervix      Old myocardial infarction      Osteopenia      Peripheral vascular disease, unspecified      Type 2 diabetes, HbA1c goal < 7% (H)        Past Surgical History:   Procedure Laterality Date     BIOPSY      Sample taken from back     CARDIAC SURGERY      Stents     COLONOSCOPY  1996    Results were ok     HYSTERECTOMY, PAP NO LONGER INDICATED  1989    ovaries only, no cervix per pt     SURGICAL HISTORY OF -   1995    bunionectomy     SURGICAL HISTORY OF -   10/10/03    cardiac stenting     SURGICAL HISTORY OF -       stent placed in both of her legs for pad     VASCULAR SURGERY      PAD       Family History   Problem Relation Age of Onset     C.A.D. Mother      Breast Cancer Mother      C.A.D. Father      Diabetes Father      Hypertension Father      C.A.D. Maternal Grandfather      C.A.D. 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     Social History Main Topics      Smoking status: Former Smoker     Packs/day: 0.50     Years: 40.00     Types: Cigarettes     Quit date: 4/25/2018     Smokeless tobacco: Never Used     Alcohol use Yes      Comment: 2 to 4 beers or mixed drinks weekly     Drug use: No     Sexual activity: No     Other Topics Concern     Parent/Sibling W/ Cabg, Mi Or Angioplasty Before 65f 55m? No     Social History Narrative    12/22/09    Balanced Diet - No    Osteoporosis Prevention Measures - Dairy servings per day: 0-1 and Medication/Supplements (See current meds)    Regular Exercise -  No Describe None    Dental Exam - YES - Date: 12/2009    Eye Exam - YES - Date: 2 years ago    Self Breast Exam - Yes, on a monthly basis    Abuse: Current or Past (Physical, Sexual or Emotional)- No    Do you feel safe in your environment - Yes    Guns stored in the home - No    Sunscreen used - Yes    Seatbelts used - Yes    Lipids -  YES - Date: 10/23/08    Glucose -  YES - Date: 10/23/08    Colon Cancer Screening - Colonoscopy 7/27/06(date completed)    Hemoccults - YES - Date: 7/11/06    Pap Test -  YES - Date: 10/23/08, Pt has Hx of abnormal paps.    Do you have any concerns about STD's -  No    Mammography - YES - Date: 10/23/08    DEXA - YES - Date: 11/29/07    Immunizations reviewed and up to date - Yes, Tdap given 10/18/07    Phillip Loo MA                         Allergies   Allergen Reactions     Morphine Nausea and Vomiting     Penicillins Nausea and Vomiting         Current Outpatient Prescriptions:      albuterol (PROAIR HFA/PROVENTIL HFA/VENTOLIN HFA) 108 (90 Base) MCG/ACT Inhaler, Inhale 2 puffs into the lungs every 6 hours as needed for shortness of breath / dyspnea or wheezing, Disp: 1 Inhaler, Rfl: 3     ASPIRIN 325 MG OR TBEC, 1 tab po QD (Once per day), Disp: 100, Rfl: 3     Calcium Carbonate-Vit D-Min (CALCIUM 1200 PO), Take 1,200 mg by mouth daily, Disp: , Rfl:      Cholecalciferol (VITAMIN D) 1000 UNIT capsule, Take 1 capsule by mouth daily. Take one  "tablet daily, Disp: , Rfl:      clopidogrel (PLAVIX) 75 MG tablet, Take 1 tablet (75 mg) by mouth daily, Disp: 90 tablet, Rfl: 3     diltiazem (CARDIZEM CD) 240 MG 24 hr capsule, Take 1 capsule (240 mg) by mouth daily, Disp: 90 capsule, Rfl: 3     doxycycline (VIBRAMYCIN) 100 MG capsule, Take 1 capsule (100 mg) by mouth every 12 hours, Disp: 20 capsule, Rfl: 0     fluticasone-vilanterol (BREO ELLIPTA) 100-25 MCG/INH oral inhaler, Inhale 1 puff into the lungs daily, Disp: 1 Inhaler, Rfl: 1     metoprolol succinate (TOPROL-XL) 100 MG 24 hr tablet, Take 1.5 tablets (150 mg) by mouth daily (Patient taking differently: Take 100 mg by mouth daily ), Disp: 90 tablet, Rfl: 3     order for DME, Equipment being ordered: Nebulizer, Disp: 1 Device, Rfl: 0     order for DME, Equipment being ordered: Nebulizer with tubing and pipe, Disp: 1 Device, Rfl: 0     Respiratory Therapy Supplies (AERK2 Media) JESSIAC, 1 applicator 2 times daily, Disp: 2 each, Rfl: 0     rosuvastatin (CRESTOR) 40 MG tablet, Take 1 tablet (40 mg) by mouth daily, Disp: 90 tablet, Rfl: 3     tiotropium (SPIRIVA HANDIHALER) 18 MCG capsule, Inhale 1 capsule (18 mcg) into the lungs daily, Disp: 90 capsule, Rfl: 3     varenicline (CHANTIX) 0.5 MG tablet, Take 1 tablet (0.5 mg) by mouth daily, Disp: 30 tablet, Rfl: 0     albuterol (2.5 MG/3ML) 0.083% neb solution, Take 1 vial (2.5 mg) by nebulization every 6 hours as needed for shortness of breath / dyspnea or wheezing (Patient not taking: Reported on 6/21/2018), Disp: 25 vial, Rfl: 1     nicotine (NICODERM CQ) 14 MG/24HR 24 hr patch, Place 1 patch onto the skin daily (Patient not taking: Reported on 6/21/2018), Disp: 30 patch, Rfl: 0      Physical Exam:  /81 (BP Location: Right arm, Patient Position: Sitting, Cuff Size: Adult Large)  Pulse 85  Resp 18  Ht 1.575 m (5' 2\")  Wt 89.4 kg (197 lb)  SpO2 94%  BMI 36.03 kg/m2  GENERAL: Well developed, well nourished, alert, and in no apparent distress.  HEENT: " Normocephalic, atraumatic. PERRL, EOMI. Oral mucosa is moist. No perioral cyanosis.  NECK: supple, no masses, no thyromegaly.  RESP:  Normal respiratory effort. Diminished breath sounds bilaterally with occasional low pitched wheeze.    No cyanosis or clubbing.  CV: Normal S1, S2, regular rhythm, normal rate. No murmur.  No LE edema.   ABDOMEN:  Soft, non-tender, non-distended.   SKIN: warm and dry. No rash.  NEURO: AAOx3.  Normal gait.  No focal neuro deficits.  PSYCH: mentation appears normal. and affect normal/bright    Results:  PFTs: Ratio 53%, FEV1 59%, FVC 57%, %, %, DLCO 59%.  Negative bronchodilator challenge testing.  Study demonstrates a moderately severe obstructive ventilatory defect with moderate impairment in gas exchange.  There is hyperinflation and air trapping in keeping with her known diagnosis of COPD.  Imaging (personally reviewed in clinic today):  CT 5/9:   1. No pulmonary embolism.  2. Centrilobular nodules and tree-in-bud opacities scattered about both lungs with an upper lobe predominance, compatible with multifocal pneumonia. There is a perilymphatic component to these nodules with interstitial thickening that can also be seen in lymphangitic carcinomatosis or sarcoidosis-like reaction.  3. Discrete 9 mm pulmonary nodule in the right upper lobe may also be related to infection although follow-up to resolution is recommended. Follow-up CT chest following treatment for infection to confirm resolution or PET CT  is recommended  4. At least 50% stenosis of the origin of the left subclavian artery secondary to atherosclerotic plaque.  5. 20 mm indeterminate density left adrenal nodule. Recommend further evaluation with CT adrenal protocol.    Assessment and Plan:   Porsche Manning is a 68 year old female with a history of COPD who presents to pulmonary clinic today for hospital follow up and to establish care for COPD.  She has done well since her discharge in early May on a  maintenance regimen of Spiriva, Breo-Ellipta and albuterol as needed.  The importance of smoking cessation was again enforced with her today as smoking cessation is one of only two things shown to prolong life in patients with COPD.  She is motivated to quit and will continue to use Chantix to help achieve this goal.  She has not historically had problems with recurrent COPD exacerbations, which is reassuring. We will continue to monitor exacerbation frequency going forward, but it is reasonable to continue her on her new maintenance inhaler regimen at this time.  Pulmonary rehab referral was considered but deferred as she currently maintains what sounds like a pretty normal functional status.  Benefit to pulmonary rehab can be revisited in the future.  In regards to abnormal CT findings, it would be reasonable to repeat CT today to look for resolution of previously seen multi-focal pneumonia as we are now about 6 weeks out from hospitalization and she feels that she has returned to her respiratory baseline.  She will also need continued surveillance of the 9 mm nodule seen in the RUL.  Repeat CT will be obtained today and we will follow up with her pending the results to determine next steps.  The above findings and plan were discussed with Ms. Manning at length. Questions and concerns were answered to her satisfaction.  She was provided with my contact information should new questions or concerns arise in the interim.  she should return to clinic in 6 months with repeat PFTs.  She is up to date on prevnar (2015) and pneumovax (2017).    Yanique Lorenzo MD  Pulmonary and Critical Care Medicine    The above note was dictated using voice recognition software and may include typographical errors. Please contact the author for any clarifications.    ADDENDUM:  CT Result:   1. Diffuse perilymphatic ground glass nodules, may represent respiratory bronchiolitis, infection or malignancy in this patient with history of  smoking. Recommend continued follow up .  2. 6 mm subpleural left lower lobe pulmonary nodule with mildly spiculated margin. Recommend for biopsy or PET/CT. Few sub-5 mm pulmonary nodules as detailed above.  3. Indeterminate 1 cm low attenuated hepatic foci. Recommend for abdominal ultrasound for better characterization.    Imaging additionally reviewed with IP service. They confirm that  6 mm subpleural nodule is below threshold for PET or biopsy.  Upper lobe findings most suggestive of respiratory bronchiolitis in the this patient who continues to smoke.  Strongly recommend smoking cessation but would not recommend anything further at this time is she is relatively asymptomatic.  Will follow nodule + continued abnormalities w/ CT in 3 months.

## 2018-06-22 NOTE — TELEPHONE ENCOUNTER
Lori Lorenzo MD  P Pulmonary Nurses-                     Please call Ms. Manning and let her know results of CT -- 1) upper lobe inflammatory changes consistent with smoking related lung disease -- pls reinforce smoking cessation   2) 6 mm pulmonary nodule seen but is below threshold size for biopsy at this time   3) Recommend f/u CT in 3 mo in conjunction w/ RV with me in clinic.  (CT ordered, will need to change 6 mo RV to 3 mo)   Thanks      Contacted patient and relayed above information.  will call and set up 3 month chest ct and 3 month apt with Dr Lorenzo. Patient confirmed understanding.

## 2018-07-19 DIAGNOSIS — J44.1 COPD EXACERBATION (H): ICD-10-CM

## 2018-07-23 NOTE — TELEPHONE ENCOUNTER
"Requested Prescriptions   Pending Prescriptions Disp Refills     fluticasone-vilanterol (BREO ELLIPTA) 100-25 MCG/INH oral inhaler  Last Written Prescription Date:  5/11/2018  Last Fill Quantity: 1,  # refills: 1   Last Office Visit: 5/17/2018   Future Office Visit:      1 Inhaler 1     Sig: Inhale 1 puff into the lungs daily    Inhaled Steroids Protocol Passed    7/19/2018 11:14 AM       Passed - Patient is age 12 or older       Passed - Recent (12 mo) or future (30 days) visit within the authorizing provider's specialty    Patient had office visit in the last 12 months or has a visit in the next 30 days with authorizing provider or within the authorizing provider's specialty.  See \"Patient Info\" tab in inbasket, or \"Choose Columns\" in Meds & Orders section of the refill encounter.              "

## 2018-07-24 ENCOUNTER — PATIENT OUTREACH (OUTPATIENT)
Dept: CARE COORDINATION | Facility: CLINIC | Age: 68
End: 2018-07-24

## 2018-07-24 DIAGNOSIS — J44.89 COPD (CHRONIC OBSTRUCTIVE PULMONARY DISEASE) WITH CHRONIC BRONCHITIS (H): Primary | ICD-10-CM

## 2018-07-24 RX ORDER — VARENICLINE TARTRATE 0.5 MG/1
0.5 TABLET, FILM COATED ORAL DAILY
Qty: 30 TABLET | Refills: 3 | Status: SHIPPED | OUTPATIENT
Start: 2018-07-24 | End: 2018-10-04 | Stop reason: SINTOL

## 2018-07-24 NOTE — PROGRESS NOTES
Left message on patients VM Chantix and Breo RX were sent to the pharmacy     Echo Galindo RN / Clinical Care Coordinator     [facility]   [email or web address]  Office :  [phone] / Fax :  [phone]

## 2018-07-24 NOTE — PROGRESS NOTES
Clinic Care Coordination Contact    Clinic Care Coordination Contact  OUTREACH    Referral Information:            Chief Complaint   Patient presents with     Clinic Care Coordination - Follow-up     Clinic Care Coordination RN         Universal Utilization: [facility] admission 5/9-5/11/2018  Acute Hypoxic respiratory failure due to COPD exacerbation and pneumonia   Hyperglycemia due to Diabetes and steroids  MICHELLE   CKD  Hypertension   CAD  Pulmonary and Adrenal nodule     Utilization    Last refreshed: 7/19/2018 11:33 AM:  No Show Count (past year) 0       Last refreshed: 7/19/2018 11:33 AM:  ED visits 0       Last refreshed: 7/19/2018 11:33 AM:  Hospital admissions 1          Current as of: 7/19/2018 11:33 AM             Clinical Concerns:     Health Maintenance Reviewed: Not assessed  Clinical Pathway: Clinic Care Coordination COPD Assessment    Symptom Review:    How have you been feeling now that you are home? Getting stronger   Are you having any increased shortness of breath? No  Symptoms  Anxiety: No  Cough: No  Fatigue: No  Increased sputum: No  Wheezing: No  COPD symptoms limiting activities?: No  What COPD zone are you currently in?: Green  Taking COPD medications as prescribed: : Yes  Overall your COPD symptoms are (GOAL):: Stable    Do you have a COPD Action Plan? Yes        Is the COPD Action Plan on refrigerator or available: Yes    What number would you call if you were in the YELLOW zones:  [phone]    Medications:    Were you started on any new medications?  No  Have you had trouble filling your prescriptions? Waiting for Breo refill   ml spoke to Saundra Gomez Triage and she will review refill need   Are you medications effective in controlling your symptoms? Yes,   A  Oxygen/DME:    Are you currently on oxygen?  No   IReview with patient how to use/maintain nebulizers and inhalers: Yes  No longer using the Nebulizer   Using Albuterol if needed with increased activity  "      Emotions/Lifestyle:    Do you smoke?  reports that she quit smoking about 2 months ago. Her smoking use included Cigarettes. She has a 20.00 pack-year smoking history. She has never used smokeless tobacco.  Would you like to try to quit smoking? Patient reports she has had 2 slips with her smoking since hospital discharge.  Currently not smoking.  Took her last Chantix Saturday and is wondering if she needs another RX  ml will route this question to PCP        Medication Management:  Reviewed inhalers      Functional Status: Independent        Living Situation: Lives alone           Transportation: Drives        Goals:   Goals        General    Medical (pt-stated)     Notes - Note created  5/14/2018 11:07 AM by Echo Galindo RN    Goal Statement: I will follow the COPD  Plan   Measure of Success: I will increase energy for my daily activities   Supportive Steps to Achieve:   I will rinse my mouth after my......inhaler to keep the lining of my mouth healthy   I will use my air conditioner and rest more on humid days   I will get a flu shot this year   I will schedule my Pneumonia shot   I will write down questions to bring to my next appointment   I will call my clinic if I start to use my rescue inhaler more often due to breathing problems   I will use my nebulizer as directed even when I am feeling well since I know that it helps to prevent future problems   I will use my inhalers as prescribed   I will use my inhalers as directed even when I am feeling well since I know it will prevent future problems   I will rest between activities such as bathing,cooking,cleaning to prevent feeling of \"running out of breath\"  I will continue smoking cessation   I will make sure I have a source of protein at each meal.   This could include meat,eggs,yougurt or cheese to help keep  my energy up.  I will eat..smaller meals a day to help me digest food better and maintain my weight   I will call my clinic with the first " "signs of a respiratory infection such as fever,chills or increased cough   I will finish my antibiotic even though I a feeling better   I will drink 6-8 ...glasses of fluids per day to help keep my secretions liquid   I will share my action plan with my family members or friend so they can help me recognize early problems also   I will put my action plan in an easy to see are and review routinely to make sure I am staying in the green zone     Barriers: Deconditioned/weak   Strengths: Patient has quit smoking for 3 weeks   Date to Achieve By: Ongoing          Lifestyle    Quit smoking / using tobacco     Notes - Note created  5/14/2018 11:04 AM by Echo Galindo, RN    Goal Statement: I will continue my smoking cessation   Measure of Success: More energy and able to take deeper breaths  Supportive Steps to Achieve:   I will use Chantix as directed and Nicoderm patch as directed   Barriers:Statement \"I think of my self as always smoking\"   Strengths: Has not smoked for 3 weeks   Date to Achieve By: To be determined             Outreach Frequency: monthly  Future Appointments              In 1 month UC[facility], [facility]    In 1 month  PFL A [facility] Pulmonary Function Testing, [facility]    In 1 month Lori Lorenzo MD [facility], [facility]    In 2 months Brayan Uriostegui MD [facility], [facility]          Plan: Patient agrees to a follow up call in one month     Echo Galindo RN / Clinical Care Coordinator     [facility]   [email or web address]  Office :  [phone] / Fax :  [phone]    "

## 2018-07-24 NOTE — TELEPHONE ENCOUNTER
Pt calling to find out status of this refill from 7-19-18.  Patient has been out of this for almost a week.  Patient is really needing this today. OK to leave message on voicemail.

## 2018-08-06 LAB
DLCOUNC-%PRED-PRE: 59 %
DLCOUNC-PRE: 11.92 ML/MIN/MMHG
DLCOUNC-PRED: 20.01 ML/MIN/MMHG
ERV-%PRED-PRE: 39 %
ERV-PRE: 0.11 L
ERV-PRED: 0.28 L
EXPTIME-PRE: 10.12 SEC
FEF2575-%PRED-POST: 18 %
FEF2575-%PRED-PRE: 26 %
FEF2575-POST: 0.36 L/SEC
FEF2575-PRE: 0.5 L/SEC
FEF2575-PRED: 1.89 L/SEC
FEFMAX-%PRED-PRE: 89 %
FEFMAX-PRE: 4.98 L/SEC
FEFMAX-PRED: 5.54 L/SEC
FEV1-%PRED-PRE: 59 %
FEV1-PRE: 1.28 L
FEV1FEV6-PRE: 58 %
FEV1FEV6-PRED: 80 %
FEV1FVC-PRE: 53 %
FEV1FVC-PRED: 77 %
FEV1SVC-PRE: 50 %
FEV1SVC-PRED: 75 %
FIFMAX-PRE: 4.52 L/SEC
FRCPLETH-%PRED-PRE: 125 %
FRCPLETH-PRE: 3.26 L
FRCPLETH-PRED: 2.59 L
FVC-%PRED-PRE: 87 %
FVC-PRE: 2.39 L
FVC-PRED: 2.73 L
IC-%PRED-PRE: 94 %
IC-PRE: 2.45 L
IC-PRED: 2.59 L
RVPLETH-%PRED-PRE: 163 %
RVPLETH-PRE: 3.15 L
RVPLETH-PRED: 1.93 L
TLCPLETH-%PRED-PRE: 124 %
TLCPLETH-PRE: 5.71 L
TLCPLETH-PRED: 4.6 L
VA-%PRED-PRE: 90 %
VA-PRE: 4.26 L
VC-%PRED-PRE: 89 %
VC-PRE: 2.57 L
VC-PRED: 2.87 L

## 2018-08-16 DIAGNOSIS — N18.30 CKD (CHRONIC KIDNEY DISEASE) STAGE 3, GFR 30-59 ML/MIN (H): ICD-10-CM

## 2018-08-16 DIAGNOSIS — E11.22 TYPE 2 DIABETES MELLITUS WITH STAGE 3 CHRONIC KIDNEY DISEASE, WITHOUT LONG-TERM CURRENT USE OF INSULIN (H): ICD-10-CM

## 2018-08-16 DIAGNOSIS — N18.30 TYPE 2 DIABETES MELLITUS WITH STAGE 3 CHRONIC KIDNEY DISEASE, WITHOUT LONG-TERM CURRENT USE OF INSULIN (H): ICD-10-CM

## 2018-08-16 LAB
ALBUMIN SERPL-MCNC: 3.9 G/DL (ref 3.4–5)
ALP SERPL-CCNC: 86 U/L (ref 40–150)
ALT SERPL W P-5'-P-CCNC: 18 U/L (ref 0–50)
ANION GAP SERPL CALCULATED.3IONS-SCNC: 9 MMOL/L (ref 3–14)
AST SERPL W P-5'-P-CCNC: 16 U/L (ref 0–45)
BILIRUB SERPL-MCNC: 0.3 MG/DL (ref 0.2–1.3)
BUN SERPL-MCNC: 21 MG/DL (ref 7–30)
CALCIUM SERPL-MCNC: 9.1 MG/DL (ref 8.5–10.1)
CHLORIDE SERPL-SCNC: 105 MMOL/L (ref 94–109)
CO2 SERPL-SCNC: 24 MMOL/L (ref 20–32)
CREAT SERPL-MCNC: 1.17 MG/DL (ref 0.52–1.04)
GFR SERPL CREATININE-BSD FRML MDRD: 46 ML/MIN/1.7M2
GLUCOSE SERPL-MCNC: 136 MG/DL (ref 70–99)
HBA1C MFR BLD: 6.1 % (ref 0–5.6)
POTASSIUM SERPL-SCNC: 4.4 MMOL/L (ref 3.4–5.3)
PROT SERPL-MCNC: 6.8 G/DL (ref 6.8–8.8)
SODIUM SERPL-SCNC: 138 MMOL/L (ref 133–144)

## 2018-08-16 PROCEDURE — 36415 COLL VENOUS BLD VENIPUNCTURE: CPT | Performed by: PHYSICIAN ASSISTANT

## 2018-08-16 PROCEDURE — 80053 COMPREHEN METABOLIC PANEL: CPT | Performed by: PHYSICIAN ASSISTANT

## 2018-08-16 PROCEDURE — 83036 HEMOGLOBIN GLYCOSYLATED A1C: CPT | Performed by: PHYSICIAN ASSISTANT

## 2018-08-16 NOTE — PROGRESS NOTES
"Daisy Morrell  Your attached labs are stable and your HgbA1C is MUCH better! Keep up the good work!  Please contact the office with any questions or concerns.    Danuta Martinez \"Eduard\" PHILL Tan  "

## 2018-08-21 ENCOUNTER — RADIANT APPOINTMENT (OUTPATIENT)
Dept: MAMMOGRAPHY | Facility: CLINIC | Age: 68
End: 2018-08-21
Attending: PHYSICIAN ASSISTANT
Payer: COMMERCIAL

## 2018-08-21 DIAGNOSIS — Z12.31 SCREENING MAMMOGRAM, ENCOUNTER FOR: ICD-10-CM

## 2018-08-21 PROCEDURE — 77067 SCR MAMMO BI INCL CAD: CPT

## 2018-09-13 ENCOUNTER — PATIENT OUTREACH (OUTPATIENT)
Dept: CARE COORDINATION | Facility: CLINIC | Age: 68
End: 2018-09-13

## 2018-09-13 DIAGNOSIS — J44.89 COPD (CHRONIC OBSTRUCTIVE PULMONARY DISEASE) WITH CHRONIC BRONCHITIS (H): Primary | ICD-10-CM

## 2018-09-13 NOTE — PROGRESS NOTES
Clinic Care Coordination Contact  [facility]/Voicemail       Clinical Data: Care Coordinator Outreach  [facility] admission 5/9-5/11/2018  Acute Hypoxic respiratory failure due to COPD exacerbation and pneumonia   Hyperglycemia due to Diabetes and steroids  MICHELLE   CKD  Hypertension   CAD  Pulmonary and Adrenal nodule  Outreach attempted x 1.  Left message on voicemail with call back information and requested return call.  Plan: Care Coordinator will await a return call .  Echo Galindo RN / Clinical Care Coordinator     [facility]   [email or web address]  Office :  [phone] / Fax :  [phone]

## 2018-09-15 DIAGNOSIS — J44.1 COPD EXACERBATION (H): ICD-10-CM

## 2018-09-17 NOTE — TELEPHONE ENCOUNTER
"Requested Prescriptions   Pending Prescriptions Disp Refills     BREO ELLIPTA 100-25 MCG/INH inhaler [Pharmacy Med Name: BREO ELLIPTA 100-25 MCG INH]  Last Written Prescription Date:  7/24/2018  Last Fill Quantity: 1 Inhaler,  # refills: 1   Last Office Visit: 5/17/2018   Future Office Visit:       1     Sig: TAKE 1 PUFF BY MOUTH EVERY DAY    Inhaled Steroids Protocol Passed    9/15/2018  1:51 AM       Passed - Patient is age 12 or older       Passed - Recent (12 mo) or future (30 days) visit within the authorizing provider's specialty    Patient had office visit in the last 12 months or has a visit in the next 30 days with authorizing provider or within the authorizing provider's specialty.  See \"Patient Info\" tab in inbasket, or \"Choose Columns\" in Meds & Orders section of the refill encounter.              "

## 2018-09-18 NOTE — TELEPHONE ENCOUNTER
Routing refill request to provider for review/approval because:  -associated diagnosis of COPD exacerbation not on refill protocol      Eduard-Please sign if agree.    Thank you!  UNIQUE CaiN, RN

## 2018-09-20 ENCOUNTER — RADIANT APPOINTMENT (OUTPATIENT)
Dept: CT IMAGING | Facility: CLINIC | Age: 68
End: 2018-09-20
Attending: INTERNAL MEDICINE
Payer: COMMERCIAL

## 2018-09-20 ENCOUNTER — OFFICE VISIT (OUTPATIENT)
Dept: PULMONOLOGY | Facility: CLINIC | Age: 68
End: 2018-09-20
Attending: INTERNAL MEDICINE
Payer: COMMERCIAL

## 2018-09-20 VITALS
RESPIRATION RATE: 17 BRPM | HEART RATE: 78 BPM | HEIGHT: 62 IN | DIASTOLIC BLOOD PRESSURE: 81 MMHG | SYSTOLIC BLOOD PRESSURE: 175 MMHG | WEIGHT: 200 LBS | BODY MASS INDEX: 36.8 KG/M2 | OXYGEN SATURATION: 98 %

## 2018-09-20 DIAGNOSIS — R91.8 PULMONARY NODULES: ICD-10-CM

## 2018-09-20 DIAGNOSIS — J44.89 COPD (CHRONIC OBSTRUCTIVE PULMONARY DISEASE) WITH CHRONIC BRONCHITIS (H): Primary | ICD-10-CM

## 2018-09-20 DIAGNOSIS — J43.2 CENTRILOBULAR EMPHYSEMA (H): ICD-10-CM

## 2018-09-20 DIAGNOSIS — K76.9 DISORDER OF LIVER: Primary | ICD-10-CM

## 2018-09-20 PROCEDURE — G0463 HOSPITAL OUTPT CLINIC VISIT: HCPCS | Mod: ZF

## 2018-09-20 ASSESSMENT — PAIN SCALES - GENERAL: PAINLEVEL: NO PAIN (0)

## 2018-09-20 NOTE — LETTER
9/20/2018       RE: Porsche Manning  [address]  [facility] [address]     Dear Colleague,    Thank you for referring your patient, Porsche Manning, to the [facility] at [facility]. Please see a copy of my visit note below.    Pulmonary Clinic Return Visit    History of Present Illness  Ms. Manning is a 68-year-old female with a history of tobacco abuse and COPD who presents to pulmonary clinic today for follow up of COPD and pulmonary nodule.  To briefly review, she was hospitalized in May with AECOPD.  She was treated with a course of prednisone and doxycycline and discharged on Chantix for smoking cessation as well as 3 Breo-Ellipta, Spiriva, and albuterol as needed.  CT scan obtained during this admission showed centrilobular nodules and tree in bud opacities in an upper lobe predominant fashion consistent with multifocal pneumonia.  There is also mention of mild perilymphatic interstitial thickening and a 9 mm pulmonary nodule.  At our follow up visit 3 months ago she was doing relatively well.  Smoking cessation was re-enforced and a surveillance CT was obtained.  It showed upper lobe findings concerning for respiratory bronchiolitis as well as a 6 mm subpleural LLL pulmonary nodule.  Imaging was reviewed with IP service who recommended short term interval surveillance.    Ms. Manning returns to clinic today doing well.  She has quit smoking altogether.  She reports May 1 is her quit date although says she has had a small handful of cigarettes since then.  Over the last month or 2 though she has not smoked at all.  She denies any new or worsening shortness of breath, cough, or sputum production since we last visited.  At present she is able to walk about half a block before she would need to stop and rest.  Going back 6 months ago she was not able to walk any further than that.  She attributes this to being out of shape and  admits that she needs to be more mindful of maintaining high level physical activity.  She denies any hospitalizations, flares of COPD, or episodes of pneumonia since we last visited.  She has an albuterol inhaler available for as needed use but has not needed to use this.  She further denies fevers, chills, hemoptysis, or significant fluctuations in weight.     She has smoked 2 packs per day for approximately 40 years although notes that she has quit off and on in between.  With the assistance of Chantix, she quit all together in summer 2018.        Review of Systems:  10 of 14 systems reviewed and are negative unless otherwise stated in HPI.    Past Medical History:   Diagnosis Date     Brachial neuritis or radiculitis NOS      Chronic obstructive pulmonary disease, unspecified COPD type (H) 3/8/2016     Coronary artery disease     Doing fine     H/O tobacco use, presenting hazards to health      Hyperlipidemia LDL goal < 100      Hypertension goal BP (blood pressure) < 140/90      Malignant neoplasm of other specified sites of cervix      Old myocardial infarction      Osteopenia      Peripheral vascular disease, unspecified      Type 2 diabetes, HbA1c goal < 7% (H)        Past Surgical History:   Procedure Laterality Date     BIOPSY      Sample taken from back     CARDIAC SURGERY      Stents     COLONOSCOPY  1996    Results were ok     HYSTERECTOMY, PAP NO LONGER INDICATED  1989    ovaries only, no cervix per pt     SURGICAL HISTORY OF -   1995    bunionectomy     SURGICAL HISTORY OF -   10/10/03    cardiac stenting     SURGICAL HISTORY OF -       stent placed in both of her legs for pad     VASCULAR SURGERY      PAD       Family History   Problem Relation Age of Onset     C.A.D. Mother      Breast Cancer Mother      C.A.D. Father      Diabetes Father      Hypertension Father      C.A.D. Maternal Grandfather      C.A.D. Paternal Grandfather      Neurologic Disorder Brother      epilepsy       Social  History     Social History     Marital status: Single     Spouse name: N/A     Number of children: 0     Years of education: 12     Occupational History     telecom assoc      [employer]     Social History Main Topics     Smoking status: Former Smoker     Packs/day: 0.50     Years: 40.00     Types: Cigarettes     Quit date: 4/25/2018     Smokeless tobacco: Never Used     Alcohol use Yes      Comment: 2 to 4 beers or mixed drinks weekly     Drug use: No     Sexual activity: No     Other Topics Concern     Parent/Sibling W/ Cabg, Mi Or Angioplasty Before 65f 55m? No     Social History Narrative    12/22/09    Balanced Diet - No    Osteoporosis Prevention Measures - Dairy servings per day: 0-1 and Medication/Supplements (See current meds)    Regular Exercise -  No Describe None    Dental Exam - YES - Date: 12/2009    Eye Exam - YES - Date: 2 years ago    Self Breast Exam - Yes, on a monthly basis    Abuse: Current or Past (Physical, Sexual or Emotional)- No    Do you feel safe in your environment - Yes    Guns stored in the home - No    Sunscreen used - Yes    Seatbelts used - Yes    Lipids -  YES - Date: 10/23/08    Glucose -  YES - Date: 10/23/08    Colon Cancer Screening - Colonoscopy 7/27/06(date completed)    Hemoccults - YES - Date: 7/11/06    Pap Test -  YES - Date: 10/23/08, Pt has Hx of abnormal paps.    Do you have any concerns about STD's -  No    Mammography - YES - Date: 10/23/08    DEXA - YES - Date: 11/29/07    Immunizations reviewed and up to date - Yes, Tdap given 10/18/07    Pihllip Loo MA                         Allergies   Allergen Reactions     Morphine Nausea and Vomiting     Penicillins Nausea and Vomiting         Current Outpatient Prescriptions:      albuterol (PROAIR HFA/PROVENTIL HFA/VENTOLIN HFA) 108 (90 Base) MCG/ACT Inhaler, Inhale 2 puffs into the lungs every 6 hours as needed for shortness of breath / dyspnea or wheezing, Disp: 1 Inhaler, Rfl: 3     ASPIRIN 325 MG OR  "TBEC, 1 tab po QD (Once per day), Disp: 100, Rfl: 3     BREO ELLIPTA 100-25 MCG/INH inhaler, TAKE 1 PUFF BY MOUTH EVERY DAY, Disp: 60 Inhaler, Rfl: 1     Calcium Carbonate-Vit D-Min (CALCIUM 1200 PO), Take 1,200 mg by mouth daily, Disp: , Rfl:      Cholecalciferol (VITAMIN D) 1000 UNIT capsule, Take 1 capsule by mouth daily. Take one tablet daily, Disp: , Rfl:      clopidogrel (PLAVIX) 75 MG tablet, Take 1 tablet (75 mg) by mouth daily, Disp: 90 tablet, Rfl: 3     diltiazem (CARDIZEM CD) 240 MG 24 hr capsule, Take 1 capsule (240 mg) by mouth daily, Disp: 90 capsule, Rfl: 3     metoprolol succinate (TOPROL-XL) 100 MG 24 hr tablet, Take 1.5 tablets (150 mg) by mouth daily (Patient taking differently: Take 100 mg by mouth daily ), Disp: 90 tablet, Rfl: 3     order for DME, Equipment being ordered: Nebulizer, Disp: 1 Device, Rfl: 0     order for DME, Equipment being ordered: Nebulizer with tubing and pipe, Disp: 1 Device, Rfl: 0     Respiratory Therapy Supplies (AEROBIKA) JESSICA, 1 applicator 2 times daily, Disp: 2 each, Rfl: 0     rosuvastatin (CRESTOR) 40 MG tablet, Take 1 tablet (40 mg) by mouth daily, Disp: 90 tablet, Rfl: 3     tiotropium (SPIRIVA HANDIHALER) 18 MCG capsule, Inhale 1 capsule (18 mcg) into the lungs daily, Disp: 90 capsule, Rfl: 3     varenicline (CHANTIX) 0.5 MG tablet, Take 1 tablet (0.5 mg) by mouth daily (Patient not taking: Reported on 9/20/2018), Disp: 30 tablet, Rfl: 3     [DISCONTINUED] albuterol (2.5 MG/3ML) 0.083% neb solution, Take 1 vial (2.5 mg) by nebulization every 6 hours as needed for shortness of breath / dyspnea or wheezing (Patient not taking: Reported on 6/21/2018), Disp: 25 vial, Rfl: 1      Physical Exam:  /81  Pulse 78  Resp 17  Ht 1.575 m (5' 2\")  Wt 90.7 kg (200 lb)  SpO2 98%  BMI 36.58 kg/m2  GENERAL: Well developed, well nourished, alert, and in no apparent distress.  HEENT: Normocephalic, atraumatic. PERRL, EOMI. Oral mucosa is moist. No perioral " cyanosis.  NECK: supple, no masses, no thyromegaly.  RESP:  Normal respiratory effort. CTAB without rales, wheezes or rhonchi.  No cyanosis or clubbing.  CV: Normal S1, S2, regular rhythm, normal rate. No murmur.  No LE edema.   ABDOMEN:  Soft, non-tender, non-distended.   SKIN: warm and dry. No rash.  NEURO: AAOx3.  Normal gait.  No focal neuro deficits.  PSYCH: mentation appears normal. and affect normal/bright    Results:  PFTs: Ratio 58%, FVC 89%, FEV1 66%, DLCO 69%.  Study demonstrates a moderate obstructive ventilatory defect with very mild impairment in gas exchange.  When compared to previous pulmonary function testing there has been a slight improvement in both FEV1 and DLCO.  Imaging (personally reviewed in clinic today):  CT pending read by radiology.      Assessment and Plan:   Porsche Manning is a 68 year old female with a history of COPD who presents to pulmonary clinic today for follow up of COPD.  Since we last visited she has done well and denies any new or worsening respiratory symptoms or problems with recurrent flares of COPD.  At this time, I think it would be reasonable to continue her on her current regimen which includes Spiriva, Brio elliptical, and albuterol as needed.  I congratulated her on her smoking cessation and encouraged her to remain abstinent from all tobacco products.  She remains a candidate for pulmonary rehab but would like to try to increase frequency of exercise herself rather than going to rehab right now.  In regards to her previous abnormal CT with 6 mm pulmonary nodule and findings concerning for respiratory bronchiolitis, we will await formal read by radiology.  If imaging is stable to improved, we can continue surveillance imaging per Fleischner criteria.  If nodule is growing, changing, then we will need to discuss the possibility of biopsy for further evaluation.  The above findings and plan were discussed with Ms. Manning at length. Questions and concerns were  answered to her satisfaction.  She was provided with my contact information should new questions or concerns arise in the interim.  she should return to clinic in 9 months for follow up; sooner if warranted based on CT findings.  She is up to date on prevnar (2015) and pneumovax (2017).  Recommend seasonal flu vaccine.    Yanique Lorenzo MD  Pulmonary and Critical Care Medicine    The above note was dictated using voice recognition software and may include typographical errors. Please contact the author for any clarifications.  ### Addendum  CT Chest results  1. Continued improvement of upper lobe predominant tree-in-bud  nodularity with subtle residual compared to 6/11/2018, likely  representing improving infectious/inflammatory etiology.  2. Scattered mosaic attenuation which can be seen with chronic small vessel/small airway disease.  3. Small pulmonary nodules including 9 mm pleural-based left lower lobe nodule, stable from 5/9/2018. Continued follow-up in 15-21 months per Fleischner Society criteria to document two-year stability.  4. Heavy coronary artery calcifications.  5. Layering biliary sludge and punctate nonobstructing left renal  stone.  6. Hypodensity in the dome of the liver, consider MR in further  evaluation.    Imaging discussed with radiology.  The 9 mm pleural-based LLL pulmonary nodule is stable when compared to imaging from August and June, even though it was reported out as 6 mm on 8/21 scan.  Plan to repeat CT chest in 1 year (Fall 2019) to ensure stability.    Patient notified of results.  Will make PCP aware for hypodensity in the liver for possible further evaluation.            Again, thank you for allowing me to participate in the care of your patient.      Sincerely,    Lori Lorenzo MD

## 2018-09-20 NOTE — MR AVS SNAPSHOT
After Visit Summary   9/20/2018    Porsche Manning    MRN: [id number]           Patient Information     Date Of Birth          1950        Visit Information        Provider Department      9/20/2018 1:00 PM Lori Lorenzo MD [facility]        Today's Diagnoses     COPD (chronic obstructive pulmonary disease) with chronic bronchitis (H)    -  1    Pulmonary nodules           Follow-ups after your visit        Follow-up notes from your care team     Return in about 9 months (around 6/20/2019).      Your next 10 appointments already scheduled     Jan 29, 2019  9:00 AM CST   (Arrive by 8:45 AM)   Return Visit with Brayan Uriostegui MD   [facility] ([facility])    [address] [place] [address]  [address] [place] [id number] [phone]            Jun 27, 2019 11:00 AM CDT   (Arrive by 10:45 AM)   Return Visit with Lori Lorenzo MD   [facility] ([facility])    [address] [place] [address]  [address] [place] [id number] [phone]              Who to contact     If you have questions or need follow up information about today's clinic visit or your schedule please contact [facility] directly at [phone].  Normal or non-critical lab and imaging results will be communicated to you by MyChart, letter or phone within 4 business days after the clinic has received the results. If you do not hear from us within 7 days, please contact the clinic through MyChart or phone. If you have a critical or abnormal lab result, we will notify you by phone as soon as possible.  Submit refill requests through [facility] or call your pharmacy and they will forward the refill request to us. Please allow 3 business days for your refill to be completed.          Additional Information About Your Visit        [employer] Information     [facility] gives you secure  "access to your electronic health record. If you see a primary care provider, you can also send messages to your care team and make appointments. If you have questions, please call your primary care clinic.  If you do not have a primary care provider, please call [phone] and they will assist you.        Care EveryWhere ID     This is your Care EveryWhere ID. This could be used by other organizations to access your [facility] medical records  [id number]        Your Vitals Were     Pulse Respirations Height Pulse Oximetry BMI (Body Mass Index)       78 17 1.575 m (5' 2\") 98% 36.58 kg/m2        Blood Pressure from Last 3 Encounters:   09/20/18 175/81   06/21/18 179/81   05/17/18 128/60    Weight from Last 3 Encounters:   09/20/18 90.7 kg (200 lb)   06/21/18 89.4 kg (197 lb)   05/17/18 85.3 kg (188 lb)              Today, you had the following     No orders found for display         Today's Medication Changes          These changes are accurate as of 9/20/18  3:26 PM.  If you have any questions, ask your nurse or doctor.               These medicines have changed or have updated prescriptions.        Dose/Directions    albuterol 108 (90 Base) MCG/ACT inhaler   Commonly known as:  PROAIR HFA/PROVENTIL HFA/VENTOLIN HFA   This may have changed:  Another medication with the same name was removed. Continue taking this medication, and follow the directions you see here.   Used for:  Chronic obstructive pulmonary disease, unspecified COPD type (H)   Changed by:  Lori Lorenzo MD        Dose:  2 puff   Inhale 2 puffs into the lungs every 6 hours as needed for shortness of breath / dyspnea or wheezing   Quantity:  1 Inhaler   Refills:  3       metoprolol succinate 100 MG 24 hr tablet   Commonly known as:  TOPROL-XL   This may have changed:  how much to take   Used for:  Hypertension goal BP (blood pressure) < 140/90, Coronary artery disease due to lipid rich plaque        Dose:  150 mg   Take 1.5 tablets (150 mg) by " "mouth daily   Quantity:  90 tablet   Refills:  3         Stop taking these medicines if you haven't already. Please contact your care team if you have questions.     doxycycline 100 MG capsule   Commonly known as:  VIBRAMYCIN   Stopped by:  Lori Lorenzo MD           nicotine 14 MG/24HR 24 hr patch   Commonly known as:  NICODERM CQ   Stopped by:  Lori Lorenzo MD                    Primary Care Provider Office Phone # Fax #    Danuta Phil Tan PA-C [phone]       [address] [facility] [address]        Goals        General    Medical (pt-stated)     Notes - Note created  5/14/2018 11:07 AM by Echo Galindo RN    Goal Statement: I will follow the COPD  Plan   Measure of Success: I will increase energy for my daily activities   Supportive Steps to Achieve:   I will rinse my mouth after my......inhaler to keep the lining of my mouth healthy   I will use my air conditioner and rest more on humid days   I will get a flu shot this year   I will schedule my Pneumonia shot   I will write down questions to bring to my next appointment   I will call my clinic if I start to use my rescue inhaler more often due to breathing problems   I will use my nebulizer as directed even when I am feeling well since I know that it helps to prevent future problems   I will use my inhalers as prescribed   I will use my inhalers as directed even when I am feeling well since I know it will prevent future problems   I will rest between activities such as bathing,cooking,cleaning to prevent feeling of \"running out of breath\"  I will continue smoking cessation   I will make sure I have a source of protein at each meal.   This could include meat,eggs,yougurt or cheese to help keep  my energy up.  I will eat..smaller meals a day to help me digest food better and maintain my weight   I will call my clinic with the first signs of a respiratory infection such as fever,chills or increased cough   I " "will finish my antibiotic even though I a feeling better   I will drink 6-8 ...glasses of fluids per day to help keep my secretions liquid   I will share my action plan with my family members or friend so they can help me recognize early problems also   I will put my action plan in an easy to see are and review routinely to make sure I am staying in the green zone     Barriers: Deconditioned/weak   Strengths: Patient has quit smoking for 3 weeks   Date to Achieve By: Ongoing          Lifestyle    Quit smoking / using tobacco     Notes - Note created  5/14/2018 11:04 AM by Echo Galindo, RN    Goal Statement: I will continue my smoking cessation   Measure of Success: More energy and able to take deeper breaths  Supportive Steps to Achieve:   I will use Chantix as directed and Nicoderm patch as directed   Barriers:Statement \"I think of my self as always smoking\"   Strengths: Has not smoked for 3 weeks   Date to Achieve By: To be determined         Equal Access to Services     RONAN NIELSEN : Hadjayme Martinez, waaxda lustephanie, qaybta kaalmada akua, lien ramsay . So [facility] [phone].    ATENCIÓN: Si habla español, tiene a antunez disposición servicios gratuitos de asistencia lingüística. Llame al [phone].    We comply with applicable federal civil rights laws and [place] laws. We do not discriminate on the basis of race, color, national origin, age, disability, sex, sexual orientation, or gender identity.            Thank you!     Thank you for choosing [facility]  for your care. Our goal is always to provide you with excellent care. Hearing back from our patients is one way we can continue to improve our services. Please take a few minutes to complete the written survey that you may receive in the mail after your visit with us. Thank you!             Your Updated Medication List - Protect others around you: Learn how to safely use, store " and throw away your medicines at www.disposemymeds.org.          This list is accurate as of 9/20/18  3:26 PM.  Always use your most recent med list.                   Brand Name Dispense Instructions for use Diagnosis    AEROBIKA Cindi     2 each    1 applicator 2 times daily    COPD exacerbation (H)       albuterol 108 (90 Base) MCG/ACT inhaler    PROAIR HFA/PROVENTIL HFA/VENTOLIN HFA    1 Inhaler    Inhale 2 puffs into the lungs every 6 hours as needed for shortness of breath / dyspnea or wheezing    Chronic obstructive pulmonary disease, unspecified COPD type (H)       aspirin 325 MG EC tablet     100    1 tab po QD (Once per day)    Other specified pre-operative examination       BREO ELLIPTA 100-25 MCG/INH inhaler   Generic drug:  fluticasone-vilanterol     60 Inhaler    TAKE 1 PUFF BY MOUTH EVERY DAY    COPD exacerbation (H)       CALCIUM 1200 PO      Take 1,200 mg by mouth daily        clopidogrel 75 MG tablet    PLAVIX    90 tablet    Take 1 tablet (75 mg) by mouth daily    Peripheral vascular disease, unspecified       diltiazem 240 MG 24 hr capsule    CARDIZEM CD    90 capsule    Take 1 capsule (240 mg) by mouth daily    Old myocardial infarction       metoprolol succinate 100 MG 24 hr tablet    TOPROL-XL    90 tablet    Take 1.5 tablets (150 mg) by mouth daily    Hypertension goal BP (blood pressure) < 140/90, Coronary artery disease due to lipid rich plaque       order for DME     1 Device    Equipment being ordered: Nebulizer    Chronic obstructive pulmonary disease, unspecified COPD type (H)       order for DME     1 Device    Equipment being ordered: Nebulizer with tubing and pipe    Chronic obstructive pulmonary disease, unspecified COPD type (H)       rosuvastatin 40 MG tablet    CRESTOR    90 tablet    Take 1 tablet (40 mg) by mouth daily    Hyperlipidemia LDL goal <70       tiotropium 18 MCG capsule    SPIRIVA HANDIHALER    90 capsule    Inhale 1 capsule (18 mcg) into the lungs daily    Chronic  obstructive pulmonary disease, unspecified COPD type (H)       varenicline 0.5 MG tablet    CHANTIX    30 tablet    Take 1 tablet (0.5 mg) by mouth daily    COPD (chronic obstructive pulmonary disease) with chronic bronchitis (H)       vitamin D 1000 units capsule      Take 1 capsule by mouth daily. Take one tablet daily

## 2018-09-20 NOTE — NURSING NOTE
Chief Complaint   Patient presents with     RECHECK     3 month follow up     Praveen Camacho CMA

## 2018-09-20 NOTE — PROGRESS NOTES
Pulmonary Clinic Return Visit    History of Present Illness  Ms. Manning is a 68-year-old female with a history of tobacco abuse and COPD who presents to pulmonary clinic today for follow up of COPD and pulmonary nodule.  To briefly review, she was hospitalized in May with AECOPD.  She was treated with a course of prednisone and doxycycline and discharged on Chantix for smoking cessation as well as 3 Breo-Ellipta, Spiriva, and albuterol as needed.  CT scan obtained during this admission showed centrilobular nodules and tree in bud opacities in an upper lobe predominant fashion consistent with multifocal pneumonia.  There is also mention of mild perilymphatic interstitial thickening and a 9 mm pulmonary nodule.  At our follow up visit 3 months ago she was doing relatively well.  Smoking cessation was re-enforced and a surveillance CT was obtained.  It showed upper lobe findings concerning for respiratory bronchiolitis as well as a 6 mm subpleural LLL pulmonary nodule.  Imaging was reviewed with IP service who recommended short term interval surveillance.    Ms. Manning returns to clinic today doing well.  She has quit smoking altogether.  She reports May 1 is her quit date although says she has had a small handful of cigarettes since then.  Over the last month or 2 though she has not smoked at all.  She denies any new or worsening shortness of breath, cough, or sputum production since we last visited.  At present she is able to walk about half a block before she would need to stop and rest.  Going back 6 months ago she was not able to walk any further than that.  She attributes this to being out of shape and admits that she needs to be more mindful of maintaining high level physical activity.  She denies any hospitalizations, flares of COPD, or episodes of pneumonia since we last visited.  She has an albuterol inhaler available for as needed use but has not needed to use this.  She further denies fevers, chills,  hemoptysis, or significant fluctuations in weight.     She has smoked 2 packs per day for approximately 40 years although notes that she has quit off and on in between.  With the assistance of Chantix, she quit all together in summer 2018.        Review of Systems:  10 of 14 systems reviewed and are negative unless otherwise stated in HPI.    Past Medical History:   Diagnosis Date     Brachial neuritis or radiculitis NOS      Chronic obstructive pulmonary disease, unspecified COPD type (H) 3/8/2016     Coronary artery disease     Doing fine     H/O tobacco use, presenting hazards to health      Hyperlipidemia LDL goal < 100      Hypertension goal BP (blood pressure) < 140/90      Malignant neoplasm of other specified sites of cervix      Old myocardial infarction      Osteopenia      Peripheral vascular disease, unspecified      Type 2 diabetes, HbA1c goal < 7% (H)        Past Surgical History:   Procedure Laterality Date     BIOPSY      Sample taken from back     CARDIAC SURGERY      Stents     COLONOSCOPY  1996    Results were ok     HYSTERECTOMY, PAP NO LONGER INDICATED  1989    ovaries only, no cervix per pt     SURGICAL HISTORY OF -   1995    bunionectomy     SURGICAL HISTORY OF -   10/10/03    cardiac stenting     SURGICAL HISTORY OF -       stent placed in both of her legs for pad     VASCULAR SURGERY      PAD       Family History   Problem Relation Age of Onset     C.A.D. Mother      Breast Cancer Mother      C.A.D. Father      Diabetes Father      Hypertension Father      C.A.D. Maternal Grandfather      C.A.D. Paternal Grandfather      Neurologic Disorder Brother      epilepsy       Social History     Social History     Marital status: Single     Spouse name: N/A     Number of children: 0     Years of education: 12     Occupational History     [employer]     Social History Main Topics     Smoking status: Former Smoker     Packs/day: 0.50     Years: 40.00     Types:  Cigarettes     Quit date: 4/25/2018     Smokeless tobacco: Never Used     Alcohol use Yes      Comment: 2 to 4 beers or mixed drinks weekly     Drug use: No     Sexual activity: No     Other Topics Concern     Parent/Sibling W/ Cabg, Mi Or Angioplasty Before 65f 55m? No     Social History Narrative    12/22/09    Balanced Diet - No    Osteoporosis Prevention Measures - Dairy servings per day: 0-1 and Medication/Supplements (See current meds)    Regular Exercise -  No Describe None    Dental Exam - YES - Date: 12/2009    Eye Exam - YES - Date: 2 years ago    Self Breast Exam - Yes, on a monthly basis    Abuse: Current or Past (Physical, Sexual or Emotional)- No    Do you feel safe in your environment - Yes    Guns stored in the home - No    Sunscreen used - Yes    Seatbelts used - Yes    Lipids -  YES - Date: 10/23/08    Glucose -  YES - Date: 10/23/08    Colon Cancer Screening - Colonoscopy 7/27/06(date completed)    Hemoccults - YES - Date: 7/11/06    Pap Test -  YES - Date: 10/23/08, Pt has Hx of abnormal paps.    Do you have any concerns about STD's -  No    Mammography - YES - Date: 10/23/08    DEXA - YES - Date: 11/29/07    Immunizations reviewed and up to date - Yes, Tdap given 10/18/07    Phillip Loo MA                         Allergies   Allergen Reactions     Morphine Nausea and Vomiting     Penicillins Nausea and Vomiting         Current Outpatient Prescriptions:      albuterol (PROAIR HFA/PROVENTIL HFA/VENTOLIN HFA) 108 (90 Base) MCG/ACT Inhaler, Inhale 2 puffs into the lungs every 6 hours as needed for shortness of breath / dyspnea or wheezing, Disp: 1 Inhaler, Rfl: 3     ASPIRIN 325 MG OR TBEC, 1 tab po QD (Once per day), Disp: 100, Rfl: 3     BREO ELLIPTA 100-25 MCG/INH inhaler, TAKE 1 PUFF BY MOUTH EVERY DAY, Disp: 60 Inhaler, Rfl: 1     Calcium Carbonate-Vit D-Min (CALCIUM 1200 PO), Take 1,200 mg by mouth daily, Disp: , Rfl:      Cholecalciferol (VITAMIN D) 1000 UNIT capsule, Take 1 capsule by  "mouth daily. Take one tablet daily, Disp: , Rfl:      clopidogrel (PLAVIX) 75 MG tablet, Take 1 tablet (75 mg) by mouth daily, Disp: 90 tablet, Rfl: 3     diltiazem (CARDIZEM CD) 240 MG 24 hr capsule, Take 1 capsule (240 mg) by mouth daily, Disp: 90 capsule, Rfl: 3     metoprolol succinate (TOPROL-XL) 100 MG 24 hr tablet, Take 1.5 tablets (150 mg) by mouth daily (Patient taking differently: Take 100 mg by mouth daily ), Disp: 90 tablet, Rfl: 3     order for DME, Equipment being ordered: Nebulizer, Disp: 1 Device, Rfl: 0     order for DME, Equipment being ordered: Nebulizer with tubing and pipe, Disp: 1 Device, Rfl: 0     Respiratory Therapy Supplies ([employer]) JESSICA, 1 applicator 2 times daily, Disp: 2 each, Rfl: 0     rosuvastatin (CRESTOR) 40 MG tablet, Take 1 tablet (40 mg) by mouth daily, Disp: 90 tablet, Rfl: 3     tiotropium (SPIRIVA HANDIHALER) 18 MCG capsule, Inhale 1 capsule (18 mcg) into the lungs daily, Disp: 90 capsule, Rfl: 3     varenicline (CHANTIX) 0.5 MG tablet, Take 1 tablet (0.5 mg) by mouth daily (Patient not taking: Reported on 9/20/2018), Disp: 30 tablet, Rfl: 3     [DISCONTINUED] albuterol (2.5 MG/3ML) 0.083% neb solution, Take 1 vial (2.5 mg) by nebulization every 6 hours as needed for shortness of breath / dyspnea or wheezing (Patient not taking: Reported on 6/21/2018), Disp: 25 vial, Rfl: 1      Physical Exam:  /81  Pulse 78  Resp 17  Ht 1.575 m (5' 2\")  Wt 90.7 kg (200 lb)  SpO2 98%  BMI 36.58 kg/m2  GENERAL: Well developed, well nourished, alert, and in no apparent distress.  HEENT: Normocephalic, atraumatic. PERRL, EOMI. Oral mucosa is moist. No perioral cyanosis.  NECK: supple, no masses, no thyromegaly.  RESP:  Normal respiratory effort. CTAB without rales, wheezes or rhonchi.  No cyanosis or clubbing.  CV: Normal S1, S2, regular rhythm, normal rate. No murmur.  No LE edema.   ABDOMEN:  Soft, non-tender, non-distended.   SKIN: warm and dry. No rash.  NEURO: AAOx3.  Normal " gait.  No focal neuro deficits.  PSYCH: mentation appears normal. and affect normal/bright    Results:  PFTs: Ratio 58%, FVC 89%, FEV1 66%, DLCO 69%.  Study demonstrates a moderate obstructive ventilatory defect with very mild impairment in gas exchange.  When compared to previous pulmonary function testing there has been a slight improvement in both FEV1 and DLCO.  Imaging (personally reviewed in clinic today):  CT pending read by radiology.      Assessment and Plan:   Porsche Manning is a 68 year old female with a history of COPD who presents to pulmonary clinic today for follow up of COPD.  Since we last visited she has done well and denies any new or worsening respiratory symptoms or problems with recurrent flares of COPD.  At this time, I think it would be reasonable to continue her on her current regimen which includes Spiriva, Brio elliptical, and albuterol as needed.  I congratulated her on her smoking cessation and encouraged her to remain abstinent from all tobacco products.  She remains a candidate for pulmonary rehab but would like to try to increase frequency of exercise herself rather than going to rehab right now.  In regards to her previous abnormal CT with 6 mm pulmonary nodule and findings concerning for respiratory bronchiolitis, we will await formal read by radiology.  If imaging is stable to improved, we can continue surveillance imaging per Fleischner criteria.  If nodule is growing, changing, then we will need to discuss the possibility of biopsy for further evaluation.  The above findings and plan were discussed with Ms. Manning at length. Questions and concerns were answered to her satisfaction.  She was provided with my contact information should new questions or concerns arise in the interim.  she should return to clinic in 9 months for follow up; sooner if warranted based on CT findings.  She is up to date on prevnar (2015) and pneumovax (2017).  Recommend seasonal flu  vaccine.    Yanique Lorenzo MD  Pulmonary and Critical Care Medicine    The above note was dictated using voice recognition software and may include typographical errors. Please contact the author for any clarifications.  ### Addendum  CT Chest results  1. Continued improvement of upper lobe predominant tree-in-bud  nodularity with subtle residual compared to 6/11/2018, likely  representing improving infectious/inflammatory etiology.  2. Scattered mosaic attenuation which can be seen with chronic small vessel/small airway disease.  3. Small pulmonary nodules including 9 mm pleural-based left lower lobe nodule, stable from 5/9/2018. Continued follow-up in 15-21 months per Fleischner Society criteria to document two-year stability.  4. Heavy coronary artery calcifications.  5. Layering biliary sludge and punctate nonobstructing left renal  stone.  6. Hypodensity in the dome of the liver, consider MR in further  evaluation.    Imaging discussed with radiology.  The 9 mm pleural-based LLL pulmonary nodule is stable when compared to imaging from August and June, even though it was reported out as 6 mm on 8/21 scan.  Plan to repeat CT chest in 1 year (Fall 2019) to ensure stability.    Patient notified of results.  Will make PCP aware for hypodensity in the liver for possible further evaluation.

## 2018-09-20 NOTE — PROGRESS NOTES
"Daisy Morrell  I believe pulmonology has already discussed your reassuring lung results, but the CT scan also mentions an unclear liver spot that needs to be addressed further.  I would like you to schedule a MR of your liver at your convenience to help us look at this further.  You can call radiology to make appointment: [phone]    Please contact the office with any questions or concerns.    Danuta Martinez \"Eduard\" PHILL Tan  "

## 2018-09-21 ENCOUNTER — TELEPHONE (OUTPATIENT)
Dept: FAMILY MEDICINE | Facility: CLINIC | Age: 68
End: 2018-09-21

## 2018-09-21 NOTE — TELEPHONE ENCOUNTER
Called patient.  She has read the message from Eduard and has the scheduling number to schedule MR of the liver.  States she will call to schedule.  Saunrda Gomez RN

## 2018-09-21 NOTE — TELEPHONE ENCOUNTER
"----- Message from Danuta Tan PA-C sent at 9/20/2018  4:32 PM CDT -----  Sent patient a [employer] message regarding scheduling MR of Liver, but can you also please call patient to confirm [employer] message was read and/or help her schedule if needed.  Thanks  Danuta \"Eduard\" PHILL Tan      ----- Message -----     From: Lori Lorenzo MD     Sent: 9/20/2018   3:23 PM       To: Danuta Tan PA-C    Hi,    I saw this mutual patient in pulmonary clinic today for f/u of her COPD and pulmonary nodules.  She is doing well and pulmonary nodules are stable.  I wanted to make you aware that CT chest did  a hypodensity in her liver of uncertain etiology.  It appears this was also seen on CT from June.  I'm not sure if you've evaluated this further already or not but wanted to make sure it the finding didn't fall through the cracks.  Let me know if questions.    Thanks.    Yanique Lorenzo (pulm)    "

## 2018-10-04 ENCOUNTER — PATIENT OUTREACH (OUTPATIENT)
Dept: CARE COORDINATION | Facility: CLINIC | Age: 68
End: 2018-10-04

## 2018-10-04 DIAGNOSIS — J44.89 COPD (CHRONIC OBSTRUCTIVE PULMONARY DISEASE) WITH CHRONIC BRONCHITIS (H): Primary | ICD-10-CM

## 2018-10-04 NOTE — PROGRESS NOTES
Clinic Care Coordination Contact    Clinic Care Coordination Contact  OUTREACH    Referral Information:  Referral Source: IP Handoff         Chief Complaint   Patient presents with     Clinic Care Coordination - Follow-up     Clinic Care Coordination RN         Universal Utilization: [facility] admission 5/9-5/11/2018  Acute Hypoxic respiratory failure due to COPD exacerbation and pneumonia   Hyperglycemia due to Diabetes and steroids  MICHELLE   CKD  Hypertension   CAD  Pulmonary and Adrenal nodule  Clinic Utilization  Difficulty keeping appointments:: No  Compliance Concerns: No  No-Show Concerns: No  No PCP office visit in Past Year: No  Utilization    Last refreshed: 9/25/2018 10:03 AM:  No Show Count (past year) 0       Last refreshed: 9/25/2018 10:03 AM:  ED visits 0       Last refreshed: 9/25/2018 10:03 AM:  Hospital admissions 1          Current as of: 9/25/2018 10:03 AM             Clinical Concerns:  Current Medical Concerns:  Patient reports there was a spot on her lung and the repeat scan reported it had resolved .  Now she just found out she has a spot on her liver  And has a follow up MRI Monday .  Patient stopped Chanitix because she was told a side effect is loss of hair which she was experiencing   Patient started smoking again 5 cigarettes a day.  Patient is residing her house and [place] and very stressed.  Patient will continue to cut down on her smoking       Pain  Pain (GOAL):: No  Health Maintenance Reviewed: Not assessed  Clinical Pathway: Clinic Care Coordination COPD Assessment      Symptom Review:    How have you been feeling now that you are home? Good  Are you having any increased shortness of breath? No       Do you have a COPD Action Plan? Yes    Is the COPD Action Plan on refrigerator or available: Yes  What number would you call if you were in the YELLOW zones:  [phone]    Medications:    Were you started on any new medications?   No    Oxygen/DME:    Are you currently on oxygen?  No     Activity:    How much activity can you do before you are SOB? Back to normal activity   Have you had to reduce your activities because of dyspnea or other symptoms? No    Diet:      Emotions/Lifestyle:    Do you smoke?  reports that she quit smoking about 5 months ago. Her smoking use included Cigarettes. She has a 20.00 pack-year smoking history. She has never used smokeless tobacco.  Would you like to try to quit smoking? Started smoking 5 cigarettes a day  Patient will continue to try to cut down       Medication Management:  Chantix taken off medication list     Functional Status:  Dependent ADLs:: Ambulation-no assistive device  Bed or wheelchair confined:: No  Mobility Status: Independent    Living Situation:  Current living arrangement:: I live alone, I live in a private home    Diet/Exercise/Sleep:  Inadequate activity/exercise (GOAL):: No  Significant changes in sleep pattern (GOAL): No    Transportation:  Transportation concerns (GOAL):: No  Transportation means:: Accessible car     Psychosocial:  Mental health DX:: No  Mental health management concern (GOAL):: No  Informal Support system:: Friends       Equipment Currently Used at Home: none    Advance Care Plan/Directive  Advanced Care Plans/Directives on file:: No          Goals:   Goals        General    Medical (pt-stated)     Notes - Note created  5/14/2018 11:07 AM by Echo Galindo RN    Goal Statement: I will follow the COPD  Plan   Measure of Success: I will increase energy for my daily activities   Supportive Steps to Achieve:   I will rinse my mouth after my......inhaler to keep the lining of my mouth healthy   I will use my air conditioner and rest more on humid days   I will get a flu shot this year   I will schedule my Pneumonia shot   I will write down questions to bring to my next appointment   I will call my clinic if I start to use my rescue inhaler more often due to breathing  "problems   I will use my nebulizer as directed even when I am feeling well since I know that it helps to prevent future problems   I will use my inhalers as prescribed   I will use my inhalers as directed even when I am feeling well since I know it will prevent future problems   I will rest between activities such as bathing,cooking,cleaning to prevent feeling of \"running out of breath\"  I will continue smoking cessation   I will make sure I have a source of protein at each meal.   This could include meat,eggs,yougurt or cheese to help keep  my energy up.  I will eat..smaller meals a day to help me digest food better and maintain my weight   I will call my clinic with the first signs of a respiratory infection such as fever,chills or increased cough   I will finish my antibiotic even though I a feeling better   I will drink 6-8 ...glasses of fluids per day to help keep my secretions liquid   I will share my action plan with my family members or friend so they can help me recognize early problems also   I will put my action plan in an easy to see are and review routinely to make sure I am staying in the green zone     Barriers: Deconditioned/weak   Strengths: Patient has quit smoking for 3 weeks   Date to Achieve By: Ongoing          Lifestyle    Quit smoking / using tobacco     Notes - Note created  5/14/2018 11:04 AM by Echo Galindo RN    Goal Statement: I will continue my smoking cessation   Measure of Success: More energy and able to take deeper breaths  Supportive Steps to Achieve:   I will use Chantix as directed and Nicoderm patch as directed   Barriers:Statement \"I think of my self as always smoking\"   Strengths: Has not smoked for 3 weeks   Date to Achieve By: To be determined                  Future Appointments              In 4 days [facility] [place], [facility]    In 3 months Brayan Uriostegui MD [facility], [facility]    In 8 months Lori Lorenzo MD [facility], [facility]          Plan: CC will follow up in 1 month     Echo Galindo RN / Clinical Care Coordinator     [facility]   [email or web address]  Office :  [phone] / Fax :  [phone]

## 2018-10-07 DIAGNOSIS — I73.9 PERIPHERAL VASCULAR DISEASE, UNSPECIFIED (H): ICD-10-CM

## 2018-10-07 DIAGNOSIS — I25.2 OLD MYOCARDIAL INFARCTION: ICD-10-CM

## 2018-10-08 ENCOUNTER — HOSPITAL ENCOUNTER (OUTPATIENT)
Dept: MRI IMAGING | Facility: CLINIC | Age: 68
Discharge: HOME | End: 2018-10-08
Attending: PHYSICIAN ASSISTANT | Admitting: PHYSICIAN ASSISTANT
Payer: COMMERCIAL

## 2018-10-08 DIAGNOSIS — K76.9 DISORDER OF LIVER: ICD-10-CM

## 2018-10-08 LAB — RADIOLOGIST FLAGS: ABNORMAL

## 2018-10-08 PROCEDURE — 25000128 H RX IP 250 OP 636: Performed by: PHYSICIAN ASSISTANT

## 2018-10-08 PROCEDURE — 74183 MRI ABD W/O CNTR FLWD CNTR: CPT

## 2018-10-08 PROCEDURE — 25500064 ZZH RX 255 OP 636: Performed by: PHYSICIAN ASSISTANT

## 2018-10-08 PROCEDURE — A9581 GADOXETATE DISODIUM INJ: HCPCS | Performed by: PHYSICIAN ASSISTANT

## 2018-10-08 RX ORDER — CLOPIDOGREL BISULFATE 75 MG/1
TABLET ORAL
Qty: 90 TABLET | Refills: 3 | Status: SHIPPED | OUTPATIENT
Start: 2018-10-08 | End: 2019-09-03

## 2018-10-08 RX ORDER — DILTIAZEM HYDROCHLORIDE 240 MG/1
CAPSULE, COATED, EXTENDED RELEASE ORAL
Qty: 90 CAPSULE | Refills: 3 | Status: SHIPPED | OUTPATIENT
Start: 2018-10-08 | End: 2019-01-24

## 2018-10-08 RX ADMIN — GADOXETATE DISODIUM 8 ML: 181.43 INJECTION, SOLUTION INTRAVENOUS at 12:21

## 2018-10-08 RX ADMIN — GADOXETATE DISODIUM 10 ML: 181.43 INJECTION, SOLUTION INTRAVENOUS at 12:21

## 2018-10-08 RX ADMIN — SODIUM CHLORIDE 80 ML: 9 INJECTION, SOLUTION INTRAVENOUS at 12:21

## 2018-10-09 ENCOUNTER — TELEPHONE (OUTPATIENT)
Dept: FAMILY MEDICINE | Facility: CLINIC | Age: 68
End: 2018-10-09

## 2018-10-09 DIAGNOSIS — I10 HYPERTENSION GOAL BP (BLOOD PRESSURE) < 140/90: ICD-10-CM

## 2018-10-09 DIAGNOSIS — R93.2 ABNORMAL MRI, LIVER: ICD-10-CM

## 2018-10-09 DIAGNOSIS — N18.30 CKD (CHRONIC KIDNEY DISEASE) STAGE 3, GFR 30-59 ML/MIN (H): Primary | ICD-10-CM

## 2018-10-09 NOTE — TELEPHONE ENCOUNTER
"Please call patient and advise we need to get further imaging of a different part of her liver/aorta.  This needs to be done by a CT scan, which I'll order today.  No rush/urgency, but just to finish the previous work up and confirm nothing concerning is going on.  Thanks  Danuta \"Eduard\" PHILL Tan   "

## 2018-10-17 LAB
DLCOUNC-%PRED-PRE: 69 %
DLCOUNC-PRE: 13.91 ML/MIN/MMHG
DLCOUNC-PRED: 19.98 ML/MIN/MMHG
ERV-%PRED-PRE: 112 %
ERV-PRE: 0.28 L
ERV-PRED: 0.25 L
EXPTIME-PRE: 10.57 SEC
FEF2575-%PRED-PRE: 29 %
FEF2575-PRE: 0.55 L/SEC
FEF2575-PRED: 1.88 L/SEC
FEFMAX-%PRED-PRE: 91 %
FEFMAX-PRE: 5.07 L/SEC
FEFMAX-PRED: 5.53 L/SEC
FEV1-%PRED-PRE: 66 %
FEV1-PRE: 1.41 L
FEV1FEV6-PRE: 62 %
FEV1FEV6-PRED: 80 %
FEV1FVC-PRE: 58 %
FEV1FVC-PRED: 76 %
FEV1SVC-PRE: 53 %
FEV1SVC-PRED: 74 %
FIFMAX-PRE: 5.23 L/SEC
FVC-%PRED-PRE: 89 %
FVC-PRE: 2.45 L
FVC-PRED: 2.73 L
IC-%PRED-PRE: 91 %
IC-PRE: 2.4 L
IC-PRED: 2.61 L
VA-%PRED-PRE: 95 %
VA-PRE: 4.53 L
VC-%PRED-PRE: 93 %
VC-PRE: 2.68 L
VC-PRED: 2.86 L

## 2018-10-24 ENCOUNTER — RADIANT APPOINTMENT (OUTPATIENT)
Dept: CT IMAGING | Facility: CLINIC | Age: 68
End: 2018-10-24
Attending: PHYSICIAN ASSISTANT
Payer: COMMERCIAL

## 2018-10-24 DIAGNOSIS — R93.2 ABNORMAL MRI, LIVER: ICD-10-CM

## 2018-10-24 LAB
CREAT BLD-MCNC: 1 MG/DL (ref 0.52–1.04)
GFR SERPL CREATININE-BSD FRML MDRD: 55 ML/MIN/1.7M2

## 2018-10-24 RX ORDER — IOPAMIDOL 755 MG/ML
100 INJECTION, SOLUTION INTRAVASCULAR ONCE
Status: COMPLETED | OUTPATIENT
Start: 2018-10-24 | End: 2018-10-24

## 2018-10-24 RX ADMIN — IOPAMIDOL 100 ML: 755 INJECTION, SOLUTION INTRAVASCULAR at 10:36

## 2018-10-24 ASSESSMENT — ENCOUNTER SYMPTOMS
PALPITATIONS: 0
MYALGIAS: 0
ABDOMINAL PAIN: 0
NAUSEA: 0
DYSURIA: 0
SORE THROAT: 0
HEMATURIA: 0
EYE PAIN: 0
CHILLS: 0
ARTHRALGIAS: 0
DIZZINESS: 0
CONSTIPATION: 0
JOINT SWELLING: 0
HEARTBURN: 0
HEMATOCHEZIA: 0
DIARRHEA: 0
NERVOUS/ANXIOUS: 0
HEADACHES: 0
WEAKNESS: 0
PARESTHESIAS: 0
FEVER: 0
COUGH: 0
BREAST MASS: 0
SHORTNESS OF BREATH: 0
FREQUENCY: 0

## 2018-10-24 ASSESSMENT — ACTIVITIES OF DAILY LIVING (ADL)
I_NEED_ASSISTANCE_FOR_THE_FOLLOWING_DAILY_ACTIVITIES:: NO ASSISTANCE IS NEEDED
CURRENT_FUNCTION: NO ASSISTANCE NEEDED

## 2018-10-24 NOTE — DISCHARGE INSTRUCTIONS

## 2018-10-25 ENCOUNTER — MYC MEDICAL ADVICE (OUTPATIENT)
Dept: FAMILY MEDICINE | Facility: CLINIC | Age: 68
End: 2018-10-25

## 2018-10-25 ENCOUNTER — OFFICE VISIT (OUTPATIENT)
Dept: FAMILY MEDICINE | Facility: CLINIC | Age: 68
End: 2018-10-25
Payer: COMMERCIAL

## 2018-10-25 VITALS
BODY MASS INDEX: 37.36 KG/M2 | HEART RATE: 86 BPM | OXYGEN SATURATION: 96 % | RESPIRATION RATE: 15 BRPM | DIASTOLIC BLOOD PRESSURE: 68 MMHG | HEIGHT: 62 IN | WEIGHT: 203 LBS | TEMPERATURE: 98.1 F | SYSTOLIC BLOOD PRESSURE: 155 MMHG

## 2018-10-25 DIAGNOSIS — Z23 NEED FOR VACCINATION: ICD-10-CM

## 2018-10-25 DIAGNOSIS — Z00.00 ROUTINE GENERAL MEDICAL EXAMINATION AT A HEALTH CARE FACILITY: Primary | ICD-10-CM

## 2018-10-25 DIAGNOSIS — Z23 NEED FOR PROPHYLACTIC VACCINATION AND INOCULATION AGAINST INFLUENZA: ICD-10-CM

## 2018-10-25 DIAGNOSIS — N18.30 CKD (CHRONIC KIDNEY DISEASE) STAGE 3, GFR 30-59 ML/MIN (H): ICD-10-CM

## 2018-10-25 DIAGNOSIS — E78.5 HYPERLIPIDEMIA LDL GOAL <100: ICD-10-CM

## 2018-10-25 DIAGNOSIS — Z71.6 ENCOUNTER FOR SMOKING CESSATION COUNSELING: ICD-10-CM

## 2018-10-25 DIAGNOSIS — I10 HYPERTENSION GOAL BP (BLOOD PRESSURE) < 140/90: ICD-10-CM

## 2018-10-25 DIAGNOSIS — E66.01 MORBID OBESITY (H): ICD-10-CM

## 2018-10-25 LAB
BASOPHILS # BLD AUTO: 0.1 10E9/L (ref 0–0.2)
BASOPHILS NFR BLD AUTO: 0.7 %
DIFFERENTIAL METHOD BLD: ABNORMAL
EOSINOPHIL # BLD AUTO: 0.2 10E9/L (ref 0–0.7)
EOSINOPHIL NFR BLD AUTO: 2.3 %
ERYTHROCYTE [DISTWIDTH] IN BLOOD BY AUTOMATED COUNT: 15.5 % (ref 10–15)
HCT VFR BLD AUTO: 43.8 % (ref 35–47)
HGB BLD-MCNC: 14.1 G/DL (ref 11.7–15.7)
LYMPHOCYTES # BLD AUTO: 1.5 10E9/L (ref 0.8–5.3)
LYMPHOCYTES NFR BLD AUTO: 16.8 %
MCH RBC QN AUTO: 29.1 PG (ref 26.5–33)
MCHC RBC AUTO-ENTMCNC: 32.2 G/DL (ref 31.5–36.5)
MCV RBC AUTO: 90 FL (ref 78–100)
MONOCYTES # BLD AUTO: 0.8 10E9/L (ref 0–1.3)
MONOCYTES NFR BLD AUTO: 8.8 %
NEUTROPHILS # BLD AUTO: 6.3 10E9/L (ref 1.6–8.3)
NEUTROPHILS NFR BLD AUTO: 71.4 %
PLATELET # BLD AUTO: 253 10E9/L (ref 150–450)
RBC # BLD AUTO: 4.85 10E12/L (ref 3.8–5.2)
WBC # BLD AUTO: 8.9 10E9/L (ref 4–11)

## 2018-10-25 PROCEDURE — 82043 UR ALBUMIN QUANTITATIVE: CPT | Performed by: PHYSICIAN ASSISTANT

## 2018-10-25 PROCEDURE — 80061 LIPID PANEL: CPT | Performed by: PHYSICIAN ASSISTANT

## 2018-10-25 PROCEDURE — 90750 HZV VACC RECOMBINANT IM: CPT | Performed by: PHYSICIAN ASSISTANT

## 2018-10-25 PROCEDURE — 99000 SPECIMEN HANDLING OFFICE-LAB: CPT | Performed by: PHYSICIAN ASSISTANT

## 2018-10-25 PROCEDURE — G0438 PPPS, INITIAL VISIT: HCPCS | Performed by: PHYSICIAN ASSISTANT

## 2018-10-25 PROCEDURE — 90471 IMMUNIZATION ADMIN: CPT | Performed by: PHYSICIAN ASSISTANT

## 2018-10-25 PROCEDURE — 85025 COMPLETE CBC W/AUTO DIFF WBC: CPT | Performed by: PHYSICIAN ASSISTANT

## 2018-10-25 PROCEDURE — 90662 IIV NO PRSV INCREASED AG IM: CPT | Performed by: PHYSICIAN ASSISTANT

## 2018-10-25 PROCEDURE — 36415 COLL VENOUS BLD VENIPUNCTURE: CPT | Performed by: PHYSICIAN ASSISTANT

## 2018-10-25 PROCEDURE — 82088 ASSAY OF ALDOSTERONE: CPT | Mod: 90 | Performed by: PHYSICIAN ASSISTANT

## 2018-10-25 PROCEDURE — 84244 ASSAY OF RENIN: CPT | Mod: 90 | Performed by: PHYSICIAN ASSISTANT

## 2018-10-25 PROCEDURE — G0008 ADMIN INFLUENZA VIRUS VAC: HCPCS | Mod: 59 | Performed by: PHYSICIAN ASSISTANT

## 2018-10-25 PROCEDURE — 80053 COMPREHEN METABOLIC PANEL: CPT | Performed by: PHYSICIAN ASSISTANT

## 2018-10-25 RX ORDER — BUPROPION HYDROCHLORIDE 150 MG/1
150 TABLET, EXTENDED RELEASE ORAL 2 TIMES DAILY
Qty: 180 TABLET | Refills: 3 | Status: SHIPPED | OUTPATIENT
Start: 2018-10-25 | End: 2021-04-22

## 2018-10-25 ASSESSMENT — ENCOUNTER SYMPTOMS
COUGH: 0
HEARTBURN: 0
DIZZINESS: 0
DYSURIA: 0
DIARRHEA: 0
FEVER: 0
SORE THROAT: 0
CHILLS: 0
BREAST MASS: 0
NAUSEA: 0
MYALGIAS: 0
JOINT SWELLING: 0
ARTHRALGIAS: 0
HEMATURIA: 0
FREQUENCY: 0
WEAKNESS: 0
NERVOUS/ANXIOUS: 0
SHORTNESS OF BREATH: 0
HEADACHES: 0
PALPITATIONS: 0
ABDOMINAL PAIN: 0
CONSTIPATION: 0
HEMATOCHEZIA: 0
PARESTHESIAS: 0
EYE PAIN: 0

## 2018-10-25 ASSESSMENT — ACTIVITIES OF DAILY LIVING (ADL): CURRENT_FUNCTION: NO ASSISTANCE NEEDED

## 2018-10-25 NOTE — MR AVS SNAPSHOT
After Visit Summary   10/25/2018    Porsche Manning    MRN: [id number]           Patient Information     Date Of Birth          1950        Visit Information        Provider Department      10/25/2018 11:00 AM Danuta Tan PA-C [facility]        Today's Diagnoses     Routine general medical examination at a health care facility    -  1    Hypertension: MEASURE BP IN RIGHT ARM ONLY        Morbid obesity (H)        Hyperlipidemia LDL goal <100        CKD (chronic kidney disease) stage 3, GFR 30-59 ml/min (H)        Encounter for smoking cessation counseling        Need for prophylactic vaccination and inoculation against influenza        Need for vaccination          Care Instructions      Preventive Health Recommendations  Female Ages 65 +    Yearly exam:     See your health care provider every year in order to  o Review health changes.   o Discuss preventive care.    o Review your medicines if your doctor has prescribed any.      You no longer need a yearly Pap test unless you've had an abnormal Pap test in the past 10 years. If you have vaginal symptoms, such as bleeding or discharge, be sure to talk with your provider about a Pap test.      Every 1 to 2 years, have a mammogram.  If you are over 69, talk with your health care provider about whether or not you want to continue having screening mammograms.      Every 10 years, have a colonoscopy. Or, have a yearly FIT test (stool test). These exams will check for colon cancer.       Have a cholesterol test every 5 years, or more often if your doctor advises it.       Have a diabetes test (fasting glucose) every three years. If you are at risk for diabetes, you should have this test more often.       At age 65, have a bone density scan (DEXA) to check for osteoporosis (brittle bone disease).    Shots:    Get a flu shot each year.    Get a tetanus shot every 10 years.    Talk to your doctor about your pneumonia vaccines.  There are now two you should receive - Pneumovax (PPSV 23) and Prevnar (PCV 13).    Talk to your pharmacist about the shingles vaccine.    Talk to your doctor about the hepatitis B vaccine.    Nutrition:     Eat at least 5 servings of fruits and vegetables each day.      Eat whole-grain bread, whole-wheat pasta and brown rice instead of white grains and rice.      Get adequate about Calcium and Vitamin D.     Lifestyle    Exercise at least 150 minutes a week (30 minutes a day, 5 days a week). This will help you control your weight and prevent disease.      Limit alcohol to one drink per day.      No smoking.       Wear sunscreen to prevent skin cancer.       See your dentist twice a year for an exam and cleaning.      See your eye doctor every 1 to 2 years to screen for conditions such as glaucoma, macular degeneration, cataracts, etc.          Follow-ups after your visit        Follow-up notes from your care team     Return for worsening symptoms, Physical Exam - yearly.      Your next 10 appointments already scheduled     Jan 29, 2019  9:00 AM CST   (Arrive by 8:45 AM)   Return Visit with Brayan Uriostegui MD   [facility] ([facility])    [address] [place] [address]  [address] [place] [id number] [phone]            Jun 27, 2019 11:00 AM CDT   (Arrive by 10:45 AM)   Return Visit with Lori Lorenzo MD   [facility] ([facility])    [address] [place] [address]  [address] [place] [id number] [phone]              Who to contact     If you have questions or need follow up information about today's clinic visit or your schedule please contact [facility] directly at [phone].  Normal or non-critical lab and imaging results will be communicated to you by MyChart, letter or phone within 4 business days after the clinic has received the results. If you do not hear from us within 7 days, please  "contact the clinic through [employer] or phone. If you have a critical or abnormal lab result, we will notify you by phone as soon as possible.  Submit refill requests through [employer] or call your pharmacy and they will forward the refill request to us. Please allow 3 business days for your refill to be completed.          Additional Information About Your Visit        [employer] Information     [employer] gives you secure access to your electronic health record. If you see a primary care provider, you can also send messages to your care team and make appointments. If you have questions, please call your primary care clinic.  If you do not have a primary care provider, please call [phone] and they will assist you.        Care EveryWhere ID     This is your Care EveryWhere ID. This could be used by other organizations to access your [place] medical records  [id number]        Your Vitals Were     Pulse Temperature Respirations Height Pulse Oximetry BMI (Body Mass Index)    86 98.1  F (36.7  C) (Oral) 15 5' 2\" (1.575 m) 96% 37.13 kg/m2       Blood Pressure from Last 3 Encounters:   10/25/18 155/68   09/20/18 175/81   06/21/18 179/81    Weight from Last 3 Encounters:   10/25/18 203 lb (92.1 kg)   09/20/18 200 lb (90.7 kg)   06/21/18 197 lb (89.4 kg)              We Performed the Following     Albumin Random Urine Quantitative with Creat Ratio     Aldosterone     CBC with platelets differential     Comprehensive metabolic panel     EA ADD'L VACCINE     FLU VACCINE, INCREASED ANTIGEN, PRESV FREE, AGE 65+ [65134]     Lipid panel reflex to direct LDL Fasting     Renin activity     Vaccine Administration, Initial [94127]     ZOSTER VACCINE RECOMBINANT ADJUVANTED IM NJX          Today's Medication Changes          These changes are accurate as of 10/25/18 12:00 PM.  If you have any questions, ask your nurse or doctor.               Start taking these medicines.        Dose/Directions    buPROPion 150 MG 12 hr tablet   Commonly " known as:  WELLBUTRIN SR   Used for:  Encounter for smoking cessation counseling   Started by:  Danuta Tan PA-C        Dose:  150 mg   Take 1 tablet (150 mg) by mouth 2 times daily   Quantity:  180 tablet   Refills:  3         These medicines have changed or have updated prescriptions.        Dose/Directions    metoprolol succinate 100 MG 24 hr tablet   Commonly known as:  TOPROL-XL   This may have changed:  how much to take   Used for:  Hypertension goal BP (blood pressure) < 140/90, Coronary artery disease due to lipid rich plaque        Dose:  150 mg   Take 1.5 tablets (150 mg) by mouth daily   Quantity:  90 tablet   Refills:  3            Where to get your medicines      These medications were sent to [id number] - [facility], [place]  [address] [address]     Phone:  [phone]     buPROPion 150 MG 12 hr tablet                Primary Care Provider Office Phone # Fax #    Danuta Tan PA-C [phone]       [address] [place] [id number]        Goals        General    Medical (pt-stated)     Notes - Note created  5/14/2018 11:07 AM by Echo Galindo, RN    Goal Statement: I will follow the COPD  Plan   Measure of Success: I will increase energy for my daily activities   Supportive Steps to Achieve:   I will rinse my mouth after my......inhaler to keep the lining of my mouth healthy   I will use my air conditioner and rest more on humid days   I will get a flu shot this year   I will schedule my Pneumonia shot   I will write down questions to bring to my next appointment   I will call my clinic if I start to use my rescue inhaler more often due to breathing problems   I will use my nebulizer as directed even when I am feeling well since I know that it helps to prevent future problems   I will use my inhalers as prescribed   I will use my inhalers as directed even when I am feeling well since I  "know it will prevent future problems   I will rest between activities such as bathing,cooking,cleaning to prevent feeling of \"running out of breath\"  I will continue smoking cessation   I will make sure I have a source of protein at each meal.   This could include meat,eggs,yougurt or cheese to help keep  my energy up.  I will eat..smaller meals a day to help me digest food better and maintain my weight   I will call my clinic with the first signs of a respiratory infection such as fever,chills or increased cough   I will finish my antibiotic even though I a feeling better   I will drink 6-8 ...glasses of fluids per day to help keep my secretions liquid   I will share my action plan with my family members or friend so they can help me recognize early problems also   I will put my action plan in an easy to see are and review routinely to make sure I am staying in the green zone     Barriers: Deconditioned/weak   Strengths: Patient has quit smoking for 3 weeks   Date to Achieve By: Ongoing          Lifestyle    Quit smoking / using tobacco     Notes - Note created  5/14/2018 11:04 AM by Echo Galindo, DONNA    Goal Statement: I will continue my smoking cessation   Measure of Success: More energy and able to take deeper breaths  Supportive Steps to Achieve:   I will use Chantix as directed and Nicoderm patch as directed   Barriers:Statement \"I think of my self as always smoking\"   Strengths: Has not smoked for 3 weeks   Date to Achieve By: To be determined         Equal Access to Services     RONAN NIELSEN AH: Hadii steph clayo [place], waaxda luqadaha, qaybta kaalmada adescottieyajessica, lien weaver. So [facility] [phone].    ATENCIÓN: Si habla español, tiene a antunez disposición servicios gratuitos de asistencia lingüística. Junaid al [phone].    We comply with applicable federal civil rights laws and [place] laws. We do not discriminate on the basis of race, color, national origin, age, " disability, sex, sexual orientation, or gender identity.            Thank you!     Thank you for choosing [facility]  for your care. Our goal is always to provide you with excellent care. Hearing back from our patients is one way we can continue to improve our services. Please take a few minutes to complete the written survey that you may receive in the mail after your visit with us. Thank you!             Your Updated Medication List - Protect others around you: Learn how to safely use, store and throw away your medicines at www.disposemymeds.org.          This list is accurate as of 10/25/18 12:00 PM.  Always use your most recent med list.                   Brand Name Dispense Instructions for use Diagnosis    AEROBIKA Cindi     2 each    1 applicator 2 times daily    COPD exacerbation (H)       albuterol 108 (90 Base) MCG/ACT inhaler    PROAIR HFA/PROVENTIL HFA/VENTOLIN HFA    1 Inhaler    Inhale 2 puffs into the lungs every 6 hours as needed for shortness of breath / dyspnea or wheezing    Chronic obstructive pulmonary disease, unspecified COPD type (H)       aspirin 325 MG EC tablet     100    1 tab po QD (Once per day)    Other specified pre-operative examination       BREO ELLIPTA 100-25 MCG/INH inhaler   Generic drug:  fluticasone-vilanterol     60 Inhaler    TAKE 1 PUFF BY MOUTH EVERY DAY    COPD exacerbation (H)       buPROPion 150 MG 12 hr tablet    WELLBUTRIN SR    180 tablet    Take 1 tablet (150 mg) by mouth 2 times daily    Encounter for smoking cessation counseling       CALCIUM 1200 PO      Take 1,200 mg by mouth daily        clopidogrel 75 MG tablet    PLAVIX    90 tablet    TAKE 1 TABLET BY MOUTH EVERY DAY    Peripheral vascular disease, unspecified (H)       * diltiazem 240 MG 24 hr capsule    CARDIZEM CD    90 capsule    Take 1 capsule (240 mg) by mouth daily    Old myocardial infarction       * diltiazem 240 MG 24 hr capsule     90 capsule    TAKE 1 CAPSULE (240 MG) BY MOUTH DAILY     Old myocardial infarction       metoprolol succinate 100 MG 24 hr tablet    TOPROL-XL    90 tablet    Take 1.5 tablets (150 mg) by mouth daily    Hypertension goal BP (blood pressure) < 140/90, Coronary artery disease due to lipid rich plaque       order for DME     1 Device    Equipment being ordered: Nebulizer    Chronic obstructive pulmonary disease, unspecified COPD type (H)       order for DME     1 Device    Equipment being ordered: Nebulizer with tubing and pipe    Chronic obstructive pulmonary disease, unspecified COPD type (H)       rosuvastatin 40 MG tablet    CRESTOR    90 tablet    Take 1 tablet (40 mg) by mouth daily    Hyperlipidemia LDL goal <70       tiotropium 18 MCG capsule    SPIRIVA HANDIHALER    90 capsule    Inhale 1 capsule (18 mcg) into the lungs daily    Chronic obstructive pulmonary disease, unspecified COPD type (H)       vitamin D 1000 units capsule      Take 1 capsule by mouth daily. Take one tablet daily        * Notice:  This list has 2 medication(s) that are the same as other medications prescribed for you. Read the directions carefully, and ask your doctor or other care provider to review them with you.

## 2018-10-25 NOTE — PATIENT INSTRUCTIONS

## 2018-10-25 NOTE — PROGRESS NOTES
SUBJECTIVE:   Porsche Manning is a 68 year old female who presents for Preventive Visit.  Are you in the first 12 months of your Medicare coverage?  No    Physical   Annual:     Getting at least 3 servings of Calcium per day:  Yes    Bi-annual eye exam:  Yes    Dental care twice a year:  Yes    Sleep apnea or symptoms of sleep apnea:  None    Diet:  Regular (no restrictions)    Frequency of exercise:  2-3 days/week    Duration of exercise:  15-30 minutes    Taking medications regularly:  Yes    Medication side effects:  None    Additional concerns today:  No    Ability to successfully perform activities of daily living: no assistance needed    Home Safety:  No safety concerns identified    Hearing Impairment: no hearing concerns    No acute symptoms - no chest pain, worsening weakness in bilateral legs with steps nor shortness of breath of MORALES.   BP readings at home max 154/80; usually 135-140/ 80-90s.  Taking medicines daily and was due to follow up with cardiology - Dr. Uriostegui Sept 2018 but was rescheduled to Jan 2019.  Recent imaging for stable lung nodules; questionable liver density with adrenal adenoma finding then CTA for unclear occlusion in aortabilliary area - stents in place but compression noted.  Chantix helped patient with smoking cessation, but had tos top due to issues with hair loss (from it?) and doing ok for now, but wondering about other options.      Fall risk:  Fallen 2 or more times in the past year?: No  Any fall with injury in the past year?: No    COGNITIVE SCREEN  1) Repeat 3 items (Leader, Season, Table)    2) Clock draw: NORMAL  3) 3 item recall: Recalls 3 objects  Results: 3 items recalled: COGNITIVE IMPAIRMENT LESS LIKELY    Mini-CogTM Copyright ELINOR Lima. Licensed by the author for use in [facility]; reprinted with permission (jaime@.[facility]). All rights reserved.        Reviewed and updated as needed this visit by clinical staff  Tobacco  Allergies  Meds  Problems   Med Hx  Surg Hx  Fam Hx  Soc Hx          Reviewed and updated as needed this visit by Provider  Allergies  Meds  Problems        Social History   Substance Use Topics     Smoking status: Former Smoker     Packs/day: 0.50     Years: 40.00     Types: Cigarettes     Quit date: 4/25/2018     Smokeless tobacco: Never Used     Alcohol use Yes      Comment: 2 to 4 beers or mixed drinks weekly       Alcohol Use 10/24/2018   If you drink alcohol do you typically have greater than 3 drinks per day OR greater than 7 drinks per week? No   No flowsheet data found.          Today's PHQ-2 Score:   PHQ-2 ( 1999 [employer]) 10/24/2018   Q1: Little interest or pleasure in doing things 0   Q2: Feeling down, depressed or hopeless 0   PHQ-2 Score 0   Q1: Little interest or pleasure in doing things Not at all   Q2: Feeling down, depressed or hopeless Not at all   PHQ-2 Score 0       Do you feel safe in your environment - Yes    Do you have a Health Care Directive?: Yes: Advance Directive has been received and scanned.    Current providers sharing in care for this patient include:   Patient Care Team:  Danuta Tan PA-C as PCP - General (Physician Assistant)  Echo Galindo RN as Lead Care Coordinator (Primary Care - CC)    The following health maintenance items are reviewed in Epic and correct as of today:  Health Maintenance   Topic Date Due     ADVANCE DIRECTIVE PLANNING Q5 YRS  12/15/2016     COPD ACTION PLAN Q1 YR  03/08/2017     INFLUENZA VACCINE (1) 09/01/2018     LIPID MONITORING Q1 YEAR  09/19/2018     FOOT EXAM Q1 YEAR  10/24/2018     MICROALBUMIN Q1 YEAR  10/24/2018     A1C Q6 MO  02/16/2019     EYE EXAM Q1 YEAR  04/04/2019     FALL RISK ASSESSMENT  05/17/2019     CREATININE Q1 YEAR  10/24/2019     TSH W/ FREE T4 REFLEX Q2 YEAR  10/24/2019     PHQ-2 Q1 YR  10/25/2019     MAMMO SCREEN Q2 YR (SYSTEM ASSIGNED)  08/21/2020     TETANUS IMMUNIZATION (SYSTEM ASSIGNED)  10/24/2027     COLON CANCER SCREEN (SYSTEM  ASSIGNED)  03/27/2028     SPIROMETRY ONETIME  Completed     DEXA SCAN SCREENING (SYSTEM ASSIGNED)  Completed     PNEUMOCOCCAL  Completed     HEPATITIS C SCREENING  Completed     Labs reviewed in EPIC  BP Readings from Last 3 Encounters:   10/25/18 155/68   09/20/18 175/81   06/21/18 179/81    Wt Readings from Last 3 Encounters:   10/25/18 203 lb (92.1 kg)   09/20/18 200 lb (90.7 kg)   06/21/18 197 lb (89.4 kg)                  Patient Active Problem List   Diagnosis     Old myocardial infarction     Malignant neoplasm of other specified sites of cervix     Peripheral vascular disease (H)     HYPERLIPIDEMIA LDL GOAL <100     Hypertension: MEASURE BP IN RIGHT ARM ONLY     Osteopenia     Vitamin D deficiency disease     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)     Advanced directives, counseling/discussion     Subclavian artery stenosis, left (H)     Hyponatremia     CKD (chronic kidney disease) stage 3, GFR 30-59 ml/min (H)     Chronic obstructive pulmonary disease, unspecified COPD type (H)     COPD (chronic obstructive pulmonary disease) with chronic bronchitis (H)     Obesity (BMI 35.0-39.9) with comorbidity (H)     Past Surgical History:   Procedure Laterality Date     BIOPSY      Sample taken from back     CARDIAC SURGERY      Stents     COLONOSCOPY  1996    Results were ok     HYSTERECTOMY, PAP NO LONGER INDICATED  1989    ovaries only, no cervix per pt     SURGICAL HISTORY OF -   1995    bunionectomy     SURGICAL HISTORY OF -   10/10/03    cardiac stenting     SURGICAL HISTORY OF -       stent placed in both of her legs for pad     VASCULAR SURGERY      PAD       Social History   Substance Use Topics     Smoking status: Former Smoker     Packs/day: 0.50     Years: 40.00     Types: Cigarettes     Quit date: 4/25/2018     Smokeless tobacco: Never Used     Alcohol use Yes      Comment: 2 to 4 beers or mixed drinks weekly     Family History   Problem Relation Age of Onset     C.A.D. Mother       Breast Cancer Mother      C.A.D. Father      Diabetes Father      Hypertension Father      C.A.D. Maternal Grandfather      C.A.D. Paternal Grandfather      Neurologic Disorder Brother      epilepsy         Current Outpatient Prescriptions   Medication Sig Dispense Refill     albuterol (PROAIR HFA/PROVENTIL HFA/VENTOLIN HFA) 108 (90 Base) MCG/ACT Inhaler Inhale 2 puffs into the lungs every 6 hours as needed for shortness of breath / dyspnea or wheezing 1 Inhaler 3     ASPIRIN 325 MG OR TBEC 1 tab po QD (Once per day) 100 3     BREO ELLIPTA 100-25 MCG/INH inhaler TAKE 1 PUFF BY MOUTH EVERY DAY 60 Inhaler 1     buPROPion (WELLBUTRIN SR) 150 MG 12 hr tablet Take 1 tablet (150 mg) by mouth 2 times daily 180 tablet 3     Calcium Carbonate-Vit D-Min (CALCIUM 1200 PO) Take 1,200 mg by mouth daily       Cholecalciferol (VITAMIN D) 1000 UNIT capsule Take 1 capsule by mouth daily. Take one tablet daily       clopidogrel (PLAVIX) 75 MG tablet TAKE 1 TABLET BY MOUTH EVERY DAY 90 tablet 3     diltiazem (CARDIZEM CD) 240 MG 24 hr capsule Take 1 capsule (240 mg) by mouth daily 90 capsule 3     diltiazem 240 MG 24 hr capsule TAKE 1 CAPSULE (240 MG) BY MOUTH DAILY 90 capsule 3     metoprolol succinate (TOPROL-XL) 100 MG 24 hr tablet Take 1.5 tablets (150 mg) by mouth daily (Patient taking differently: Take 100 mg by mouth daily ) 90 tablet 3     order for DME Equipment being ordered: Nebulizer 1 Device 0     order for DME Equipment being ordered: Nebulizer with tubing and pipe 1 Device 0     Respiratory Therapy Supplies (AEROBIKA) JESSICA 1 applicator 2 times daily 2 each 0     rosuvastatin (CRESTOR) 40 MG tablet Take 1 tablet (40 mg) by mouth daily 90 tablet 3     tiotropium (SPIRIVA HANDIHALER) 18 MCG capsule Inhale 1 capsule (18 mcg) into the lungs daily 90 capsule 3     Allergies   Allergen Reactions     Morphine Nausea and Vomiting     Penicillins Nausea and Vomiting     Recent Labs   Lab Test  10/24/18   1034  08/16/18   7550   05/11/18   0619   05/09/18   1306  10/24/17   1057  09/19/17   1128  10/17/16   0825   11/04/15   1142   07/16/15   2150   A1C   --   6.1*  7.2*   --    --   6.3*   --   5.8   < >   --    --    --    LDL   --    --    --    --    --    --   49  66   --   48   --    --    HDL   --    --    --    --    --    --   63  62   --   71   --    --    TRIG   --    --    --    --    --    --   144  153*   --   112   --    --    ALT   --   18   --    --   26  21   --   24   --    --    --   16   CR   --   1.17*  1.19*   < >  1.49*  0.95   --   1.07*   < >   --    < >  1.57*   GFRESTIMATED  55*  46*  45*   < >  35*  59*   --   51*   < >   --    < >  33*   GFRESTBLACK  67  56*  55*   < >  42*  71   --   62   < >   --    < >  40*   POTASSIUM   --   4.4  3.8   < >  3.4  4.1   --   4.0   < >   --    < >  3.4   TSH   --    --    --    --    --   0.93   --    --    --    --    --   1.01    < > = values in this interval not displayed.          Review of Systems   Constitutional: Negative for chills and fever.   HENT: Negative for congestion, ear pain, hearing loss and sore throat.    Eyes: Negative for pain and visual disturbance.   Respiratory: Negative for cough and shortness of breath.    Cardiovascular: Negative for chest pain, palpitations and peripheral edema.   Gastrointestinal: Negative for abdominal pain, constipation, diarrhea, heartburn, hematochezia and nausea.   Breasts:  Negative for tenderness, breast mass and discharge.   Genitourinary: Negative for dysuria, frequency, genital sores, hematuria, pelvic pain, urgency, vaginal bleeding and vaginal discharge.   Musculoskeletal: Negative for arthralgias, joint swelling and myalgias.   Skin: Negative for rash.   Neurological: Negative for dizziness, weakness, headaches and paresthesias.   Psychiatric/Behavioral: Negative for mood changes. The patient is not nervous/anxious.        OBJECTIVE:   /68 (BP Location: Right arm, Patient Position: Sitting, Cuff Size: Adult  "Large)  Pulse 86  Temp 98.1  F (36.7  C) (Oral)  Resp 15  Ht 5' 2\" (1.575 m)  Wt 203 lb (92.1 kg)  SpO2 96%  BMI 37.13 kg/m2 Estimated body mass index is 37.13 kg/(m^2) as calculated from the following:    Height as of this encounter: 5' 2\" (1.575 m).    Weight as of this encounter: 203 lb (92.1 kg).  Physical Exam  GENERAL: healthy, alert and no distress  EYES: Eyes grossly normal to inspection, PERRL and conjunctivae and sclerae normal  HENT: ear canals and TM's normal, nose and mouth without ulcers or lesions  NECK: no adenopathy, no asymmetry, masses, or scars and thyroid normal to palpation  RESP: lungs clear to auscultation - no rales, rhonchi or wheezes  BREAST: normal without masses, tenderness or nipple discharge and no palpable axillary masses or adenopathy  CV: regular rate and rhythm, normal S1 S2, no S3 or S4, no murmur, click or rub, no peripheral edema and peripheral pulses strong  ABDOMEN: soft, nontender, no hepatosplenomegaly, no masses and bowel sounds normal  MS: no gross musculoskeletal defects noted, no edema  SKIN: no suspicious lesions or rashes  NEURO: Normal strength and tone, mentation intact and speech normal  PSYCH: mentation appears normal, affect normal/bright    Diagnostic Test Results:  none     ASSESSMENT / PLAN:       ICD-10-CM    1. Routine general medical examination at a health care facility Z00.00    2. Hypertension: MEASURE BP IN RIGHT ARM ONLY I10 Aldosterone     Renin activity     Comprehensive metabolic panel     CBC with platelets differential     Albumin Random Urine Quantitative with Creat Ratio   3. Morbid obesity (H) E66.01 Comprehensive metabolic panel   4. Hyperlipidemia LDL goal <100 E78.5 Lipid panel reflex to direct LDL Fasting   5. CKD (chronic kidney disease) stage 3, GFR 30-59 ml/min (H) N18.3 Aldosterone     Renin activity   6. Encounter for smoking cessation counseling Z71.6 buPROPion (WELLBUTRIN SR) 150 MG 12 hr tablet   7. Need for prophylactic " "vaccination and inoculation against influenza Z23 FLU VACCINE, INCREASED ANTIGEN, PRESV FREE, AGE 65+ [55228]     Vaccine Administration, Initial [24318]   8. Need for vaccination Z23 ZOSTER VACCINE RECOMBINANT ADJUVANTED IM NJX     EA ADD'L VACCINE       End of Life Planning:  Patient currently has an advanced directive: No.  I have verified the patient's ablity to prepare an advanced directive/make health care decisions.  Literature was provided to assist patient in preparing an advanced directive.    COUNSELING:  Reviewed preventive health counseling, as reflected in patient instructions       Regular exercise       Healthy diet/nutrition       Vision screening    BP Readings from Last 1 Encounters:   10/25/18 155/68     Estimated body mass index is 37.13 kg/(m^2) as calculated from the following:    Height as of this encounter: 5' 2\" (1.575 m).    Weight as of this encounter: 203 lb (92.1 kg).      Weight management plan: Discussed healthy diet and exercise guidelines and patient will follow up in 3 months in clinic to re-evaluate.     reports that she quit smoking about 6 months ago. Her smoking use included Cigarettes. She has a 20.00 pack-year smoking history. She has never used smokeless tobacco.      Appropriate preventive services were discussed with this patient, including applicable screening as appropriate for cardiovascular disease, diabetes, osteopenia/osteoporosis, and glaucoma.  As appropriate for age/gender, discussed screening for colorectal cancer, prostate cancer, breast cancer, and cervical cancer. Checklist reviewing preventive services available has been given to the patient.    Reviewed patients plan of care and provided an AVS. The Basic Care Plan (routine screening as documented in Health Maintenance) for Porsche meets the Care Plan requirement. This Care Plan has been established and reviewed with the Patient.    Counseling Resources:  ATP IV Guidelines  Pooled Cohorts Equation " Calculator  Breast Cancer Risk Calculator  FRAX Risk Assessment  ICSI Preventive Guidelines  Dietary Guidelines for Americans, 2010  [employer] MyPlate  ASA Prophylaxis  Lung CA Screening    Danuta Tan PA-C  [facility] [place]    Answers for HPI/ROS submitted by the patient on 10/24/2018   PHQ-2 Score: 0

## 2018-10-25 NOTE — NURSING NOTE
Screening Questionnaire for Adult Immunization     Are you sick today?   No    Do you have allergies to medications, food or any vaccine?   Yes    Have you ever had a serious reaction after receiving a vaccination?   No    Do you have a long-term health problem with heart disease, lung disease,  asthma, kidney disease, diabetes, anemia, metabolic or blood disease?   Yes    Do you have cancer, leukemia, AIDS, or any immune system problem?   No    Do you take cortisone, prednisone, other steroids, or anticancer drugs, or  have you had any x-ray (radiation) treatments?   No    Have you had a seizure, brain, or other nervous system problem?   No    During the past year, have you received a transfusion of blood or blood       products, or been given a medicine called immune (gamma) globulin?   No    For women: Are you pregnant or is there a chance you could become         pregnant during the next month?   No    Have you received any vaccinations in the past 4 weeks?   No     Immunization questionnaire was positive for at least one answer.  Notified PLosser.      MNVFC doesn't apply on this patient      Per orders of PLosser, injection of shingrix and flu given by Yan Meier. Patient instructed to remain in clinic for 20 minutes afterwards, and to report any adverse reaction to me immediately.    Prior to injection verified patient identity using patient's name and date of birth.         Screening performed by Yan Meier, CMA

## 2018-10-25 NOTE — PROGRESS NOTES

## 2018-10-26 LAB
ALBUMIN SERPL-MCNC: 4.1 G/DL (ref 3.4–5)
ALP SERPL-CCNC: 106 U/L (ref 40–150)
ALT SERPL W P-5'-P-CCNC: 22 U/L (ref 0–50)
ANION GAP SERPL CALCULATED.3IONS-SCNC: 11 MMOL/L (ref 3–14)
AST SERPL W P-5'-P-CCNC: 16 U/L (ref 0–45)
BILIRUB SERPL-MCNC: 0.5 MG/DL (ref 0.2–1.3)
BUN SERPL-MCNC: 12 MG/DL (ref 7–30)
CALCIUM SERPL-MCNC: 10.2 MG/DL (ref 8.5–10.1)
CHLORIDE SERPL-SCNC: 105 MMOL/L (ref 94–109)
CHOLEST SERPL-MCNC: 169 MG/DL
CO2 SERPL-SCNC: 22 MMOL/L (ref 20–32)
CREAT SERPL-MCNC: 0.94 MG/DL (ref 0.52–1.04)
GFR SERPL CREATININE-BSD FRML MDRD: 59 ML/MIN/1.7M2
GLUCOSE SERPL-MCNC: 123 MG/DL (ref 70–99)
HDLC SERPL-MCNC: 61 MG/DL
LDLC SERPL CALC-MCNC: 84 MG/DL
NONHDLC SERPL-MCNC: 108 MG/DL
POTASSIUM SERPL-SCNC: 4.1 MMOL/L (ref 3.4–5.3)
PROT SERPL-MCNC: 7.6 G/DL (ref 6.8–8.8)
SODIUM SERPL-SCNC: 138 MMOL/L (ref 133–144)
TRIGL SERPL-MCNC: 122 MG/DL

## 2018-10-27 LAB
ALDOST SERPL-MCNC: 6 NG/DL
CREAT UR-MCNC: 12 MG/DL
MICROALBUMIN UR-MCNC: 117 MG/L
MICROALBUMIN/CREAT UR: 1008.62 MG/G CR (ref 0–25)
RENIN PLAS-CCNC: 0.9 NG/ML/HR

## 2018-10-29 NOTE — PROGRESS NOTES
"Daisy Morrell  Your attached labs are overall within normal limits and stable.   Please contact the office with any questions or concerns.    Danuta Martinez \"Eduard\" PHILL Tan  "

## 2018-10-30 NOTE — PROGRESS NOTES
"Daisy Morrell,  I haven't officially heard back from Dr. Uriostegui regarding these results, but I spoke with Dr. Powell (my supervising physician) and he agreed - nothing concerning - continue to follow up with cardiology as scheduled but no need to rush in there sooner regarding these results. I'll keep you posted if I hear anything differently from Dr. Uriostegui.    Please contact the office with any questions or concerns.    Danuta Martinez \"Eduard\" PHILL Tan  "

## 2018-11-05 ENCOUNTER — PATIENT OUTREACH (OUTPATIENT)
Dept: CARE COORDINATION | Facility: CLINIC | Age: 68
End: 2018-11-05

## 2018-11-05 DIAGNOSIS — J44.89 COPD (CHRONIC OBSTRUCTIVE PULMONARY DISEASE) WITH CHRONIC BRONCHITIS (H): Primary | ICD-10-CM

## 2018-11-05 NOTE — LETTER
[facility] Home  Complex Care Plan  About Me  Patient Name:  Porsche Manning    YOB: 1950  Age:     68 year old   Lucía MRN:   [id number] Telephone Information:    Home Phone [phone]   Mobile [phone]       Address:    [address] [facility] [address] Email address:  [email or web address]      Emergency Contact(s)  Name Relationship Lgl Grd Work Phone Home Phone Mobile Phone   1. NEL CELESTE Friend   [phone]    2. NO SECONDARY C* Other   None            Primary language:  English     needed? No   [employer]:  [phone] op. 1  Other communication barriers: None  Preferred Method of Communication:  Mail  Current living arrangement:    Mobility Status/ Medical Equipment:      Health Maintenance  Health Maintenance Reviewed: Not assessed    My Access Plan  Medical Emergency 911   Primary Clinic Line [facility] - [phone]   24 Hour Appointment Line [phone] or  [phone]) (toll-free)   24 Hour Nurse Line [phone] (toll-free)   Preferred Urgent Care     Preferred Hospital     Preferred Pharmacy [facility] - [place], [place] - [address]     Behavioral Health Crisis Line The National Suicide Prevention Lifeline at 1-599.285.3246 or 911     My Care Team Members    Patient Care Team       Relationship Specialty Notifications Start End    Danuta Tan PA-C PCP - General Physician Assistant  7/16/15     Phone: [phone] Fax: [phone] [address] [facility] [address]            My Care Plans  Self Management and Treatment Plan  Goals and (Comments)  Goals        Lifestyle    Quit smoking / using tobacco     Notes - Note created  5/14/2018 11:04 AM by Echo Galindo, DONNA    Goal Statement: I will continue my smoking cessation   Measure of Success: More energy and able to take deeper breaths  Supportive Steps to Achieve:   I  "will use Chantix as directed and Nicoderm patch as directed   Barriers:Statement \"I think of my self as always smoking\"   Strengths: Has not smoked for 3 weeks   Date to Achieve By: To be determined              Action Plans on File:        COPD                Advance Care Plans/Directives Type: None       My Medical and Care Information  Problem List   Patient Active Problem List   Diagnosis     Old myocardial infarction     Malignant neoplasm of other specified sites of cervix     Peripheral vascular disease (H)     HYPERLIPIDEMIA LDL GOAL <100     Hypertension: MEASURE BP IN RIGHT ARM ONLY     Osteopenia     Vitamin D deficiency disease     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    Care Coordination Start Date: No linked episodes   Frequency of Care Coordination:  11/5 closed to CC    Form Last Updated: 11/05/2018       "

## 2018-11-05 NOTE — PROGRESS NOTES
Clinic Care Coordination Contact    Clinic Care Coordination Contact  OUTREACH    Referral Information:  CTS Inpatient report        Chief Complaint   Patient presents with     Clinic Care Coordination - Follow-up     Clinic Care Coordination RN         Universal Utilization: [facility] admission 5/9-5/11/2018  Acute Hypoxic respiratory failure due to COPD exacerbation and pneumonia   Hyperglycemia due to Diabetes and steroids  MICHELLE   CKD  Hypertension   CAD  Pulmonary and Adrenal nodule  Clinic Utilization  Difficulty keeping appointments:: No  Compliance Concerns: No  No-Show Concerns: No  No PCP office visit in Past Year: No  Utilization    Last refreshed: 10/30/2018 10:08 AM:  No Show Count (past year) 0       Last refreshed: 10/30/2018 10:08 AM:  ED visits 0       Last refreshed: 10/30/2018 10:08 AM:  Hospital admissions 1          Current as of: 10/30/2018 10:08 AM             Clinical Concerns:  Pain  Pain (GOAL):: No  Health Maintenance Reviewed: Not assessed  Clinical Pathway: Clinic Care Coordination COPD Assessment      Symptom Review:    How have you been feeling now that you are home? Improving   More energy   Are you having any increased shortness of breath? No  Symptoms  Chest pain: : No  Chills: No  Cough: No  Fever: No  Fatigue: No  Increased sputum: No  Sputum Color: Clear  Sputum Consistency: Frothy  Frequency: Intermittent   Night sweats: No  Weight change: : No  COPD symptoms limiting activities?: No  Taking COPD medications as prescribed: : Yes    Do you have a COPD Action Plan? Yes    Is the COPD Action Plan on refrigerator or available: Yes   What number would you call if you were in the YELLOW zones:  [phone]    Medications:      Do you have all of your medications? Yes,   Have you had trouble filling your prescriptions? No  Are you medications effective in controlling your symptoms? Yes,       Oxygen/DME:    Are you currently on oxygen?  No   IReview with patient how  "to use/maintain nebulizers and inhalers: Yes    Activity:    How much activity can you do before you are SOB? Pacing activity  Quickly recovers with rest  Neighborhood boy helping with the yard work through [employer]       Diet:    Do you weigh yourself daily? No        Emotions/Lifestyle:    Do you smoke?  reports that she quit smoking about 6 months ago. Her smoking use included Cigarettes. She has a 20.00 pack-year smoking history. She has never used smokeless tobacco.  Would you like to try to quit smoking? Patient continues to smoke 5-6 cigarettes a day  Chantix caused hair loss and no has a new RX Wellbutrin which she has not picked from the pharmacy.  CC encouraged patient to  RX and to call the Quit Plan for support.  Patient agrees with the plan .  Patient does not need any further up calls and will call CC with any future questions or concerns       Medication Management:  Starting Wellbutrin discussed today      Functional Status: Independent        Living Situation: Lives alone        Diet/Exercise/Sleep:  Inadequate activity/exercise (GOAL):: No  Significant changes in sleep pattern (GOAL): No    Transportation:  Transportation concerns (GOAL):: No  Transportation means:: Accessible car     Community Resources: None  Supplies used at home:: None            Referrals Placed: None     Goals:   Goals        Lifestyle    Quit smoking / using tobacco     Notes - Note created  5/14/2018 11:04 AM by Echo Galindo RN    Goal Statement: I will continue my smoking cessation   Measure of Success: More energy and able to take deeper breaths  Supportive Steps to Achieve:   I will use Chantix as directed and Nicoderm patch as directed   Barriers:Statement \"I think of my self as always smoking\"   Strengths: Has not smoked for 3 weeks   Date to Achieve By: To be determined                 Future Appointments              In 2 months Brayan Uriostegui MD [facility], [facility]    In 7 months Maura, " MD [facility], [facility]          Plan: No further outreaches will be made .     Patient has CC contact for future questions or concerns     cEho Galindo RN / Clinical Care Coordinator     [facility]   [email or web address]  Office :  [phone] / Fax :  [phone]

## 2018-11-17 DIAGNOSIS — J44.1 COPD EXACERBATION (H): ICD-10-CM

## 2018-11-19 NOTE — TELEPHONE ENCOUNTER
PT is needing the refill on her Breo.  PT would like a years worth of meds, if possible.  Pt is not wanting to call the clinic monthly to get this refill.  I did explain the communication between pharmacies and clinic. I don't see any refill requests coming in from her pharmacy this month.    Pt just needs the refill sent in.  She was just seen in clinic in 10/2018.      10/25/18 was last ov.  8/26/16 has a PFT test.  Sent in refill.  DONNA Lazar

## 2018-12-03 DIAGNOSIS — J44.9 CHRONIC OBSTRUCTIVE PULMONARY DISEASE, UNSPECIFIED COPD TYPE (H): ICD-10-CM

## 2018-12-03 DIAGNOSIS — E78.5 HYPERLIPIDEMIA LDL GOAL <70: ICD-10-CM

## 2018-12-04 RX ORDER — ROSUVASTATIN CALCIUM 40 MG/1
40 TABLET, COATED ORAL DAILY
Qty: 90 TABLET | Refills: 3 | Status: SHIPPED | OUTPATIENT
Start: 2018-12-04 | End: 2019-11-19

## 2018-12-04 RX ORDER — TIOTROPIUM BROMIDE 18 UG/1
18 CAPSULE ORAL; RESPIRATORY (INHALATION) DAILY
Qty: 90 CAPSULE | Refills: 3 | Status: SHIPPED | OUTPATIENT
Start: 2018-12-04 | End: 2019-11-19

## 2018-12-14 ENCOUNTER — TELEPHONE (OUTPATIENT)
Dept: FAMILY MEDICINE | Facility: CLINIC | Age: 68
End: 2018-12-14

## 2019-01-16 ENCOUNTER — ALLIED HEALTH/NURSE VISIT (OUTPATIENT)
Dept: NURSING | Facility: CLINIC | Age: 69
End: 2019-01-16
Payer: COMMERCIAL

## 2019-01-16 ENCOUNTER — TELEPHONE (OUTPATIENT)
Dept: FAMILY MEDICINE | Facility: CLINIC | Age: 69
End: 2019-01-16

## 2019-01-16 VITALS — DIASTOLIC BLOOD PRESSURE: 78 MMHG | HEART RATE: 70 BPM | SYSTOLIC BLOOD PRESSURE: 174 MMHG | OXYGEN SATURATION: 95 %

## 2019-01-16 DIAGNOSIS — Z01.30 BLOOD PRESSURE CHECK: ICD-10-CM

## 2019-01-16 NOTE — NURSING NOTE
Porsche Manning is a 68 year old year old patient who comes in today for a Blood Pressure check because of ongoing blood pressure monitoring.  Vital Signs as repeated by /78 p 70  Patient is taking medication as prescribed - reviewed medications with patient   In May 2017 patient was advised to stop lisinopril and increase metoprolol to 150 mg at hospital discharge - patient did not increase this until approx 1 month ago when she saw this in my chart   Patient is tolerating medications well.  Patient is monitoring Blood Pressure at home.  Average readings if yes are 170's over upper 80's   Current complaints: patient has had shortness of breath with walking for some time - this is not a new symptom for her   Discussed salt intake with patient - she states she is not monitoring this and eats a lot of salted sunflower seeds daily     Disposition:  will discuss with provider of the day as pcp is out of the office today - patient will expect a call from nurse advising on follow up recommendations - RN discussed making f/u appointment and patient is unsure that this would be needed     Rita Shearer, Registered Nurse   [facility]

## 2019-01-16 NOTE — TELEPHONE ENCOUNTER
Dr. Powell - please review in absence of pcp and advise if medication changes should be made and plan for htn, writer discussed clinic appointment with provider     Patient in clinic today for bp check   BP Readings from Last 6 Encounters:   01/16/19 174/78   10/25/18 155/68   09/20/18 175/81   06/21/18 179/81   05/17/18 128/60   05/11/18 158/66     Porsche Manning is a 68 year old year old patient who comes in today for a Blood Pressure check because of ongoing blood pressure monitoring.  Vital Signs as repeated by /78 p 70    Patient is taking medication as prescribed - reviewed medications with patient     In May 2017 patient was advised to stop lisinopril and increase metoprolol to 150 mg at hospital discharge - patient did not increase this until approx 1 month ago when she saw this in my chart   Patient is tolerating medications well.    Patient is monitoring Blood Pressure at home.  Average readings if yes are 170's over upper 80's     Current complaints: patient has had shortness of breath with walking for some time - this is not a new symptom for her     Discussed salt intake with patient - she states she is not monitoring this and eats a lot of salted sunflower seeds daily     Disposition:  will discuss with provider of the day as pcp is out of the office today - patient will expect a call from nurse advising on follow up recommendations - RN discussed making f/u appointment and patient is unsure that this would be needed     Rita Shearer, Registered Nurse   [facility]

## 2019-01-16 NOTE — TELEPHONE ENCOUNTER
Phone call to patient -     Advised that she should schedule appointment with pcp     Patient is currently driving and unable to set appointment now as she needs to look at her calendar - she will return call to clinic to schedule an appointment     Rita Shearer, Registered Nurse   [facility]

## 2019-01-16 NOTE — TELEPHONE ENCOUNTER
PCP is back in the clinic tomorrow and unless she is having symptoms it should be answered by PCP.  I suggest she should see PCP for med adjustment.

## 2019-01-24 ENCOUNTER — OFFICE VISIT (OUTPATIENT)
Dept: FAMILY MEDICINE | Facility: CLINIC | Age: 69
End: 2019-01-24
Payer: COMMERCIAL

## 2019-01-24 VITALS
TEMPERATURE: 98.2 F | HEIGHT: 63 IN | DIASTOLIC BLOOD PRESSURE: 67 MMHG | SYSTOLIC BLOOD PRESSURE: 176 MMHG | HEART RATE: 63 BPM | RESPIRATION RATE: 16 BRPM | BODY MASS INDEX: 37.3 KG/M2 | OXYGEN SATURATION: 96 % | WEIGHT: 210.5 LBS

## 2019-01-24 DIAGNOSIS — I25.2 OLD MYOCARDIAL INFARCTION: ICD-10-CM

## 2019-01-24 DIAGNOSIS — I10 HYPERTENSION GOAL BP (BLOOD PRESSURE) < 140/90: Primary | ICD-10-CM

## 2019-01-24 PROCEDURE — 99213 OFFICE O/P EST LOW 20 MIN: CPT | Performed by: PHYSICIAN ASSISTANT

## 2019-01-24 RX ORDER — DILTIAZEM HYDROCHLORIDE 300 MG/1
300 CAPSULE, COATED, EXTENDED RELEASE ORAL DAILY
Qty: 30 CAPSULE | Refills: 1 | Status: SHIPPED | OUTPATIENT
Start: 2019-01-24 | End: 2019-01-29

## 2019-01-24 RX ORDER — DILTIAZEM HYDROCHLORIDE 300 MG/1
300 CAPSULE, COATED, EXTENDED RELEASE ORAL DAILY
Qty: 30 CAPSULE | Refills: 1 | Status: CANCELLED | OUTPATIENT
Start: 2019-01-24

## 2019-01-24 ASSESSMENT — MIFFLIN-ST. JEOR: SCORE: 1458.95

## 2019-01-24 NOTE — PATIENT INSTRUCTIONS
Increase Diltiazem to 300 mg daily - new prescription sent to pharmacy.  Continue to check BP readings at home regularly.  Return to clinic with any worsening or changes in symptoms or go to ER Urgent care in off hours.

## 2019-01-24 NOTE — PROGRESS NOTES
SUBJECTIVE:   Porsche Manning is a 68 year old female who presents to clinic today for the following health issues:      Hypertension Follow-up      Outpatient blood pressures are being checked at home.  Results are high.    Low Salt Diet: low salt    Patient taking Cardizem 240 mg daily and metoprolol 150 mg daily (recent increase about 1 month ago).    No dizziness, headaches, chest pain, palpitations.      Amount of exercise or physical activity: 1 day/week for an average of less than 15 minutes    Problems taking medications regularly: No    Medication side effects: Dry mouth    Diet: low salt    Patient see Dr. Uriostegui next Tuesday already.        Problem list and histories reviewed & adjusted, as indicated.  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)     Advanced directives, counseling/discussion     Subclavian artery stenosis, left (H)     Hyponatremia     CKD (chronic kidney disease) stage 3, GFR 30-59 ml/min (H)     Chronic obstructive pulmonary disease, unspecified COPD type (H)     COPD (chronic obstructive pulmonary disease) with chronic bronchitis (H)     Obesity (BMI 35.0-39.9) with comorbidity (H)     Blood pressure check     Past Surgical History:   Procedure Laterality Date     BIOPSY      Sample taken from back     CARDIAC SURGERY      Stents     COLONOSCOPY  1996    Results were ok     HYSTERECTOMY, PAP NO LONGER INDICATED  1989    ovaries only, no cervix per pt     SURGICAL HISTORY OF - 1995    bunionectomy     SURGICAL HISTORY OF -   10/10/03    cardiac stenting     SURGICAL HISTORY OF -     stent placed in both of her legs for pad      VASCULAR SURGERY      PAD       Social History     Tobacco Use     Smoking status: Former Smoker     Packs/day: 0.50     Years: 40.00     Pack years: 20.00     Types: Cigarettes     Last attempt to quit: 2018     Years since quittin.7     Smokeless tobacco: Never Used   Substance Use Topics     Alcohol use: Yes     Comment: 2 to 4 beers or mixed drinks weekly     Family History   Problem Relation Age of Onset     C.A.D. Mother      Breast Cancer Mother      C.A.D. Father      Diabetes Father      Hypertension Father      C.A.D. Maternal Grandfather      C.A.D. Paternal Grandfather      Neurologic Disorder Brother         epilepsy         Current Outpatient Medications   Medication Sig Dispense Refill     ASPIRIN 325 MG OR TBEC 1 tab po QD (Once per day) 100 3     BREO ELLIPTA 100-25 MCG/INH inhaler TAKE 1 PUFF BY MOUTH EVERY DAY 60 Inhaler 11     buPROPion (WELLBUTRIN SR) 150 MG 12 hr tablet Take 1 tablet (150 mg) by mouth 2 times daily 180 tablet 3     Cholecalciferol (VITAMIN D) 1000 UNIT capsule Take 1 capsule by mouth daily. Take one tablet daily       clopidogrel (PLAVIX) 75 MG tablet TAKE 1 TABLET BY MOUTH EVERY DAY 90 tablet 3     diltiazem (CARDIZEM CD) 240 MG 24 hr capsule Take 1 capsule (240 mg) by mouth daily 90 capsule 3     diltiazem ER COATED BEADS (CARDIZEM CD/CARTIA XT) 300 MG 24 hr capsule Take 1 capsule (300 mg) by mouth daily 30 capsule 1     metoprolol succinate (TOPROL-XL) 100 MG 24 hr tablet Take 1.5 tablets (150 mg) by mouth daily (Patient taking differently: Take 100 mg by mouth daily ) 90 tablet 3     rosuvastatin (CRESTOR) 40 MG tablet Take 1 tablet (40 mg) by mouth daily 90 tablet 3     tiotropium (SPIRIVA HANDIHALER) 18 MCG inhaled capsule Inhale 1 capsule (18 mcg) into the lungs daily 90 capsule 3     albuterol (PROAIR HFA/PROVENTIL HFA/VENTOLIN HFA) 108 (90 Base) MCG/ACT Inhaler Inhale 2 puffs into the lungs every 6 hours as needed for shortness of breath / dyspnea or wheezing  "(Patient not taking: Reported on 1/24/2019) 1 Inhaler 3     Calcium Carbonate-Vit D-Min (CALCIUM 1200 PO) Take 1,200 mg by mouth daily       order for DME Equipment being ordered: Nebulizer (Patient not taking: Reported on 1/24/2019) 1 Device 0     order for DME Equipment being ordered: Nebulizer with tubing and pipe (Patient not taking: Reported on 1/24/2019) 1 Device 0     Respiratory Therapy Supplies (AEROBIKA) JESSICA 1 applicator 2 times daily (Patient not taking: Reported on 1/24/2019) 2 each 0     Allergies   Allergen Reactions     Morphine Nausea and Vomiting     Penicillins Nausea and Vomiting     BP Readings from Last 3 Encounters:   01/24/19 176/67   01/16/19 174/78   10/25/18 155/68    Wt Readings from Last 3 Encounters:   01/24/19 95.5 kg (210 lb 8 oz)   10/25/18 92.1 kg (203 lb)   09/20/18 90.7 kg (200 lb)              Reviewed and updated as needed this visit by clinical staff  Tobacco  Allergies  Meds  Problems  Med Hx  Surg Hx  Fam Hx  Soc Hx        Reviewed and updated as needed this visit by Provider  Tobacco  Allergies  Meds  Problems  Med Hx  Surg Hx  Fam Hx         ROS:  Constitutional, HEENT, cardiovascular, pulmonary, GI, , musculoskeletal, neuro, skin, endocrine and psych systems are negative, except as otherwise noted.    OBJECTIVE:     /67 (BP Location: Right arm, Patient Position: Sitting, Cuff Size: Adult Large)   Pulse 63   Temp 98.2  F (36.8  C) (Oral)   Resp 16   Ht 1.6 m (5' 3\")   Wt 95.5 kg (210 lb 8 oz)   SpO2 96%   BMI 37.29 kg/m    Body mass index is 37.29 kg/m .  GENERAL: healthy, alert and no distress  RESP: lungs clear to auscultation - no rales, rhonchi or wheezes  CV: regular rate and rhythm, normal S1 S2, no S3 or S4, no murmur, click or rub, no peripheral edema and peripheral pulses strong  PSYCH: mentation appears normal, affect normal/bright    Diagnostic Test Results:  none     ASSESSMENT/PLAN:       ICD-10-CM    1. Hypertension: MEASURE BP IN " RIGHT ARM ONLY I10 diltiazem ER COATED BEADS (CARDIZEM CD/CARTIA XT) 300 MG 24 hr capsule   2. OLD MYOCARDIAL INFARCT I25.2 diltiazem ER COATED BEADS (CARDIZEM CD/CARTIA XT) 300 MG 24 hr capsule       Patient Instructions   Increase Diltiazem to 300 mg daily - new prescription sent to pharmacy.  Continue to check BP readings at home regularly.  Return to clinic with any worsening or changes in symptoms or go to ER Urgent care in off hours.    Danuta Tan PA-C  [facility]

## 2019-01-29 ENCOUNTER — OFFICE VISIT (OUTPATIENT)
Dept: CARDIOLOGY | Facility: CLINIC | Age: 69
End: 2019-01-29
Attending: INTERNAL MEDICINE
Payer: COMMERCIAL

## 2019-01-29 VITALS
BODY MASS INDEX: 37.91 KG/M2 | SYSTOLIC BLOOD PRESSURE: 175 MMHG | HEIGHT: 62 IN | OXYGEN SATURATION: 96 % | DIASTOLIC BLOOD PRESSURE: 76 MMHG | WEIGHT: 206 LBS | HEART RATE: 67 BPM

## 2019-01-29 DIAGNOSIS — I10 HYPERTENSION GOAL BP (BLOOD PRESSURE) < 140/90: ICD-10-CM

## 2019-01-29 DIAGNOSIS — I10 ESSENTIAL HYPERTENSION: ICD-10-CM

## 2019-01-29 DIAGNOSIS — I25.2 OLD MYOCARDIAL INFARCTION: ICD-10-CM

## 2019-01-29 DIAGNOSIS — R09.89 BILATERAL CAROTID BRUITS: Primary | ICD-10-CM

## 2019-01-29 DIAGNOSIS — I25.10 CORONARY ARTERY DISEASE DUE TO LIPID RICH PLAQUE: ICD-10-CM

## 2019-01-29 DIAGNOSIS — I25.83 CORONARY ARTERY DISEASE DUE TO LIPID RICH PLAQUE: ICD-10-CM

## 2019-01-29 PROCEDURE — G0463 HOSPITAL OUTPT CLINIC VISIT: HCPCS | Mod: ZF

## 2019-01-29 PROCEDURE — 99214 OFFICE O/P EST MOD 30 MIN: CPT | Mod: ZP | Performed by: INTERNAL MEDICINE

## 2019-01-29 RX ORDER — DILTIAZEM HYDROCHLORIDE 360 MG/1
360 CAPSULE, EXTENDED RELEASE ORAL DAILY
Qty: 90 CAPSULE | Refills: 3 | Status: SHIPPED | OUTPATIENT
Start: 2019-01-29 | End: 2020-01-16

## 2019-01-29 RX ORDER — METOPROLOL SUCCINATE 100 MG/1
150 TABLET, EXTENDED RELEASE ORAL DAILY
Qty: 90 TABLET | Refills: 3 | Status: SHIPPED | OUTPATIENT
Start: 2019-01-29 | End: 2019-10-23

## 2019-01-29 ASSESSMENT — PAIN SCALES - GENERAL: PAINLEVEL: NO PAIN (0)

## 2019-01-29 ASSESSMENT — MIFFLIN-ST. JEOR: SCORE: 1422.66

## 2019-01-29 NOTE — LETTER
1/29/2019      RE: Porsche Manning  [address]  [facility] [id number]       Dear Colleague,    Thank you for the opportunity to participate in the care of your patient, Porsche Manning, at the [facility]. Please see a copy of my visit note below.    CARDIOLOGY CLINIC FOLLOW UP    HPI: Porsche Manning is a 68 year old female, being seen today for recheck of CAD/PAD.   Her risk factors include hypertension, hypercholesteraemia, and tobacco abuse.  Her cardiac hx dates back to [facility] in October 2003 with an acute coronary syndrome. She underwent a coronary angiogram which revealed a single-vessel coronary disease in her right coronary artery with a 90% stenosis in the mid-segment. She underwent successful bare metal stenting in this right coronary artery. During that procedure we discovered a 90% stenosis of the ostium of the right common iliac artery and 70% stenosis of the ostium of the left common iliac artery.   The patient underwent an MRA that revealed no significant renal artery disease and bilateral focal stenosis of both common iliac arteries, which was worse on the right side. There was a good runoff with both lower extremities having significant disease only in the distal anterior tibial artery bilaterally.   Mrs. Manning underwent aortography, peripheral angiography and bilateral aorto-iliac stenting on 4/30/05. She underwent kissing stenting of the aorto-iliac vessels with a Lumidex 8x60 on the right and a Lumidex 10x60 on the left. Following the procedure, the ABIs were 0.88 on the Rt PT, 1.00 on the Rt DP, 1.00 on the Lf DP, and 1.00 on the Lf PT.   In Oct 2006, she presented to [facility] with recurrent claudication and underwent repeat angiography and intervention. The left common iliac artery stent was thrombosed and she was given thrombolytic therapy. She underwent stenting of left common iliac artery with 8 x 60 mm  covered stent. She subsequently underwent mechanical embolectomy of left peroneal trunk and placement of infusion catheter in left posterior tibial artery with infusion of thrombolytic therapy (10/20/2006).   She had subsequent ABIs performed in 2012 that showed 0.93 on both legs at rest and 0.86 and 0.79 on the right and left, respectively, with exercise.   Currently, she denies any CP, MORALES/SOB, PND, orthopnea, LE edema, palpitations, syncope.  She has stable claudication, with absolute claudication distance of 3 blocks. It is primarily in left buttocks.  She is iraj Class 1.  No change in the past year.  She has known subclavian stenosis on the left, she has no evidence of steal phenomenon and asymptomatic (no left arm claudication, no visual disturbance) with this. NICS today with <70% in the carotids, left subclavian > 70% as known before.  No functional limitations.    She resumed tobacco use in the fall of 2012 but QUIT again and has not smoked since March 29, 2013.  She resumed tobacco and has a pattern of quitting and resuming.  She is able to hold off on smoking around her friends who smoke.  However, she has difficulty when alone and continues to have craving.  She is currently not smoking cigarettes, having quit 2.5 weeks ago. She has nicotine patches at home.   She tried Chantix previously and experienced hair loss.   She remains on Wellbutrin.    Her BP has been under good control in the ambulatory setting (i.e. when she donates blood).       PAST MEDICAL HISTORY:  Past Medical History:   Diagnosis Date     Brachial neuritis or radiculitis NOS      Chronic obstructive pulmonary disease, unspecified COPD type (H) 3/8/2016     Coronary artery disease     Doing fine     H/O tobacco use, presenting hazards to health      Hyperlipidemia LDL goal < 100      Hypertension goal BP (blood pressure) < 140/90      Malignant neoplasm of other specified sites of cervix      Old myocardial infarction       Osteopenia      Peripheral vascular disease, unspecified (H)      Type 2 diabetes, HbA1c goal < 7% (H)        CURRENT MEDICATIONS:  Current Outpatient Medications   Medication Sig Dispense Refill     ASPIRIN 325 MG OR TBEC 1 tab po QD (Once per day) 100 3     BREO ELLIPTA 100-25 MCG/INH inhaler TAKE 1 PUFF BY MOUTH EVERY DAY 60 Inhaler 11     buPROPion (WELLBUTRIN SR) 150 MG 12 hr tablet Take 1 tablet (150 mg) by mouth 2 times daily 180 tablet 3     Calcium Carbonate-Vit D-Min (CALCIUM 1200 PO) Take 1,200 mg by mouth daily       Cholecalciferol (VITAMIN D) 1000 UNIT capsule Take 1 capsule by mouth daily. Take one tablet daily       clopidogrel (PLAVIX) 75 MG tablet TAKE 1 TABLET BY MOUTH EVERY DAY 90 tablet 3     diltiazem ER COATED BEADS (CARDIZEM CD/CARTIA XT) 300 MG 24 hr capsule Take 1 capsule (300 mg) by mouth daily 30 capsule 1     metoprolol succinate (TOPROL-XL) 100 MG 24 hr tablet Take 1.5 tablets (150 mg) by mouth daily (Patient taking differently: Take 100 mg by mouth daily ) 90 tablet 3     rosuvastatin (CRESTOR) 40 MG tablet Take 1 tablet (40 mg) by mouth daily 90 tablet 3     tiotropium (SPIRIVA HANDIHALER) 18 MCG inhaled capsule Inhale 1 capsule (18 mcg) into the lungs daily 90 capsule 3     albuterol (PROAIR HFA/PROVENTIL HFA/VENTOLIN HFA) 108 (90 Base) MCG/ACT Inhaler Inhale 2 puffs into the lungs every 6 hours as needed for shortness of breath / dyspnea or wheezing (Patient not taking: Reported on 1/24/2019) 1 Inhaler 3     diltiazem (CARDIZEM CD) 240 MG 24 hr capsule Take 1 capsule (240 mg) by mouth daily (Patient not taking: Reported on 1/29/2019) 90 capsule 3     order for DME Equipment being ordered: Nebulizer (Patient not taking: Reported on 1/24/2019) 1 Device 0     order for DME Equipment being ordered: Nebulizer with tubing and pipe (Patient not taking: Reported on 1/24/2019) 1 Device 0     Respiratory Therapy Supplies (AEROBIKA) JESSICA 1 applicator 2 times daily (Patient not taking: Reported  on 1/24/2019) 2 each 0       PAST SURGICAL HISTORY:  Past Surgical History:   Procedure Laterality Date     BIOPSY      Sample taken from back     CARDIAC SURGERY      Stents     COLONOSCOPY  1996    Results were ok     HYSTERECTOMY, PAP NO LONGER INDICATED  1989    ovaries only, no cervix per pt     SURGICAL HISTORY OF -   1995    bunionectomy     SURGICAL HISTORY OF -   10/10/03    cardiac stenting     SURGICAL HISTORY OF -       stent placed in both of her legs for pad     VASCULAR SURGERY      PAD       ALLERGIES  Morphine and Penicillins    FAMILY HX:  Family History   Problem Relation Age of Onset     C.A.D. Mother      Breast Cancer Mother      C.A.D. Father      Diabetes Father      Hypertension Father      C.A.D. Maternal Grandfather      C.A.D. Paternal Grandfather      Neurologic Disorder Brother         epilepsy       SOCIAL HX:  History     Social History     Marital Status: Single     Spouse Name: N/A     Number of Children: 0     Years of Education: 12     Occupational History     telecom assoc      [employer]     Social History Main Topics     Smoking status: Former Smoker -- 0.25 packs/day for 40 years     Types: Cigarettes     Quit date: 09/28/2006     Smokeless tobacco: Not on file     Alcohol Use: Yes      Comment: 2 to 4 beers or mixed drinks weekly     Drug Use: No     Sexual Activity: Not Currently     Other Topics Concern     Parent/Sibling W/ Cabg, Mi Or Angioplasty Before 65f 55m? No     Social History Narrative    12/22/09    Balanced Diet - No    Osteoporosis Prevention Measures - Dairy servings per day: 0-1 and Medication/Supplements (See current meds)    Regular Exercise -  No Describe None    Dental Exam - YES - Date: 12/2009    Eye Exam - YES - Date: 2 years ago    Self Breast Exam - Yes, on a monthly basis    Abuse: Current or Past (Physical, Sexual or Emotional)- No    Do you feel safe in your environment - Yes    Guns stored in the home - No    Sunscreen used -  "Yes    Seatbelts used - Yes    Lipids -  YES - Date: 10/23/08    Glucose -  YES - Date: 10/23/08    Colon Cancer Screening - Colonoscopy 06(date completed)    Hemoccults - YES - Date: 06    Pap Test -  YES - Date: 10/23/08, Pt has Hx of abnormal paps.    Do you have any concerns about STD's -  No    Mammography - YES - Date: 10/23/08    DEXA - YES - Date: 07    Immunizations reviewed and up to date - Yes, Tdap given 10/18/07    Phillpi Loo MA                     VITAL SIGNS:  /76   Pulse 67   Ht 1.575 m (5' 2\")   Wt 93.4 kg (206 lb)   SpO2 96%   BMI 37.68 kg/m     Body mass index is 37.68 kg/m .  Wt Readings from Last 2 Encounters:   19 93.4 kg (206 lb)   19 95.5 kg (210 lb 8 oz)       PHYSICAL EXAM  Porsche Manning is a 64 year old female.in no acute distress.  HEENT: Eyes Nonicteric.  Neck: JVP normal.  Carotids +3/3 bilaterally. Prominent right carotid bruit.  Soft left carotid bruit.   Lungs: CTA.  Cor: RRR. Normal S1 and S2.  No murmur, rub, or gallop.  PMI in Lf 5th ICS.  Abd: Soft, nontender, nondistended.  NABS.  No pulsatile mass.  Extremities: No C/C/E.  Pulses +1/3 symmetric at PT bilaterally.  Doppler signals Rt fem +2 with bruit.  Rt pop +2 RT DP 0, Rt PT +1.  Rt DP  Weak doppler signal.  Rt PT strong Doppler signal.  Lf fem +1 no bruit.   Lf pop +1. Lf DP 0  Lf PT 0.  Lf DP moderate signal.  Lf PT strong signal.  Neuro: Grossly intact.  Psych: A&O x 3.  Skin: No rash.    LABS  Recent Labs   Lab Test 10/25/18  1140 17  1128  11/04/15  1142 07/09/15  1013   CHOL 169 142   < > 141 125   HDL 61 63   < > 71 61   LDL 84 49   < > 48 35   TRIG 122 144   < > 112 145   CHOLHDLRATIO  --   --   --  2.0 2.0    < > = values in this interval not displayed.         EC17  NSR with 1st degree AV block.  WA 0.23  No ST shift, TWI, or Q waves.    PROCEDURES:     CTA Abdomen/Pelvis:  10/24/18  CTA: \"Double barrel shotgun\" kissing aortobiiliac stents extend from the " distal aorta at the level of L3 into their respective common iliac arteries. The right iliac stent starts to the left of the left iliac stent and crosses anteriorly over the left iliac stent into the right iliac artery. The top of the right iliac stent is compressed by the adjacent left iliac stent (series 4, image 37) and the mid right iliac stent is compressed as it crosses anterior to the left iliac stent, but the right iliac stent appears widely patent in the right common iliac artery. Common iliac portions of the bilateral stents are widely patent with symmetric opacification. No large collaterals to suggest significant stenosis.    Diffuse and eccentric atherosclerotic calcified plaques from the aorta through the femoral arteries without more than 50% diameter narrowing.    Multifocal calcified plaques cause less than 50% diameter narrowing through the iliac and femoral arteries. Bilateral common, internal, and external iliac arteries are patent. Bilateral common femoral arteries are patent.     Celiac, superior mesenteric, single right renal, and inferior mesenteric arteries are patent. Calcified plaque at the left renal artery ostium causes less than 50% diameter narrowing.    CT: 12 mm segment 5 hepatic hypodensity was diagnosed as a simple cyst by MRI. Colonic diverticulosis without CT evidence for diverticulitis.    6 x 9 mm left lower lobe pleural-based posterior pulmonary nodule, unchanged from 5/9/2018. Upper lobe nodules were excluded from this study.    Spine degenerative changes are similar in appearance.    IMPRESSION:  1. Aortobiiliac stents. Right iliac stent compressed superiorly by the adjacent left iliac stent. Mid right iliac stent compressed as it crosses anterior to the left iliac stent. Common iliac portions of the stents are widely patent with symmetric opacification. No enlarged collaterals to suggest hemodynamically significant stenosis.    2. Unchanged 6 x 9 mm left lower lobe  pleural-based pulmonary nodule. Upper pole nodules were excluded from this study. Continued follow-up to document two-year stability from the 5/9/2018 CT suggested.    CT angiogram of right and left upper extremities (4/30/2013)  1. Subtotal stenosis of the origin of the left subclavian artery. Remainder of the upper extremity vasculature is patent.   2. 70% stenosis of the right internal carotid artery. Less than 10% stenosis of the left internal carotid artery. Retropharyngeal course of the carotid arteries.  US CAROTID BILAT (1/25/2012)  1. Right side: 50-69% stenosis.  2. Left side: <50% stenosis.   3. Elevated velocity in the left subclavian artery origin with post obstructive waveforms and bidirectional waveforms in the vertebral artery. Findings concerning for subclavian artery stenosis at the origin of the vessel.     REST and EXERCISE CLAUDINE (1/25/2012)  1. Right lower extremity: Resting CLAUDINE 0.93, which is in the borderline category. Decrease in CLAUDINE to 0.80 at 3 minutes post exercise, with recovery of CLAUDINE to 0.86 at 10 minutes.   2. Left lower extremity: Resting CLAUDINE 0.93, which is in the borderline category. Decrease in CLAUDINE to 0.79 at 2 minutes post exercise with recovery back to baseline by 10 minutes post exercise.    Coronary Angiography with PCI (10/10/2003)  1. Pt has a normal L Main and has about 25-50% lesions in the PLAD which are considered not be significant hemodynamically.  2. Pt has mild disease in his Cx  3. RCA had a 70-90% lesion in the Mid segement which was stented - 3.0 x 23 mm Cypher drug eluting stent was placed and post dilated using a 3.0 x 20mm power sail balloon.  Aortgraphy reveals a 90% stenosis at the origin of the right common iliac and a 70% stenosis at the origin of the left common iliac.    NICS (11/4/15):  Impression:  1. Right side: 50-69% stenosis in the proximal ICA, slightly progressed since prior study.  2. Left side: Less than 50% stenosis in the ICA.  3. Persistent  stenosis at the origin of the left subclavian artery. Retrograde flow in the vertebral artery, previously bidirectional  suggestive of worsening subclavian steal.    ASSESSMENT AND PLAN:   1. CAD: Single vessel CAD, dating back to 2003 at which time she had PCI of RCA. Since that time, she has been asymptomatic. We are continuing aspirin and plavix given her history of PAD and CAD including stent thrombosis.  She can reduce the ASA from 325 every day to 81 every day.  She is on a statin.  2. LE PAD: Stable mild claudication s/p bilateral aorto-iliac stenting years ago. ABIs in 2012 showed mild PAD. We're continuing with rehab, there is no claudication with her current exercise, she is Neyda stage 0 - 1.  No change.  I recommended repeat rest and exercise ABIs and ultrasound to assess pelvic arteries and BLE runoff.  PAtient would like to defer to next year but will notify us if claudication worsens.  CT (2018) showed widely patent stents.  3. Carotid disease.  NICS (2016) with <70% disease in her common carotids, L subclavian with >70% stenosis as known before. No change.  Recheck NICS in one year.  4. HTN: BP in left arm is FALSELY low, presumably due to left subclavian artery stenosis. She will continue on Toprol  mg qd and Diltiazem 300 CD qd.  Check ambulatory BP.  Next step would be max Dilt at 360 mg CD every day. We could potentially add on a low dose of ACE-I or ARB, while monitoring renal function.  Alternatively, she may be looking at hydralazine or clonidine as next medication.  5. Left subclavian artery stenosis. There was a 57 mm Hg pressure gradient between the right and left arms.  CT angiogram showed subtotal occlusion of the ostium of the left subclavian artery.   She is asymptomatic and there is no evidence of a subclavian steal phenomenon, no need for intervention at this time, we will continue to monitor  6. HLD:  Repeat lipid panel pending. She is on crestor 40 mg qd  7. Tobacco use:  Counseled.  She would like to quit on her own and does not want to retry nicotine patches today  8. CKD.  I believe the patient had increase in Cr to 1.49 which resulted in Lisinopril being stopped.  Last Cr 0.94 mg/dl.  9. Smoking Cessation.  I discussed this for over 15 minutes.  Plan to continue Wellbutrin and stay clear of Chantix and use Nicotine patches again.      Follow up 1 year    Brayan Uriostegui MD    Divisions of Cardiology  [place], [place]      CC  Patient Care Team:  Danuta Tan PA-C as PCP - General (Physician Assistant)  Danuta Tan PA-C as PCP - Assigned PCP  REFERRING HERBER LONG

## 2019-01-29 NOTE — NURSING NOTE
Chief Complaint   Patient presents with     Follow Up     Vitals were taken and medications were reconciled.    8:45 AM Delfino Montana, Student CMA

## 2019-01-29 NOTE — NURSING NOTE
Diet: Patient instructed regarding a heart healthy diet, including discussion of reduced fat and sodium intake. Patient demonstrated understanding of this information and agreed to call with further questions or concerns.  Med Reconcile: Reviewed and verified all current medications with the patient. The updated medication list was printed and given to the patient.  Return Appointment: Patient given instructions regarding scheduling next clinic visit. Patient demonstrated understanding of this information and agreed to call with further questions or concerns. Needs carotid ultrasound in one year  Medication Change: Patient was educated regarding prescribed medication change, including discussion of the indication, administration, side effects, and when to report to MD or RN. Patient demonstrated understanding of this information and agreed to call with further questions or concerns.  Patient stated she understood all health information given and agreed to call with further questions or concerns.

## 2019-01-29 NOTE — PROGRESS NOTES
CARDIOLOGY CLINIC FOLLOW UP    HPI: Porsche Manning is a 68 year old female, being seen today for recheck of CAD/PAD.   Her risk factors include hypertension, hypercholesteraemia, and tobacco abuse.  Her cardiac hx dates back to [facility] in October 2003 with an acute coronary syndrome. She underwent a coronary angiogram which revealed a single-vessel coronary disease in her right coronary artery with a 90% stenosis in the mid-segment. She underwent successful bare metal stenting in this right coronary artery. During that procedure we discovered a 90% stenosis of the ostium of the right common iliac artery and 70% stenosis of the ostium of the left common iliac artery.   The patient underwent an MRA that revealed no significant renal artery disease and bilateral focal stenosis of both common iliac arteries, which was worse on the right side. There was a good runoff with both lower extremities having significant disease only in the distal anterior tibial artery bilaterally.   Mrs. Manning underwent aortography, peripheral angiography and bilateral aorto-iliac stenting on 4/30/05. She underwent kissing stenting of the aorto-iliac vessels with a Lumidex 8x60 on the right and a Lumidex 10x60 on the left. Following the procedure, the ABIs were 0.88 on the Rt PT, 1.00 on the Rt DP, 1.00 on the Lf DP, and 1.00 on the Lf PT.   In Oct 2006, she presented to [facility] with recurrent claudication and underwent repeat angiography and intervention. The left common iliac artery stent was thrombosed and she was given thrombolytic therapy. She underwent stenting of left common iliac artery with 8 x 60 mm covered stent. She subsequently underwent mechanical embolectomy of left peroneal trunk and placement of infusion catheter in left posterior tibial artery with infusion of thrombolytic therapy (10/20/2006).   She had subsequent ABIs performed in 2012 that showed 0.93 on both legs at rest and 0.86 and 0.79 on the right and  left, respectively, with exercise.   Currently, she denies any CP, MORALES/SOB, PND, orthopnea, LE edema, palpitations, syncope.  She has stable claudication, with absolute claudication distance of 3 blocks. It is primarily in left buttocks.  She is iraj Class 1.  No change in the past year.  She has known subclavian stenosis on the left, she has no evidence of steal phenomenon and asymptomatic (no left arm claudication, no visual disturbance) with this. NICS today with <70% in the carotids, left subclavian > 70% as known before.  No functional limitations.    She resumed tobacco use in the fall of 2012 but QUIT again and has not smoked since March 29, 2013.  She resumed tobacco and has a pattern of quitting and resuming.  She is able to hold off on smoking around her friends who smoke.  However, she has difficulty when alone and continues to have craving.  She is currently not smoking cigarettes, having quit 2.5 weeks ago. She has nicotine patches at home.   She tried Chantix previously and experienced hair loss.   She remains on Wellbutrin.    Her BP has been under good control in the ambulatory setting (i.e. when she donates blood).       PAST MEDICAL HISTORY:  Past Medical History:   Diagnosis Date     Brachial neuritis or radiculitis NOS      Chronic obstructive pulmonary disease, unspecified COPD type (H) 3/8/2016     Coronary artery disease     Doing fine     H/O tobacco use, presenting hazards to health      Hyperlipidemia LDL goal < 100      Hypertension goal BP (blood pressure) < 140/90      Malignant neoplasm of other specified sites of cervix      Old myocardial infarction      Osteopenia      Peripheral vascular disease, unspecified (H)      Type 2 diabetes, HbA1c goal < 7% (H)        CURRENT MEDICATIONS:  Current Outpatient Medications   Medication Sig Dispense Refill     ASPIRIN 325 MG OR TBEC 1 tab po QD (Once per day) 100 3     BREO ELLIPTA 100-25 MCG/INH inhaler TAKE 1 PUFF BY MOUTH EVERY DAY 60  Inhaler 11     buPROPion (WELLBUTRIN SR) 150 MG 12 hr tablet Take 1 tablet (150 mg) by mouth 2 times daily 180 tablet 3     Calcium Carbonate-Vit D-Min (CALCIUM 1200 PO) Take 1,200 mg by mouth daily       Cholecalciferol (VITAMIN D) 1000 UNIT capsule Take 1 capsule by mouth daily. Take one tablet daily       clopidogrel (PLAVIX) 75 MG tablet TAKE 1 TABLET BY MOUTH EVERY DAY 90 tablet 3     diltiazem ER COATED BEADS (CARDIZEM CD/CARTIA XT) 300 MG 24 hr capsule Take 1 capsule (300 mg) by mouth daily 30 capsule 1     metoprolol succinate (TOPROL-XL) 100 MG 24 hr tablet Take 1.5 tablets (150 mg) by mouth daily (Patient taking differently: Take 100 mg by mouth daily ) 90 tablet 3     rosuvastatin (CRESTOR) 40 MG tablet Take 1 tablet (40 mg) by mouth daily 90 tablet 3     tiotropium (SPIRIVA HANDIHALER) 18 MCG inhaled capsule Inhale 1 capsule (18 mcg) into the lungs daily 90 capsule 3     albuterol (PROAIR HFA/PROVENTIL HFA/VENTOLIN HFA) 108 (90 Base) MCG/ACT Inhaler Inhale 2 puffs into the lungs every 6 hours as needed for shortness of breath / dyspnea or wheezing (Patient not taking: Reported on 1/24/2019) 1 Inhaler 3     diltiazem (CARDIZEM CD) 240 MG 24 hr capsule Take 1 capsule (240 mg) by mouth daily (Patient not taking: Reported on 1/29/2019) 90 capsule 3     order for DME Equipment being ordered: Nebulizer (Patient not taking: Reported on 1/24/2019) 1 Device 0     order for DME Equipment being ordered: Nebulizer with tubing and pipe (Patient not taking: Reported on 1/24/2019) 1 Device 0     Respiratory Therapy Supplies (AEROBIKA) JESSICA 1 applicator 2 times daily (Patient not taking: Reported on 1/24/2019) 2 each 0       PAST SURGICAL HISTORY:  Past Surgical History:   Procedure Laterality Date     BIOPSY      Sample taken from back     CARDIAC SURGERY      Stents     COLONOSCOPY  1996    Results were ok     HYSTERECTOMY, PAP NO LONGER INDICATED  1989    ovaries only, no cervix per pt     SURGICAL HISTORY OF -    1995    bunionectomy     SURGICAL HISTORY OF -   10/10/03    cardiac stenting     SURGICAL HISTORY OF -       stent placed in both of her legs for pad     VASCULAR SURGERY      PAD       ALLERGIES  Morphine and Penicillins    FAMILY HX:  Family History   Problem Relation Age of Onset     C.A.D. Mother      Breast Cancer Mother      C.A.D. Father      Diabetes Father      Hypertension Father      C.A.D. Maternal Grandfather      C.A.D. Paternal Grandfather      Neurologic Disorder Brother         epilepsy       SOCIAL HX:  History     Social History     Marital Status: Single     Spouse Name: N/A     Number of Children: 0     Years of Education: 12     Occupational History     telecom assoc      [employer]     Social History Main Topics     Smoking status: Former Smoker -- 0.25 packs/day for 40 years     Types: Cigarettes     Quit date: 09/28/2006     Smokeless tobacco: Not on file     Alcohol Use: Yes      Comment: 2 to 4 beers or mixed drinks weekly     Drug Use: No     Sexual Activity: Not Currently     Other Topics Concern     Parent/Sibling W/ Cabg, Mi Or Angioplasty Before 65f 55m? No     Social History Narrative    12/22/09    Balanced Diet - No    Osteoporosis Prevention Measures - Dairy servings per day: 0-1 and Medication/Supplements (See current meds)    Regular Exercise -  No Describe None    Dental Exam - YES - Date: 12/2009    Eye Exam - YES - Date: 2 years ago    Self Breast Exam - Yes, on a monthly basis    Abuse: Current or Past (Physical, Sexual or Emotional)- No    Do you feel safe in your environment - Yes    Guns stored in the home - No    Sunscreen used - Yes    Seatbelts used - Yes    Lipids -  YES - Date: 10/23/08    Glucose -  YES - Date: 10/23/08    Colon Cancer Screening - Colonoscopy 7/27/06(date completed)    Hemoccults - YES - Date: 7/11/06    Pap Test -  YES - Date: 10/23/08, Pt has Hx of abnormal paps.    Do you have any concerns about STD's -  No    Mammography -  "YES - Date: 10/23/08    DEXA - YES - Date: 07    Immunizations reviewed and up to date - Yes, Tdap given 10/18/07    Phillip Loo MA                       ROS:  Answers for HPI/ROS submitted by the patient on 2019   General Symptoms: No  Skin Symptoms: No  HENT Symptoms: No  EYE SYMPTOMS: No  HEART SYMPTOMS: No  LUNG SYMPTOMS: No  INTESTINAL SYMPTOMS: No  URINARY SYMPTOMS: No  GYNECOLOGIC SYMPTOMS: No  BREAST SYMPTOMS: No  SKELETAL SYMPTOMS: No  BLOOD SYMPTOMS: No  NERVOUS SYSTEM SYMPTOMS: No  MENTAL HEALTH SYMPTOMS: No    VITAL SIGNS:  /76   Pulse 67   Ht 1.575 m (5' 2\")   Wt 93.4 kg (206 lb)   SpO2 96%   BMI 37.68 kg/m    Body mass index is 37.68 kg/m .  Wt Readings from Last 2 Encounters:   19 93.4 kg (206 lb)   19 95.5 kg (210 lb 8 oz)       PHYSICAL EXAM  Porsche Manning is a 64 year old female.in no acute distress.  HEENT: Eyes Nonicteric.  Neck: JVP normal.  Carotids +3/3 bilaterally. Prominent right carotid bruit.  Soft left carotid bruit.   Lungs: CTA.  Cor: RRR. Normal S1 and S2.  No murmur, rub, or gallop.  PMI in Lf 5th ICS.  Abd: Soft, nontender, nondistended.  NABS.  No pulsatile mass.  Extremities: No C/C/E.  Pulses +1/3 symmetric at PT bilaterally.  Doppler signals Rt fem +2 with bruit.  Rt pop +2 RT DP 0, Rt PT +1.  Rt DP  Weak doppler signal.  Rt PT strong Doppler signal.  Lf fem +1 no bruit.   Lf pop +1. Lf DP 0  Lf PT 0.  Lf DP moderate signal.  Lf PT strong signal.  Neuro: Grossly intact.  Psych: A&O x 3.  Skin: No rash.    LABS  Recent Labs   Lab Test 10/25/18  1140 17  1128  11/04/15  1142 07/09/15  1013   CHOL 169 142   < > 141 125   HDL 61 63   < > 71 61   LDL 84 49   < > 48 35   TRIG 122 144   < > 112 145   CHOLHDLRATIO  --   --   --  2.0 2.0    < > = values in this interval not displayed.         EC17  NSR with 1st degree AV block.  IL 0.23  No ST shift, TWI, or Q waves.    PROCEDURES:     CTA Abdomen/Pelvis:  10/24/18  CTA: \"Double " "barrel shotgun\" kissing aortobiiliac stents extend from the distal aorta at the level of L3 into their respective common iliac arteries. The right iliac stent starts to the left of the left iliac stent and crosses anteriorly over the left iliac stent into the right iliac artery. The top of the right iliac stent is compressed by the adjacent left iliac stent (series 4, image 37) and the mid right iliac stent is compressed as it crosses anterior to the left iliac stent, but the right iliac stent appears widely patent in the right common iliac artery. Common iliac portions of the bilateral stents are widely patent with symmetric opacification. No large collaterals to suggest significant stenosis.    Diffuse and eccentric atherosclerotic calcified plaques from the aorta through the femoral arteries without more than 50% diameter narrowing.    Multifocal calcified plaques cause less than 50% diameter narrowing through the iliac and femoral arteries. Bilateral common, internal, and external iliac arteries are patent. Bilateral common femoral arteries are patent.     Celiac, superior mesenteric, single right renal, and inferior mesenteric arteries are patent. Calcified plaque at the left renal artery ostium causes less than 50% diameter narrowing.    CT: 12 mm segment 5 hepatic hypodensity was diagnosed as a simple cyst by MRI. Colonic diverticulosis without CT evidence for diverticulitis.    6 x 9 mm left lower lobe pleural-based posterior pulmonary nodule, unchanged from 5/9/2018. Upper lobe nodules were excluded from this study.    Spine degenerative changes are similar in appearance.    IMPRESSION:  1. Aortobiiliac stents. Right iliac stent compressed superiorly by the adjacent left iliac stent. Mid right iliac stent compressed as it crosses anterior to the left iliac stent. Common iliac portions of the stents are widely patent with symmetric opacification. No enlarged collaterals to suggest hemodynamically significant " stenosis.    2. Unchanged 6 x 9 mm left lower lobe pleural-based pulmonary nodule. Upper pole nodules were excluded from this study. Continued follow-up to document two-year stability from the 5/9/2018 CT suggested.    CT angiogram of right and left upper extremities (4/30/2013)  1. Subtotal stenosis of the origin of the left subclavian artery. Remainder of the upper extremity vasculature is patent.   2. 70% stenosis of the right internal carotid artery. Less than 10% stenosis of the left internal carotid artery. Retropharyngeal course of the carotid arteries.  US CAROTID BILAT (1/25/2012)  1. Right side: 50-69% stenosis.  2. Left side: <50% stenosis.   3. Elevated velocity in the left subclavian artery origin with post obstructive waveforms and bidirectional waveforms in the vertebral artery. Findings concerning for subclavian artery stenosis at the origin of the vessel.     REST and EXERCISE CLAUDINE (1/25/2012)  1. Right lower extremity: Resting CLAUDINE 0.93, which is in the borderline category. Decrease in CLAUDINE to 0.80 at 3 minutes post exercise, with recovery of CLAUDINE to 0.86 at 10 minutes.   2. Left lower extremity: Resting CLAUDINE 0.93, which is in the borderline category. Decrease in CLAUDINE to 0.79 at 2 minutes post exercise with recovery back to baseline by 10 minutes post exercise.    Coronary Angiography with PCI (10/10/2003)  1. Pt has a normal L Main and has about 25-50% lesions in the PLAD which are considered not be significant hemodynamically.  2. Pt has mild disease in his Cx  3. RCA had a 70-90% lesion in the Mid segement which was stented - 3.0 x 23 mm Cypher drug eluting stent was placed and post dilated using a 3.0 x 20mm power sail balloon.  Aortgraphy reveals a 90% stenosis at the origin of the right common iliac and a 70% stenosis at the origin of the left common iliac.    NICS (11/4/15):  Impression:  1. Right side: 50-69% stenosis in the proximal ICA, slightly progressed since prior study.  2. Left side: Less  than 50% stenosis in the ICA.  3. Persistent stenosis at the origin of the left subclavian artery. Retrograde flow in the vertebral artery, previously bidirectional  suggestive of worsening subclavian steal.    ASSESSMENT AND PLAN:   1. CAD: Single vessel CAD, dating back to 2003 at which time she had PCI of RCA. Since that time, she has been asymptomatic. We are continuing aspirin and plavix given her history of PAD and CAD including stent thrombosis.  She can reduce the ASA from 325 every day to 81 every day.  She is on a statin.  2. LE PAD: Stable mild claudication s/p bilateral aorto-iliac stenting years ago. ABIs in 2012 showed mild PAD. We're continuing with rehab, there is no claudication with her current exercise, she is Hendry stage 0 - 1.  No change.  I recommended repeat rest and exercise ABIs and ultrasound to assess pelvic arteries and BLE runoff.  PAtient would like to defer to next year but will notify us if claudication worsens.  CT (2018) showed widely patent stents.  3. Carotid disease.  NICS (2016) with <70% disease in her common carotids, L subclavian with >70% stenosis as known before. No change.  Recheck NICS in one year.  4. HTN: BP in left arm is FALSELY low, presumably due to left subclavian artery stenosis. She will continue on Toprol  mg qd and Diltiazem 300 CD qd.  Check ambulatory BP.  Next step would be max Dilt at 360 mg CD every day. We could potentially add on a low dose of ACE-I or ARB, while monitoring renal function.  Alternatively, she may be looking at hydralazine or clonidine as next medication.  5. Left subclavian artery stenosis. There was a 57 mm Hg pressure gradient between the right and left arms.  CT angiogram showed subtotal occlusion of the ostium of the left subclavian artery.   She is asymptomatic and there is no evidence of a subclavian steal phenomenon, no need for intervention at this time, we will continue to monitor  6. HLD:  Repeat lipid panel pending.  She is on crestor 40 mg qd  7. Tobacco use: Counseled.  She would like to quit on her own and does not want to retry nicotine patches today  8. CKD.  I believe the patient had increase in Cr to 1.49 which resulted in Lisinopril being stopped.  Last Cr 0.94 mg/dl.  9. Smoking Cessation.  I discussed this for over 15 minutes.  Plan to continue Wellbutrin and stay clear of Chantix and use Nicotine patches again.      Follow up 1 year    Brayan Uriostegui MD    Divisions of Cardiology  [facility]  Patient Care Team:  Danuta Tan PA-C as PCP - General (Physician Assistant)  Danuta Tan PA-C as PCP - Assigned PCP  REFERRING HERBER LONG

## 2019-01-29 NOTE — PATIENT INSTRUCTIONS
Patient Instructions:  It was a pleasure to see you in the cardiology clinic today.      If you have any questions, call  Minerva Chawla RN, at [phone].  Press Option #1 for the [facility], and then press Option #3 for nursing.  We are encouraging the use of MyChart to communicate with your HealthCare Provider    Note the new medications: we are increasing the diltiazem to 360 mg by mouth everyday  Stop the following medications: none    You need to decrease the amount of salt intake  BP should be < 140/90    The results from today include: carotid ultrasound in one year  Please follow up with Dr. Brayan Uriostegui in one year      If you have an urgent need after hours (8:00 am to 4:30 pm) please call [phone] and ask for the cardiology fellow on call.

## 2019-02-11 ENCOUNTER — TELEPHONE (OUTPATIENT)
Dept: FAMILY MEDICINE | Facility: CLINIC | Age: 69
End: 2019-02-11

## 2019-02-27 ENCOUNTER — ALLIED HEALTH/NURSE VISIT (OUTPATIENT)
Dept: NURSING | Facility: CLINIC | Age: 69
End: 2019-02-27
Payer: COMMERCIAL

## 2019-02-27 VITALS — SYSTOLIC BLOOD PRESSURE: 174 MMHG | DIASTOLIC BLOOD PRESSURE: 84 MMHG | HEART RATE: 69 BPM | OXYGEN SATURATION: 94 %

## 2019-02-27 DIAGNOSIS — Z01.30 BLOOD PRESSURE CHECK: Primary | ICD-10-CM

## 2019-02-27 PROCEDURE — 99207 ZZC NO CHARGE NURSE ONLY: CPT

## 2019-02-27 NOTE — PROGRESS NOTES
Spoke with Kelle RN nurse about patient blood pressure. Nurse advise patient to follow up with her provider, patient refused scheduling appointment at this time.     Theodora Angulo on 2/27/2019 at 10:28 AM

## 2019-04-09 ENCOUNTER — TELEPHONE (OUTPATIENT)
Dept: FAMILY MEDICINE | Facility: CLINIC | Age: 69
End: 2019-04-09

## 2019-04-12 ENCOUNTER — MYC MEDICAL ADVICE (OUTPATIENT)
Dept: CARDIOLOGY | Facility: CLINIC | Age: 69
End: 2019-04-12

## 2019-04-12 DIAGNOSIS — I10 HYPERTENSION GOAL BP (BLOOD PRESSURE) < 140/90: Primary | ICD-10-CM

## 2019-04-15 RX ORDER — LISINOPRIL 40 MG/1
40 TABLET ORAL DAILY
COMMUNITY
Start: 2019-04-15 | End: 2019-10-28

## 2019-04-15 NOTE — TELEPHONE ENCOUNTER
BP's remain elevated outside of clinic. Patient still has some lisinopril at home.    Date: 4/15/2019    Time of Call: 11:58 AM     Diagnosis:  hypertension     [ TORB ] Ordering provider: Dr. Brayan Uriostegui   Order: Lisinopril 40 Mg by mouth everyday  BMP in two weeks     Order received by: Minerva Chawla RN     Follow-up/additional notes: patient understands and agrees to the plan.    OV note 1/29/2019    4. HTN: BP in left arm is FALSELY low, presumably due to left subclavian artery stenosis. She will continue on Toprol  mg qd and Diltiazem 300 CD qd.  Check ambulatory BP.  Next step would be max Dilt at 360 mg CD every day. We could potentially add on a low dose of ACE-I or ARB, while monitoring renal function.  Alternatively, she may be looking at hydralazine or clonidine as next medication.

## 2019-04-18 ENCOUNTER — TRANSFERRED RECORDS (OUTPATIENT)
Dept: HEALTH INFORMATION MANAGEMENT | Facility: CLINIC | Age: 69
End: 2019-04-18

## 2019-04-26 ENCOUNTER — TELEPHONE (OUTPATIENT)
Dept: CARDIOLOGY | Facility: CLINIC | Age: 69
End: 2019-04-26

## 2019-05-02 ENCOUNTER — ALLIED HEALTH/NURSE VISIT (OUTPATIENT)
Dept: NURSING | Facility: CLINIC | Age: 69
End: 2019-05-02
Payer: COMMERCIAL

## 2019-05-02 VITALS — DIASTOLIC BLOOD PRESSURE: 71 MMHG | SYSTOLIC BLOOD PRESSURE: 159 MMHG | HEART RATE: 66 BPM

## 2019-05-02 DIAGNOSIS — Z01.30 BP CHECK: Primary | ICD-10-CM

## 2019-05-02 DIAGNOSIS — I10 HYPERTENSION GOAL BP (BLOOD PRESSURE) < 140/90: ICD-10-CM

## 2019-05-02 DIAGNOSIS — E11.29 TYPE 2 DIABETES MELLITUS WITH OTHER DIABETIC KIDNEY COMPLICATION, WITHOUT LONG-TERM CURRENT USE OF INSULIN (H): ICD-10-CM

## 2019-05-02 LAB
ANION GAP SERPL CALCULATED.3IONS-SCNC: 6 MMOL/L (ref 3–14)
BUN SERPL-MCNC: 14 MG/DL (ref 7–30)
CALCIUM SERPL-MCNC: 9.3 MG/DL (ref 8.5–10.1)
CHLORIDE SERPL-SCNC: 105 MMOL/L (ref 94–109)
CO2 SERPL-SCNC: 25 MMOL/L (ref 20–32)
CREAT SERPL-MCNC: 1.07 MG/DL (ref 0.52–1.04)
GFR SERPL CREATININE-BSD FRML MDRD: 53 ML/MIN/{1.73_M2}
GLUCOSE SERPL-MCNC: 160 MG/DL (ref 70–99)
HBA1C MFR BLD: 6.9 % (ref 0–5.6)
POTASSIUM SERPL-SCNC: 4.3 MMOL/L (ref 3.4–5.3)
SODIUM SERPL-SCNC: 136 MMOL/L (ref 133–144)

## 2019-05-02 PROCEDURE — 83036 HEMOGLOBIN GLYCOSYLATED A1C: CPT | Performed by: PHYSICIAN ASSISTANT

## 2019-05-02 PROCEDURE — 80048 BASIC METABOLIC PNL TOTAL CA: CPT | Performed by: FAMILY MEDICINE

## 2019-05-02 PROCEDURE — 36415 COLL VENOUS BLD VENIPUNCTURE: CPT | Performed by: FAMILY MEDICINE

## 2019-05-02 PROCEDURE — 99207 ZZC NO CHARGE NURSE ONLY: CPT

## 2019-05-02 NOTE — NURSING NOTE
Porsche Manning is a 69 year old patient who comes in today for a Blood Pressure check.  Initial BP:  /71 (BP Location: Right arm, Patient Position: Chair, Cuff Size: Adult Large)   Pulse 66      66  Disposition: BP elevated.  Triage RN notified, patient asked to wait.    Tessy Chen MA

## 2019-05-02 NOTE — RESULT ENCOUNTER NOTE
"Daisy Morrell  Your attached labs are officially in the diabetic range.  Please make an appointment in office to discuss this further.    Please contact the office with any questions or concerns.    Danuta Martinez \"Eduard\" PHILL Tan    "

## 2019-05-02 NOTE — PROGRESS NOTES
Discussed with patient and Eduard Tan.  As Cardiology is adjusting patient's meds. patient will follow-up with cards.  Patient is also having labs today that she will need to follow-up on with cards.  Saundra Gomez RN

## 2019-06-10 ENCOUNTER — TELEPHONE (OUTPATIENT)
Dept: FAMILY MEDICINE | Facility: CLINIC | Age: 69
End: 2019-06-10

## 2019-06-10 DIAGNOSIS — J44.1 COPD EXACERBATION (H): Primary | ICD-10-CM

## 2019-06-10 NOTE — TELEPHONE ENCOUNTER
This may have been sent to wrong pool. Please route to refill pool for Danuta Villarreal PA-C.    Anya Stratton RN  [facility]

## 2019-06-11 RX ORDER — ALBUTEROL SULFATE 90 UG/1
2 AEROSOL, METERED RESPIRATORY (INHALATION) EVERY 6 HOURS
Qty: 1 INHALER | Refills: 3 | Status: SHIPPED | OUTPATIENT
Start: 2019-06-11 | End: 2021-02-22

## 2019-06-27 ENCOUNTER — OFFICE VISIT (OUTPATIENT)
Dept: PULMONOLOGY | Facility: CLINIC | Age: 69
End: 2019-06-27
Attending: INTERNAL MEDICINE
Payer: COMMERCIAL

## 2019-06-27 VITALS
BODY MASS INDEX: 37.68 KG/M2 | HEART RATE: 76 BPM | HEIGHT: 62 IN | RESPIRATION RATE: 17 BRPM | OXYGEN SATURATION: 94 % | SYSTOLIC BLOOD PRESSURE: 172 MMHG | DIASTOLIC BLOOD PRESSURE: 81 MMHG

## 2019-06-27 DIAGNOSIS — J44.9 CHRONIC OBSTRUCTIVE PULMONARY DISEASE, UNSPECIFIED COPD TYPE (H): Primary | ICD-10-CM

## 2019-06-27 DIAGNOSIS — Z72.0 TOBACCO ABUSE: ICD-10-CM

## 2019-06-27 DIAGNOSIS — R91.8 PULMONARY NODULES: ICD-10-CM

## 2019-06-27 DIAGNOSIS — Z87.891 PERSONAL HISTORY OF NICOTINE DEPENDENCE: ICD-10-CM

## 2019-06-27 PROCEDURE — G0463 HOSPITAL OUTPT CLINIC VISIT: HCPCS | Mod: ZF

## 2019-06-27 RX ORDER — ASPIRIN 81 MG/1
81 TABLET ORAL DAILY
COMMUNITY

## 2019-06-27 ASSESSMENT — PAIN SCALES - GENERAL: PAINLEVEL: NO PAIN (0)

## 2019-06-27 NOTE — NURSING NOTE
Chief Complaint   Patient presents with     RECHECK     COPD    Medications reviewed and vital signs taken.   Praveen Camacho CMA

## 2019-06-27 NOTE — PROGRESS NOTES
Pulmonary Clinic Return Visit    History of Present Illness  Ms. Manning is a 69-year-old female with a history of tobacco abuse and COPD who presents to pulmonary clinic today for follow up of COPD and pulmonary nodule.  To briefly review, she has been maintained on Breo-Ellipta, Spiriva, and albuterol as needed.  When we last visited in September she was doing well and pulmonary nodules had remained stable.    Ms. Manning returns to clinic today doing well.  She denies any new or worsening shortness of breath.  She continues to have an occasional dry cough.  She denies any hospitalizations, flares of COPD, or episodes of pneumonia since we last visited.  She has an albuterol inhaler available for as needed use but has not needed to use this for at least the last 3 months.  She further denies fevers, chills, hemoptysis, or significant fluctuations in weight.  She tells me that recently sold her parents old [employer] near [facility] and this was quite stressful for her.  The house had been in the family for over 60 years.    She has smoked 2 packs per day for approximately 40 years although notes that she has quit off and on in between.  With the assistance of Chantix, she quit all together in summer 2018 but then stopped Chantix due to her hair falling out and has since relapsed.  She continues to smoke about 4-5 cigarettes per day.  She remains motivated to quit and has had some success with nicotine patches.        Review of Systems:  10 of 14 systems reviewed and are negative unless otherwise stated in HPI.    Past Medical History:   Diagnosis Date     Brachial neuritis or radiculitis NOS      Chronic obstructive pulmonary disease, unspecified COPD type (H) 3/8/2016     Coronary artery disease     Doing fine     H/O tobacco use, presenting hazards to health      Hyperlipidemia LDL goal < 100      Hypertension goal BP (blood pressure) < 140/90      Malignant neoplasm of other specified sites of cervix       Old myocardial infarction      Osteopenia      Peripheral vascular disease, unspecified (H)      Type 2 diabetes, HbA1c goal < 7% (H)        Past Surgical History:   Procedure Laterality Date     BIOPSY      Sample taken from back     CARDIAC SURGERY      Stents     COLONOSCOPY  1996    Results were ok     HYSTERECTOMY, PAP NO LONGER INDICATED  1989    ovaries only, no cervix per pt     SURGICAL HISTORY OF -   1995    bunionectomy     SURGICAL HISTORY OF -   10/10/03    cardiac stenting     SURGICAL HISTORY OF -     stent placed in both of her legs for pad     VASCULAR SURGERY      PAD       Family History   Problem Relation Age of Onset     C.A.D. Mother      Breast Cancer Mother      C.A.D. Father      Diabetes Father      Hypertension Father      C.A.D. Maternal Grandfather      C.A.JOE. 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     Social History Main Topics     Smoking status: Former Smoker     Packs/day: 0.50     Years: 40.00     Types: Cigarettes     Quit date: 4/25/2018     Smokeless tobacco: Never Used     Alcohol use Yes      Comment: 2 to 4 beers or mixed drinks weekly     Drug use: No     Sexual activity: No     Other Topics Concern     Parent/Sibling W/ Cabg, Mi Or Angioplasty Before 65f 55m? No     Social History Narrative    12/22/09    Balanced Diet - No    Osteoporosis Prevention Measures - Dairy servings per day: 0-1 and Medication/Supplements (See current meds)    Regular Exercise -  No Describe None    Dental Exam - YES - Date: 12/2009    Eye Exam - YES - Date: 2 years ago    Self Breast Exam - Yes, on a monthly basis    Abuse: Current or Past (Physical, Sexual or Emotional)- No    Do you feel safe in your environment - Yes    Guns stored in the  home - No    Sunscreen used - Yes    Seatbelts used - Yes    Lipids -  YES - Date: 10/23/08    Glucose -  YES - Date: 10/23/08    Colon Cancer Screening - Colonoscopy 7/27/06(date completed)    Hemoccults - YES - Date: 7/11/06    Pap Test -  YES - Date: 10/23/08, Pt has Hx of abnormal paps.    Do you have any concerns about STD's -  No    Mammography - YES - Date: 10/23/08    DEXA - YES - Date: 11/29/07    Immunizations reviewed and up to date - Yes, Tdap given 10/18/07    Phillip Loo MA                         Allergies   Allergen Reactions     Morphine Nausea and Vomiting     Penicillins Nausea and Vomiting         Current Outpatient Medications:      albuterol (PROAIR HFA/PROVENTIL HFA/VENTOLIN HFA) 108 (90 Base) MCG/ACT inhaler, Inhale 2 puffs into the lungs every 6 hours (Patient taking differently: Inhale 2 puffs into the lungs every 4 hours as needed ), Disp: 1 Inhaler, Rfl: 3     aspirin 81 MG EC tablet, Take 81 mg by mouth daily, Disp: , Rfl:      BREO ELLIPTA 100-25 MCG/INH inhaler, TAKE 1 PUFF BY MOUTH EVERY DAY, Disp: 60 Inhaler, Rfl: 11     Calcium Carbonate-Vit D-Min (CALCIUM 1200 PO), Take 1,200 mg by mouth daily, Disp: , Rfl:      Cholecalciferol (VITAMIN D) 1000 UNIT capsule, Take 1 capsule by mouth daily. Take one tablet daily, Disp: , Rfl:      clopidogrel (PLAVIX) 75 MG tablet, TAKE 1 TABLET BY MOUTH EVERY DAY, Disp: 90 tablet, Rfl: 3     diltiazem ER COATED BEADS (CARDIZEM CD) 360 MG 24 hr capsule, Take 1 capsule (360 mg) by mouth daily, Disp: 90 capsule, Rfl: 3     metoprolol succinate ER (TOPROL-XL) 100 MG 24 hr tablet, Take 1.5 tablets (150 mg) by mouth daily, Disp: 90 tablet, Rfl: 3     rosuvastatin (CRESTOR) 40 MG tablet, Take 1 tablet (40 mg) by mouth daily, Disp: 90 tablet, Rfl: 3     tiotropium (SPIRIVA HANDIHALER) 18 MCG inhaled capsule, Inhale 1 capsule (18 mcg) into the lungs daily, Disp: 90 capsule, Rfl: 3     ASPIRIN 325 MG OR TBEC, 1 tab po QD (Once per day) (Patient not  "taking: No sig reported), Disp: 100, Rfl: 3     buPROPion (WELLBUTRIN SR) 150 MG 12 hr tablet, Take 1 tablet (150 mg) by mouth 2 times daily (Patient not taking: Reported on 6/27/2019), Disp: 180 tablet, Rfl: 3     lisinopril (PRINIVIL/ZESTRIL) 40 MG tablet, Take 1 tablet (40 mg) by mouth daily, Disp: , Rfl:       Physical Exam:  /81   Pulse 76   Resp 17   Ht 1.575 m (5' 2\")   SpO2 94%   BMI 37.68 kg/m    GENERAL: Well developed, well nourished, alert, and in no apparent distress.  HEENT: Normocephalic, atraumatic. PERRL, EOMI. Oral mucosa is moist. No perioral cyanosis.  NECK: supple, no masses, no thyromegaly.  RESP:  Normal respiratory effort. CTAB without rales, wheezes or rhonchi.  No cyanosis or clubbing.  CV: Normal S1, S2, regular rhythm, normal rate. No murmur.  No LE edema.   ABDOMEN:  Soft, non-tender, non-distended.   SKIN: warm and dry. No rash.  NEURO: AAOx3.  Normal gait.  No focal neuro deficits.  PSYCH: mentation appears normal. and affect normal/bright    Results:  PFTs: none today.    Imaging (personally reviewed in clinic today):  CT Chest 9/20/2018:  1. Continued improvement of upper lobe predominant tree-in-bud nodularity with subtle residual compared to 6/11/2018, likely representing improving infectious/inflammatory etiology.  2. Scattered mosaic attenuation which can be seen with chronic small vessel/small airway disease.  3. Small pulmonary nodules including 9 mm pleural-based left lower lobe nodule, stable from 5/9/2018. Continued follow-up in 15-21 months per Fleischner Society criteria to document two-year stability.  4. Heavy coronary artery calcifications.  5. Layering biliary sludge and punctate nonobstructing left renal stone.  6. Hypodensity in the dome of the liver, consider MR in further evaluation.        Assessment and Plan:   Porsche Manning is a 69 year old female with a history of COPD who presents to pulmonary clinic today for follow up of COPD.  Since we last " visited she has done well and denies any new or worsening respiratory symptoms or problems with recurrent flares of COPD.  At this time, I think it would be reasonable to continue her on her current regimen which includes Spiriva, Breo, and albuterol as needed.  We again discussed the importance of smoking cessation.  Pharmacologic cessation therapies were offered but she does not want to take Chantix since it caused her hair to fall out before.  She would prefer to use nicotine patches, but can buy these cheaper with coupons on her local drug store.  She will keep working towards this goal.  In regards to her pulmonary nodules, there were stable on her last CT from September.  She continues to qualify for annual surveillance and we will plan to repeat CT to assess nodule stability in conjunction with her RV to clinic.  The above findings and plan were discussed with Ms. Manning at length. Questions and concerns were answered to her satisfaction.  She was provided with my contact information should new questions or concerns arise in the interim.  she should return to clinic in 6 months for follow up with CT chest and eric/DL.  She is up to date on prevnar (2015) and pneumovax (2017).      Yanique Lorenzo MD  Pulmonary and Critical Care Medicine    The above note was dictated using voice recognition software and may include typographical errors. Please contact the author for any clarifications.

## 2019-06-27 NOTE — LETTER
6/27/2019       RE: Porsche Manning  [address]  [place] [address]     Dear Colleague,    Thank you for referring your patient, Porsche Manning, to the [facility] at [facility]. Please see a copy of my visit note below.    Pulmonary Clinic Return Visit    History of Present Illness  Ms. Manning is a 69-year-old female with a history of tobacco abuse and COPD who presents to pulmonary clinic today for follow up of COPD and pulmonary nodule.  To briefly review, she has been maintained on Breo-Ellipta, Spiriva, and albuterol as needed.  When we last visited in September she was doing well and pulmonary nodules had remained stable.    Ms. Manning returns to clinic today doing well.  She denies any new or worsening shortness of breath.  She continues to have an occasional dry cough.  She denies any hospitalizations, flares of COPD, or episodes of pneumonia since we last visited.  She has an albuterol inhaler available for as needed use but has not needed to use this for at least the last 3 months.  She further denies fevers, chills, hemoptysis, or significant fluctuations in weight.  She tells me that recently sold her parents old [employer] near [facility] and this was quite stressful for her.  The house had been in the family for over 60 years.    She has smoked 2 packs per day for approximately 40 years although notes that she has quit off and on in between.  With the assistance of Chantix, she quit all together in summer 2018 but then stopped Chantix due to her hair falling out and has since relapsed.  She continues to smoke about 4-5 cigarettes per day.  She remains motivated to quit and has had some success with nicotine patches.        Review of Systems:  10 of 14 systems reviewed and are negative unless otherwise stated in HPI.    Past Medical History:   Diagnosis Date     Brachial neuritis or radiculitis NOS       Chronic obstructive pulmonary disease, unspecified COPD type (H) 3/8/2016     Coronary artery disease     Doing fine     H/O tobacco use, presenting hazards to health      Hyperlipidemia LDL goal < 100      Hypertension goal BP (blood pressure) < 140/90      Malignant neoplasm of other specified sites of cervix      Old myocardial infarction      Osteopenia      Peripheral vascular disease, unspecified (H)      Type 2 diabetes, HbA1c goal < 7% (H)        Past Surgical History:   Procedure Laterality Date     BIOPSY      Sample taken from back     CARDIAC SURGERY      Stents     COLONOSCOPY  1996    Results were ok     HYSTERECTOMY, PAP NO LONGER INDICATED  1989    ovaries only, no cervix per pt     SURGICAL HISTORY OF -   1995    bunionectomy     SURGICAL HISTORY OF -   10/10/03    cardiac stenting     SURGICAL HISTORY OF -     stent placed in both of her legs for pad     VASCULAR SURGERY      PAD       Family History   Problem Relation Age of Onset     C.A.D. Mother      Breast Cancer Mother      C.ACHERIE. Father      Diabetes Father      Hypertension Father      C.A.JOE. Maternal Grandfather      C.A.JOE. Paternal Grandfather      Neurologic Disorder Brother         epilepsy       Social History     Social History     Marital status: Single     Spouse name: N/A     Number of children: 0     Years of education: 12     Occupational History     [employer]      [employer]     Social History Main Topics     Smoking status: Former Smoker     Packs/day: 0.50     Years: 40.00     Types: Cigarettes     Quit date: 4/25/2018     Smokeless tobacco: Never Used     Alcohol use Yes      Comment: 2 to 4 beers or mixed drinks weekly     Drug use: No     Sexual activity: No     Other Topics Concern     Parent/Sibling W/ Cabg, Mi Or Angioplasty Before 65f 55m? No     Social History Narrative    12/22/09    Balanced Diet - No    Osteoporosis Prevention Measures - Dairy servings per day: 0-1 and Medication/Supplements  (See current meds)    Regular Exercise -  No Describe None    Dental Exam - YES - Date: 12/2009    Eye Exam - YES - Date: 2 years ago    Self Breast Exam - Yes, on a monthly basis    Abuse: Current or Past (Physical, Sexual or Emotional)- No    Do you feel safe in your environment - Yes    Guns stored in the home - No    Sunscreen used - Yes    Seatbelts used - Yes    Lipids -  YES - Date: 10/23/08    Glucose -  YES - Date: 10/23/08    Colon Cancer Screening - Colonoscopy 7/27/06(date completed)    Hemoccults - YES - Date: 7/11/06    Pap Test -  YES - Date: 10/23/08, Pt has Hx of abnormal paps.    Do you have any concerns about STD's -  No    Mammography - YES - Date: 10/23/08    DEXA - YES - Date: 11/29/07    Immunizations reviewed and up to date - Yes, Tdap given 10/18/07    Phillip Loo MA                         Allergies   Allergen Reactions     Morphine Nausea and Vomiting     Penicillins Nausea and Vomiting         Current Outpatient Medications:      albuterol (PROAIR HFA/PROVENTIL HFA/VENTOLIN HFA) 108 (90 Base) MCG/ACT inhaler, Inhale 2 puffs into the lungs every 6 hours (Patient taking differently: Inhale 2 puffs into the lungs every 4 hours as needed ), Disp: 1 Inhaler, Rfl: 3     aspirin 81 MG EC tablet, Take 81 mg by mouth daily, Disp: , Rfl:      BREO ELLIPTA 100-25 MCG/INH inhaler, TAKE 1 PUFF BY MOUTH EVERY DAY, Disp: 60 Inhaler, Rfl: 11     Calcium Carbonate-Vit D-Min (CALCIUM 1200 PO), Take 1,200 mg by mouth daily, Disp: , Rfl:      Cholecalciferol (VITAMIN D) 1000 UNIT capsule, Take 1 capsule by mouth daily. Take one tablet daily, Disp: , Rfl:      clopidogrel (PLAVIX) 75 MG tablet, TAKE 1 TABLET BY MOUTH EVERY DAY, Disp: 90 tablet, Rfl: 3     diltiazem ER COATED BEADS (CARDIZEM CD) 360 MG 24 hr capsule, Take 1 capsule (360 mg) by mouth daily, Disp: 90 capsule, Rfl: 3     metoprolol succinate ER (TOPROL-XL) 100 MG 24 hr tablet, Take 1.5 tablets (150 mg) by mouth daily, Disp: 90 tablet, Rfl:  "3     rosuvastatin (CRESTOR) 40 MG tablet, Take 1 tablet (40 mg) by mouth daily, Disp: 90 tablet, Rfl: 3     tiotropium (SPIRIVA HANDIHALER) 18 MCG inhaled capsule, Inhale 1 capsule (18 mcg) into the lungs daily, Disp: 90 capsule, Rfl: 3     ASPIRIN 325 MG OR TBEC, 1 tab po QD (Once per day) (Patient not taking: No sig reported), Disp: 100, Rfl: 3     buPROPion (WELLBUTRIN SR) 150 MG 12 hr tablet, Take 1 tablet (150 mg) by mouth 2 times daily (Patient not taking: Reported on 6/27/2019), Disp: 180 tablet, Rfl: 3     lisinopril (PRINIVIL/ZESTRIL) 40 MG tablet, Take 1 tablet (40 mg) by mouth daily, Disp: , Rfl:       Physical Exam:  /81   Pulse 76   Resp 17   Ht 1.575 m (5' 2\")   SpO2 94%   BMI 37.68 kg/m     GENERAL: Well developed, well nourished, alert, and in no apparent distress.  HEENT: Normocephalic, atraumatic. PERRL, EOMI. Oral mucosa is moist. No perioral cyanosis.  NECK: supple, no masses, no thyromegaly.  RESP:  Normal respiratory effort. CTAB without rales, wheezes or rhonchi.  No cyanosis or clubbing.  CV: Normal S1, S2, regular rhythm, normal rate. No murmur.  No LE edema.   ABDOMEN:  Soft, non-tender, non-distended.   SKIN: warm and dry. No rash.  NEURO: AAOx3.  Normal gait.  No focal neuro deficits.  PSYCH: mentation appears normal. and affect normal/bright    Results:  PFTs: none today.    Imaging (personally reviewed in clinic today):  CT Chest 9/20/2018:  1. Continued improvement of upper lobe predominant tree-in-bud nodularity with subtle residual compared to 6/11/2018, likely representing improving infectious/inflammatory etiology.  2. Scattered mosaic attenuation which can be seen with chronic small vessel/small airway disease.  3. Small pulmonary nodules including 9 mm pleural-based left lower lobe nodule, stable from 5/9/2018. Continued follow-up in 15-21 months per Fleischner Society criteria to document two-year stability.  4. Heavy coronary artery calcifications.  5. Layering " biliary sludge and punctate nonobstructing left renal stone.  6. Hypodensity in the dome of the liver, consider MR in further evaluation.        Assessment and Plan:   Porsche Manning is a 69 year old female with a history of COPD who presents to pulmonary clinic today for follow up of COPD.  Since we last visited she has done well and denies any new or worsening respiratory symptoms or problems with recurrent flares of COPD.  At this time, I think it would be reasonable to continue her on her current regimen which includes Spiriva, Breo, and albuterol as needed.  We again discussed the importance of smoking cessation.  Pharmacologic cessation therapies were offered but she does not want to take Chantix since it caused her hair to fall out before.  She would prefer to use nicotine patches, but can buy these cheaper with coupons on her local drug store.  She will keep working towards this goal.  In regards to her pulmonary nodules, there were stable on her last CT from September.  She continues to qualify for annual surveillance and we will plan to repeat CT to assess nodule stability in conjunction with her RV to clinic.  The above findings and plan were discussed with Ms. Manning at length. Questions and concerns were answered to her satisfaction.  She was provided with my contact information should new questions or concerns arise in the interim.  she should return to clinic in 6 months for follow up with CT chest and eric/DL.  She is up to date on prevnar (2015) and pneumovax (2017).      Yanique Lorenzo MD  Pulmonary and Critical Care Medicine    The above note was dictated using voice recognition software and may include typographical errors. Please contact the author for any clarifications.

## 2019-08-29 ENCOUNTER — ANCILLARY PROCEDURE (OUTPATIENT)
Dept: MAMMOGRAPHY | Facility: CLINIC | Age: 69
End: 2019-08-29
Attending: PHYSICIAN ASSISTANT
Payer: COMMERCIAL

## 2019-08-29 DIAGNOSIS — Z12.31 VISIT FOR SCREENING MAMMOGRAM: ICD-10-CM

## 2019-08-29 PROCEDURE — 77067 SCR MAMMO BI INCL CAD: CPT

## 2019-09-03 DIAGNOSIS — I73.9 PERIPHERAL VASCULAR DISEASE, UNSPECIFIED (H): ICD-10-CM

## 2019-09-06 RX ORDER — CLOPIDOGREL BISULFATE 75 MG/1
75 TABLET ORAL DAILY
Qty: 90 TABLET | Refills: 1 | Status: SHIPPED | OUTPATIENT
Start: 2019-09-06 | End: 2020-03-06

## 2019-09-28 ENCOUNTER — HEALTH MAINTENANCE LETTER (OUTPATIENT)
Age: 69
End: 2019-09-28

## 2019-10-17 ASSESSMENT — ENCOUNTER SYMPTOMS
DIARRHEA: 0
NAUSEA: 0
DYSURIA: 0
HEADACHES: 0
PARESTHESIAS: 0
DIZZINESS: 0
MYALGIAS: 0
PALPITATIONS: 0
COUGH: 0
SORE THROAT: 0
FREQUENCY: 0
WEAKNESS: 0
JOINT SWELLING: 0
ABDOMINAL PAIN: 0
NERVOUS/ANXIOUS: 0
EYE PAIN: 0
ARTHRALGIAS: 0
CONSTIPATION: 0
FEVER: 0
BREAST MASS: 0
HEMATURIA: 0
HEARTBURN: 0
CHILLS: 0
HEMATOCHEZIA: 0
SHORTNESS OF BREATH: 0

## 2019-10-17 ASSESSMENT — ACTIVITIES OF DAILY LIVING (ADL): CURRENT_FUNCTION: NO ASSISTANCE NEEDED

## 2019-10-22 ENCOUNTER — OFFICE VISIT (OUTPATIENT)
Dept: FAMILY MEDICINE | Facility: CLINIC | Age: 69
End: 2019-10-22
Payer: COMMERCIAL

## 2019-10-22 VITALS
TEMPERATURE: 97.7 F | RESPIRATION RATE: 17 BRPM | SYSTOLIC BLOOD PRESSURE: 136 MMHG | HEIGHT: 62 IN | WEIGHT: 195 LBS | BODY MASS INDEX: 35.88 KG/M2 | OXYGEN SATURATION: 97 % | DIASTOLIC BLOOD PRESSURE: 66 MMHG | HEART RATE: 76 BPM

## 2019-10-22 DIAGNOSIS — I10 HYPERTENSION GOAL BP (BLOOD PRESSURE) < 140/90: ICD-10-CM

## 2019-10-22 DIAGNOSIS — J44.9 CHRONIC OBSTRUCTIVE PULMONARY DISEASE, UNSPECIFIED COPD TYPE (H): ICD-10-CM

## 2019-10-22 DIAGNOSIS — Z00.00 ENCOUNTER FOR MEDICARE ANNUAL WELLNESS EXAM: Primary | ICD-10-CM

## 2019-10-22 DIAGNOSIS — R06.09 DYSPNEA ON EXERTION: ICD-10-CM

## 2019-10-22 DIAGNOSIS — Z23 NEED FOR PROPHYLACTIC VACCINATION AND INOCULATION AGAINST INFLUENZA: ICD-10-CM

## 2019-10-22 DIAGNOSIS — E11.22 TYPE 2 DIABETES MELLITUS WITH STAGE 3 CHRONIC KIDNEY DISEASE, WITHOUT LONG-TERM CURRENT USE OF INSULIN (H): ICD-10-CM

## 2019-10-22 DIAGNOSIS — N18.30 TYPE 2 DIABETES MELLITUS WITH STAGE 3 CHRONIC KIDNEY DISEASE, WITHOUT LONG-TERM CURRENT USE OF INSULIN (H): ICD-10-CM

## 2019-10-22 LAB
ALBUMIN SERPL-MCNC: 3.7 G/DL (ref 3.4–5)
ALP SERPL-CCNC: 120 U/L (ref 40–150)
ALT SERPL W P-5'-P-CCNC: 18 U/L (ref 0–50)
ANION GAP SERPL CALCULATED.3IONS-SCNC: 9 MMOL/L (ref 3–14)
AST SERPL W P-5'-P-CCNC: 9 U/L (ref 0–45)
BASOPHILS # BLD AUTO: 0 10E9/L (ref 0–0.2)
BASOPHILS NFR BLD AUTO: 0.5 %
BILIRUB SERPL-MCNC: 0.3 MG/DL (ref 0.2–1.3)
BUN SERPL-MCNC: 11 MG/DL (ref 7–30)
CALCIUM SERPL-MCNC: 9.8 MG/DL (ref 8.5–10.1)
CHLORIDE SERPL-SCNC: 101 MMOL/L (ref 94–109)
CHOLEST SERPL-MCNC: 159 MG/DL
CO2 SERPL-SCNC: 23 MMOL/L (ref 20–32)
CREAT SERPL-MCNC: 1.1 MG/DL (ref 0.52–1.04)
CREAT UR-MCNC: 22 MG/DL
DIFFERENTIAL METHOD BLD: NORMAL
EOSINOPHIL # BLD AUTO: 0.4 10E9/L (ref 0–0.7)
EOSINOPHIL NFR BLD AUTO: 5.1 %
ERYTHROCYTE [DISTWIDTH] IN BLOOD BY AUTOMATED COUNT: 15 % (ref 10–15)
GFR SERPL CREATININE-BSD FRML MDRD: 51 ML/MIN/{1.73_M2}
GLUCOSE SERPL-MCNC: 151 MG/DL (ref 70–99)
HBA1C MFR BLD: 6.5 % (ref 0–5.6)
HCT VFR BLD AUTO: 40 % (ref 35–47)
HDLC SERPL-MCNC: 53 MG/DL
HGB BLD-MCNC: 12.9 G/DL (ref 11.7–15.7)
LDLC SERPL CALC-MCNC: 79 MG/DL
LYMPHOCYTES # BLD AUTO: 1.2 10E9/L (ref 0.8–5.3)
LYMPHOCYTES NFR BLD AUTO: 13.9 %
MCH RBC QN AUTO: 28.2 PG (ref 26.5–33)
MCHC RBC AUTO-ENTMCNC: 32.3 G/DL (ref 31.5–36.5)
MCV RBC AUTO: 87 FL (ref 78–100)
MICROALBUMIN UR-MCNC: 297 MG/L
MICROALBUMIN/CREAT UR: 1337.84 MG/G CR (ref 0–25)
MONOCYTES # BLD AUTO: 1 10E9/L (ref 0–1.3)
MONOCYTES NFR BLD AUTO: 11.8 %
NEUTROPHILS # BLD AUTO: 5.8 10E9/L (ref 1.6–8.3)
NEUTROPHILS NFR BLD AUTO: 68.7 %
NONHDLC SERPL-MCNC: 106 MG/DL
NT-PROBNP SERPL-MCNC: 667 PG/ML (ref 0–125)
PLATELET # BLD AUTO: 360 10E9/L (ref 150–450)
POTASSIUM SERPL-SCNC: 4.3 MMOL/L (ref 3.4–5.3)
PROT SERPL-MCNC: 7.2 G/DL (ref 6.8–8.8)
RBC # BLD AUTO: 4.58 10E12/L (ref 3.8–5.2)
SODIUM SERPL-SCNC: 133 MMOL/L (ref 133–144)
TRIGL SERPL-MCNC: 135 MG/DL
TSH SERPL DL<=0.005 MIU/L-ACNC: 1.92 MU/L (ref 0.4–4)
WBC # BLD AUTO: 8.4 10E9/L (ref 4–11)

## 2019-10-22 PROCEDURE — 83880 ASSAY OF NATRIURETIC PEPTIDE: CPT | Performed by: PHYSICIAN ASSISTANT

## 2019-10-22 PROCEDURE — G0008 ADMIN INFLUENZA VIRUS VAC: HCPCS | Performed by: PHYSICIAN ASSISTANT

## 2019-10-22 PROCEDURE — 85025 COMPLETE CBC W/AUTO DIFF WBC: CPT | Performed by: PHYSICIAN ASSISTANT

## 2019-10-22 PROCEDURE — 90662 IIV NO PRSV INCREASED AG IM: CPT | Performed by: PHYSICIAN ASSISTANT

## 2019-10-22 PROCEDURE — 99207 C FOOT EXAM  NO CHARGE: CPT | Mod: 25 | Performed by: PHYSICIAN ASSISTANT

## 2019-10-22 PROCEDURE — 82043 UR ALBUMIN QUANTITATIVE: CPT | Performed by: PHYSICIAN ASSISTANT

## 2019-10-22 PROCEDURE — 83036 HEMOGLOBIN GLYCOSYLATED A1C: CPT | Performed by: PHYSICIAN ASSISTANT

## 2019-10-22 PROCEDURE — 84443 ASSAY THYROID STIM HORMONE: CPT | Performed by: PHYSICIAN ASSISTANT

## 2019-10-22 PROCEDURE — 36415 COLL VENOUS BLD VENIPUNCTURE: CPT | Performed by: PHYSICIAN ASSISTANT

## 2019-10-22 PROCEDURE — 99207 C PAF COMPLETED  NO CHARGE: CPT | Mod: 25 | Performed by: PHYSICIAN ASSISTANT

## 2019-10-22 PROCEDURE — 99397 PER PM REEVAL EST PAT 65+ YR: CPT | Mod: 25 | Performed by: PHYSICIAN ASSISTANT

## 2019-10-22 PROCEDURE — 80061 LIPID PANEL: CPT | Performed by: PHYSICIAN ASSISTANT

## 2019-10-22 PROCEDURE — 80053 COMPREHEN METABOLIC PANEL: CPT | Performed by: PHYSICIAN ASSISTANT

## 2019-10-22 PROCEDURE — 99213 OFFICE O/P EST LOW 20 MIN: CPT | Mod: 25 | Performed by: PHYSICIAN ASSISTANT

## 2019-10-22 ASSESSMENT — ENCOUNTER SYMPTOMS
HEADACHES: 0
FREQUENCY: 0
SORE THROAT: 0
DIZZINESS: 0
NAUSEA: 0
ABDOMINAL PAIN: 0
WEAKNESS: 0
DYSURIA: 0
EYE PAIN: 0
PARESTHESIAS: 0
MYALGIAS: 0
HEMATURIA: 0
SHORTNESS OF BREATH: 0
PALPITATIONS: 0
BREAST MASS: 0
NERVOUS/ANXIOUS: 0
ARTHRALGIAS: 0
FEVER: 0
CONSTIPATION: 0
HEARTBURN: 0
HEMATOCHEZIA: 0
COUGH: 0
DIARRHEA: 0
CHILLS: 0
JOINT SWELLING: 0

## 2019-10-22 ASSESSMENT — MIFFLIN-ST. JEOR: SCORE: 1367.76

## 2019-10-22 ASSESSMENT — ACTIVITIES OF DAILY LIVING (ADL): CURRENT_FUNCTION: NO ASSISTANCE NEEDED

## 2019-10-22 NOTE — PROGRESS NOTES
"SUBJECTIVE:   Porsche Manning is a 69 year old female who presents for Preventive Visit.    Are you in the first 12 months of your Medicare coverage?  No    Healthy Habits:     In general, how would you rate your overall health?  Good    Frequency of exercise:  2-3 days/week    Duration of exercise:  Less than 15 minutes    Do you usually eat at least 4 servings of fruit and vegetables a day, include whole grains    & fiber and avoid regularly eating high fat or \"junk\" foods?  No    Taking medications regularly:  Yes    Medication side effects:  None    Ability to successfully perform activities of daily living:  No assistance needed    Home Safety:  No safety concerns identified    Hearing Impairment:  No hearing concerns    In the past 6 months, have you been bothered by leaking of urine?  No    In general, how would you rate your overall mental or emotional health?  Good      PHQ-2 Total Score: 0    Additional concerns today:  No    Do you feel safe in your environment? Yes    Do you have a Health Care Directive? Yes: Advance Directive has been received and scanned.    Other:  Wants to talk about blood sugar that was done last time patient was seen - HgbA1C 6.9 on 5/2/2019.  Patient notes some shortness of breath with exertion - wondering if from COPD vs deconditioning. Taking inhalers as directed, rescue inhaler doesn't really help much. Sees pulmonology and cardiology in new year.      PHQ-2 Score:     PHQ-2 ( 1999 [employer]) 10/22/2019 10/17/2019   Q1: Little interest or pleasure in doing things 0 0   Q2: Feeling down, depressed or hopeless 0 0   PHQ-2 Score 0 0   Q1: Little interest or pleasure in doing things - Not at all   Q2: Feeling down, depressed or hopeless - Not at all   PHQ-2 Score - 0       Fall risk  Fallen 2 or more times in the past year?: No  Any fall with injury in the past year?: No    Cognitive Screening   1) Repeat 3 items (Leader, Season, Table)    2) Clock draw: NORMAL  3) 3 item recall: " Recalls 3 objects  Results: 3 items recalled: COGNITIVE IMPAIRMENT LESS LIKELY    Mini-CogTM Copyright ELINOR Lima. Licensed by the author for use in [facility]; reprinted with permission (jaime@[facility]). All rights reserved.      Do you have sleep apnea, excessive snoring or daytime drowsiness?: no    Reviewed and updated as needed this visit by clinical staff  Tobacco  Allergies  Meds  Problems  Med Hx  Surg Hx  Fam Hx  Soc Hx          Reviewed and updated as needed this visit by Provider  Tobacco  Allergies  Meds  Problems  Med Hx  Surg Hx  Fam Hx        Social History     Tobacco Use     Smoking status: Former Smoker     Packs/day: 0.50     Years: 40.00     Pack years: 20.00     Types: Cigarettes     Last attempt to quit: 2018     Years since quittin.4     Smokeless tobacco: Never Used   Substance Use Topics     Alcohol use: Yes     Comment: 2 to 4 beers or mixed drinks weekly     If you drink alcohol do you typically have >3 drinks per day or >7 drinks per week? No    No flowsheet data found.      Current providers sharing in care for this patient include: Patient Care Team:  Danuta Tan PA-C as PCP - General (Physician Assistant)  Danuta Tan PA-C as Assigned PCP    The following health maintenance items are reviewed in Epic and correct as of today:  Health Maintenance   Topic Date Due     ADVANCE CARE PLANNING  12/15/2016     DIABETIC FOOT EXAM  10/24/2018     INFLUENZA VACCINE (1) 2019     TSH W/FREE T4 REFLEX  10/24/2019     MEDICARE ANNUAL WELLNESS VISIT  10/25/2019     LIPID  10/25/2019     MICROALBUMIN  10/25/2019     FALL RISK ASSESSMENT  10/25/2019     A1C  2019     EYE EXAM  2020     BMP  2020     MAMMO SCREENING  2021     DTAP/TDAP/TD IMMUNIZATION (3 - Td) 10/24/2027     COLONOSCOPY  2028     DEXA  Completed     SPIROMETRY  Completed     HEPATITIS C SCREENING  Completed     COPD ACTION PLAN  Completed      PHQ-2  Completed     PNEUMOCOCCAL IMMUNIZATION 65+ HIGH/HIGHEST RISK  Completed     ZOSTER IMMUNIZATION  Completed     IPV IMMUNIZATION  Aged Out     MENINGITIS IMMUNIZATION  Aged Out     Lab work is in process  Labs reviewed in EPIC  BP Readings from Last 3 Encounters:   10/22/19 136/66   19 172/81   19 159/71    Wt Readings from Last 3 Encounters:   10/22/19 88.5 kg (195 lb)   19 93.4 kg (206 lb)   19 95.5 kg (210 lb 8 oz)                  Patient Active Problem List   Diagnosis     Old myocardial infarction     Malignant neoplasm of other specified sites of cervix     Peripheral vascular disease (H)     HYPERLIPIDEMIA LDL GOAL <100     Hypertension: MEASURE BP IN RIGHT ARM ONLY     Osteopenia     Vitamin D deficiency disease     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)     Advanced directives, counseling/discussion     Subclavian artery stenosis, left (H)     Hyponatremia     CKD (chronic kidney disease) stage 3, GFR 30-59 ml/min (H)     Chronic obstructive pulmonary disease, unspecified COPD type (H)     COPD (chronic obstructive pulmonary disease) with chronic bronchitis (H)     Obesity (BMI 35.0-39.9) with comorbidity (H)     Blood pressure check     Past Surgical History:   Procedure Laterality Date     BIOPSY      Sample taken from back     CARDIAC SURGERY      Stents     COLONOSCOPY      Results were ok     HYSTERECTOMY, PAP NO LONGER INDICATED      ovaries only, no cervix per pt     SURGICAL HISTORY OF -       bunionectomy     SURGICAL HISTORY OF -   10/10/03    cardiac stenting     SURGICAL HISTORY OF -       stent placed in both of her legs for pad     VASCULAR SURGERY      PAD       Social History     Tobacco Use     Smoking status: Former Smoker     Packs/day: 0.50     Years: 40.00     Pack years: 20.00     Types: Cigarettes     Last attempt to quit: 2018     Years since quittin.4     Smokeless tobacco: Never Used   Substance Use Topics      Alcohol use: Yes     Comment: 2 to 4 beers or mixed drinks weekly     Family History   Problem Relation Age of Onset     C.A.D. Mother      Breast Cancer Mother      C.A.D. Father      Diabetes Father      Hypertension Father      C.A.D. Maternal Grandfather      C.A.D. Paternal Grandfather      Neurologic Disorder Brother         epilepsy         Current Outpatient Medications   Medication Sig Dispense Refill     albuterol (PROAIR HFA/PROVENTIL HFA/VENTOLIN HFA) 108 (90 Base) MCG/ACT inhaler Inhale 2 puffs into the lungs every 6 hours (Patient taking differently: Inhale 2 puffs into the lungs every 4 hours as needed ) 1 Inhaler 3     aspirin 81 MG EC tablet Take 81 mg by mouth daily       BREO ELLIPTA 100-25 MCG/INH inhaler TAKE 1 PUFF BY MOUTH EVERY DAY 60 Inhaler 11     Calcium Carbonate-Vit D-Min (CALCIUM 1200 PO) Take 600 mg by mouth daily        Cholecalciferol (VITAMIN D) 1000 UNIT capsule Take 1 capsule by mouth daily. Take one tablet daily       clopidogrel (PLAVIX) 75 MG tablet Take 1 tablet (75 mg) by mouth daily 90 tablet 1     diltiazem ER COATED BEADS (CARDIZEM CD) 360 MG 24 hr capsule Take 1 capsule (360 mg) by mouth daily 90 capsule 3     metoprolol succinate ER (TOPROL-XL) 100 MG 24 hr tablet Take 1.5 tablets (150 mg) by mouth daily 90 tablet 3     rosuvastatin (CRESTOR) 40 MG tablet Take 1 tablet (40 mg) by mouth daily 90 tablet 3     tiotropium (SPIRIVA HANDIHALER) 18 MCG inhaled capsule Inhale 1 capsule (18 mcg) into the lungs daily 90 capsule 3     ASPIRIN 325 MG OR TBEC 1 tab po QD (Once per day) (Patient not taking: No sig reported) 100 3     buPROPion (WELLBUTRIN SR) 150 MG 12 hr tablet Take 1 tablet (150 mg) by mouth 2 times daily (Patient not taking: Reported on 6/27/2019) 180 tablet 3     lisinopril (PRINIVIL/ZESTRIL) 40 MG tablet Take 1 tablet (40 mg) by mouth daily       Allergies   Allergen Reactions     Morphine Nausea and Vomiting     Penicillins Nausea and Vomiting     Recent  Labs   Lab Test 05/02/19  0927 05/02/19  0917 10/25/18  1140  08/16/18  0925 05/11/18  0619  05/09/18  1306 10/24/17  1057 09/19/17  1128 10/17/16  0825  07/16/15  2150   A1C 6.9*  --   --   --  6.1* 7.2*  --   --  6.3*  --  5.8   < >  --    LDL  --   --  84  --   --   --   --   --   --  49 66   < >  --    HDL  --   --  61  --   --   --   --   --   --  63 62   < >  --    TRIG  --   --  122  --   --   --   --   --   --  144 153*   < >  --    ALT  --   --  22  --  18  --   --  26 21  --  24  --  16   CR  --  1.07* 0.94  --  1.17* 1.19*   < > 1.49* 0.95  --  1.07*   < > 1.57*   GFRESTIMATED  --  53* 59*   < > 46* 45*   < > 35* 59*  --  51*   < > 33*   GFRESTBLACK  --  62 72   < > 56* 55*   < > 42* 71  --  62   < > 40*   POTASSIUM  --  4.3 4.1  --  4.4 3.8   < > 3.4 4.1  --  4.0   < > 3.4   TSH  --   --   --   --   --   --   --   --  0.93  --   --   --  1.01    < > = values in this interval not displayed.        Review of Systems   Constitutional: Negative for chills and fever.   HENT: Negative for congestion, ear pain, hearing loss and sore throat.    Eyes: Negative for pain and visual disturbance.   Respiratory: Negative for cough and shortness of breath.    Cardiovascular: Negative for chest pain, palpitations and peripheral edema.   Gastrointestinal: Negative for abdominal pain, constipation, diarrhea, heartburn, hematochezia and nausea.   Breasts:  Negative for tenderness, breast mass and discharge.   Genitourinary: Negative for dysuria, frequency, genital sores, hematuria, pelvic pain, urgency, vaginal bleeding and vaginal discharge.   Musculoskeletal: Negative for arthralgias, joint swelling and myalgias.   Skin: Negative for rash.   Neurological: Negative for dizziness, weakness, headaches and paresthesias.   Psychiatric/Behavioral: Negative for mood changes. The patient is not nervous/anxious.        OBJECTIVE:   /66 (BP Location: Left arm, Patient Position: Sitting, Cuff Size: Adult Regular)   Pulse 76   " Temp 97.7  F (36.5  C) (Oral)   Resp 17   Ht 1.575 m (5' 2\")   Wt 88.5 kg (195 lb)   SpO2 97%   BMI 35.67 kg/m   Estimated body mass index is 35.67 kg/m  as calculated from the following:    Height as of this encounter: 1.575 m (5' 2\").    Weight as of this encounter: 88.5 kg (195 lb).  Physical Exam  GENERAL: healthy, alert and no distress  EYES: Eyes grossly normal to inspection, PERRL and conjunctivae and sclerae normal  HENT: ear canals and TM's normal, nose and mouth without ulcers or lesions  NECK: no adenopathy, no asymmetry, masses, or scars and thyroid normal to palpation  RESP: lungs clear to auscultation - no rales, rhonchi or wheezes  BREAST: normal without masses, tenderness or nipple discharge and no palpable axillary masses or adenopathy  CV: regular rate and rhythm, normal S1 S2, no S3 or S4, no murmur, click or rub, no peripheral edema and peripheral pulses strong  ABDOMEN: soft, nontender, no hepatosplenomegaly, no masses and bowel sounds normal  MS: no gross musculoskeletal defects noted, no edema  SKIN: no suspicious lesions or rashes  NEURO: Normal strength and tone, mentation intact and speech normal  PSYCH: mentation appears normal, affect normal/bright    Diagnostic Test Results:  Labs reviewed in Epic    ASSESSMENT / PLAN:     (R06.09) Dyspnea on exertion   Comment: new onset - COPD vs cardiac vs deconditioning  Plan: BNP-N terminal pro, CBC with platelets         differential        Labs updated today; follow up with cardiology and pulmonology; trial of rescue inhaler (albuterol) prior to exercise;    (E11.22,  N18.3) Type 2 diabetes mellitus with stage 3 chronic kidney disease, without long-term current use of insulin (H)  Comment: Long standing, chronic, controlled  Plan: Comprehensive metabolic panel, Lipid panel         reflex to direct LDL Fasting, TSH with free T4         reflex, Hemoglobin A1c, FOOT EXAM        No medicine at this time, continue to work on diet and " exercise    (I10) Hypertension: MEASURE BP IN RIGHT ARM ONLY  Comment: Long standing, chronic, controlled here but high at home  Plan: Albumin Random Urine Quantitative with Creat         Ratio, BNP-N terminal pro, CBC with platelets         differential        Labs updated today; may need to restart lisinopril pending labs and discussion with cardiology    (J44.9) Chronic obstructive pulmonary disease, unspecified COPD type (H)  Comment: Long standing, chronic, controlled overall  Plan: may be related to shortness of breath but will follow up with pulmonology as scheduled.        ICD-10-CM    1. Encounter for Medicare annual wellness exam Z00.00    2. Dyspnea on exertion  R06.09 BNP-N terminal pro     CBC with platelets differential   3. Type 2 diabetes mellitus with stage 3 chronic kidney disease, without long-term current use of insulin (H) E11.22 Comprehensive metabolic panel    N18.3 Lipid panel reflex to direct LDL Fasting     TSH with free T4 reflex     Hemoglobin A1c     FOOT EXAM   4. Hypertension: MEASURE BP IN RIGHT ARM ONLY I10 Albumin Random Urine Quantitative with Creat Ratio     BNP-N terminal pro     CBC with platelets differential   5. Chronic obstructive pulmonary disease, unspecified COPD type (H) J44.9    6. Need for prophylactic vaccination and inoculation against influenza Z23 INFLUENZA (HIGH DOSE) 3 VALENT VACCINE [31634]     Vaccine Administration, Initial [94328]       End of Life Planning:  Patient currently has an advanced directive: No.  I have verified the patient's ablity to prepare an advanced directive/make health care decisions.  Literature was provided to assist patient in preparing an advanced directive.    COUNSELING:  Reviewed preventive health counseling, as reflected in patient instructions       Regular exercise       Healthy diet/nutrition       Vision screening    Estimated body mass index is 35.67 kg/m  as calculated from the following:    Height as of this encounter: 1.575  "m (5' 2\").    Weight as of this encounter: 88.5 kg (195 lb).    Weight management plan: Discussed healthy diet and exercise guidelines     reports that she quit smoking about 17 months ago. Her smoking use included cigarettes. She has a 20.00 pack-year smoking history. She has never used smokeless tobacco.      Appropriate preventive services were discussed with this patient, including applicable screening as appropriate for cardiovascular disease, diabetes, osteopenia/osteoporosis, and glaucoma.  As appropriate for age/gender, discussed screening for colorectal cancer, prostate cancer, breast cancer, and cervical cancer. Checklist reviewing preventive services available has been given to the patient.    Reviewed patients plan of care and provided an AVS. The Basic Care Plan (routine screening as documented in Health Maintenance) for Porsche meets the Care Plan requirement. This Care Plan has been established and reviewed with the Patient.    Counseling Resources:  ATP IV Guidelines  Pooled Cohorts Equation Calculator  Breast Cancer Risk Calculator  FRAX Risk Assessment  ICSI Preventive Guidelines  Dietary Guidelines for Americans, 2010  USDA's MyPlate  ASA Prophylaxis  Lung CA Screening    Danuta Tan PA-C  [facility]    Identified Health Risks:  "

## 2019-10-22 NOTE — PATIENT INSTRUCTIONS
Patient Education   Personalized Prevention Plan  You are due for the preventive services outlined below.  Your care team is available to assist you in scheduling these services.  If you have already completed any of these items, please share that information with your care team to update in your medical record.  Health Maintenance Due   Topic Date Due     Discuss Advance Care Planning  12/15/2016     Diabetic Foot Exam  10/24/2018     Flu Vaccine (1) 09/01/2019     Thyroid Function Lab  10/24/2019     Annual Wellness Visit  10/25/2019     Cholesterol Lab  10/25/2019     Kidney Microalbumin Urine Test  10/25/2019     FALL RISK ASSESSMENT  10/25/2019     A1C Lab  11/02/2019

## 2019-10-23 DIAGNOSIS — I25.83 CORONARY ARTERY DISEASE DUE TO LIPID RICH PLAQUE: ICD-10-CM

## 2019-10-23 DIAGNOSIS — I25.10 CORONARY ARTERY DISEASE DUE TO LIPID RICH PLAQUE: ICD-10-CM

## 2019-10-23 DIAGNOSIS — J44.9 CHRONIC OBSTRUCTIVE PULMONARY DISEASE, UNSPECIFIED COPD TYPE (H): ICD-10-CM

## 2019-10-23 DIAGNOSIS — I10 HYPERTENSION GOAL BP (BLOOD PRESSURE) < 140/90: ICD-10-CM

## 2019-10-23 NOTE — TELEPHONE ENCOUNTER
"Requested Prescriptions   Pending Prescriptions Disp Refills     PROAIR  (90 Base) MCG/ACT inhaler [Pharmacy Med Name: PROAIR HFA 90 MCG INHALER]    Last Written Prescription Date:  6/11/19  Last Fill Quantity: 1 inhaler,  # refills: 3   Last office visit: 10/22/2019 with prescribing provider:  Britney       Future Office Visit:     8.5 Inhaler 3     Sig: INHALE 2 PUFFS INTO THE LUNGS EVERY 6 HOURS AS NEEDED FOR SHORTNESS OF BREATH / DYSPNEA OR WHEEZING       Asthma Maintenance Inhalers - Anticholinergics Passed - 10/23/2019 11:06 AM        Passed - Patient is age 12 years or older        Passed - Recent (12 mo) or future (30 days) visit within the authorizing provider's specialty     Patient has had an office visit with the authorizing provider or a provider within the authorizing providers department within the previous 12 mos or has a future within next 30 days. See \"Patient Info\" tab in inbasket, or \"Choose Columns\" in Meds & Orders section of the refill encounter.              Passed - Medication is active on med list        "

## 2019-10-24 RX ORDER — ALBUTEROL SULFATE 90 UG/1
AEROSOL, METERED RESPIRATORY (INHALATION)
Qty: 8.5 INHALER | Refills: 1 | Status: SHIPPED | OUTPATIENT
Start: 2019-10-24 | End: 2021-09-29

## 2019-10-24 NOTE — RESULT ENCOUNTER NOTE
"Daisy Morrell  Your attached labs are overall stable, but I sent a message to Dr. Uriostegui to request his opinion regarding adding back in Lisinopril as well. I will keep you posted.  Please contact the office with any questions or concerns.    Danuta Martinez \"Eduard\" PHILL Tan    "

## 2019-10-24 NOTE — TELEPHONE ENCOUNTER
Prescription approved per [facility] Refill Protocol.  - patient prescribed Rx for COPD exacerbation (H) [J44.1]    LOV: 10/22/2019    Thank You!  Alaina García, DONNA  Triage Nurse

## 2019-10-25 RX ORDER — METOPROLOL SUCCINATE 100 MG/1
150 TABLET, EXTENDED RELEASE ORAL DAILY
Qty: 135 TABLET | Refills: 0 | Status: SHIPPED | OUTPATIENT
Start: 2019-10-25 | End: 2020-01-16

## 2019-10-28 RX ORDER — LISINOPRIL 5 MG/1
5 TABLET ORAL DAILY
Qty: 30 TABLET | Refills: 3 | Status: SHIPPED | OUTPATIENT
Start: 2019-10-28 | End: 2020-02-20

## 2019-11-07 DIAGNOSIS — I25.83 CORONARY ARTERY DISEASE DUE TO LIPID RICH PLAQUE: ICD-10-CM

## 2019-11-07 DIAGNOSIS — I25.10 CORONARY ARTERY DISEASE DUE TO LIPID RICH PLAQUE: ICD-10-CM

## 2019-11-07 DIAGNOSIS — I10 HYPERTENSION GOAL BP (BLOOD PRESSURE) < 140/90: ICD-10-CM

## 2019-11-08 RX ORDER — METOPROLOL SUCCINATE 100 MG/1
150 TABLET, EXTENDED RELEASE ORAL DAILY
Qty: 135 TABLET | Refills: 0 | OUTPATIENT
Start: 2019-11-08

## 2019-11-12 DIAGNOSIS — I10 HYPERTENSION GOAL BP (BLOOD PRESSURE) < 140/90: ICD-10-CM

## 2019-11-12 LAB
ALBUMIN SERPL-MCNC: 3.3 G/DL (ref 3.4–5)
ALP SERPL-CCNC: 100 U/L (ref 40–150)
ALT SERPL W P-5'-P-CCNC: 20 U/L (ref 0–50)
ANION GAP SERPL CALCULATED.3IONS-SCNC: 8 MMOL/L (ref 3–14)
AST SERPL W P-5'-P-CCNC: 11 U/L (ref 0–45)
BILIRUB SERPL-MCNC: 0.4 MG/DL (ref 0.2–1.3)
BUN SERPL-MCNC: 12 MG/DL (ref 7–30)
CALCIUM SERPL-MCNC: 9 MG/DL (ref 8.5–10.1)
CHLORIDE SERPL-SCNC: 104 MMOL/L (ref 94–109)
CO2 SERPL-SCNC: 25 MMOL/L (ref 20–32)
CREAT SERPL-MCNC: 0.96 MG/DL (ref 0.52–1.04)
GFR SERPL CREATININE-BSD FRML MDRD: 61 ML/MIN/{1.73_M2}
GLUCOSE SERPL-MCNC: 178 MG/DL (ref 70–99)
POTASSIUM SERPL-SCNC: 4.3 MMOL/L (ref 3.4–5.3)
PROT SERPL-MCNC: 6.6 G/DL (ref 6.8–8.8)
SODIUM SERPL-SCNC: 137 MMOL/L (ref 133–144)

## 2019-11-12 PROCEDURE — 80053 COMPREHEN METABOLIC PANEL: CPT | Performed by: PHYSICIAN ASSISTANT

## 2019-11-12 PROCEDURE — 36415 COLL VENOUS BLD VENIPUNCTURE: CPT | Performed by: PHYSICIAN ASSISTANT

## 2019-11-14 NOTE — RESULT ENCOUNTER NOTE
"Daisy Morrell  Your attached labs are within normal limits/stable.  Please contact the office with any questions or concerns.    Danuta Martinez \"Eduard\" PHILL Tan    "

## 2019-11-19 DIAGNOSIS — E78.5 HYPERLIPIDEMIA LDL GOAL <70: ICD-10-CM

## 2019-11-19 DIAGNOSIS — J44.9 CHRONIC OBSTRUCTIVE PULMONARY DISEASE, UNSPECIFIED COPD TYPE (H): ICD-10-CM

## 2019-11-21 RX ORDER — ROSUVASTATIN CALCIUM 40 MG/1
40 TABLET, COATED ORAL DAILY
Qty: 90 TABLET | Refills: 0 | Status: SHIPPED | OUTPATIENT
Start: 2019-11-21 | End: 2020-02-16

## 2019-11-21 NOTE — TELEPHONE ENCOUNTER
tiotropium (SPIRIVA HANDIHALER) 18 MCG   Last Written Prescription Date:  12/4/18  Last Fill Quantity: 90,   # refills: 3  Last Office Visit : 1/29/19  Future Office visit:  NONE    Routing refill request to provider for review/approval because:  Drug not on the refill protocol

## 2019-11-25 RX ORDER — TIOTROPIUM BROMIDE 18 UG/1
CAPSULE ORAL; RESPIRATORY (INHALATION)
Qty: 90 CAPSULE | Refills: 3 | Status: SHIPPED | OUTPATIENT
Start: 2019-11-25 | End: 2020-10-19

## 2019-12-11 ENCOUNTER — TELEPHONE (OUTPATIENT)
Dept: CARDIOLOGY | Facility: CLINIC | Age: 69
End: 2019-12-11

## 2019-12-11 NOTE — TELEPHONE ENCOUNTER
Health Call Center    Phone Message    May a detailed message be left on voicemail: yes    Reason for Call: Other: Pt called to schedule her annual with Dr. Uriostegui and wanted to schedule the US Carotid Order but it .  Please extend the order, pt's appt with Dr. Uriostegui is 20.  Please schedule the US Carotid same day at 10:00 please.  Please call pt when this has been done     Action Taken: Message routed to:  [facility] ([facility] Cardiology

## 2020-01-13 ENCOUNTER — ANCILLARY PROCEDURE (OUTPATIENT)
Dept: CT IMAGING | Facility: CLINIC | Age: 70
End: 2020-01-13
Attending: INTERNAL MEDICINE
Payer: COMMERCIAL

## 2020-01-13 ENCOUNTER — TELEPHONE (OUTPATIENT)
Dept: PULMONOLOGY | Facility: CLINIC | Age: 70
End: 2020-01-13

## 2020-01-13 ENCOUNTER — OFFICE VISIT (OUTPATIENT)
Dept: PULMONOLOGY | Facility: CLINIC | Age: 70
End: 2020-01-13
Attending: INTERNAL MEDICINE
Payer: COMMERCIAL

## 2020-01-13 VITALS
SYSTOLIC BLOOD PRESSURE: 180 MMHG | WEIGHT: 195 LBS | HEIGHT: 62 IN | HEART RATE: 63 BPM | DIASTOLIC BLOOD PRESSURE: 74 MMHG | BODY MASS INDEX: 35.88 KG/M2 | RESPIRATION RATE: 17 BRPM | OXYGEN SATURATION: 92 %

## 2020-01-13 DIAGNOSIS — Z87.891 PERSONAL HISTORY OF NICOTINE DEPENDENCE: ICD-10-CM

## 2020-01-13 DIAGNOSIS — J44.9 CHRONIC OBSTRUCTIVE PULMONARY DISEASE, UNSPECIFIED COPD TYPE (H): ICD-10-CM

## 2020-01-13 DIAGNOSIS — E66.01 MORBID OBESITY (H): ICD-10-CM

## 2020-01-13 DIAGNOSIS — J44.9 COPD (CHRONIC OBSTRUCTIVE PULMONARY DISEASE) (H): Primary | ICD-10-CM

## 2020-01-13 DIAGNOSIS — Z72.0 TOBACCO ABUSE: ICD-10-CM

## 2020-01-13 DIAGNOSIS — R91.8 PULMONARY NODULES: ICD-10-CM

## 2020-01-13 DIAGNOSIS — J44.89 COPD (CHRONIC OBSTRUCTIVE PULMONARY DISEASE) WITH CHRONIC BRONCHITIS (H): Primary | ICD-10-CM

## 2020-01-13 PROCEDURE — G0463 HOSPITAL OUTPT CLINIC VISIT: HCPCS | Mod: ZF

## 2020-01-13 ASSESSMENT — MIFFLIN-ST. JEOR: SCORE: 1367.76

## 2020-01-13 ASSESSMENT — PAIN SCALES - GENERAL: PAINLEVEL: MILD PAIN (3)

## 2020-01-13 NOTE — TELEPHONE ENCOUNTER
Contacted Leann with results: pulmonary nodules are stable.  Some findings suspicious for smoking associated inflammation (respiratory bronchiolitis) -- Continued attempts at smoking cessation recommended.  Will need repeat scan in 1 year. Pt verbalized understanding and has no further questions at this time.

## 2020-01-13 NOTE — Clinical Note
Please let Leann know CT results -- pulmonary nodules are stable.  Some findings suspicious for smoking associated inflammation (respiratory bronchiolitis) -- Continued attempts at smoking cessation recommended.  Will need repeat scan in 1 year.

## 2020-01-13 NOTE — PROGRESS NOTES
Pulmonary Clinic Return Visit    History of Present Illness  Ms. Manning is a 69-year-old female with a history of tobacco abuse and COPD who presents to pulmonary clinic today for follow up of COPD and pulmonary nodule.  To briefly review, she has been maintained on Breo-Ellipta, Spiriva, and albuterol as needed.      Saw PCP in October for annual exam and noted increased shortness of breath with exertion.  No improvement in symptoms with inhalers, wondering if this could be related to deconditioning.  Today denies any new or worsening shortness of breath or cough.  Weight is stable.  Denies hemoptysis.  Has an albuterol rescue inhaler but never needs to use this.  Has become more sedentary over the fall and winter.  Denies AECOPD or pneumonia.    She has smoked 2 packs per day for approximately 40 years although notes that she has quit off and on in between.  Continues to quit on and off but averages smoking about 4-5 cigarettes per day. Previously tried Chantix but stopped due to hair falling out.        Review of Systems:  10 of 14 systems reviewed and are negative unless otherwise stated in HPI.    Past Medical History:   Diagnosis Date     Brachial neuritis or radiculitis NOS      Chronic obstructive pulmonary disease, unspecified COPD type (H) 3/8/2016     Coronary artery disease     Doing fine     H/O tobacco use, presenting hazards to health      Hyperlipidemia LDL goal < 100      Hypertension goal BP (blood pressure) < 140/90      Malignant neoplasm of other specified sites of cervix      Old myocardial infarction      Osteopenia      Peripheral vascular disease, unspecified (H)      Type 2 diabetes, HbA1c goal < 7% (H)        Past Surgical History:   Procedure Laterality Date     BIOPSY      Sample taken from back     CARDIAC SURGERY      Stents     COLONOSCOPY  1996    Results were ok     HYSTERECTOMY, PAP NO LONGER INDICATED  1989    ovaries only, no cervix per pt     SURGICAL HISTORY OF -   1995     bunionectomy     SURGICAL HISTORY OF -   10/10/03    cardiac stenting     SURGICAL HISTORY OF -       stent placed in both of her legs for pad     VASCULAR SURGERY      PAD       Family History   Problem Relation Age of Onset     C.A.D. Mother      Breast Cancer Mother      C.A.JOE. Father      Diabetes Father      Hypertension Father      C.A.D. Maternal Grandfather      INDIOACHERIE. 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     Social History Main Topics     Smoking status: Former Smoker     Packs/day: 0.50     Years: 40.00     Types: Cigarettes     Quit date: 4/25/2018     Smokeless tobacco: Never Used     Alcohol use Yes      Comment: 2 to 4 beers or mixed drinks weekly     Drug use: No     Sexual activity: No     Other Topics Concern     Parent/Sibling W/ Cabg, Mi Or Angioplasty Before 65f 55m? No     Social History Narrative    12/22/09    Balanced Diet - No    Osteoporosis Prevention Measures - Dairy servings per day: 0-1 and Medication/Supplements (See current meds)    Regular Exercise -  No Describe None    Dental Exam - YES - Date: 12/2009    Eye Exam - YES - Date: 2 years ago    Self Breast Exam - Yes, on a monthly basis    Abuse: Current or Past (Physical, Sexual or Emotional)- No    Do you feel safe in your environment - Yes    Guns stored in the home - No    Sunscreen used - Yes    Seatbelts used - Yes    Lipids -  YES - Date: 10/23/08    Glucose -  YES - Date: 10/23/08    Colon Cancer Screening - Colonoscopy 7/27/06(date completed)    Hemoccults - YES - Date: 7/11/06    Pap Test -  YES - Date: 10/23/08, Pt has Hx of abnormal paps.    Do you have any concerns about STD's -  No    Mammography - YES - Date: 10/23/08    DEXA - YES - Date: 11/29/07    Immunizations  reviewed and up to date - Yes, Tdap given 10/18/07    Phillip Loo MA                         Allergies   Allergen Reactions     Morphine Nausea and Vomiting     Penicillins Nausea and Vomiting         Current Outpatient Medications:      albuterol (PROAIR HFA/PROVENTIL HFA/VENTOLIN HFA) 108 (90 Base) MCG/ACT inhaler, Inhale 2 puffs into the lungs every 6 hours (Patient taking differently: Inhale 2 puffs into the lungs every 4 hours as needed ), Disp: 1 Inhaler, Rfl: 3     ASPIRIN 325 MG OR TBEC, 1 tab po QD (Once per day) (Patient not taking: No sig reported), Disp: 100, Rfl: 3     aspirin 81 MG EC tablet, Take 81 mg by mouth daily, Disp: , Rfl:      BREO ELLIPTA 100-25 MCG/INH inhaler, TAKE 1 PUFF BY MOUTH EVERY DAY, Disp: 60 Inhaler, Rfl: 11     buPROPion (WELLBUTRIN SR) 150 MG 12 hr tablet, Take 1 tablet (150 mg) by mouth 2 times daily (Patient not taking: Reported on 6/27/2019), Disp: 180 tablet, Rfl: 3     Calcium Carbonate-Vit D-Min (CALCIUM 1200 PO), Take 600 mg by mouth daily , Disp: , Rfl:      Cholecalciferol (VITAMIN D) 1000 UNIT capsule, Take 1 capsule by mouth daily. Take one tablet daily, Disp: , Rfl:      clopidogrel (PLAVIX) 75 MG tablet, Take 1 tablet (75 mg) by mouth daily, Disp: 90 tablet, Rfl: 1     diltiazem ER COATED BEADS (CARDIZEM CD) 360 MG 24 hr capsule, Take 1 capsule (360 mg) by mouth daily, Disp: 90 capsule, Rfl: 3     lisinopril (PRINIVIL/ZESTRIL) 5 MG tablet, Take 1 tablet (5 mg) by mouth daily, Disp: 30 tablet, Rfl: 3     metoprolol succinate ER (TOPROL-XL) 100 MG 24 hr tablet, TAKE 1.5 TABLETS (150 MG) BY MOUTH DAILY, Disp: 135 tablet, Rfl: 0     PROAIR  (90 Base) MCG/ACT inhaler, INHALE 2 PUFFS INTO THE LUNGS EVERY 6 HOURS AS NEEDED FOR SHORTNESS OF BREATH / DYSPNEA OR WHEEZING, Disp: 8.5 Inhaler, Rfl: 1     rosuvastatin (CRESTOR) 40 MG tablet, Take 1 tablet (40 mg) by mouth daily * PLEASE SCHEDULE MD APPT, Disp: 90 tablet, Rfl: 0     SPIRIVA HANDIHALER 18 MCG inhaled  "capsule, INHALE 1 CAPSULE (18 MCG) INTO THE LUNGS DAILY, Disp: 90 capsule, Rfl: 3      Physical Exam:  BP (!) 180/74   Pulse 63   Resp 17   Ht 1.575 m (5' 2\")   Wt 88.5 kg (195 lb)   SpO2 92%   BMI 35.67 kg/m    GENERAL: Well developed, well nourished, alert, and in no apparent distress.  HEENT: Normocephalic, atraumatic. PERRL, EOMI. Oral mucosa is moist. No perioral cyanosis.  NECK: supple, no masses, no thyromegaly.  RESP:  Normal respiratory effort. CTAB without rales, wheezes or rhonchi.  No cyanosis or clubbing.  CV: Normal S1, S2, regular rhythm, normal rate. No murmur.  No LE edema.   ABDOMEN: non-distended.   SKIN: warm and dry. No rash.  NEURO: AAOx3.  Normal gait.  No focal neuro deficits.  PSYCH: mentation appears normal. and affect normal/bright    Results:  PFTs: Ratio 59%, FVC 82%, FEV1 62%, %, %, DLCO 60%.  Study demonstrates a moderate obstructive ventilatory defect with air trapping and moderate impairment in gas exchange.  When compared with previous pulmonary function testing lung function is overall stable.    Imaging (personally reviewed in clinic today):  CT Chest: pending review by radiology.      Assessment and Plan:   Porsche Manning is a 69 year old female with a history of COPD who presents to pulmonary clinic today for follow up of COPD.    1. COPD.  Maintained on LABA/LAMA/ICS with Breo and spiriva.  Stable spirometry and stable symptoms.  Denies episodes of pneumonia or AECOPD.  Continue Breo and Spiriva as prescribed.  Pulmonary rehab offered as means to motivate her to get out of the house and exercise more but she declined.  She does have a treadmill and exercise bike at home which she will try to use.  2. Pulmonary nodules.  Stable on last CT, due for annual lung cancer screening exam/nodule surveillance today.  CT Chest pending read by radiology.  3. Tobacco Cessation.  Discussed importance of smoking cessation again today, particularly as it relates to risk " of lung cancer and COPD.  She will continue to try to quit.  The above findings and plan were discussed with Ms. Manning at length. Questions and concerns were answered to her satisfaction.  She was provided with my contact information should new questions or concerns arise in the interim.  she should return to clinic in 6 months for follow up.  She is up to date on prevnar (2015) and pneumovax (2017).  Has received a seasonal influenza vaccine.    Yanique Lorenzo MD  Pulmonary and Critical Care Medicine    The above note was dictated using voice recognition software and may include typographical errors. Please contact the author for any clarifications.    ### CT Chest Results:  1. ACR Assessment Category (v1.1):  Lung-RADS Category 2. Benign appearance or behavior.  Recommendation:  Lung-RADS Category 2. Benign appearance or behavior.  Recommendation:  continue annual screening with Lung cancer screening CT (please order exam code [id number]).   2. Significant Incidental Finding(s):  Category S: Yes.  a.  Heavy atherosclerotic calcification of coronary arteries. b.  Atherosclerotic calcification of thoracic and medial portion of abdominal aorta and the origin of major branches c. 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 Avoidance of tobacco smoke is strongly advised. Please consider referral for smoking cessation to [facility] Medication Therapy Management (MTM) if clinically appropriate.  Continued attempts at smoking cessation recommended, particularly in light of suspected RB-ILD findings as above.  Otherwise continue annual screening.

## 2020-01-13 NOTE — NURSING NOTE
Chief Complaint   Patient presents with     RECHECK     6 month follow up COPD     Medications reviewed and vital signs taken.   Praveen Camacho CMA

## 2020-01-13 NOTE — LETTER
1/13/2020       RE: Porsceh Manning  [address]  [facility] [id number]     Dear Colleague,    Thank you for referring your patient, Porsche Manning, to the [facility] at [facility]. Please see a copy of my visit note below.    Pulmonary Clinic Return Visit    History of Present Illness  Ms. Manning is a 69-year-old female with a history of tobacco abuse and COPD who presents to pulmonary clinic today for follow up of COPD and pulmonary nodule.  To briefly review, she has been maintained on Breo-Ellipta, Spiriva, and albuterol as needed.      Saw PCP in October for annual exam and noted increased shortness of breath with exertion.  No improvement in symptoms with inhalers, wondering if this could be related to deconditioning.  Today denies any new or worsening shortness of breath or cough.  Weight is stable.  Denies hemoptysis.  Has an albuterol rescue inhaler but never needs to use this.  Has become more sedentary over the fall and winter.  Denies AECOPD or pneumonia.    She has smoked 2 packs per day for approximately 40 years although notes that she has quit off and on in between.  Continues to quit on and off but averages smoking about 4-5 cigarettes per day. Previously tried Chantix but stopped due to hair falling out.        Review of Systems:  10 of 14 systems reviewed and are negative unless otherwise stated in HPI.    Past Medical History:   Diagnosis Date     Brachial neuritis or radiculitis NOS      Chronic obstructive pulmonary disease, unspecified COPD type (H) 3/8/2016     Coronary artery disease     Doing fine     H/O tobacco use, presenting hazards to health      Hyperlipidemia LDL goal < 100      Hypertension goal BP (blood pressure) < 140/90      Malignant neoplasm of other specified sites of cervix      Old myocardial infarction      Osteopenia      Peripheral vascular disease, unspecified (H)      Type 2  diabetes, HbA1c goal < 7% (H)        Past Surgical History:   Procedure Laterality Date     BIOPSY      Sample taken from back     CARDIAC SURGERY      Stents     COLONOSCOPY  1996    Results were ok     HYSTERECTOMY, PAP NO LONGER INDICATED  1989    ovaries only, no cervix per pt     SURGICAL HISTORY OF -   1995    bunionectomy     SURGICAL HISTORY OF -   10/10/03    cardiac stenting     SURGICAL HISTORY OF -       stent placed in both of her legs for pad     VASCULAR SURGERY      PAD       Family History   Problem Relation Age of Onset     C.A.D. Mother      Breast Cancer Mother      C.A.D. Father      Diabetes Father      Hypertension Father      C.A.D. Maternal Grandfather      C.A.D. 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     Social History Main Topics     Smoking status: Former Smoker     Packs/day: 0.50     Years: 40.00     Types: Cigarettes     Quit date: 4/25/2018     Smokeless tobacco: Never Used     Alcohol use Yes      Comment: 2 to 4 beers or mixed drinks weekly     Drug use: No     Sexual activity: No     Other Topics Concern     Parent/Sibling W/ Cabg, Mi Or Angioplasty Before 65f 55m? No     Social History Narrative    12/22/09    Balanced Diet - No    Osteoporosis Prevention Measures - Dairy servings per day: 0-1 and Medication/Supplements (See current meds)    Regular Exercise -  No Describe None    Dental Exam - YES - Date: 12/2009    Eye Exam - YES - Date: 2 years ago    Self Breast Exam - Yes, on a monthly basis    Abuse: Current or Past (Physical, Sexual or Emotional)- No    Do you feel safe in your environment - Yes    Guns stored in the home - No    Sunscreen used - Yes    Seatbelts used - Yes    Lipids -  YES - Date: 10/23/08    Glucose -  YES -  Date: 10/23/08    Colon Cancer Screening - Colonoscopy 7/27/06(date completed)    Hemoccults - YES - Date: 7/11/06    Pap Test -  YES - Date: 10/23/08, Pt has Hx of abnormal paps.    Do you have any concerns about STD's -  No    Mammography - YES - Date: 10/23/08    DEXA - YES - Date: 11/29/07    Immunizations reviewed and up to date - Yes, Tdap given 10/18/07    Phillip Loo MA                         Allergies   Allergen Reactions     Morphine Nausea and Vomiting     Penicillins Nausea and Vomiting         Current Outpatient Medications:      albuterol (PROAIR HFA/PROVENTIL HFA/VENTOLIN HFA) 108 (90 Base) MCG/ACT inhaler, Inhale 2 puffs into the lungs every 6 hours (Patient taking differently: Inhale 2 puffs into the lungs every 4 hours as needed ), Disp: 1 Inhaler, Rfl: 3     ASPIRIN 325 MG OR TBEC, 1 tab po QD (Once per day) (Patient not taking: No sig reported), Disp: 100, Rfl: 3     aspirin 81 MG EC tablet, Take 81 mg by mouth daily, Disp: , Rfl:      BREO ELLIPTA 100-25 MCG/INH inhaler, TAKE 1 PUFF BY MOUTH EVERY DAY, Disp: 60 Inhaler, Rfl: 11     buPROPion (WELLBUTRIN SR) 150 MG 12 hr tablet, Take 1 tablet (150 mg) by mouth 2 times daily (Patient not taking: Reported on 6/27/2019), Disp: 180 tablet, Rfl: 3     Calcium Carbonate-Vit D-Min (CALCIUM 1200 PO), Take 600 mg by mouth daily , Disp: , Rfl:      Cholecalciferol (VITAMIN D) 1000 UNIT capsule, Take 1 capsule by mouth daily. Take one tablet daily, Disp: , Rfl:      clopidogrel (PLAVIX) 75 MG tablet, Take 1 tablet (75 mg) by mouth daily, Disp: 90 tablet, Rfl: 1     diltiazem ER COATED BEADS (CARDIZEM CD) 360 MG 24 hr capsule, Take 1 capsule (360 mg) by mouth daily, Disp: 90 capsule, Rfl: 3     lisinopril (PRINIVIL/ZESTRIL) 5 MG tablet, Take 1 tablet (5 mg) by mouth daily, Disp: 30 tablet, Rfl: 3     metoprolol succinate ER (TOPROL-XL) 100 MG 24 hr tablet, TAKE 1.5 TABLETS (150 MG) BY MOUTH DAILY, Disp: 135 tablet, Rfl: 0     PROAIR  (90 Base)  "MCG/ACT inhaler, INHALE 2 PUFFS INTO THE LUNGS EVERY 6 HOURS AS NEEDED FOR SHORTNESS OF BREATH / DYSPNEA OR WHEEZING, Disp: 8.5 Inhaler, Rfl: 1     rosuvastatin (CRESTOR) 40 MG tablet, Take 1 tablet (40 mg) by mouth daily * PLEASE SCHEDULE MD JACKLYNT, Disp: 90 tablet, Rfl: 0     SPIRIVA HANDIHALER 18 MCG inhaled capsule, INHALE 1 CAPSULE (18 MCG) INTO THE LUNGS DAILY, Disp: 90 capsule, Rfl: 3      Physical Exam:  BP (!) 180/74   Pulse 63   Resp 17   Ht 1.575 m (5' 2\")   Wt 88.5 kg (195 lb)   SpO2 92%   BMI 35.67 kg/m     GENERAL: Well developed, well nourished, alert, and in no apparent distress.  HEENT: Normocephalic, atraumatic. PERRL, EOMI. Oral mucosa is moist. No perioral cyanosis.  NECK: supple, no masses, no thyromegaly.  RESP:  Normal respiratory effort. CTAB without rales, wheezes or rhonchi.  No cyanosis or clubbing.  CV: Normal S1, S2, regular rhythm, normal rate. No murmur.  No LE edema.   ABDOMEN: non-distended.   SKIN: warm and dry. No rash.  NEURO: AAOx3.  Normal gait.  No focal neuro deficits.  PSYCH: mentation appears normal. and affect normal/bright    Results:  PFTs: Ratio 59%, FVC 82%, FEV1 62%, %, %, DLCO 60%.  Study demonstrates a moderate obstructive ventilatory defect with air trapping and moderate impairment in gas exchange.  When compared with previous pulmonary function testing lung function is overall stable.    Imaging (personally reviewed in clinic today):  CT Chest: pending review by radiology.      Assessment and Plan:   Porsche Manning is a 69 year old female with a history of COPD who presents to pulmonary clinic today for follow up of COPD.    1. COPD.  Maintained on LABA/LAMA/ICS with Breo and spiriva.  Stable spirometry and stable symptoms.  Denies episodes of pneumonia or AECOPD.  Continue Breo and Spiriva as prescribed.  Pulmonary rehab offered as means to motivate her to get out of the house and exercise more but she declined.  She does have a treadmill and " exercise bike at home which she will try to use.  2. Pulmonary nodules.  Stable on last CT, due for annual lung cancer screening exam/nodule surveillance today.  CT Chest pending read by radiology.  3. Tobacco Cessation.  Discussed importance of smoking cessation again today, particularly as it relates to risk of lung cancer and COPD.  She will continue to try to quit.  The above findings and plan were discussed with Ms. Manning at length. Questions and concerns were answered to her satisfaction.  She was provided with my contact information should new questions or concerns arise in the interim.  she should return to clinic in 6 months for follow up.  She is up to date on prevnar (2015) and pneumovax (2017).  Has received a seasonal influenza vaccine.    Yanique Lorenzo MD  Pulmonary and Critical Care Medicine    The above note was dictated using voice recognition software and may include typographical errors. Please contact the author for any clarifications.    ### CT Chest Results:  1. ACR Assessment Category (v1.1):  Lung-RADS Category 2. Benign appearance or behavior.  Recommendation:  Lung-RADS Category 2. Benign appearance or behavior.  Recommendation:  continue annual screening with Lung cancer screening CT (please order exam code [id number]).   2. Significant Incidental Finding(s):  Category S: Yes.  a.  Heavy atherosclerotic calcification of coronary arteries. b.  Atherosclerotic calcification of thoracic and medial portion of abdominal aorta and the origin of major branches c. 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 Avoidance of tobacco smoke is strongly advised. Please consider referral for smoking cessation to [facility] Medication Therapy Management (MTM) if clinically appropriate.  Continued attempts at smoking cessation recommended, particularly in light of suspected RB-ILD findings as above.  Otherwise continue annual screening    Lori Lorenzo MD

## 2020-01-14 ENCOUNTER — TELEPHONE (OUTPATIENT)
Facility: CLINIC | Age: 70
End: 2020-01-14

## 2020-01-14 DIAGNOSIS — I25.2 OLD MYOCARDIAL INFARCTION: ICD-10-CM

## 2020-01-14 DIAGNOSIS — J44.1 COPD EXACERBATION (H): ICD-10-CM

## 2020-01-14 DIAGNOSIS — I10 HYPERTENSION GOAL BP (BLOOD PRESSURE) < 140/90: ICD-10-CM

## 2020-01-14 DIAGNOSIS — I25.83 CORONARY ARTERY DISEASE DUE TO LIPID RICH PLAQUE: Primary | ICD-10-CM

## 2020-01-14 DIAGNOSIS — I25.10 CORONARY ARTERY DISEASE DUE TO LIPID RICH PLAQUE: Primary | ICD-10-CM

## 2020-01-14 NOTE — TELEPHONE ENCOUNTER
[facility] Call Center    Phone Message    May a detailed message be left on voicemail: no    Reason for Call: Other: Ranjeet would like a call back from Dr. Lorenzo about an accidental finding that was found in an MRI      Action Taken: Message routed to:  [facility] ([facility] Pulm

## 2020-01-14 NOTE — TELEPHONE ENCOUNTER
[facility] Call Center    Phone Message    May a detailed message be left on voicemail: yes    Reason for Call: Other: Ranjeet is calling back to FU on this. If the doctor is aware of this go ahead and put a mesage in pt's chart so Ranjeet can close out of this.      Action Taken: Message routed to:  [facility] Pulmonary

## 2020-01-14 NOTE — TELEPHONE ENCOUNTER
"Requested Prescriptions   Pending Prescriptions Disp Refills     BREO ELLIPTA 100-25 MCG/INH inhaler [Pharmacy Med Name: BREO ELLIPTA 100-25 MCG INH]  11     Sig: TAKE 1 PUFF BY MOUTH EVERY DAY  Last Written Prescription Date:  11/19/2018  Last Fill Quantity: 60,  # refills: 11   Last Office Visit: 10/22/2019   Future Office Visit:            Inhaled Steroids Protocol Passed - 1/14/2020 10:11 AM        Passed - Patient is age 12 or older        Passed - Recent (12 mo) or future (30 days) visit within the authorizing provider's specialty     Patient has had an office visit with the authorizing provider or a provider within the authorizing providers department within the previous 12 mos or has a future within next 30 days. See \"Patient Info\" tab in inbasket, or \"Choose Columns\" in Meds & Orders section of the refill encounter.              Passed - Medication is active on med list          "

## 2020-01-15 LAB
DLCOUNC-%PRED-PRE: 60 %
DLCOUNC-PRE: 11 ML/MIN/MMHG
DLCOUNC-PRED: 18.3 ML/MIN/MMHG
ERV-%PRED-PRE: 171 %
ERV-PRE: 0.48 L
ERV-PRED: 0.28 L
EXPTIME-PRE: 12.07 SEC
FEF2575-%PRED-PRE: 31 %
FEF2575-PRE: 0.58 L/SEC
FEF2575-PRED: 1.86 L/SEC
FEFMAX-%PRED-PRE: 77 %
FEFMAX-PRE: 4.28 L/SEC
FEFMAX-PRED: 5.49 L/SEC
FEV1-%PRED-PRE: 62 %
FEV1-PRE: 1.33 L
FEV1FEV6-PRE: 62 %
FEV1FEV6-PRED: 79 %
FEV1FVC-PRE: 59 %
FEV1FVC-PRED: 79 %
FEV1SVC-PRE: 49 %
FEV1SVC-PRED: 74 %
FIFMAX-PRE: 4.57 L/SEC
FRCPLETH-%PRED-PRE: 124 %
FRCPLETH-PRE: 3.23 L
FRCPLETH-PRED: 2.6 L
FVC-%PRED-PRE: 82 %
FVC-PRE: 2.24 L
FVC-PRED: 2.71 L
IC-%PRED-PRE: 87 %
IC-PRE: 2.26 L
IC-PRED: 2.57 L
RVPLETH-%PRED-PRE: 142 %
RVPLETH-PRE: 2.76 L
RVPLETH-PRED: 1.94 L
TLCPLETH-%PRED-PRE: 119 %
TLCPLETH-PRE: 5.49 L
TLCPLETH-PRED: 4.6 L
VA-%PRED-PRE: 92 %
VA-PRE: 4.03 L
VC-%PRED-PRE: 95 %
VC-PRE: 2.73 L
VC-PRED: 2.85 L

## 2020-01-15 NOTE — TELEPHONE ENCOUNTER
Assuming they are referring to the heavy coronary artery calcifications as the urgent incidental finding, I'm aware.  I think we can go ahead and send an FYI to her PCP and ask her to follow up there.     TEGAN

## 2020-01-15 NOTE — TELEPHONE ENCOUNTER
Confirmed incidental finding with [facility]. Significant Incidental Finding(s):  Category S: Yes.  a.  Heavy atherosclerotic calcification of coronary arteries.   b.  Atherosclerotic calcification of thoracic and medial portion of  abdominal aorta and the origin of major branches  c. 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    Reviewed with pt and encouraged follow up with PCP/ cardiologist, pt states she has appointment .  Contacted Dr. Moody Palma's nurse (cardiology), and informed her of findings.

## 2020-01-16 RX ORDER — DILTIAZEM HYDROCHLORIDE 360 MG/1
360 CAPSULE, EXTENDED RELEASE ORAL DAILY
Qty: 90 CAPSULE | Refills: 0 | Status: SHIPPED | OUTPATIENT
Start: 2020-01-16 | End: 2020-03-31

## 2020-01-16 RX ORDER — METOPROLOL SUCCINATE 100 MG/1
150 TABLET, EXTENDED RELEASE ORAL DAILY
Qty: 135 TABLET | Refills: 0 | Status: SHIPPED | OUTPATIENT
Start: 2020-01-16 | End: 2020-03-31

## 2020-01-16 NOTE — TELEPHONE ENCOUNTER
Last Spirometry or PFT'S : 1/13/2020.    Prescription approved per [facility] Refill Protocol.  Kenyatta Ott RN

## 2020-02-15 DIAGNOSIS — I10 HYPERTENSION GOAL BP (BLOOD PRESSURE) < 140/90: ICD-10-CM

## 2020-02-15 DIAGNOSIS — E78.5 HYPERLIPIDEMIA LDL GOAL <70: Primary | ICD-10-CM

## 2020-02-16 RX ORDER — ROSUVASTATIN CALCIUM 40 MG/1
40 TABLET, COATED ORAL DAILY
Qty: 90 TABLET | Refills: 0 | Status: SHIPPED | OUTPATIENT
Start: 2020-02-16 | End: 2020-05-05

## 2020-02-17 NOTE — TELEPHONE ENCOUNTER
"Requested Prescriptions   Pending Prescriptions Disp Refills     lisinopril (ZESTRIL) 5 MG tablet [Pharmacy Med Name: LISINOPRIL 5 MG TABLET] 90 tablet 1     Sig: TAKE 1 TABLET BY MOUTH EVERY DAY  Last Written Prescription Date:  10/28/2019  Last Fill Quantity: 30 tablet,  # refills: 3   Last Office Visit: 10/22/2019   Future Office Visit:            ACE Inhibitors (Including Combos) Protocol Failed - 2/15/2020 12:38 AM        Failed - Blood pressure under 140/90 in past 12 months     BP Readings from Last 3 Encounters:   01/13/20 (!) 180/74   10/22/19 136/66   06/27/19 172/81           Passed - Recent (12 mo) or future (30 days) visit within the authorizing provider's specialty     Patient has had an office visit with the authorizing provider or a provider within the authorizing providers department within the previous 12 mos or has a future within next 30 days. See \"Patient Info\" tab in inbasket, or \"Choose Columns\" in Meds & Orders section of the refill encounter.            Passed - Medication is active on med list        Passed - Patient is age 18 or older        Passed - No active pregnancy on record        Passed - Normal serum creatinine on file in past 12 months     Recent Labs   Lab Test 11/12/19  0920  10/24/18  1034   CR 0.96   < >  --    CREAT  --   --  1.0    < > = values in this interval not displayed.           Passed - Normal serum potassium on file in past 12 months     Recent Labs   Lab Test 11/12/19  0920   POTASSIUM 4.3           Passed - No positive pregnancy test within past 12 months          "

## 2020-02-18 ENCOUNTER — OFFICE VISIT (OUTPATIENT)
Dept: CARDIOLOGY | Facility: CLINIC | Age: 70
End: 2020-02-18
Attending: INTERNAL MEDICINE
Payer: COMMERCIAL

## 2020-02-18 ENCOUNTER — ANCILLARY PROCEDURE (OUTPATIENT)
Dept: ULTRASOUND IMAGING | Facility: CLINIC | Age: 70
End: 2020-02-18
Attending: INTERNAL MEDICINE
Payer: COMMERCIAL

## 2020-02-18 VITALS
SYSTOLIC BLOOD PRESSURE: 156 MMHG | HEIGHT: 62 IN | DIASTOLIC BLOOD PRESSURE: 74 MMHG | HEART RATE: 64 BPM | OXYGEN SATURATION: 93 % | BODY MASS INDEX: 34.96 KG/M2 | WEIGHT: 190 LBS

## 2020-02-18 DIAGNOSIS — I73.9 PERIPHERAL VASCULAR DISEASE, UNSPECIFIED (H): ICD-10-CM

## 2020-02-18 DIAGNOSIS — I70.0 ATHEROSCLEROSIS OF AORTA (H): ICD-10-CM

## 2020-02-18 DIAGNOSIS — I10 HYPERTENSION GOAL BP (BLOOD PRESSURE) < 140/90: ICD-10-CM

## 2020-02-18 DIAGNOSIS — I25.10 CORONARY ARTERY DISEASE INVOLVING NATIVE CORONARY ARTERY OF NATIVE HEART WITHOUT ANGINA PECTORIS: Primary | ICD-10-CM

## 2020-02-18 DIAGNOSIS — R09.89 BILATERAL CAROTID BRUITS: ICD-10-CM

## 2020-02-18 PROCEDURE — 99214 OFFICE O/P EST MOD 30 MIN: CPT | Mod: ZP | Performed by: INTERNAL MEDICINE

## 2020-02-18 PROCEDURE — 93005 ELECTROCARDIOGRAM TRACING: CPT | Mod: ZF

## 2020-02-18 PROCEDURE — 93010 ELECTROCARDIOGRAM REPORT: CPT | Mod: ZP | Performed by: INTERNAL MEDICINE

## 2020-02-18 PROCEDURE — G0463 HOSPITAL OUTPT CLINIC VISIT: HCPCS | Mod: 25,ZF

## 2020-02-18 RX ORDER — SPIRONOLACTONE 25 MG/1
25 TABLET ORAL DAILY
Qty: 90 TABLET | Refills: 3 | Status: SHIPPED | OUTPATIENT
Start: 2020-02-18 | End: 2020-03-12

## 2020-02-18 ASSESSMENT — MIFFLIN-ST. JEOR: SCORE: 1345.08

## 2020-02-18 ASSESSMENT — PAIN SCALES - GENERAL: PAINLEVEL: NO PAIN (0)

## 2020-02-18 NOTE — NURSING NOTE
Chief Complaint   Patient presents with     Follow Up      manage CAD and PAD       Vitals were taken and medications were reconciled. EKG was performed    Kamini Bailey ALYCIA  10:58 AM

## 2020-02-18 NOTE — TELEPHONE ENCOUNTER
"Eduard-Please review.  Would you like patient to follow up for BP check?    Per 2/18/20 cardiology visit note:  \"4. HTN: BP in left arm is FALSELY low, presumably due to left subclavian artery stenosis. She is on Toprol  mg qd and Diltiazem 360 CD every day and Lisinopril 10 mg every day.  Check ambulatory BP.  BP has never been controlled.   Start Spironolactone 12.5 mg every day. Cr 0.96 in Nov 2019.  Alternatively, she may be looking at hydralazine or clonidine as next medication.\"    Thank you!  REGI Rice, UNIQUEN, RN      "

## 2020-02-18 NOTE — LETTER
2/18/2020      RE: Porsche Manning  [address]  [facility] [id number]       Dear Colleague,    Thank you for the opportunity to participate in the care of your patient, Porsche Manning, at the [facility]. Please see a copy of my visit note below.    CARDIOLOGY CLINIC FOLLOW UP    HPI: Porsche Manning is a 69 year old female, being seen today for recheck of CAD/PAD.   Her risk factors include hypertension, hypercholesteraemia, and tobacco abuse.  Her cardiac hx dates back to 1987 when she had her 1st MI.  She presented to [facility] in October 2003 with an acute coronary syndrome. She underwent a coronary angiogram which revealed a single-vessel coronary disease in her right coronary artery with a 90% stenosis in the mid-segment. She underwent successful bare metal stenting in this right coronary artery. During that procedure we discovered a 90% stenosis of the ostium of the right common iliac artery and 70% stenosis of the ostium of the left common iliac artery.   The patient underwent an MRA that revealed no significant renal artery disease and bilateral focal stenosis of both common iliac arteries, which was worse on the right side. There was a good runoff with both lower extremities having significant disease only in the distal anterior tibial artery bilaterally.   Mrs. Manning underwent aortography, peripheral angiography and bilateral aorto-iliac stenting on 4/30/05. She underwent kissing stenting of the aorto-iliac vessels with a Lumidex 8x60 on the right and a Lumidex 10x60 on the left. Following the procedure, the ABIs were 0.88 on the Rt PT, 1.00 on the Rt DP, 1.00 on the Lf DP, and 1.00 on the Lf PT.   In Oct 2006, she presented to [facility] with recurrent claudication and underwent repeat angiography and intervention. The left common iliac artery stent was thrombosed and she was given thrombolytic therapy. She underwent  stenting of left common iliac artery with 8 x 60 mm covered stent. She subsequently underwent mechanical embolectomy of left peroneal trunk and placement of infusion catheter in left posterior tibial artery with infusion of thrombolytic therapy (10/20/2006).   She had subsequent ABIs performed in 2012 that showed 0.93 on both legs at rest and 0.86 and 0.79 on the right and left, respectively, with exercise.   Currently, she denies any CP, PND, orthopnea, LE edema, palpitations, syncope.  She notes SOB with one flight of steps.  It does not interfere with her function.  She is able to carry out her normal activities including carrying laundry up a flight of stairs.  She has attributed it to deconditioning in the winter months.  She has stable claudication, with absolute claudication distance of 3 blocks. It is primarily in left buttocks.  She is iraj Class 1.  No change in the past year.  This is primarily due to her limitation in walking due to dyspnea.  She has known subclavian stenosis on the left, she has no evidence of steal phenomenon and asymptomatic (no left arm claudication, no visual disturbance) with this. NICS showed <70% in the carotids, left subclavian > 70% as known before.  No functional limitations.    She resumed tobacco use in the fall of 2012 but QUIT again and has not smoked since March 29, 2013.  She resumed tobacco and has a pattern of quitting and resuming.  She is able to hold off on smoking around her friends who smoke.  However, she has difficulty when alone and continues to have craving.  She is currently not smoking cigarettes, having quit 3 weeks ago. She has nicotine patches at home.   She tried Chantix previously and experienced hair loss.   She remains on Wellbutrin.    Her BP has been under good control in the ambulatory setting (i.e. when she donates blood).       PAST MEDICAL HISTORY:  Past Medical History:   Diagnosis Date     Brachial neuritis or radiculitis NOS       Chronic obstructive pulmonary disease, unspecified COPD type (H) 3/8/2016     Coronary artery disease     Doing fine     H/O tobacco use, presenting hazards to health      Hyperlipidemia LDL goal < 100      Hypertension goal BP (blood pressure) < 140/90      Malignant neoplasm of other specified sites of cervix      Old myocardial infarction      Osteopenia      Peripheral vascular disease, unspecified (H)      Type 2 diabetes, HbA1c goal < 7% (H)        CURRENT MEDICATIONS:  Current Outpatient Medications   Medication Sig     albuterol (PROAIR HFA/PROVENTIL HFA/VENTOLIN HFA) 108 (90 Base) MCG/ACT inhaler Inhale 2 puffs into the lungs every 6 hours (Patient taking differently: Inhale 2 puffs into the lungs every 4 hours as needed )     aspirin 81 MG EC tablet Take 81 mg by mouth daily     BREO ELLIPTA 100-25 MCG/INH inhaler TAKE 1 PUFF BY MOUTH EVERY DAY     buPROPion (WELLBUTRIN SR) 150 MG 12 hr tablet Take 1 tablet (150 mg) by mouth 2 times daily     Calcium Carbonate-Vit D-Min (CALCIUM 1200 PO) Take 600 mg by mouth daily      Cholecalciferol (VITAMIN D) 1000 UNIT capsule Take 1 capsule by mouth daily. Take one tablet daily     clopidogrel (PLAVIX) 75 MG tablet Take 1 tablet (75 mg) by mouth daily     diltiazem ER COATED BEADS (CARDIZEM CD) 360 MG 24 hr capsule Take 1 capsule (360 mg) by mouth daily     lisinopril (PRINIVIL/ZESTRIL) 5 MG tablet Take 1 tablet (5 mg) by mouth daily     metoprolol succinate ER (TOPROL-XL) 100 MG 24 hr tablet Take 1.5 tablets (150 mg) by mouth daily     PROAIR  (90 Base) MCG/ACT inhaler INHALE 2 PUFFS INTO THE LUNGS EVERY 6 HOURS AS NEEDED FOR SHORTNESS OF BREATH / DYSPNEA OR WHEEZING     rosuvastatin (CRESTOR) 40 MG tablet Take 1 tablet (40 mg) by mouth daily * PLEASE SCHEDULE MD APPT     SPIRIVA HANDIHALER 18 MCG inhaled capsule INHALE 1 CAPSULE (18 MCG) INTO THE LUNGS DAILY     ASPIRIN 325 MG OR TBEC 1 tab po QD (Once per day) (Patient not taking: No sig reported)     No  current facility-administered medications for this visit.        PAST SURGICAL HISTORY:  Past Surgical History:   Procedure Laterality Date     BIOPSY      Sample taken from back     CARDIAC SURGERY      Stents     COLONOSCOPY  1996    Results were ok     HYSTERECTOMY, PAP NO LONGER INDICATED  1989    ovaries only, no cervix per pt     SURGICAL HISTORY OF -   1995    bunionectomy     SURGICAL HISTORY OF -   10/10/03    cardiac stenting     SURGICAL HISTORY OF -       stent placed in both of her legs for pad     VASCULAR SURGERY      PAD       ALLERGIES  Morphine and Penicillins    FAMILY HX:  Family History   Problem Relation Age of Onset     C.A.D. Mother      Breast Cancer Mother      C.A.D. Father      Diabetes Father      Hypertension Father      C.A.D. Maternal Grandfather      C.A.D. Paternal Grandfather      Neurologic Disorder Brother         epilepsy       SOCIAL HX:  History     Social History     Marital Status: Single     Spouse Name: N/A     Number of Children: 0     Years of Education: 12     Occupational History     telecom assoc      [employer]     Social History Main Topics     Smoking status: Former Smoker -- 0.25 packs/day for 40 years     Types: Cigarettes     Quit date: 09/28/2006     Smokeless tobacco: Not on file     Alcohol Use: Yes      Comment: 2 to 4 beers or mixed drinks weekly     Drug Use: No     Sexual Activity: Not Currently     Other Topics Concern     Parent/Sibling W/ Cabg, Mi Or Angioplasty Before 65f 55m? No     Social History Narrative    12/22/09    Balanced Diet - No    Osteoporosis Prevention Measures - Dairy servings per day: 0-1 and Medication/Supplements (See current meds)    Regular Exercise -  No Describe None    Dental Exam - YES - Date: 12/2009    Eye Exam - YES - Date: 2 years ago    Self Breast Exam - Yes, on a monthly basis    Abuse: Current or Past (Physical, Sexual or Emotional)- No    Do you feel safe in your environment - Yes    Guns stored in  "the home - No    Sunscreen used - Yes    Seatbelts used - Yes    Lipids -  YES - Date: 10/23/08    Glucose -  YES - Date: 10/23/08    Colon Cancer Screening - Colonoscopy 7/27/06(date completed)    Hemoccults - YES - Date: 7/11/06    Pap Test -  YES - Date: 10/23/08, Pt has Hx of abnormal paps.    Do you have any concerns about STD's -  No    Mammography - YES - Date: 10/23/08    DEXA - YES - Date: 11/29/07    Immunizations reviewed and up to date - Yes, Tdap given 10/18/07    Phillip Loo MA                       ROS:  Answers for HPI/ROS submitted by the patient on 2/16/2020   General Symptoms: No  Skin Symptoms: No  HENT Symptoms: No  EYE SYMPTOMS: No  HEART SYMPTOMS: No  LUNG SYMPTOMS: No  INTESTINAL SYMPTOMS: No  URINARY SYMPTOMS: No  GYNECOLOGIC SYMPTOMS: No  BREAST SYMPTOMS: No  SKELETAL SYMPTOMS: No  BLOOD SYMPTOMS: No  NERVOUS SYSTEM SYMPTOMS: No  MENTAL HEALTH SYMPTOMS: No    VITAL SIGNS:  BP (!) 177/73 (BP Location: Right arm, Patient Position: Chair, Cuff Size: Adult Regular)   Pulse 64   Ht 1.575 m (5' 2\")   Wt 86.2 kg (190 lb)   SpO2 93%   BMI 34.75 kg/m      Repeat /74 mm Hg  Body mass index is 34.75 kg/m .  Wt Readings from Last 2 Encounters:   01/13/20 88.5 kg (195 lb)   10/22/19 88.5 kg (195 lb)       PHYSICAL EXAM  Porsche Manning is a 69 year old female.in no acute distress.  HEENT: Eyes Nonicteric.  Neck: JVP normal.  Carotids +3/3 bilaterally. Prominent right carotid bruit.  Soft left carotid bruit.   Lungs: CTA.  Cor: RRR. Normal S1 and S2.  No murmur, rub, or gallop.  PMI in Lf 5th ICS.  Abd: Soft, nontender, nondistended.  NABS.  No pulsatile mass.  Extremities: No C/C/E.  Pulses +1/3 symmetric at PT bilaterally.  Doppler signals Rt fem +2 with bruit.  Rt pop +2 RT DP 0, Rt PT +1.  Rt DP  Weak doppler signal.  Rt PT strong Doppler signal.  Lf fem +1 no bruit.   Lf pop +1. Lf DP 0  Lf PT 0.  Lf DP moderate signal.  Lf PT strong signal.  Neuro: Grossly intact.  Psych: A&O x 3.  " "Skin: No rash.    LABS  Recent Labs   Lab Test 10/22/19  1012 10/25/18  1140   WBC 8.4 8.9   HGB 12.9 14.1   HCT 40.0 43.8    253     Recent Labs   Lab Test 19  0920 10/22/19  1012    133   POTASSIUM 4.3 4.3   CHLORIDE 104 101   CO2 25 23   BUN 12 11   CR 0.96 1.10*   GFRESTIMATED 61 51*     EC20  NSR.  Normal ECG.    EC17  NSR with 1st degree AV block.  GA 0.23  No ST shift, TWI, or Q waves.    PROCEDURES:     CTA Abdomen/Pelvis:  10/24/18  CTA: \"Double barrel shotgun\" kissing aortobiiliac stents extend from the distal aorta at the level of L3 into their respective common iliac arteries. The right iliac stent starts to the left of the left iliac stent and crosses anteriorly over the left iliac stent into the right iliac artery. The top of the right iliac stent is compressed by the adjacent left iliac stent (series 4, image 37) and the mid right iliac stent is compressed as it crosses anterior to the left iliac stent, but the right iliac stent appears widely patent in the right common iliac artery. Common iliac portions of the bilateral stents are widely patent with symmetric opacification. No large collaterals to suggest significant stenosis.    Diffuse and eccentric atherosclerotic calcified plaques from the aorta through the femoral arteries without more than 50% diameter narrowing.    Multifocal calcified plaques cause less than 50% diameter narrowing through the iliac and femoral arteries. Bilateral common, internal, and external iliac arteries are patent. Bilateral common femoral arteries are patent.     Celiac, superior mesenteric, single right renal, and inferior mesenteric arteries are patent. Calcified plaque at the left renal artery ostium causes less than 50% diameter narrowing.    CT: 12 mm segment 5 hepatic hypodensity was diagnosed as a simple cyst by MRI. Colonic diverticulosis without CT evidence for diverticulitis.    6 x 9 mm left lower lobe pleural-based " posterior pulmonary nodule, unchanged from 5/9/2018. Upper lobe nodules were excluded from this study.    Spine degenerative changes are similar in appearance.    IMPRESSION:  1. Aortobiiliac stents. Right iliac stent compressed superiorly by the adjacent left iliac stent. Mid right iliac stent compressed as it crosses anterior to the left iliac stent. Common iliac portions of the stents are widely patent with symmetric opacification. No enlarged collaterals to suggest hemodynamically significant stenosis.    2. Unchanged 6 x 9 mm left lower lobe pleural-based pulmonary nodule. Upper pole nodules were excluded from this study. Continued follow-up to document two-year stability from the 5/9/2018 CT suggested.    CT angiogram of right and left upper extremities (4/30/2013)  1. Subtotal stenosis of the origin of the left subclavian artery. Remainder of the upper extremity vasculature is patent.   2. 70% stenosis of the right internal carotid artery. Less than 10% stenosis of the left internal carotid artery. Retropharyngeal course of the carotid arteries.  US CAROTID BILAT (1/25/2012)  1. Right side: 50-69% stenosis.  2. Left side: <50% stenosis.   3. Elevated velocity in the left subclavian artery origin with post obstructive waveforms and bidirectional waveforms in the vertebral artery. Findings concerning for subclavian artery stenosis at the origin of the vessel.     REST and EXERCISE CLAUDINE (1/25/2012)  1. Right lower extremity: Resting CLAUDINE 0.93, which is in the borderline category. Decrease in CLAUDINE to 0.80 at 3 minutes post exercise, with recovery of CLAUDINE to 0.86 at 10 minutes.   2. Left lower extremity: Resting CLAUDINE 0.93, which is in the borderline category. Decrease in CLAUDINE to 0.79 at 2 minutes post exercise with recovery back to baseline by 10 minutes post exercise.    Coronary Angiography with PCI (10/10/2003)  1. Pt has a normal L Main and has about 25-50% lesions in the PLAD which are considered not be significant  hemodynamically.  2. Pt has mild disease in his Cx  3. RCA had a 70-90% lesion in the Mid segement which was stented - 3.0 x 23 mm Cypher drug eluting stent was placed and post dilated using a 3.0 x 20mm power sail balloon.  Aortgraphy reveals a 90% stenosis at the origin of the right common iliac and a 70% stenosis at the origin of the left common iliac.    NICS (11/4/15):  Impression:  1. Right side: 50-69% stenosis in the proximal ICA, slightly progressed since prior study.  2. Left side: Less than 50% stenosis in the ICA.  3. Persistent stenosis at the origin of the left subclavian artery. Retrograde flow in the vertebral artery, previously bidirectional  suggestive of worsening subclavian steal.    ASSESSMENT AND PLAN:   1. CAD: Single vessel CAD, dating back to 2003 at which time she had PCI of RCA. Since that time, she has been asymptomatic. Recent CT scan noted heavy coronary artery calcification.  We are continuing aspirin and plavix given her history of PAD and CAD including stent thrombosis.  She continues on ASA 81 every day and Crestor 40 mg every day. No angina.  Chronic, mild MORALES.  Defer on repeat stress test for the present time.  2. LE PAD: Stable mild claudication s/p bilateral aorto-iliac kissing stens years ago complicated by stent thrombosis at later date requiring urgent intervention. ABIs in 2012 showed mild PAD. We're continuing with rehab, there is no claudication with her current exercise, she is [place] stage 0 - 1.  No change. Repeat rest and exercise ABIs and ultrasound to assess pelvic arteries and BLE runoff.  CT (2018) showed widely patent stents.   3. Carotid disease.  NICS (2016) with <70% disease in her common carotids, L subclavian with >70% stenosis as known before. NICS performed today, results pending.  4. HTN: BP in left arm is FALSELY low, presumably due to left subclavian artery stenosis. She is on Toprol  mg qd and Diltiazem 360 CD every day and Lisinopril 10 mg  every day.  Check ambulatory BP.  BP has never been controlled.   Start Spironolactone 12.5 mg every day. Cr 0.96 in Nov 2019.  Alternatively, she may be looking at hydralazine or clonidine as next medication.  5. Left subclavian artery stenosis. There was a 57 mm Hg pressure gradient between the right and left arms.  CT angiogram showed subtotal occlusion of the ostium of the left subclavian artery.   She is asymptomatic and there is no evidence of a subclavian steal phenomenon, no need for intervention at this time, we will continue to monitor  6. HLD:  Repeat lipid panel pending. She is on crestor 40 mg qd  7. Tobacco use: Counseled.   I discussed this for over 15 minutes.  Plan to continue Wellbutrin and stay clear of Chantix.  She is using generic Nicotine patches again.    8. CKD.  I believe the patient had increase in Cr to 1.49 which resulted in Lisinopril being stopped.  Last Cr 0.96 mg/dl in Nov 2019.      NICS:  2/18/2020:  1. RIGHT ICA: 50-69% diameter stenosis by grayscale imaging and sonographic velocity criteria, unchanged from 2015.  2. LEFT ICA:  Less than 50% diameter narrowing by grayscale imaging and sonographic velocity criteria, unchanged from 2015.  3. Subclavian steal physiology with retrograde left vertebral artery flow, unchanged. Correlate for symptoms.    Follow up 1 year    Brayan Uriostegui MD    Divisions of Cardiology  [place], [place]    CC  Patient Care Team:  Danuta Tan PA-C as PCP - General (Physician Assistant)

## 2020-02-18 NOTE — PATIENT INSTRUCTIONS
It was a pleasure to see you in the cardiology clinic today.    If you have any questions, you can reach my nurse, Minerva Chawla, at [phone].  Press Option #1 for the [facility], and then press Option #f 4 or nursing.    Note the new medications: Spironolactone 25 mg every day. Start with just 1/2 tablet a day.    Stop the following medications: None    The results from today include: None    Tests ordered today:   1. Rest and Exercise ABIs  2. Arterial US Pelvis and Lower Extremities  3. Lab in two weeks to check renal function    I would like you to follow up with Dr.Alan Uriostegui in one year.    Sincerely,      Brayan Uriostegui MD     [facility]

## 2020-02-18 NOTE — PROGRESS NOTES
CARDIOLOGY CLINIC FOLLOW UP    HPI: Porsche Manning is a 69 year old female, being seen today for recheck of CAD/PAD.   Her risk factors include hypertension, hypercholesteraemia, and tobacco abuse.  Her cardiac hx dates back to 1987 when she had her 1st MI.  She presented to [facility] in October 2003 with an acute coronary syndrome. She underwent a coronary angiogram which revealed a single-vessel coronary disease in her right coronary artery with a 90% stenosis in the mid-segment. She underwent successful bare metal stenting in this right coronary artery. During that procedure we discovered a 90% stenosis of the ostium of the right common iliac artery and 70% stenosis of the ostium of the left common iliac artery.   The patient underwent an MRA that revealed no significant renal artery disease and bilateral focal stenosis of both common iliac arteries, which was worse on the right side. There was a good runoff with both lower extremities having significant disease only in the distal anterior tibial artery bilaterally.   Mrs. Manning underwent aortography, peripheral angiography and bilateral aorto-iliac stenting on 4/30/05. She underwent kissing stenting of the aorto-iliac vessels with a Lumidex 8x60 on the right and a Lumidex 10x60 on the left. Following the procedure, the ABIs were 0.88 on the Rt PT, 1.00 on the Rt DP, 1.00 on the Lf DP, and 1.00 on the Lf PT.   In Oct 2006, she presented to [facility] with recurrent claudication and underwent repeat angiography and intervention. The left common iliac artery stent was thrombosed and she was given thrombolytic therapy. She underwent stenting of left common iliac artery with 8 x 60 mm covered stent. She subsequently underwent mechanical embolectomy of left peroneal trunk and placement of infusion catheter in left posterior tibial artery with infusion of thrombolytic therapy (10/20/2006).   She had subsequent ABIs performed in 2012 that showed 0.93 on both  legs at rest and 0.86 and 0.79 on the right and left, respectively, with exercise.   Currently, she denies any CP, PND, orthopnea, LE edema, palpitations, syncope.  She notes SOB with one flight of steps.  It does not interfere with her function.  She is able to carry out her normal activities including carrying laundry up a flight of stairs.  She has attributed it to deconditioning in the winter months.  She has stable claudication, with absolute claudication distance of 3 blocks. It is primarily in left buttocks.  She is iraj Class 1.  No change in the past year.  This is primarily due to her limitation in walking due to dyspnea.  She has known subclavian stenosis on the left, she has no evidence of steal phenomenon and asymptomatic (no left arm claudication, no visual disturbance) with this. NICS showed <70% in the carotids, left subclavian > 70% as known before.  No functional limitations.    She resumed tobacco use in the fall of 2012 but QUIT again and has not smoked since March 29, 2013.  She resumed tobacco and has a pattern of quitting and resuming.  She is able to hold off on smoking around her friends who smoke.  However, she has difficulty when alone and continues to have craving.  She is currently not smoking cigarettes, having quit 3 weeks ago. She has nicotine patches at home.   She tried Chantix previously and experienced hair loss.   She remains on Wellbutrin.    Her BP has been under good control in the ambulatory setting (i.e. when she donates blood).       PAST MEDICAL HISTORY:  Past Medical History:   Diagnosis Date     Brachial neuritis or radiculitis NOS      Chronic obstructive pulmonary disease, unspecified COPD type (H) 3/8/2016     Coronary artery disease     Doing fine     H/O tobacco use, presenting hazards to health      Hyperlipidemia LDL goal < 100      Hypertension goal BP (blood pressure) < 140/90      Malignant neoplasm of other specified sites of cervix      Old myocardial  infarction      Osteopenia      Peripheral vascular disease, unspecified (H)      Type 2 diabetes, HbA1c goal < 7% (H)        CURRENT MEDICATIONS:  Current Outpatient Medications   Medication Sig     albuterol (PROAIR HFA/PROVENTIL HFA/VENTOLIN HFA) 108 (90 Base) MCG/ACT inhaler Inhale 2 puffs into the lungs every 6 hours (Patient taking differently: Inhale 2 puffs into the lungs every 4 hours as needed )     aspirin 81 MG EC tablet Take 81 mg by mouth daily     BREO ELLIPTA 100-25 MCG/INH inhaler TAKE 1 PUFF BY MOUTH EVERY DAY     buPROPion (WELLBUTRIN SR) 150 MG 12 hr tablet Take 1 tablet (150 mg) by mouth 2 times daily     Calcium Carbonate-Vit D-Min (CALCIUM 1200 PO) Take 600 mg by mouth daily      Cholecalciferol (VITAMIN D) 1000 UNIT capsule Take 1 capsule by mouth daily. Take one tablet daily     clopidogrel (PLAVIX) 75 MG tablet Take 1 tablet (75 mg) by mouth daily     diltiazem ER COATED BEADS (CARDIZEM CD) 360 MG 24 hr capsule Take 1 capsule (360 mg) by mouth daily     lisinopril (PRINIVIL/ZESTRIL) 5 MG tablet Take 1 tablet (5 mg) by mouth daily     metoprolol succinate ER (TOPROL-XL) 100 MG 24 hr tablet Take 1.5 tablets (150 mg) by mouth daily     PROAIR  (90 Base) MCG/ACT inhaler INHALE 2 PUFFS INTO THE LUNGS EVERY 6 HOURS AS NEEDED FOR SHORTNESS OF BREATH / DYSPNEA OR WHEEZING     rosuvastatin (CRESTOR) 40 MG tablet Take 1 tablet (40 mg) by mouth daily * PLEASE SCHEDULE MD APPT     SPIRIVA HANDIHALER 18 MCG inhaled capsule INHALE 1 CAPSULE (18 MCG) INTO THE LUNGS DAILY     ASPIRIN 325 MG OR TBEC 1 tab po QD (Once per day) (Patient not taking: No sig reported)     No current facility-administered medications for this visit.        PAST SURGICAL HISTORY:  Past Surgical History:   Procedure Laterality Date     BIOPSY      Sample taken from back     CARDIAC SURGERY      Stents     COLONOSCOPY  1996    Results were ok     HYSTERECTOMY, PAP NO LONGER INDICATED  1989    ovaries only, no cervix per pt      SURGICAL HISTORY OF -   1995    bunionectomy     SURGICAL HISTORY OF -   10/10/03    cardiac stenting     SURGICAL HISTORY OF -       stent placed in both of her legs for pad     VASCULAR SURGERY      PAD       ALLERGIES  Morphine and Penicillins    FAMILY HX:  Family History   Problem Relation Age of Onset     C.A.D. Mother      Breast Cancer Mother      C.A.D. Father      Diabetes Father      Hypertension Father      C.A.D. Maternal Grandfather      C.A.D. Paternal Grandfather      Neurologic Disorder Brother         epilepsy       SOCIAL HX:  History     Social History     Marital Status: Single     Spouse Name: N/A     Number of Children: 0     Years of Education: 12     Occupational History     telecom assoc      [employer]     Social History Main Topics     Smoking status: Former Smoker -- 0.25 packs/day for 40 years     Types: Cigarettes     Quit date: 09/28/2006     Smokeless tobacco: Not on file     Alcohol Use: Yes      Comment: 2 to 4 beers or mixed drinks weekly     Drug Use: No     Sexual Activity: Not Currently     Other Topics Concern     Parent/Sibling W/ Cabg, Mi Or Angioplasty Before 65f 55m? No     Social History Narrative    12/22/09    Balanced Diet - No    Osteoporosis Prevention Measures - Dairy servings per day: 0-1 and Medication/Supplements (See current meds)    Regular Exercise -  No Describe None    Dental Exam - YES - Date: 12/2009    Eye Exam - YES - Date: 2 years ago    Self Breast Exam - Yes, on a monthly basis    Abuse: Current or Past (Physical, Sexual or Emotional)- No    Do you feel safe in your environment - Yes    Guns stored in the home - No    Sunscreen used - Yes    Seatbelts used - Yes    Lipids -  YES - Date: 10/23/08    Glucose -  YES - Date: 10/23/08    Colon Cancer Screening - Colonoscopy 7/27/06(date completed)    Hemoccults - YES - Date: 7/11/06    Pap Test -  YES - Date: 10/23/08, Pt has Hx of abnormal paps.    Do you have any concerns about  "STD's -  No    Mammography - YES - Date: 10/23/08    DEXA - YES - Date: 07    Immunizations reviewed and up to date - Yes, Tdap given 10/18/07    Phillip Loo MA                       ROS:  Answers for HPI/ROS submitted by the patient on 2020   General Symptoms: No  Skin Symptoms: No  HENT Symptoms: No  EYE SYMPTOMS: No  HEART SYMPTOMS: No  LUNG SYMPTOMS: No  INTESTINAL SYMPTOMS: No  URINARY SYMPTOMS: No  GYNECOLOGIC SYMPTOMS: No  BREAST SYMPTOMS: No  SKELETAL SYMPTOMS: No  BLOOD SYMPTOMS: No  NERVOUS SYSTEM SYMPTOMS: No  MENTAL HEALTH SYMPTOMS: No    VITAL SIGNS:  BP (!) 177/73 (BP Location: Right arm, Patient Position: Chair, Cuff Size: Adult Regular)   Pulse 64   Ht 1.575 m (5' 2\")   Wt 86.2 kg (190 lb)   SpO2 93%   BMI 34.75 kg/m     Repeat /74 mm Hg  Body mass index is 34.75 kg/m .  Wt Readings from Last 2 Encounters:   20 88.5 kg (195 lb)   10/22/19 88.5 kg (195 lb)       PHYSICAL EXAM  Porsche Manning is a 69 year old female.in no acute distress.  HEENT: Eyes Nonicteric.  Neck: JVP normal.  Carotids +3/3 bilaterally. Prominent right carotid bruit.  Soft left carotid bruit.   Lungs: CTA.  Cor: RRR. Normal S1 and S2.  No murmur, rub, or gallop.  PMI in Lf 5th ICS.  Abd: Soft, nontender, nondistended.  NABS.  No pulsatile mass.  Extremities: No C/C/E.  Pulses +1/3 symmetric at PT bilaterally.  Doppler signals Rt fem +2 with bruit.  Rt pop +2 RT DP 0, Rt PT +1.  Rt DP  Weak doppler signal.  Rt PT strong Doppler signal.  Lf fem +1 no bruit.   Lf pop +1. Lf DP 0  Lf PT 0.  Lf DP moderate signal.  Lf PT strong signal.  Neuro: Grossly intact.  Psych: A&O x 3.  Skin: No rash.    LABS  Recent Labs   Lab Test 10/22/19  1012 10/25/18  1140   WBC 8.4 8.9   HGB 12.9 14.1   HCT 40.0 43.8    253     Recent Labs   Lab Test 19  0920 10/22/19  1012    133   POTASSIUM 4.3 4.3   CHLORIDE 104 101   CO2 25 23   BUN 12 11   CR 0.96 1.10*   GFRESTIMATED 61 51*     EC20  NSR. " " Normal ECG.    EC17  NSR with 1st degree AV block.  CA 0.23  No ST shift, TWI, or Q waves.    PROCEDURES:     CTA Abdomen/Pelvis:  10/24/18  CTA: \"Double barrel shotgun\" kissing aortobiiliac stents extend from the distal aorta at the level of L3 into their respective common iliac arteries. The right iliac stent starts to the left of the left iliac stent and crosses anteriorly over the left iliac stent into the right iliac artery. The top of the right iliac stent is compressed by the adjacent left iliac stent (series 4, image 37) and the mid right iliac stent is compressed as it crosses anterior to the left iliac stent, but the right iliac stent appears widely patent in the right common iliac artery. Common iliac portions of the bilateral stents are widely patent with symmetric opacification. No large collaterals to suggest significant stenosis.    Diffuse and eccentric atherosclerotic calcified plaques from the aorta through the femoral arteries without more than 50% diameter narrowing.    Multifocal calcified plaques cause less than 50% diameter narrowing through the iliac and femoral arteries. Bilateral common, internal, and external iliac arteries are patent. Bilateral common femoral arteries are patent.     Celiac, superior mesenteric, single right renal, and inferior mesenteric arteries are patent. Calcified plaque at the left renal artery ostium causes less than 50% diameter narrowing.    CT: 12 mm segment 5 hepatic hypodensity was diagnosed as a simple cyst by MRI. Colonic diverticulosis without CT evidence for diverticulitis.    6 x 9 mm left lower lobe pleural-based posterior pulmonary nodule, unchanged from 2018. Upper lobe nodules were excluded from this study.    Spine degenerative changes are similar in appearance.    IMPRESSION:  1. Aortobiiliac stents. Right iliac stent compressed superiorly by the adjacent left iliac stent. Mid right iliac stent compressed as it crosses anterior to the " left iliac stent. Common iliac portions of the stents are widely patent with symmetric opacification. No enlarged collaterals to suggest hemodynamically significant stenosis.    2. Unchanged 6 x 9 mm left lower lobe pleural-based pulmonary nodule. Upper pole nodules were excluded from this study. Continued follow-up to document two-year stability from the 5/9/2018 CT suggested.    CT angiogram of right and left upper extremities (4/30/2013)  1. Subtotal stenosis of the origin of the left subclavian artery. Remainder of the upper extremity vasculature is patent.   2. 70% stenosis of the right internal carotid artery. Less than 10% stenosis of the left internal carotid artery. Retropharyngeal course of the carotid arteries.  US CAROTID BILAT (1/25/2012)  1. Right side: 50-69% stenosis.  2. Left side: <50% stenosis.   3. Elevated velocity in the left subclavian artery origin with post obstructive waveforms and bidirectional waveforms in the vertebral artery. Findings concerning for subclavian artery stenosis at the origin of the vessel.     REST and EXERCISE CLAUDINE (1/25/2012)  1. Right lower extremity: Resting CLAUDINE 0.93, which is in the borderline category. Decrease in CLAUDINE to 0.80 at 3 minutes post exercise, with recovery of CLAUDINE to 0.86 at 10 minutes.   2. Left lower extremity: Resting CLAUDINE 0.93, which is in the borderline category. Decrease in CLAUDINE to 0.79 at 2 minutes post exercise with recovery back to baseline by 10 minutes post exercise.    Coronary Angiography with PCI (10/10/2003)  1. Pt has a normal L Main and has about 25-50% lesions in the PLAD which are considered not be significant hemodynamically.  2. Pt has mild disease in his Cx  3. RCA had a 70-90% lesion in the Mid segement which was stented - 3.0 x 23 mm Cypher drug eluting stent was placed and post dilated using a 3.0 x 20mm power sail balloon.  Aortgraphy reveals a 90% stenosis at the origin of the right common iliac and a 70% stenosis at the origin of  the left common iliac.    NICS (11/4/15):  Impression:  1. Right side: 50-69% stenosis in the proximal ICA, slightly progressed since prior study.  2. Left side: Less than 50% stenosis in the ICA.  3. Persistent stenosis at the origin of the left subclavian artery. Retrograde flow in the vertebral artery, previously bidirectional  suggestive of worsening subclavian steal.    ASSESSMENT AND PLAN:   1. CAD: Single vessel CAD, dating back to 2003 at which time she had PCI of RCA. Since that time, she has been asymptomatic. Recent CT scan noted heavy coronary artery calcification.  We are continuing aspirin and plavix given her history of PAD and CAD including stent thrombosis.  She continues on ASA 81 every day and Crestor 40 mg every day. No angina.  Chronic, mild MORALES.  Defer on repeat stress test for the present time.  2. LE PAD: Stable mild claudication s/p bilateral aorto-iliac kissing stens years ago complicated by stent thrombosis at later date requiring urgent intervention. ABIs in 2012 showed mild PAD. We're continuing with rehab, there is no claudication with her current exercise, she is [facility] stage 0 - 1.  No change. Repeat rest and exercise ABIs and ultrasound to assess pelvic arteries and BLE runoff.  CT (2018) showed widely patent stents.   3. Carotid disease.  NICS (2016) with <70% disease in her common carotids, L subclavian with >70% stenosis as known before. NICS performed today, results pending.  4. HTN: BP in left arm is FALSELY low, presumably due to left subclavian artery stenosis. She is on Toprol  mg qd and Diltiazem 360 CD every day and Lisinopril 10 mg every day.  Check ambulatory BP.  BP has never been controlled.   Start Spironolactone 12.5 mg every day. Cr 0.96 in Nov 2019.  Alternatively, she may be looking at hydralazine or clonidine as next medication.  5. Left subclavian artery stenosis. There was a 57 mm Hg pressure gradient between the right and left arms.  CT angiogram  showed subtotal occlusion of the ostium of the left subclavian artery.   She is asymptomatic and there is no evidence of a subclavian steal phenomenon, no need for intervention at this time, we will continue to monitor  6. HLD:  Repeat lipid panel pending. She is on crestor 40 mg qd  7. Tobacco use: Counseled.   I discussed this for over 15 minutes.  Plan to continue Wellbutrin and stay clear of Chantix.  She is using generic Nicotine patches again.    8. CKD.  I believe the patient had increase in Cr to 1.49 which resulted in Lisinopril being stopped.  Last Cr 0.96 mg/dl in Nov 2019.      NICS:  2/18/2020:  1. RIGHT ICA: 50-69% diameter stenosis by grayscale imaging and sonographic velocity criteria, unchanged from 2015.  2. LEFT ICA:  Less than 50% diameter narrowing by grayscale imaging and sonographic velocity criteria, unchanged from 2015.  3. Subclavian steal physiology with retrograde left vertebral artery flow, unchanged. Correlate for symptoms.    Follow up 1 year    Brayan Uriostegui MD    Divisions of Cardiology  [place], [place]      CC  Patient Care Team:  Danuta Tan PA-C as PCP - General (Physician Assistant)  Danuta Tan PA-C as Assigned PCP  REFERRING HERBER LONG

## 2020-02-18 NOTE — NURSING NOTE
Cardiac Testing: Patient given instructions regarding CLAUDINE's , ultrasound of pelvis and lower extremity. Discussed purpose, preparation, procedure and when to expect results reported back to the patient. Patient demonstrated understanding of this information and agreed to call with further questions or concerns.  Med Reconcile: Reviewed and verified all current medications with the patient. The updated medication list was printed and given to the patient.  New Medication: Patient was educated regarding newly prescribed medication, including discussion of  the indication, administration, side effects, and when to report to MD or RN. Patient demonstrated understanding of this information and agreed to call with further questions or concerns.  Return Appointment: Patient given instructions regarding scheduling next clinic visit. Patient demonstrated understanding of this information and agreed to call with further questions or concerns.  Patient stated she understood all health information given and agreed to call with further questions or concerns.

## 2020-02-19 LAB — INTERPRETATION ECG - MUSE: NORMAL

## 2020-02-20 RX ORDER — LISINOPRIL 5 MG/1
TABLET ORAL
Qty: 90 TABLET | Refills: 1 | Status: SHIPPED | OUTPATIENT
Start: 2020-02-20 | End: 2020-07-23

## 2020-03-05 DIAGNOSIS — I73.9 PERIPHERAL VASCULAR DISEASE, UNSPECIFIED (H): ICD-10-CM

## 2020-03-06 RX ORDER — CLOPIDOGREL BISULFATE 75 MG/1
75 TABLET ORAL DAILY
Qty: 90 TABLET | Refills: 1 | Status: SHIPPED | OUTPATIENT
Start: 2020-03-06 | End: 2020-08-26

## 2020-03-06 NOTE — TELEPHONE ENCOUNTER
CLOPIDOGREL 75 MG TABLET   Last Written Prescription Date:  9/6/2019  Last Fill Quantity: 90,   # refills: 1  Last Office Visit : 2/18/2020  Future Office visit:  None  90 Tabs, 1 Refill sent to pharmacy for Pt care.      3/6/2020      Mercedes Frederick RN  Central Triage Red Flags/Med Refills

## 2020-03-10 DIAGNOSIS — I10 HYPERTENSION GOAL BP (BLOOD PRESSURE) < 140/90: ICD-10-CM

## 2020-03-10 LAB
ANION GAP SERPL CALCULATED.3IONS-SCNC: 8 MMOL/L (ref 3–14)
BUN SERPL-MCNC: 19 MG/DL (ref 7–30)
CALCIUM SERPL-MCNC: 9.5 MG/DL (ref 8.5–10.1)
CHLORIDE SERPL-SCNC: 103 MMOL/L (ref 94–109)
CO2 SERPL-SCNC: 23 MMOL/L (ref 20–32)
CREAT SERPL-MCNC: 1.31 MG/DL (ref 0.52–1.04)
GFR SERPL CREATININE-BSD FRML MDRD: 41 ML/MIN/{1.73_M2}
GLUCOSE SERPL-MCNC: 153 MG/DL (ref 70–99)
POTASSIUM SERPL-SCNC: 4.5 MMOL/L (ref 3.4–5.3)
SODIUM SERPL-SCNC: 134 MMOL/L (ref 133–144)

## 2020-03-10 PROCEDURE — 80048 BASIC METABOLIC PNL TOTAL CA: CPT | Performed by: INTERNAL MEDICINE

## 2020-03-10 PROCEDURE — 36415 COLL VENOUS BLD VENIPUNCTURE: CPT | Performed by: INTERNAL MEDICINE

## 2020-03-12 ENCOUNTER — TELEPHONE (OUTPATIENT)
Dept: CARDIOLOGY | Facility: CLINIC | Age: 70
End: 2020-03-12

## 2020-03-12 DIAGNOSIS — I10 HYPERTENSION GOAL BP (BLOOD PRESSURE) < 140/90: ICD-10-CM

## 2020-03-12 DIAGNOSIS — N18.30 CKD (CHRONIC KIDNEY DISEASE) STAGE 3, GFR 30-59 ML/MIN (H): Primary | ICD-10-CM

## 2020-03-12 RX ORDER — SPIRONOLACTONE 25 MG/1
12.5 TABLET ORAL DAILY
Qty: 90 TABLET | Refills: 3 | COMMUNITY
Start: 2020-03-12 | End: 2020-08-19

## 2020-03-12 NOTE — TELEPHONE ENCOUNTER
----- Message from Brayan Uriostegui MD sent at 3/12/2020  8:34 AM CDT -----  Porsche,    Your renal function is slightly worse than prior checks.  Please hydrate and have your kidney function rechecked in 2-3 weeks.    MD Estrella Songsy,    [facility] in 2-3 weeks.    Brayan Uriostegui MD

## 2020-03-12 NOTE — TELEPHONE ENCOUNTER
Called patient and let her know about the increase in creatinine. She will have her labs redrawn in 2 weeks. States her BP is well controlled now with the Spironolactone at 12.5 mg daily.

## 2020-03-25 ENCOUNTER — MYC MEDICAL ADVICE (OUTPATIENT)
Dept: CARDIOLOGY | Facility: CLINIC | Age: 70
End: 2020-03-25

## 2020-03-30 DIAGNOSIS — I25.10 CORONARY ARTERY DISEASE DUE TO LIPID RICH PLAQUE: ICD-10-CM

## 2020-03-30 DIAGNOSIS — I10 HYPERTENSION GOAL BP (BLOOD PRESSURE) < 140/90: ICD-10-CM

## 2020-03-30 DIAGNOSIS — I25.83 CORONARY ARTERY DISEASE DUE TO LIPID RICH PLAQUE: ICD-10-CM

## 2020-03-30 DIAGNOSIS — I25.2 OLD MYOCARDIAL INFARCTION: ICD-10-CM

## 2020-03-31 NOTE — TELEPHONE ENCOUNTER
DILTIAZEM 24H ER(CD) 360 MG CP     Last Written Prescription Date:  1/16/2020  Last Fill Quantity: 90,   # refills: 0  Last Office Visit : 2/18/2020  Future Office visit:  None  Routing refill request to provider for review/approval because:  Blood pressure out of range  Refer to clinic for review and refills   02/18/20 (!) 156/74 01/13/20 (!) 180/74   10/22/19 136/66       METOPROLOL SUCC  MG TAB    Last Written Prescription Date:  1/16/2020  Last Fill Quantity: 135,   # refills: 0  Last Office Visit : 2/18/2020  Future Office visit:  None    Routing refill request to provider for review/approval because:  Blood pressure out of range   Refer to clinic and refills  02/18/20 (!) 156/74   01/13/20 (!) 180/74   10/22/19 136/66       Mercedes Frederick RN  Central Triage Red Flags/Med Refills

## 2020-04-01 RX ORDER — DILTIAZEM HYDROCHLORIDE 360 MG/1
360 CAPSULE, EXTENDED RELEASE ORAL DAILY
Qty: 90 CAPSULE | Refills: 0 | Status: SHIPPED | OUTPATIENT
Start: 2020-04-01 | End: 2020-07-10

## 2020-04-01 RX ORDER — METOPROLOL SUCCINATE 100 MG/1
150 TABLET, EXTENDED RELEASE ORAL DAILY
Qty: 135 TABLET | Refills: 0 | Status: SHIPPED | OUTPATIENT
Start: 2020-04-01 | End: 2020-07-09

## 2020-05-04 DIAGNOSIS — E78.5 HYPERLIPIDEMIA LDL GOAL <70: ICD-10-CM

## 2020-05-05 RX ORDER — ROSUVASTATIN CALCIUM 40 MG/1
40 TABLET, COATED ORAL DAILY
Qty: 90 TABLET | Refills: 2 | Status: SHIPPED | OUTPATIENT
Start: 2020-05-05 | End: 2021-01-11

## 2020-07-07 DIAGNOSIS — I25.10 CORONARY ARTERY DISEASE DUE TO LIPID RICH PLAQUE: ICD-10-CM

## 2020-07-07 DIAGNOSIS — I25.83 CORONARY ARTERY DISEASE DUE TO LIPID RICH PLAQUE: ICD-10-CM

## 2020-07-07 DIAGNOSIS — I10 HYPERTENSION GOAL BP (BLOOD PRESSURE) < 140/90: ICD-10-CM

## 2020-07-09 DIAGNOSIS — I10 HYPERTENSION GOAL BP (BLOOD PRESSURE) < 140/90: ICD-10-CM

## 2020-07-09 DIAGNOSIS — I25.2 OLD MYOCARDIAL INFARCTION: ICD-10-CM

## 2020-07-09 NOTE — TELEPHONE ENCOUNTER
metoprolol succinate ER (TOPROL-XL) 100 MG 24 hr tablet   Last Written Prescription Date:  4/1/2020  Last Fill Quantity: 135,   # refills: 0  Last Office Visit : 2/18/2020  Future Office visit:  None    Routing refill request to provider for review/approval because  Only a 90 day supply sent to pharm last order.     B/P readings are high.  Ok to fill med for a whole year?   Follow up B/P?      Refer to Provider for review and refills per Providers recommendations.    02/18/20 (!) 156/74   01/13/20 (!) 180/74   10/22/19 136/66       Mercedes Frederick RN  Central Triage Red Flags/Med Refills

## 2020-07-10 NOTE — TELEPHONE ENCOUNTER
diltiazem ER COATED BEADS (CARDIZEM CD) 360 MG 24 hr capsule     Last Written Prescription Date:  4/1/2020  Last Fill Quantity: 90,   # refills: 0  Last Office Visit : 2/18/2020  Future Office visit:  None    Routing refill request to provider for review/approval because:  Abnormal Blood Pressure?       Change med?  Follow up B/P?  Second request for med?     Refer to clinic    02/18/20 (!) 156/74   01/13/20 (!) 180/74   10/22/19 136/66     Mercedes Frederick RN  Central Triage Red Flags/Med Refills

## 2020-07-14 RX ORDER — METOPROLOL SUCCINATE 100 MG/1
150 TABLET, EXTENDED RELEASE ORAL DAILY
Qty: 135 TABLET | Refills: 3 | Status: SHIPPED | OUTPATIENT
Start: 2020-07-14 | End: 2021-07-07

## 2020-07-14 RX ORDER — DILTIAZEM HYDROCHLORIDE 360 MG/1
360 CAPSULE, EXTENDED RELEASE ORAL DAILY
Qty: 90 CAPSULE | Refills: 3 | Status: SHIPPED | OUTPATIENT
Start: 2020-07-14 | End: 2021-07-26

## 2020-07-23 DIAGNOSIS — I10 HYPERTENSION GOAL BP (BLOOD PRESSURE) < 140/90: ICD-10-CM

## 2020-07-23 RX ORDER — LISINOPRIL 5 MG/1
TABLET ORAL
Qty: 90 TABLET | Refills: 1 | Status: SHIPPED | OUTPATIENT
Start: 2020-07-23 | End: 2021-01-13

## 2020-08-19 ENCOUNTER — MYC MEDICAL ADVICE (OUTPATIENT)
Dept: CARDIOLOGY | Facility: CLINIC | Age: 70
End: 2020-08-19

## 2020-08-19 NOTE — TELEPHONE ENCOUNTER
S-(situation): see [employer] message  High in the morning and lowers after she takes medications. HR 39-51  Metoprolol 150 mg daily  Lisinopril 5 mg daily  Has stopped taking spironolactone due to lightheadedness.  Shortness of breath with exertion, sitting she feels just fine.  Denies having any palpitations, gets her pulse rate from the machine.    B-(background): Her risk factors include hypertension, hypercholesteraemia, and tobacco abuse.  Her cardiac hx dates back to 1987 when she had her 1st MI.  She presented to [facility] in October 2003 with an acute coronary syndrome. She underwent a coronary angiogram which revealed a single-vessel coronary disease in her right coronary artery with a 90% stenosis in the mid-segment. She underwent successful bare metal stenting in this right coronary artery. During that procedure we discovered a 90% stenosis of the ostium of the right common iliac artery and 70% stenosis of the ostium of the left common iliac artery    A-(assessment): shortness of breath with exertions    R-(recommendations): decrease metoprolol from 150 mg daily to 100 mg daily, continue lisinopril. Monitor BP and HR outside of clinic. Congratulated patient on not smoking and encouraged continued cessation.  Will call Monday for BP and HR.

## 2020-08-21 DIAGNOSIS — I73.9 PERIPHERAL VASCULAR DISEASE, UNSPECIFIED (H): ICD-10-CM

## 2020-08-26 ENCOUNTER — TELEPHONE (OUTPATIENT)
Dept: CARDIOLOGY | Facility: CLINIC | Age: 70
End: 2020-08-26

## 2020-08-26 RX ORDER — CLOPIDOGREL BISULFATE 75 MG/1
75 TABLET ORAL DAILY
Qty: 90 TABLET | Refills: 1 | Status: SHIPPED | OUTPATIENT
Start: 2020-08-26 | End: 2021-02-09

## 2020-08-26 NOTE — TELEPHONE ENCOUNTER
S-(situation): patient states that she feels much better although she hasn't noticed much improvement in her breathing. She attributes it to the humidity and heat we are having right now. Blood pressures on metoprolol 100 mg and lisinopril 5 mg are 130-140.    B-(background): Single vessel CAD, dating back to 2003 at which time she had PCI of RCA. Since that time, she has been asymptomatic. Recent CT scan noted heavy coronary artery calcification.  We are continuing aspirin and plavix given her history of PAD and CAD including stent thrombosis.  She continues on ASA 81 every day and Crestor 40 mg every day. No angina.  Chronic, mild MORALES.  Defer on repeat stress test for the present time.    A-(assessment): breathing not improved but she feels better    R-(recommendations):  Will call in two weeks for recheck of symptoms and blood pressures.

## 2020-09-09 NOTE — TELEPHONE ENCOUNTER
S-(situation): she states she is feeling much better, still d/o gasping for air with activity. No issues with breathing at night.  Blood pressure 130-140 systolic.  Continues to not smoke.    B-(background): Single vessel CAD, dating back to 2003 at which time she had PCI of RCA. Since that time, she has been asymptomatic. Recent CT scan noted heavy coronary artery calcification.  We are continuing aspirin and plavix given her history of PAD and CAD including stent thrombosis.  She continues on ASA 81 every day and Crestor 40 mg every day. No angina.  Chronic, mild MORALES.  Defer on repeat stress test for the present time.    A-(assessment): feeling better, still MORALES    R-(recommendations): patient doesn't want to come in for a visit as she fears COVID. She said she absolutely would if her symptoms get worse.

## 2020-09-15 DIAGNOSIS — Z12.31 VISIT FOR SCREENING MAMMOGRAM: ICD-10-CM

## 2020-09-15 PROCEDURE — 77063 BREAST TOMOSYNTHESIS BI: CPT | Mod: TC

## 2020-09-15 PROCEDURE — 77067 SCR MAMMO BI INCL CAD: CPT | Mod: TC

## 2020-10-19 DIAGNOSIS — J44.9 CHRONIC OBSTRUCTIVE PULMONARY DISEASE, UNSPECIFIED COPD TYPE (H): ICD-10-CM

## 2020-10-21 RX ORDER — TIOTROPIUM BROMIDE 18 UG/1
CAPSULE ORAL; RESPIRATORY (INHALATION)
Qty: 90 CAPSULE | Refills: 3 | Status: SHIPPED | OUTPATIENT
Start: 2020-10-21 | End: 2021-10-04

## 2020-10-21 NOTE — TELEPHONE ENCOUNTER
SPIRIVA HANDIHALER 18 MCG inhaled capsule    Last Written Prescription Date:  11/25/19  Last Fill Quantity: 90  # refills: 3  Last Office Visit :2/18/20  Future Office visit:  None    Routing refill request to provider for review/approval because: not on protocol for [facility] dx.

## 2020-10-28 ENCOUNTER — TELEPHONE (OUTPATIENT)
Dept: SCHEDULING | Facility: CLINIC | Age: 70
End: 2020-10-28

## 2020-10-28 NOTE — TELEPHONE ENCOUNTER
10/28/2020    Call Regarding ReattributionPhysical       Attempt 1    Message on voicemail    Comments: PASTOR      Outreach   GB

## 2020-11-06 NOTE — TELEPHONE ENCOUNTER
11/6/2020      Patient return call and scheduled for annual wellness visit.            Outreach ,  Ranjeet Herring

## 2020-12-18 ENCOUNTER — TELEPHONE (OUTPATIENT)
Dept: FAMILY MEDICINE | Facility: CLINIC | Age: 70
End: 2020-12-18

## 2020-12-18 NOTE — TELEPHONE ENCOUNTER
Patient Quality Outreach      Summary:    Patient has the following on her problem list/HM:   Diabetes    Last A1C:  Lab Results   Component Value Date    A1C 6.5 10/22/2019    A1C 6.9 2019       Last LDL:    Lab Results   Component Value Date    LDL 79 10/22/2019       Is the patient on a Statin? Yes          Is the patient on Aspirin? Yes    Medications     HMG CoA Reductase Inhibitors     rosuvastatin (CRESTOR) 40 MG tablet       Salicylates     aspirin 81 MG EC tablet             Last three blood pressure readings:  BP Readings from Last 3 Encounters:   20 (!) 156/74   20 (!) 180/74   10/22/19 136/66            Tobacco Use      Smoking status: Former Smoker        Packs/day: 0.50        Years: 40.00        Pack years: 20        Types: Cigarettes        Quit date: 2018        Years since quittin.6      Smokeless tobacco: Never Used          Hypertension   Last three blood pressure readings:  BP Readings from Last 3 Encounters:   20 (!) 156/74   20 (!) 180/74   10/22/19 136/66     Blood pressure: Failed    HTN Guidelines:  ? 139/89     Patient is due/failing the following:   A1C, LDL (Fasting), Eye Exam and Microalbumin, BP check and Annual wellness, date due: 10/22/2020    Type of outreach:    Sent [employer] message. and Sent letter.    Questions for provider review:    None                                                                                                                                     America Rodriguez MA on 2020 at 9:41 AM       Chart routed to .

## 2020-12-18 NOTE — LETTER
[facility]  [address] [place] [id number] [phone]       December 18, 2020    Porscheajcob Manning  [address]  [facility] [address]    Dear Leann,    We care about your health and have reviewed your health plan and are making recommendations based on this review, to optimize your health.    You are in particular need of attention regarding:  -Diabetes  -High Blood Pressure  -Wellness (Physical) Visit     We are recommending that you:  -schedule a LAB ONLY APPOINTMENT to recheck your: A1c, Lipids (fasting cholesterol - nothing to eat except water and/or meds for 8+ hours) and Microablumin tests within the next 1-4 weeks.    -schedule a FOLLOWUP OFFICE APPOINTMENT with me for diabetes or hypertension - THIS CAN BE VIRTUAL (telephone or video).      -schedule a WELLNESS (Physical) APPOINTMENT with me.   I will check fasting labs the same day - nothing to eat except water and meds for 8-10 hours prior.      In addition, here is a list of due or overdue Health Maintenance reminders.    Health Maintenance Due   Topic Date Due     Discuss Advance Care Planning  12/15/2016     Eye Exam  04/18/2020     A1C Lab  04/22/2020     Annual Wellness Visit  10/22/2020     Cholesterol Lab  10/22/2020     Kidney Microalbumin Urine Test  10/22/2020     Diabetic Foot Exam  10/22/2020     FALL RISK ASSESSMENT  10/22/2020     Lung Cancer Screening (CT Scan)  01/13/2021       To address the above recommendations, we encourage you to contact us at [phone], via [employer] or by contacting Central Scheduling toll free at [phone] 24 hours a day. They will assist you with finding the most convenient time and location.    Thank you for trusting [facility] and we appreciate the opportunity to serve you.  We look forward to supporting your healthcare needs in the future.    Healthy Regards,    Eduard Tan PA-C and your [facility]  Team

## 2020-12-21 DIAGNOSIS — J44.1 COPD EXACERBATION (H): ICD-10-CM

## 2020-12-23 NOTE — TELEPHONE ENCOUNTER
Medication is being filled for 1 time refill only due to:  Patient needs to be seen because, over one year since last seen by PCP.

## 2021-01-07 DIAGNOSIS — E78.5 HYPERLIPIDEMIA LDL GOAL <70: ICD-10-CM

## 2021-01-07 DIAGNOSIS — I10 HYPERTENSION GOAL BP (BLOOD PRESSURE) < 140/90: ICD-10-CM

## 2021-01-10 ENCOUNTER — HEALTH MAINTENANCE LETTER (OUTPATIENT)
Age: 71
End: 2021-01-10

## 2021-01-11 RX ORDER — ROSUVASTATIN CALCIUM 40 MG/1
40 TABLET, COATED ORAL DAILY
Qty: 90 TABLET | Refills: 0 | Status: SHIPPED | OUTPATIENT
Start: 2021-01-11 | End: 2021-01-11

## 2021-01-11 RX ORDER — ROSUVASTATIN CALCIUM 40 MG/1
40 TABLET, COATED ORAL DAILY
Qty: 90 TABLET | Refills: 3 | Status: SHIPPED | OUTPATIENT
Start: 2021-01-11 | End: 2022-03-23

## 2021-01-11 NOTE — TELEPHONE ENCOUNTER
ROSUVASTATIN CALCIUM 40 MG TAB      Last Written Prescription Date:  5/5/20  Last Fill Quantity: 90,   # refills: 2  Last Office Visit : Brayan Uriostegui MD  Interventional Cardiology  2/18/2020  [facility]  Recommended 1 year follow up  Future Office visit:  None scheduled    Routing refill request to provider for review/approval because:  Overdue for LDL    Lab Test 10/22/19  1012   LDL 79     Nothing more recent in care everywhere nor media  Future order for BMET only    Per protocol 90 day refill provided, routed to cardiology refills [facility]

## 2021-01-13 RX ORDER — LISINOPRIL 5 MG/1
5 TABLET ORAL DAILY
Qty: 90 TABLET | Refills: 0 | Status: SHIPPED | OUTPATIENT
Start: 2021-01-13 | End: 2021-04-23

## 2021-01-13 NOTE — TELEPHONE ENCOUNTER
Routing refill request to provider for review/approval because:  --Last blood pressures not at goal.  --Abnormal Scr.  --Overdue for annual office visit.  --Plan in last office visit 10/22/19 was to RTC 6 months for wellness visit.      --José Miguel Chapa - can you route to your TC at [facility] to call patient to schedule.            Creatinine   Date Value Ref Range Status   03/10/2020 1.31 (H) 0.52 - 1.04 mg/dL Final   11/12/2019 0.96 0.52 - 1.04 mg/dL Final   10/22/2019 1.10 (H) 0.52 - 1.04 mg/dL Final   05/02/2019 1.07 (H) 0.52 - 1.04 mg/dL Final   10/25/2018 0.94 0.52 - 1.04 mg/dL Final         BP Readings from Last 6 Encounters:   02/18/20 (!) 156/74   01/13/20 (!) 180/74   10/22/19 136/66   06/27/19 172/81   05/02/19 159/71   02/27/19 174/84

## 2021-01-13 NOTE — TELEPHONE ENCOUNTER
"Signed, but encourage office visit or virtual visit if has BP cuff at home.  Thanks  Danuta \"Eduard\" PHILL Tan   "

## 2021-01-15 NOTE — TELEPHONE ENCOUNTER
Spoke with patient. She was not interested in scheduling an appt at this time but was informed that Melvi Tan is encouraging one based off recent BP documentation and overdue Px.    Elaine FRIEDF

## 2021-01-26 ENCOUNTER — DOCUMENTATION ONLY (OUTPATIENT)
Dept: FAMILY MEDICINE | Facility: CLINIC | Age: 71
End: 2021-01-26

## 2021-01-26 DIAGNOSIS — E11.22 TYPE 2 DIABETES MELLITUS WITH STAGE 3 CHRONIC KIDNEY DISEASE, WITHOUT LONG-TERM CURRENT USE OF INSULIN, UNSPECIFIED WHETHER STAGE 3A OR 3B CKD (H): ICD-10-CM

## 2021-01-26 DIAGNOSIS — I10 HYPERTENSION GOAL BP (BLOOD PRESSURE) < 140/90: Primary | ICD-10-CM

## 2021-01-26 DIAGNOSIS — E78.5 HYPERLIPIDEMIA LDL GOAL <100: ICD-10-CM

## 2021-01-26 DIAGNOSIS — N18.30 TYPE 2 DIABETES MELLITUS WITH STAGE 3 CHRONIC KIDNEY DISEASE, WITHOUT LONG-TERM CURRENT USE OF INSULIN, UNSPECIFIED WHETHER STAGE 3A OR 3B CKD (H): ICD-10-CM

## 2021-01-28 DIAGNOSIS — E78.5 HYPERLIPIDEMIA LDL GOAL <100: ICD-10-CM

## 2021-01-28 DIAGNOSIS — I10 HYPERTENSION GOAL BP (BLOOD PRESSURE) < 140/90: ICD-10-CM

## 2021-01-28 DIAGNOSIS — N18.30 TYPE 2 DIABETES MELLITUS WITH STAGE 3 CHRONIC KIDNEY DISEASE, WITHOUT LONG-TERM CURRENT USE OF INSULIN, UNSPECIFIED WHETHER STAGE 3A OR 3B CKD (H): ICD-10-CM

## 2021-01-28 DIAGNOSIS — N18.31 STAGE 3A CHRONIC KIDNEY DISEASE (H): ICD-10-CM

## 2021-01-28 DIAGNOSIS — E11.22 TYPE 2 DIABETES MELLITUS WITH STAGE 3 CHRONIC KIDNEY DISEASE, WITHOUT LONG-TERM CURRENT USE OF INSULIN, UNSPECIFIED WHETHER STAGE 3A OR 3B CKD (H): ICD-10-CM

## 2021-01-28 LAB
ALBUMIN SERPL-MCNC: 4.1 G/DL (ref 3.4–5)
ALP SERPL-CCNC: 114 U/L (ref 40–150)
ALT SERPL W P-5'-P-CCNC: 30 U/L (ref 0–50)
ANION GAP SERPL CALCULATED.3IONS-SCNC: 9 MMOL/L (ref 3–14)
AST SERPL W P-5'-P-CCNC: 16 U/L (ref 0–45)
BASOPHILS # BLD AUTO: 0.1 10E9/L (ref 0–0.2)
BASOPHILS NFR BLD AUTO: 0.6 %
BILIRUB SERPL-MCNC: 0.5 MG/DL (ref 0.2–1.3)
BUN SERPL-MCNC: 15 MG/DL (ref 7–30)
CALCIUM SERPL-MCNC: 10.2 MG/DL (ref 8.5–10.1)
CHLORIDE SERPL-SCNC: 107 MMOL/L (ref 94–109)
CHOLEST SERPL-MCNC: 168 MG/DL
CO2 SERPL-SCNC: 21 MMOL/L (ref 20–32)
CREAT SERPL-MCNC: 1.07 MG/DL (ref 0.52–1.04)
CREAT UR-MCNC: 37 MG/DL
DIFFERENTIAL METHOD BLD: NORMAL
EOSINOPHIL # BLD AUTO: 0.3 10E9/L (ref 0–0.7)
EOSINOPHIL NFR BLD AUTO: 3.4 %
ERYTHROCYTE [DISTWIDTH] IN BLOOD BY AUTOMATED COUNT: 12.6 % (ref 10–15)
GFR SERPL CREATININE-BSD FRML MDRD: 53 ML/MIN/{1.73_M2}
GLUCOSE SERPL-MCNC: 160 MG/DL (ref 70–99)
HBA1C MFR BLD: 6.7 % (ref 0–5.6)
HCT VFR BLD AUTO: 45 % (ref 35–47)
HDLC SERPL-MCNC: 62 MG/DL
HGB BLD-MCNC: 14.8 G/DL (ref 11.7–15.7)
LDLC SERPL CALC-MCNC: 73 MG/DL
LYMPHOCYTES # BLD AUTO: 1.6 10E9/L (ref 0.8–5.3)
LYMPHOCYTES NFR BLD AUTO: 16.1 %
MCH RBC QN AUTO: 31.3 PG (ref 26.5–33)
MCHC RBC AUTO-ENTMCNC: 32.9 G/DL (ref 31.5–36.5)
MCV RBC AUTO: 95 FL (ref 78–100)
MICROALBUMIN UR-MCNC: 1240 MG/L
MICROALBUMIN/CREAT UR: 3342.32 MG/G CR (ref 0–25)
MONOCYTES # BLD AUTO: 0.9 10E9/L (ref 0–1.3)
MONOCYTES NFR BLD AUTO: 9.1 %
NEUTROPHILS # BLD AUTO: 7 10E9/L (ref 1.6–8.3)
NEUTROPHILS NFR BLD AUTO: 70.8 %
NONHDLC SERPL-MCNC: 106 MG/DL
PLATELET # BLD AUTO: 260 10E9/L (ref 150–450)
POTASSIUM SERPL-SCNC: 3.7 MMOL/L (ref 3.4–5.3)
PROT SERPL-MCNC: 7.6 G/DL (ref 6.8–8.8)
RBC # BLD AUTO: 4.73 10E12/L (ref 3.8–5.2)
SODIUM SERPL-SCNC: 137 MMOL/L (ref 133–144)
TRIGL SERPL-MCNC: 166 MG/DL
WBC # BLD AUTO: 9.9 10E9/L (ref 4–11)

## 2021-01-28 PROCEDURE — 82043 UR ALBUMIN QUANTITATIVE: CPT | Performed by: PHYSICIAN ASSISTANT

## 2021-01-28 PROCEDURE — 36415 COLL VENOUS BLD VENIPUNCTURE: CPT | Performed by: PHYSICIAN ASSISTANT

## 2021-01-28 PROCEDURE — 80053 COMPREHEN METABOLIC PANEL: CPT | Performed by: PHYSICIAN ASSISTANT

## 2021-01-28 PROCEDURE — 83036 HEMOGLOBIN GLYCOSYLATED A1C: CPT | Performed by: PHYSICIAN ASSISTANT

## 2021-01-28 PROCEDURE — 85025 COMPLETE CBC W/AUTO DIFF WBC: CPT | Performed by: PHYSICIAN ASSISTANT

## 2021-01-28 PROCEDURE — 80061 LIPID PANEL: CPT | Performed by: PHYSICIAN ASSISTANT

## 2021-02-01 NOTE — RESULT ENCOUNTER NOTE
"Daisy Morrell  Your attached labs are mostly stable, but your kidney function is worsening.  I discussed this with your cardiologist, Dr. Uriostegui, and we would like you to see the kidney specialists - nephrology.  I will place an order and the scheduling team should contact you in 1-2 days.  Please contact the office with any questions or concerns.    Danuta Martinez \"Eduard\" PHILL Tan    "

## 2021-02-06 DIAGNOSIS — I73.9 PERIPHERAL VASCULAR DISEASE, UNSPECIFIED (H): ICD-10-CM

## 2021-02-09 RX ORDER — CLOPIDOGREL BISULFATE 75 MG/1
75 TABLET ORAL DAILY
Qty: 90 TABLET | Refills: 0 | Status: SHIPPED | OUTPATIENT
Start: 2021-02-09 | End: 2021-05-06

## 2021-02-09 NOTE — CONFIDENTIAL NOTE
clopidogrel (PLAVIX) 75 MG tablet  Last Written Prescription Date:  8/26/20  Last Fill Quantity: 90,   # refills: 1  Last Office Visit : 2/18/20  Future Office visit:  None    Follow up 1 year

## 2021-02-17 DIAGNOSIS — J44.1 COPD EXACERBATION (H): ICD-10-CM

## 2021-02-18 NOTE — TELEPHONE ENCOUNTER
RECORDS RECEIVED FROM: Internal   DATE RECEIVED: 05.13.2021   NOTES STATUS DETAILS   OFFICE NOTE from referring provider Internal 01.28.2021 Danuta Tan PA-C   OFFICE NOTE from other specialist  N/A    *Only VASCULITIS or LUPUS gather office notes for the following N/A    *PULMONARY   N/A    *ENT N/A    *DERMATOLOGY N/A    *RHEUMATOLOGY N/A    DISCHARGE SUMMARY from hospital N/A    DISCHARGE REPORT from the ER N/A    MEDICATION LIST Internal    IMAGING  (NEED IMAGES AND REPORTS)     KIDNEY CT SCAN N/A    KIDNEY ULTRASOUND N/A    MR ABDOMEN N/A    NUCLEAR MEDICINE RENAL N/A    LABS     CBC Internal 01.28.2021   CMP Internal 01.28.2021   BMP Internal 03.10.2020   UA N/A    URINE PROTEIN N/A    RENAL PANEL N/A    BIOPSY     KIDNEY BIOPSY  N/A

## 2021-02-22 RX ORDER — ALBUTEROL SULFATE 90 UG/1
2 AEROSOL, METERED RESPIRATORY (INHALATION) EVERY 6 HOURS
Qty: 1 INHALER | Refills: 0 | Status: SHIPPED | OUTPATIENT
Start: 2021-02-22 | End: 2021-07-13

## 2021-02-22 NOTE — TELEPHONE ENCOUNTER
,  --Please contact patient and ask to schedule an annual visit/physical with Eduard at [facility]?      --Last visit:  10/22/2019     --Future Visit: NONE      ---Prescription approved per [facility] Refill Protocol.       Kenyatta Ott RN BSN     [facility]

## 2021-03-16 ENCOUNTER — TELEPHONE (OUTPATIENT)
Dept: FAMILY MEDICINE | Facility: CLINIC | Age: 71
End: 2021-03-16

## 2021-03-16 NOTE — TELEPHONE ENCOUNTER
Summary:    Patient is due/failing the following:   BP CHECK    Type of outreach:  Sent [employer] message.  Action needed: Patient needs office visit for blood pressure follow up .    If need for provider review:    Please indicate OV, lab, MTM, or nurse appt if needed.  Indicate fasting or not fasting.                                                                                                                                          Petar Ferrell MA

## 2021-03-27 DIAGNOSIS — J44.1 COPD EXACERBATION (H): ICD-10-CM

## 2021-03-29 NOTE — TELEPHONE ENCOUNTER
"Routing refill request to provider for review/approval because:  Last in 11/2019.  Please authorize if appropriate.  Thanks,  Kandi King RN    Left message on cell for pt to call- due for physical.  Added in pharm comments also.            Requested Prescriptions   Pending Prescriptions Disp Refills     BREO ELLIPTA 100-25 MCG/INH inhaler [Pharmacy Med Name: BREO ELLIPTA 100-25 MCG INH]  1     Sig: INHALE 1 PUFF BY MOUTH EVERY DAY       Inhaled Steroids Protocol Failed - 3/27/2021 12:23 AM        Failed - Recent (12 mo) or future (30 days) visit within the authorizing provider's specialty     Patient has had an office visit with the authorizing provider or a provider within the authorizing providers department within the previous 12 mos or has a future within next 30 days. See \"Patient Info\" tab in inbasket, or \"Choose Columns\" in Meds & Orders section of the refill encounter.              Passed - Patient is age 12 or older        Passed - Medication is active on med list             "

## 2021-04-21 NOTE — PROGRESS NOTES
"SUBJECTIVE:   Porsche Manning is a 71 year old female who presents for Preventive Visit.      Patient has been advised of split billing requirements and indicates understanding: Yes   Are you in the first 12 months of your Medicare coverage?  No    Healthy Habits:     In general, how would you rate your overall health?  Good    Frequency of exercise:  1 day/week    Duration of exercise:  15-30 minutes    Do you usually eat at least 4 servings of fruit and vegetables a day, include whole grains    & fiber and avoid regularly eating high fat or \"junk\" foods?  No    Taking medications regularly:  Yes    Medication side effects:  None    Ability to successfully perform activities of daily living:  No assistance needed    Home Safety:  No safety concerns identified    Hearing Impairment:  No hearing concerns    In the past 6 months, have you been bothered by leaking of urine?  No    In general, how would you rate your overall mental or emotional health?  Good      PHQ-2 Total Score: 0    Additional concerns today:  No    Please abstract the following data from this visit with this patient into the appropriate field in Epic:    Tests that can be patient reported without a hard copy:    Eye exam with ophthalmology on this date: 04/19/ 2019 [facility]   Patient see nephrology May 13, 2021.  BPs at home high as well. Was low over the summer though and discussed with Dr. Uriostegui (last seen 2/20).  Currently taking Metoprolol 150 mg daily, lisinopril 5 mg (previously on 10 mg last year; spironolactone made her dizzy).  She notes continued SHORTNESS OF BREATH despite using inhalers daily as directed; uses proair (rescue inhaler) only sporadically/rarely. Wondering if related to above vs lungs vs deconditioning.  No medicine for diabetes at this time.  Tried Chantix and smoking patches to quite July 2020 x 4 months; restarted around holidays but < 5 per day.     Do you feel safe in your environment? Yes    Have you " ever done Advance Care Planning? (For example, a Health Directive, POLST, or a discussion with a medical provider or your loved ones about your wishes): Yes, advance care planning is on file.       Fall risk  Fallen 2 or more times in the past year?: No  Any fall with injury in the past year?: No    Cognitive Screening   1) Repeat 3 items (Leader, Season, Table)    2) Clock draw: NORMAL  3) 3 item recall: Recalls 3 objects  Results: 3 items recalled: COGNITIVE IMPAIRMENT LESS LIKELY    Mini-CogTM Copyright S Janie. Licensed by the author for use in [facility] [employer]; reprinted with permission (jaime@[facility]). All rights reserved.      Do you have sleep apnea, excessive snoring or daytime drowsiness?: no    Reviewed and updated as needed this visit by clinical staff  Tobacco  Allergies  Meds  Problems  Med Hx  Surg Hx  Fam Hx  Soc Hx          Reviewed and updated as needed this visit by Provider  Tobacco  Allergies  Meds  Problems  Med Hx  Surg Hx  Fam Hx         Social History     Tobacco Use     Smoking status: Former Smoker     Packs/day: 0.50     Years: 40.00     Pack years: 20.00     Types: Cigarettes     Quit date: 2018     Years since quittin.9     Smokeless tobacco: Never Used   Substance Use Topics     Alcohol use: Yes     Comment: 2 to 4 beers or mixed drinks weekly     If you drink alcohol do you typically have >3 drinks per day or >7 drinks per week? No    Alcohol Use 2021   Prescreen: >3 drinks/day or >7 drinks/week? No   Prescreen: >3 drinks/day or >7 drinks/week? -   No flowsheet data found.            Current providers sharing in care for this patient include:   Patient Care Team:  Danuta Tan PA-C as PCP - General (Physician Assistant)  Danuta Tan PA-C as Assigned PCP  Lori Lorenzo MD as Assigned Pulmonology Provider  Brayan Uriostegui MD as Assigned Heart and Vascular Provider    The following health maintenance items are  reviewed in Epic and correct as of today:  Health Maintenance Due   Topic Date Due     ANNUAL REVIEW OF HM ORDERS  Never done     EYE EXAM  04/18/2020     DIABETIC FOOT EXAM  10/22/2020     FALL RISK ASSESSMENT  10/22/2020     LUNG CANCER SCREENING ANNUAL  01/13/2021     Lab work is in process  Labs reviewed in EPIC  BP Readings from Last 3 Encounters:   04/22/21 (!) 175/96   02/18/20 (!) 156/74   01/13/20 (!) 180/74    Wt Readings from Last 3 Encounters:   04/22/21 86.6 kg (191 lb)   02/18/20 86.2 kg (190 lb)   01/13/20 88.5 kg (195 lb)                  Patient Active Problem List   Diagnosis     Old myocardial infarction     Malignant neoplasm of other specified sites of cervix     Peripheral vascular disease (H)     HYPERLIPIDEMIA LDL GOAL <100     Hypertension: MEASURE BP IN RIGHT ARM ONLY     Osteopenia     Vitamin D deficiency disease     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)     Advanced directives, counseling/discussion     Subclavian artery stenosis, left (H)     Hyponatremia     CKD (chronic kidney disease) stage 3, GFR 30-59 ml/min     Chronic obstructive pulmonary disease, unspecified COPD type (H)     COPD (chronic obstructive pulmonary disease) with chronic bronchitis (H)     Obesity (BMI 35.0-39.9) with comorbidity (H)     Blood pressure check     Past Surgical History:   Procedure Laterality Date     BIOPSY      Sample taken from back     CARDIAC SURGERY      Stents     COLONOSCOPY  1996    Results were ok     HYSTERECTOMY, PAP NO LONGER INDICATED  1989    ovaries only, no cervix per pt     SURGICAL HISTORY OF -   1995    bunionectomy     SURGICAL HISTORY OF -   10/10/03    cardiac stenting     SURGICAL HISTORY OF -       stent placed in both of her legs for pad     VASCULAR SURGERY      PAD       Social History     Tobacco Use     Smoking status: Former Smoker     Packs/day: 0.50     Years: 40.00     Pack years: 20.00     Types: Cigarettes     Quit date: 4/25/2018     Years  since quittin.9     Smokeless tobacco: Never Used   Substance Use Topics     Alcohol use: Yes     Comment: 2 to 4 beers or mixed drinks weekly     Family History   Problem Relation Age of Onset     C.A.D. Mother      Breast Cancer Mother      C.A.D. Father      Diabetes Father      Hypertension Father      C.A.D. Maternal Grandfather      C.A.D. Paternal Grandfather      Neurologic Disorder Brother         epilepsy         Current Outpatient Medications   Medication Sig Dispense Refill     albuterol (PROAIR HFA/PROVENTIL HFA/VENTOLIN HFA) 108 (90 Base) MCG/ACT inhaler Inhale 2 puffs into the lungs every 6 hours 1 Inhaler 0     aspirin 81 MG EC tablet Take 81 mg by mouth daily       BREO ELLIPTA 100-25 MCG/INH inhaler INHALE 1 PUFF BY MOUTH EVERY DAY 60 each 0     buPROPion (WELLBUTRIN XL) 150 MG 24 hr tablet Take 1 tablet (150 mg) by mouth every morning 90 tablet 1     Calcium Carbonate-Vit D-Min (CALCIUM 1200 PO) Take 600 mg by mouth daily        Cholecalciferol (VITAMIN D) 1000 UNIT capsule Take 1 capsule by mouth daily. Take one tablet daily       clopidogrel (PLAVIX) 75 MG tablet Take 1 tablet (75 mg) by mouth daily Call clinic to schedule follow up appointment. 90 tablet 0     diltiazem ER COATED BEADS (CARDIZEM CD) 360 MG 24 hr capsule Take 1 capsule (360 mg) by mouth daily 90 capsule 3     lisinopril (ZESTRIL) 5 MG tablet Take 1 tablet (5 mg) by mouth daily - needs ov for further refills 90 tablet 0     metoprolol succinate ER (TOPROL-XL) 100 MG 24 hr tablet Take 1.5 tablets (150 mg) by mouth daily 135 tablet 3     PROAIR  (90 Base) MCG/ACT inhaler INHALE 2 PUFFS INTO THE LUNGS EVERY 6 HOURS AS NEEDED FOR SHORTNESS OF BREATH / DYSPNEA OR WHEEZING 8.5 Inhaler 1     rosuvastatin (CRESTOR) 40 MG tablet Take 1 tablet (40 mg) by mouth daily For additional refills, please schedule a follow-up appointment at [phone] 90 tablet 3     tiotropium (SPIRIVA HANDIHALER) 18 MCG inhaled capsule INHALE 1  "CAPSULE (18 MCG) INTO THE LUNGS DAILY 90 capsule 3     Allergies   Allergen Reactions     Morphine Nausea and Vomiting     Penicillins Nausea and Vomiting     Recent Labs   Lab Test 04/22/21  1044 01/28/21  1004 03/10/20  0946 11/12/19  0920 10/22/19  1012 10/25/18  1140 10/25/18  1140 10/24/17  1057 10/24/17  1057   A1C 6.7* 6.7*  --   --  6.5*   < >  --    < > 6.3*   LDL  --  73  --   --  79  --  84  --   --    HDL  --  62  --   --  53  --  61  --   --    TRIG  --  166*  --   --  135  --  122  --   --    ALT  --  30  --  20 18  --  22   < > 21   CR  --  1.07* 1.31* 0.96 1.10*   < > 0.94   < > 0.95   GFRESTIMATED  --  53* 41* 61 51*   < > 59*   < > 59*   GFRESTBLACK  --  61 48* 70 60*   < > 72   < > 71   POTASSIUM  --  3.7 4.5 4.3 4.3   < > 4.1   < > 4.1   TSH  --   --   --   --  1.92  --   --   --  0.93    < > = values in this interval not displayed.        Review of Systems  Constitutional, HEENT, cardiovascular, pulmonary, GI, , musculoskeletal, neuro, skin, endocrine and psych systems are negative, except as otherwise noted.    OBJECTIVE:   BP (!) 175/96   Pulse 74   Temp 97.7  F (36.5  C) (Skin)   Resp 17   Ht 1.575 m (5' 2\")   Wt 86.6 kg (191 lb)   SpO2 93%   BMI 34.93 kg/m   Estimated body mass index is 34.93 kg/m  as calculated from the following:    Height as of this encounter: 1.575 m (5' 2\").    Weight as of this encounter: 86.6 kg (191 lb).  Physical Exam  GENERAL: healthy, alert and no distress  EYES: Eyes grossly normal to inspection, PERRL and conjunctivae and sclerae normal  HENT: ear canals and TM's normal, nose and mouth without ulcers or lesions  NECK: no adenopathy, no asymmetry, masses, or scars and thyroid normal to palpation  RESP: lungs clear to auscultation - no rales, rhonchi or wheezes  BREAST: normal without masses, tenderness or nipple discharge and no palpable axillary masses or adenopathy  CV: regular rate and rhythm, normal S1 S2, no S3 or S4, no murmur, click or rub, no " peripheral edema and peripheral pulses strong  ABDOMEN: soft, nontender, no hepatosplenomegaly, no masses and bowel sounds normal  MS: no gross musculoskeletal defects noted, no edema  SKIN: no suspicious lesions or rashes  NEURO: Normal strength and tone, mentation intact and speech normal  PSYCH: mentation appears normal, affect normal/bright    Diagnostic Test Results:  Labs reviewed in Epic    ASSESSMENT / PLAN:   1. Encounter for Medicare annual wellness exam  Routine labs updated today; continue to work on healthy diet and exercise    2. Hypertension: MEASURE BP IN RIGHT ARM ONLY  Long standing, chronic, UNcontrolled-  Needs better control, will discuss options with cardiology (Dr. Uriostegui and update patient. Most likely needs follow up with cardiology directly, but patient still hesitant at this time. Has follow up with nephrology 5/13/21 as well.  - Comprehensive metabolic panel  - Albumin Random Urine Quantitative with Creat Ratio  - BNP-N terminal pro  - lisinopril (ZESTRIL) 10 MG tablet; Take 1 tablet (10 mg) by mouth daily  Dispense: 90 tablet; Refill: 1    3. Type 2 diabetes mellitus with stage 3 chronic kidney disease, without long-term current use of insulin, unspecified whether stage 3a or 3b CKD (H)  Long standing, chronic, controlled-  Continue to work on healthy diet and exercise.Has follow up with nephrology 5/13/21 as well, but whit repeat labs today as visit is virtual.  - Comprehensive metabolic panel  - Albumin Random Urine Quantitative with Creat Ratio  - TSH with free T4 reflex  - Hemoglobin A1c    4. Hyperlipidemia LDL goal <100  Labs updated today; continue to work on healthy diet and exercise.    5. Chronic obstructive pulmonary disease, unspecified COPD type (H)  Long standing, chronic, controlled but shortness of breath present and unclear if related to cardiac vs lungs vs deconditioning. Trial or rescue in haler prior to daily inhaler use AND 30 mins prior to increased activity.    6.  "Dyspnea on exertion   See above and labs updated.  - BNP-N terminal pro    7. Encounter for smoking cessation counseling  Restart daily medicine with possible improvement if underlying depressed mood/decreased energy as well.  - buPROPion (WELLBUTRIN XL) 150 MG 24 hr tablet; Take 1 tablet (150 mg) by mouth every morning  Dispense: 90 tablet; Refill: 1    Patient has been advised of split billing requirements and indicates understanding: Yes  COUNSELING:  Reviewed preventive health counseling, as reflected in patient instructions       Regular exercise       Healthy diet/nutrition       Vision screening    Estimated body mass index is 34.93 kg/m  as calculated from the following:    Height as of this encounter: 1.575 m (5' 2\").    Weight as of this encounter: 86.6 kg (191 lb).    Weight management plan: Discussed healthy diet and exercise guidelines    She reports that she quit smoking about 2 years ago. Her smoking use included cigarettes. She has a 20.00 pack-year smoking history. She has never used smokeless tobacco.      Appropriate preventive services were discussed with this patient, including applicable screening as appropriate for cardiovascular disease, diabetes, osteopenia/osteoporosis, and glaucoma.  As appropriate for age/gender, discussed screening for colorectal cancer, prostate cancer, breast cancer, and cervical cancer. Checklist reviewing preventive services available has been given to the patient.    Reviewed patients plan of care and provided an AVS. The Basic Care Plan (routine screening as documented in Health Maintenance) for Porsche meets the Care Plan requirement. This Care Plan has been established and reviewed with the Patient.    Counseling Resources:  ATP IV Guidelines  Pooled Cohorts Equation Calculator  Breast Cancer Risk Calculator  Breast Cancer: Medication to Reduce Risk  FRAX Risk Assessment  ICSI Preventive Guidelines  Dietary Guidelines for Americans, 2010  USDA's MyPlate  ASA " Prophylaxis  Lung CA Screening    PHILL Wu [facility]    Identified Health Risks:

## 2021-04-22 ENCOUNTER — OFFICE VISIT (OUTPATIENT)
Dept: FAMILY MEDICINE | Facility: CLINIC | Age: 71
End: 2021-04-22
Payer: COMMERCIAL

## 2021-04-22 VITALS
TEMPERATURE: 97.7 F | HEART RATE: 74 BPM | HEIGHT: 62 IN | DIASTOLIC BLOOD PRESSURE: 96 MMHG | SYSTOLIC BLOOD PRESSURE: 175 MMHG | WEIGHT: 191 LBS | OXYGEN SATURATION: 93 % | BODY MASS INDEX: 35.15 KG/M2 | RESPIRATION RATE: 17 BRPM

## 2021-04-22 DIAGNOSIS — E78.5 HYPERLIPIDEMIA LDL GOAL <100: ICD-10-CM

## 2021-04-22 DIAGNOSIS — I10 HYPERTENSION GOAL BP (BLOOD PRESSURE) < 140/90: ICD-10-CM

## 2021-04-22 DIAGNOSIS — Z00.00 ENCOUNTER FOR MEDICARE ANNUAL WELLNESS EXAM: Primary | ICD-10-CM

## 2021-04-22 DIAGNOSIS — Z71.6 ENCOUNTER FOR SMOKING CESSATION COUNSELING: ICD-10-CM

## 2021-04-22 DIAGNOSIS — J44.9 CHRONIC OBSTRUCTIVE PULMONARY DISEASE, UNSPECIFIED COPD TYPE (H): ICD-10-CM

## 2021-04-22 DIAGNOSIS — E11.22 TYPE 2 DIABETES MELLITUS WITH STAGE 3 CHRONIC KIDNEY DISEASE, WITHOUT LONG-TERM CURRENT USE OF INSULIN, UNSPECIFIED WHETHER STAGE 3A OR 3B CKD (H): ICD-10-CM

## 2021-04-22 DIAGNOSIS — N18.30 TYPE 2 DIABETES MELLITUS WITH STAGE 3 CHRONIC KIDNEY DISEASE, WITHOUT LONG-TERM CURRENT USE OF INSULIN, UNSPECIFIED WHETHER STAGE 3A OR 3B CKD (H): ICD-10-CM

## 2021-04-22 DIAGNOSIS — R06.09 DYSPNEA ON EXERTION: ICD-10-CM

## 2021-04-22 LAB
ALBUMIN SERPL-MCNC: 4.2 G/DL (ref 3.4–5)
ALP SERPL-CCNC: 112 U/L (ref 40–150)
ALT SERPL W P-5'-P-CCNC: 24 U/L (ref 0–50)
ANION GAP SERPL CALCULATED.3IONS-SCNC: 10 MMOL/L (ref 3–14)
AST SERPL W P-5'-P-CCNC: 15 U/L (ref 0–45)
BILIRUB SERPL-MCNC: 0.5 MG/DL (ref 0.2–1.3)
BUN SERPL-MCNC: 13 MG/DL (ref 7–30)
CALCIUM SERPL-MCNC: 10.4 MG/DL (ref 8.5–10.1)
CHLORIDE SERPL-SCNC: 104 MMOL/L (ref 94–109)
CO2 SERPL-SCNC: 23 MMOL/L (ref 20–32)
CREAT SERPL-MCNC: 0.97 MG/DL (ref 0.52–1.04)
CREAT UR-MCNC: 26 MG/DL
GFR SERPL CREATININE-BSD FRML MDRD: 59 ML/MIN/{1.73_M2}
GLUCOSE SERPL-MCNC: 164 MG/DL (ref 70–99)
HBA1C MFR BLD: 6.7 % (ref 0–5.6)
MICROALBUMIN UR-MCNC: 364 MG/L
MICROALBUMIN/CREAT UR: 1427.45 MG/G CR (ref 0–25)
NT-PROBNP SERPL-MCNC: 432 PG/ML (ref 0–125)
POTASSIUM SERPL-SCNC: 4.4 MMOL/L (ref 3.4–5.3)
PROT SERPL-MCNC: 7.6 G/DL (ref 6.8–8.8)
SODIUM SERPL-SCNC: 137 MMOL/L (ref 133–144)
TSH SERPL DL<=0.005 MIU/L-ACNC: 1.14 MU/L (ref 0.4–4)

## 2021-04-22 PROCEDURE — 83880 ASSAY OF NATRIURETIC PEPTIDE: CPT | Performed by: PHYSICIAN ASSISTANT

## 2021-04-22 PROCEDURE — 80053 COMPREHEN METABOLIC PANEL: CPT | Performed by: PHYSICIAN ASSISTANT

## 2021-04-22 PROCEDURE — 99397 PER PM REEVAL EST PAT 65+ YR: CPT | Performed by: PHYSICIAN ASSISTANT

## 2021-04-22 PROCEDURE — 99214 OFFICE O/P EST MOD 30 MIN: CPT | Mod: 25 | Performed by: PHYSICIAN ASSISTANT

## 2021-04-22 PROCEDURE — 83036 HEMOGLOBIN GLYCOSYLATED A1C: CPT | Performed by: PHYSICIAN ASSISTANT

## 2021-04-22 PROCEDURE — 84443 ASSAY THYROID STIM HORMONE: CPT | Performed by: PHYSICIAN ASSISTANT

## 2021-04-22 PROCEDURE — 82043 UR ALBUMIN QUANTITATIVE: CPT | Performed by: PHYSICIAN ASSISTANT

## 2021-04-22 PROCEDURE — 36415 COLL VENOUS BLD VENIPUNCTURE: CPT | Performed by: PHYSICIAN ASSISTANT

## 2021-04-22 RX ORDER — LISINOPRIL 10 MG/1
10 TABLET ORAL DAILY
Qty: 90 TABLET | Refills: 1 | Status: CANCELLED | OUTPATIENT
Start: 2021-04-22

## 2021-04-22 RX ORDER — BUPROPION HYDROCHLORIDE 150 MG/1
150 TABLET, EXTENDED RELEASE ORAL 2 TIMES DAILY
Qty: 180 TABLET | Refills: 3 | Status: SHIPPED | OUTPATIENT
Start: 2021-04-22 | End: 2021-04-22

## 2021-04-22 RX ORDER — BUPROPION HYDROCHLORIDE 150 MG/1
150 TABLET ORAL EVERY MORNING
Qty: 90 TABLET | Refills: 1 | Status: SHIPPED | OUTPATIENT
Start: 2021-04-22 | End: 2022-11-25

## 2021-04-22 ASSESSMENT — ACTIVITIES OF DAILY LIVING (ADL): CURRENT_FUNCTION: NO ASSISTANCE NEEDED

## 2021-04-22 ASSESSMENT — MIFFLIN-ST. JEOR: SCORE: 1339.62

## 2021-04-22 NOTE — RESULT ENCOUNTER NOTE
"Daisy Morrell  Your attached labs are overall stable (with maybe slight improvement), but still need further follow up with nephrology.  I have a message to Dr. Uriostegui as well.  Please contact the office with any questions or concerns.    Danuta Martinez \"Eduard\" PHILL Tan    "

## 2021-04-22 NOTE — PATIENT INSTRUCTIONS
We are working hard to begin vaccinating more people against COVID-19.   Check FRESS or go to [email or web address] website to check your eligibility and to schedule vaccine appointment.   Please call [phone] to schedule your covid vaccine if you are unable to schedule it through FRESS.           Did you know?      You can schedule a video visit for follow-up appointments as well as future appointments for certain conditions.  Please see the below link.     [email or web address]    If you have not already done so,  I encourage you to sign up for [employer] ([email or web address]).  This will allow you to review your results, securely communicate with a provider, and schedule virtual visits as well.

## 2021-04-22 NOTE — Clinical Note
"Dr. Uriostegui,   Are you ok with increasing her Lisinopril back to 10 mg (BP much higher these days). She is (finally) seeing nephrology 5/13/21 to discuss kidney changes, but thought it might be a good idea to try to get her BP better controlled before then... would appreciate any insight.  Thanks!  Danuta \"Eduard\" PHILL Tan "

## 2021-04-23 RX ORDER — LISINOPRIL 10 MG/1
10 TABLET ORAL DAILY
Qty: 90 TABLET | Refills: 3 | Status: SHIPPED | OUTPATIENT
Start: 2021-04-23 | End: 2021-05-12

## 2021-04-23 NOTE — PROGRESS NOTES
SCOTT DAVIDSON  Informed pt below.  She is aware and agreeable to take lisinopril 10 mg q d.  She says she just picked up old rx recently but will take 2 tabs of the 5 mg until its gone then will  new rx.  She verbalized understanding that new rx will be 10 mg tab, 1 tab every day.  Thanks,  Kandi King RN

## 2021-04-29 DIAGNOSIS — J44.1 COPD EXACERBATION (H): ICD-10-CM

## 2021-04-29 NOTE — TELEPHONE ENCOUNTER
Wyatt Camejo:     One month supply sent 3/29/21.  Due for physical    Physical done 4/22/21  Prescription approved per [facility] Refill Protocol.  Kenyatta Chicas RN

## 2021-05-04 DIAGNOSIS — I25.2 OLD MYOCARDIAL INFARCTION: ICD-10-CM

## 2021-05-04 DIAGNOSIS — I10 HYPERTENSION GOAL BP (BLOOD PRESSURE) < 140/90: ICD-10-CM

## 2021-05-04 DIAGNOSIS — I73.9 PERIPHERAL VASCULAR DISEASE, UNSPECIFIED (H): ICD-10-CM

## 2021-05-04 NOTE — LETTER
May 10, 2021      TO: Porsche Manning  [address]  [place] [facility] [address]         Dear Porsche,    According to our medication protocol, you will need to have a visit with cardiology before any more refills can be prescribed.  You can call [phone] to make an appointment with Dr. Brayan Uriostegui or one of the Interventional [facility]'s. There are in clinic, video and phone call visits available.  Your other option is to ask your primary care provider to fill the scripts for you.    Please do not hesitate to call me if you have any questions or concerns.    Sincerely,      Minerva Chawla RN  Nurse Coordinator for Brayan Uriostegui MD  [phone]

## 2021-05-06 RX ORDER — DILTIAZEM HYDROCHLORIDE 360 MG/1
CAPSULE, EXTENDED RELEASE ORAL
Qty: 90 CAPSULE | Refills: 3 | OUTPATIENT
Start: 2021-05-06

## 2021-05-06 NOTE — TELEPHONE ENCOUNTER
CLOPIDOGREL 75 MG TABLET    Last Written Prescription Date:  2/9/21  Last Fill Quantity: 90,   # refills: 0  Last Office Visit : 2/18/20  Future Office visit:  none    Routing refill request to provider for review/approval because:  2nd notice  Sent #90 to pharm on 2/9/21  Thank-you, Dunia LOPEZ RN Medication Refill Team

## 2021-05-06 NOTE — PROGRESS NOTES
Patient was not examined by me.  Chart and note reviewed.      Mitchel Powell MD  [facility]

## 2021-05-10 DIAGNOSIS — N18.31 STAGE 3A CHRONIC KIDNEY DISEASE (H): Primary | ICD-10-CM

## 2021-05-10 RX ORDER — CLOPIDOGREL BISULFATE 75 MG/1
75 TABLET ORAL DAILY
Qty: 60 TABLET | Refills: 0 | Status: SHIPPED | OUTPATIENT
Start: 2021-05-10 | End: 2021-07-07

## 2021-05-12 ENCOUNTER — VIRTUAL VISIT (OUTPATIENT)
Dept: NEPHROLOGY | Facility: CLINIC | Age: 71
End: 2021-05-12
Attending: INTERNAL MEDICINE
Payer: COMMERCIAL

## 2021-05-12 DIAGNOSIS — Z11.59 NEED FOR HEPATITIS B SCREENING TEST: ICD-10-CM

## 2021-05-12 DIAGNOSIS — N18.31 STAGE 3A CHRONIC KIDNEY DISEASE (H): Primary | ICD-10-CM

## 2021-05-12 DIAGNOSIS — I10 HYPERTENSION GOAL BP (BLOOD PRESSURE) < 140/90: ICD-10-CM

## 2021-05-12 DIAGNOSIS — R80.8 OTHER PROTEINURIA: ICD-10-CM

## 2021-05-12 PROCEDURE — 99205 OFFICE O/P NEW HI 60 MIN: CPT | Mod: 95 | Performed by: INTERNAL MEDICINE

## 2021-05-12 RX ORDER — LISINOPRIL 20 MG/1
20 TABLET ORAL DAILY
Qty: 90 TABLET | Refills: 3 | Status: SHIPPED | OUTPATIENT
Start: 2021-05-12 | End: 2021-11-05

## 2021-05-12 ASSESSMENT — PAIN SCALES - GENERAL: PAINLEVEL: NO PAIN (0)

## 2021-05-12 NOTE — LETTER
5/12/2021       RE: Porsche Manning  [address]  [place] [facility] [address]     Dear Colleague,    Thank you for referring your patient, Porsche Manning, to the [facility] [place] at [facility]. Please see a copy of my visit note below.    Leann is a 71 year old who is being evaluated via a billable video visit.      How would you like to obtain your AVS? MyChart  If the video visit is dropped, the invitation should be resent by: Text to cell phone: [phone]  Will anyone else be joining your video visit? No      Video Start Time: 2:10 PM  Video-Visit Details    Type of service:  Video Visit    Video End Time:2:49 PM    Originating Location (pt. Location): Home    Distant Location (provider location):  [facility] [place]     Platform used for Video Visit: Alise      Assessment and Plan:    # CKD stage stage 3a with with subnephrotic proteinuria   Creatinine 0.97 with GFR of 59, on 4/22/2021  Review of her labs show that her creatinine has ranged between 0.94-1.49 in the last few years.  ACR: 15 mg/gCr ( 2013) --> 158 mg/gCr ( 2015( --> 238 mg/gCr ( 2016) --> 0.5g/gCr ( 2017)--> 1g/gCr ( 2018) --> 1.3 g/gCr ( 2019) --> 3.3 g/gCr ( 1/28/21) --> 1.4 g/gCr ( 4/22/21)    h/o long standing  T2DM at least since 2003 .A1c 6.7 %. No h/o retinopathy  Hence ACR is gradually increased over the last 7 to 8 years.  ETIOLOGY : The pattern is very consistent with injury related to diabetic nephropathy.Longstanding hypertension could along with multiple IV contrast studies over the years could have been contributary.  However differential diagnosis includes other forms of glomerulonephritis.    We will proceed with checking the following:  UA, UPCR, renal panel, CBC with platelets, vitamin D, PTH: These have been ordered.  Patient was requested to get them done.  We will also order the following labs for further  assessment of Glomerulonephritis :  C3-C4  JUAN MANUEL  SPEP with immunofixation  UPEP with immunofixation  FLC ratio  MPO antibody, TN-3 antibody  Cryoglobulins  HIV, hep B, hep C serology  Anti-GBM antibody  [id number] Ab  We will do renal ultrasound study         # Hypertension:   Current antihypertensive regimen:  Cardizem  mg daily  Lisinopril 10 mg daily  Metoprolol succinate 150 mg daily    Aldosterone 6 ng/dL, renin activity 0.9 ng/ml/hr in 2018  We will recheck renin activity and aldosterone      # Anemia in chronic renal disease:   - Hgb: 14.8..  Patient is not anemic currently    # Electrolytes: No acute issues      # Mineral Bone Disorder:   Hypercalcemia: Calcium 10.4 with albumin of 4.2.   We will check vitamin D, PTH, phosphorus along with repeating calcium again  Advised to eat/drink calcium rich food in moderation including milk products  Advise to hold calcium carbonate vitamin D supplementation for now.  Patient is also on vitamin D 1000 units daily    CAD s/p stent to RCA in 2003  Lower extremity significant PAD with multiple intervention  Carotid artery disease  Left subclavian stenosis  -- On aspirin, Plavix, Lipitor, antihypertensive meds  -- Follows with cardiology. With her worsening SOB  She was advised to schedule a cardiology follow up soon      COPD   With some  worsening SOB could be multifactorial   Though cigg smokning contributing heavily  Advised to follow up with Pulmonologist  Advisd her to quit smoking . She is down to 5 ciggs/day       Follow up : RTC in 1 month - video visit /phone visit     Patient verbalized understanding of what was discussed with her and agreed to follow up with the recommendations     Patient seen and discussed with Dr Volodymyr Morocho MD, FACP  Nephrology Fellow   [facility]   Pager [phone]        Assessment and plan was discussed with patient and she voiced her understanding and agreement.    Consult:  Porsche Manning was seen in  consultation at the request of  KRISTEN LARSON PA-C     Reason for Visit:  Hypertension and proteinuria    HPI:  71-year-old female patient with COPD, CAD ( s/p stent to RCA in 2003)/PAD/HTN/HLD, smoker, COPD  Still continues to smoke cigarettes  - 5/day     She has longstanding PAD  Had MRI which did not reveal any renal artery disease but revealed bilateral focal stenosis of both common iliac arteries, right worse than left.    -- In 2005 patient underwent aortography, peripheral angiography and bilateral aortoiliac stenting.  She subsequently underwent kissing stenting of the aortoiliac vessels.   -- In 2006 she underwent repeat angiography and intervention.  Left common iliac artery stent was thrombosed and she needed thrombolytic therapy.  She also underwent left common iliac artery stenting.  --She also has left subclavian stenosis with no evidence of steal phenomena. Most recent CT angiogram studies of bilateral upper extremities in 2013 showed subtotal stenosis of the origin of the left subclavian artery remainder of the upper extremity vasculature were patent.  Patient also has 70% stenosis of right ICA.  Less than 10% stenosis of left ICA.      Patient denies any chest pain  Has chronic SOB.Got worse  In the last 1 year   Sees cardiologist and pulmonologist    No  PND, orthopnea.  No leg swelling.   No wheezing or cough.   No dizziness, lightheadedness.  No focal weakness, altered sensation or confusion  No fever, chills  No abdominal pain nausea vomiting.  No black stools or rectal bleeding.    No voiding difficulty.  No hematuria.  No unintentional weight loss.    No skin rash    HOME VITALS : 162/66, HR       Renal History:           ROS:   A comprehensive review of systems was obtained and negative, except as noted in the HPI or PMH.    Active Medical Problems:  Patient Active Problem List   Diagnosis     Old myocardial infarction     Malignant neoplasm of other specified sites of cervix      Peripheral vascular disease (H)     HYPERLIPIDEMIA LDL GOAL <100     Hypertension: MEASURE BP IN RIGHT ARM ONLY     Osteopenia     Vitamin D deficiency disease     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)     Advanced directives, counseling/discussion     Subclavian artery stenosis, left (H)     Hyponatremia     CKD (chronic kidney disease) stage 3, GFR 30-59 ml/min     Chronic obstructive pulmonary disease, unspecified COPD type (H)     COPD (chronic obstructive pulmonary disease) with chronic bronchitis (H)     Obesity (BMI 35.0-39.9) with comorbidity (H)     Blood pressure check     PMH:   Medical record was reviewed and PMH was discussed with patient and noted below.  Past Medical History:   Diagnosis Date     Brachial neuritis or radiculitis NOS      Chronic obstructive pulmonary disease, unspecified COPD type (H) 3/8/2016     Coronary artery disease     Doing fine     H/O tobacco use, presenting hazards to health      Hyperlipidemia LDL goal < 100      Hypertension goal BP (blood pressure) < 140/90      Malignant neoplasm of other specified sites of cervix      Old myocardial infarction      Osteopenia      Peripheral vascular disease, unspecified (H)      Type 2 diabetes, HbA1c goal < 7% (H)      PSH:   Past Surgical History:   Procedure Laterality Date     BIOPSY      Sample taken from back     CARDIAC SURGERY      Stents     COLONOSCOPY  1996    Results were ok     HYSTERECTOMY, PAP NO LONGER INDICATED  1989    ovaries only, no cervix per pt     SURGICAL HISTORY OF - 1995    bunionectomy     SURGICAL HISTORY OF -   10/10/03    cardiac stenting     SURGICAL HISTORY OF -     stent placed in both of her legs for pad     VASCULAR SURGERY      PAD       Family Hx:   Family History   Problem Relation Age of Onset     C.A.D. Mother      Breast Cancer Mother      C.A.D. Father      Diabetes Father      Hypertension Father      C.A.D. Maternal Grandfather      C.A.JOE. Paternal Grandfather       Neurologic Disorder Brother         epilepsy     Personal Hx:   Social History     Tobacco Use     Smoking status: Former Smoker     Packs/day: 0.50     Years: 40.00     Pack years: 20.00     Types: Cigarettes     Quit date: 4/25/2018     Years since quitting: 3.0     Smokeless tobacco: Never Used   Substance Use Topics     Alcohol use: Yes     Comment: 2 to 4 beers or mixed drinks weekly       Allergies:  Allergies   Allergen Reactions     Morphine Nausea and Vomiting     Penicillins Nausea and Vomiting       Medications:  Current Outpatient Medications   Medication Sig     albuterol (PROAIR HFA/PROVENTIL HFA/VENTOLIN HFA) 108 (90 Base) MCG/ACT inhaler Inhale 2 puffs into the lungs every 6 hours     aspirin 81 MG EC tablet Take 81 mg by mouth daily     BREO ELLIPTA 100-25 MCG/INH inhaler INHALE 1 PUFF BY MOUTH EVERY DAY     buPROPion (WELLBUTRIN XL) 150 MG 24 hr tablet Take 1 tablet (150 mg) by mouth every morning     Calcium Carbonate-Vit D-Min (CALCIUM 1200 PO) Take 600 mg by mouth daily      Cholecalciferol (VITAMIN D) 1000 UNIT capsule Take 1 capsule by mouth daily. Take one tablet daily     clopidogrel (PLAVIX) 75 MG tablet Take 1 tablet (75 mg) by mouth daily Call clinic to schedule follow up appointment.     diltiazem ER COATED BEADS (CARDIZEM CD) 360 MG 24 hr capsule Take 1 capsule (360 mg) by mouth daily     lisinopril (ZESTRIL) 10 MG tablet Take 1 tablet (10 mg) by mouth daily     metoprolol succinate ER (TOPROL-XL) 100 MG 24 hr tablet Take 1.5 tablets (150 mg) by mouth daily     PROAIR  (90 Base) MCG/ACT inhaler INHALE 2 PUFFS INTO THE LUNGS EVERY 6 HOURS AS NEEDED FOR SHORTNESS OF BREATH / DYSPNEA OR WHEEZING     rosuvastatin (CRESTOR) 40 MG tablet Take 1 tablet (40 mg) by mouth daily For additional refills, please schedule a follow-up appointment at [phone]     tiotropium (SPIRIVA HANDIHALER) 18 MCG inhaled capsule INHALE 1 CAPSULE (18 MCG) INTO THE LUNGS DAILY     No current  facility-administered medications for this visit.       Vitals:  There were no vitals taken for this visit.    Exam:  On video patient appeared awake and alert, conversing normally, no conversational dyspnea and speaking in full sentences . Comprehension is normal      LABS:   CMP  Recent Labs   Lab Test 04/22/21  1044 01/28/21  1004 03/10/20  0946 11/12/19  0920    137 134 137   POTASSIUM 4.4 3.7 4.5 4.3   CHLORIDE 104 107 103 104   CO2 23 21 23 25   ANIONGAP 10 9 8 8   * 160* 153* 178*   BUN 13 15 19 12   CR 0.97 1.07* 1.31* 0.96   GFRESTIMATED 59* 53* 41* 61   GFRESTBLACK 69 61 48* 70   VIANEY 10.4* 10.2* 9.5 9.0     Recent Labs   Lab Test 04/22/21  1044 01/28/21  1004 11/12/19  0920 10/22/19  1012   BILITOTAL 0.5 0.5 0.4 0.3   ALKPHOS 112 114 100 120   ALT 24 30 20 18   AST 15 16 11 9     CBC  Recent Labs   Lab Test 01/28/21  1004 10/22/19  1012 10/25/18  1140 05/11/18  0619   HGB 14.8 12.9 14.1 10.9*   WBC 9.9 8.4 8.9 16.9*   RBC 4.73 4.58 4.85 3.99   HCT 45.0 40.0 43.8 34.6*   MCV 95 87 90 87   MCH 31.3 28.2 29.1 27.3   MCHC 32.9 32.3 32.2 31.5   RDW 12.6 15.0 15.5* 15.2*    360 253 236     URINE STUDIES  Recent Labs   Lab Test 07/23/15  0925 07/16/15  2154   COLOR Yellow Light Yellow   APPEARANCE Clear Clear   URINEGLC Negative Negative   URINEBILI Negative Negative   URINEKETONE Negative Negative   SG 1.010 1.003   UBLD Negative Negative   URINEPH 5.5 5.5   PROTEIN Negative Negative   UROBILINOGEN 0.2  --    NITRITE Negative Negative   LEUKEST Negative Negative   RBCU  --  <1   WBCU  --  <1     No lab results found.  PTH  No lab results found.  IRON STUDIES  Recent Labs   Lab Test 10/17/16  0825   BRI 11         Rashawn Juarez MD    I was present with the fellow during the history and exam.  I discussed the case with the fellow and agree with the findings as documented in the assessment and plan.  Charlotte Helm        Again, thank you for allowing me to participate in the care of  your patient.      Sincerely,    Rashawn Juarez MD

## 2021-05-12 NOTE — PROGRESS NOTES
Leann is a 71 year old who is being evaluated via a billable video visit.      How would you like to obtain your AVS? MyChart  If the video visit is dropped, the invitation should be resent by: Text to cell phone: [phone]  Will anyone else be joining your video visit? No      Video Start Time: 2:10 PM  Video-Visit Details    Type of service:  Video Visit    Video End Time:2:49 PM    Originating Location (pt. Location): Home    Distant Location (provider location):  [facility] [place]     Platform used for Video Visit: [facility]      Assessment and Plan:    # CKD stage stage 3a with with subnephrotic proteinuria   Creatinine 0.97 with GFR of 59, on 4/22/2021  Review of her labs show that her creatinine has ranged between 0.94-1.49 in the last few years.  ACR: 15 mg/gCr ( 2013) --> 158 mg/gCr ( 2015( --> 238 mg/gCr ( 2016) --> 0.5g/gCr ( 2017)--> 1g/gCr ( 2018) --> 1.3 g/gCr ( 2019) --> 3.3 g/gCr ( 1/28/21) --> 1.4 g/gCr ( 4/22/21)    h/o long standing  T2DM at least since 2003 .A1c 6.7 %. No h/o retinopathy  Hence ACR is gradually increased over the last 7 to 8 years.  ETIOLOGY : The pattern is very consistent with injury related to diabetic nephropathy.Longstanding hypertension could along with multiple IV contrast studies over the years could have been contributary.  However differential diagnosis includes other forms of glomerulonephritis.    We will proceed with checking the following:  UA, UPCR, renal panel, CBC with platelets, vitamin D, PTH: These have been ordered.  Patient was requested to get them done.  We will also order the following labs for further assessment of Glomerulonephritis :  C3-C4  JUAN MANUEL  SPEP with immunofixation  UPEP with immunofixation  FLC ratio  MPO antibody, GA-3 antibody  Cryoglobulins  HIV, hep B, hep C serology  Anti-GBM antibody  [id number] Ab  We will do renal ultrasound study         # Hypertension:   Current antihypertensive regimen:  Cardizem  mg  daily  Lisinopril 10 mg daily  Metoprolol succinate 150 mg daily    Aldosterone 6 ng/dL, renin activity 0.9 ng/ml/hr in 2018  We will recheck renin activity and aldosterone      # Anemia in chronic renal disease:   - Hgb: 14.8..  Patient is not anemic currently    # Electrolytes: No acute issues      # Mineral Bone Disorder:   Hypercalcemia: Calcium 10.4 with albumin of 4.2.   We will check vitamin D, PTH, phosphorus along with repeating calcium again  Advised to eat/drink calcium rich food in moderation including milk products  Advise to hold calcium carbonate vitamin D supplementation for now.  Patient is also on vitamin D 1000 units daily    CAD s/p stent to RCA in 2003  Lower extremity significant PAD with multiple intervention  Carotid artery disease  Left subclavian stenosis  -- On aspirin, Plavix, Lipitor, antihypertensive meds  -- Follows with cardiology. With her worsening SOB  She was advised to schedule a cardiology follow up soon      COPD   With some  worsening SOB could be multifactorial   Though cigg smokning contributing heavily  Advised to follow up with Pulmonologist  Advisd her to quit smoking . She is down to 5 ciggs/day       Follow up : RTC in 1 month - video visit /phone visit     Patient verbalized understanding of what was discussed with her and agreed to follow up with the recommendations     Patient seen and discussed with Dr Volodymyr Morocho MD, FACP  Nephrology Fellow   [facility]   Pager [phone]        Assessment and plan was discussed with patient and she voiced her understanding and agreement.    Consult:  Porsche Manning was seen in consultation at the request of  KRISTEN LARSON PA-C     Reason for Visit:  Hypertension and proteinuria    HPI:  71-year-old female patient with COPD, CAD ( s/p stent to RCA in 2003)/PAD/HTN/HLD, smoker, COPD  Still continues to smoke cigarettes  - 5/day     She has longstanding PAD  Had MRI which did not reveal any  renal artery disease but revealed bilateral focal stenosis of both common iliac arteries, right worse than left.    -- In 2005 patient underwent aortography, peripheral angiography and bilateral aortoiliac stenting.  She subsequently underwent kissing stenting of the aortoiliac vessels.   -- In 2006 she underwent repeat angiography and intervention.  Left common iliac artery stent was thrombosed and she needed thrombolytic therapy.  She also underwent left common iliac artery stenting.  --She also has left subclavian stenosis with no evidence of steal phenomena. Most recent CT angiogram studies of bilateral upper extremities in 2013 showed subtotal stenosis of the origin of the left subclavian artery remainder of the upper extremity vasculature were patent.  Patient also has 70% stenosis of right ICA.  Less than 10% stenosis of left ICA.      Patient denies any chest pain  Has chronic SOB.Got worse  In the last 1 year   Sees cardiologist and pulmonologist    No  PND, orthopnea.  No leg swelling.   No wheezing or cough.   No dizziness, lightheadedness.  No focal weakness, altered sensation or confusion  No fever, chills  No abdominal pain nausea vomiting.  No black stools or rectal bleeding.    No voiding difficulty.  No hematuria.  No unintentional weight loss.    No skin rash    HOME VITALS : 162/66, HR       Renal History:           ROS:   A comprehensive review of systems was obtained and negative, except as noted in the HPI or PMH.    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)     Advanced directives, counseling/discussion     Subclavian artery stenosis, left (H)     Hyponatremia      CKD (chronic kidney disease) stage 3, GFR 30-59 ml/min     Chronic obstructive pulmonary disease, unspecified COPD type (H)     COPD (chronic obstructive pulmonary disease) with chronic bronchitis (H)     Obesity (BMI 35.0-39.9) with comorbidity (H)     Blood pressure check     PMH:   Medical record was reviewed and PMH was discussed with patient and noted below.  Past Medical History:   Diagnosis Date     Brachial neuritis or radiculitis NOS      Chronic obstructive pulmonary disease, unspecified COPD type (H) 3/8/2016     Coronary artery disease     Doing fine     H/O tobacco use, presenting hazards to health      Hyperlipidemia LDL goal < 100      Hypertension goal BP (blood pressure) < 140/90      Malignant neoplasm of other specified sites of cervix      Old myocardial infarction      Osteopenia      Peripheral vascular disease, unspecified (H)      Type 2 diabetes, HbA1c goal < 7% (H)      PSH:   Past Surgical History:   Procedure Laterality Date     BIOPSY      Sample taken from back     CARDIAC SURGERY      Stents     COLONOSCOPY  1996    Results were ok     HYSTERECTOMY, PAP NO LONGER INDICATED  1989    ovaries only, no cervix per pt     SURGICAL HISTORY OF - 1995    bunionectomy     SURGICAL HISTORY OF -   10/10/03    cardiac stenting     SURGICAL HISTORY OF -     stent placed in both of her legs for pad     VASCULAR SURGERY      PAD       Family Hx:   Family History   Problem Relation Age of Onset     C.A.D. Mother      Breast Cancer Mother      C.A.D. Father      Diabetes Father      Hypertension Father      C.A.D. Maternal Grandfather      C.A.D. Paternal Grandfather      Neurologic Disorder Brother         epilepsy     Personal Hx:   Social History     Tobacco Use     Smoking status: Former Smoker     Packs/day: 0.50     Years: 40.00     Pack years: 20.00     Types: Cigarettes     Quit date: 4/25/2018     Years since quitting: 3.0     Smokeless tobacco: Never Used   Substance Use Topics      Alcohol use: Yes     Comment: 2 to 4 beers or mixed drinks weekly       Allergies:  Allergies   Allergen Reactions     Morphine Nausea and Vomiting     Penicillins Nausea and Vomiting       Medications:  Current Outpatient Medications   Medication Sig     albuterol (PROAIR HFA/PROVENTIL HFA/VENTOLIN HFA) 108 (90 Base) MCG/ACT inhaler Inhale 2 puffs into the lungs every 6 hours     aspirin 81 MG EC tablet Take 81 mg by mouth daily     BREO ELLIPTA 100-25 MCG/INH inhaler INHALE 1 PUFF BY MOUTH EVERY DAY     buPROPion (WELLBUTRIN XL) 150 MG 24 hr tablet Take 1 tablet (150 mg) by mouth every morning     Calcium Carbonate-Vit D-Min (CALCIUM 1200 PO) Take 600 mg by mouth daily      Cholecalciferol (VITAMIN D) 1000 UNIT capsule Take 1 capsule by mouth daily. Take one tablet daily     clopidogrel (PLAVIX) 75 MG tablet Take 1 tablet (75 mg) by mouth daily Call clinic to schedule follow up appointment.     diltiazem ER COATED BEADS (CARDIZEM CD) 360 MG 24 hr capsule Take 1 capsule (360 mg) by mouth daily     lisinopril (ZESTRIL) 10 MG tablet Take 1 tablet (10 mg) by mouth daily     metoprolol succinate ER (TOPROL-XL) 100 MG 24 hr tablet Take 1.5 tablets (150 mg) by mouth daily     PROAIR  (90 Base) MCG/ACT inhaler INHALE 2 PUFFS INTO THE LUNGS EVERY 6 HOURS AS NEEDED FOR SHORTNESS OF BREATH / DYSPNEA OR WHEEZING     rosuvastatin (CRESTOR) 40 MG tablet Take 1 tablet (40 mg) by mouth daily For additional refills, please schedule a follow-up appointment at [phone]     tiotropium (SPIRIVA HANDIHALER) 18 MCG inhaled capsule INHALE 1 CAPSULE (18 MCG) INTO THE LUNGS DAILY     No current facility-administered medications for this visit.       Vitals:  There were no vitals taken for this visit.    Exam:  On video patient appeared awake and alert, conversing normally, no conversational dyspnea and speaking in full sentences . Comprehension is normal      LABS:   [facility]  Recent Labs   Lab Test 04/22/21  1044 01/28/21  1004  03/10/20  0946 11/12/19  0920    137 134 137   POTASSIUM 4.4 3.7 4.5 4.3   CHLORIDE 104 107 103 104   CO2 23 21 23 25   ANIONGAP 10 9 8 8   * 160* 153* 178*   BUN 13 15 19 12   CR 0.97 1.07* 1.31* 0.96   GFRESTIMATED 59* 53* 41* 61   GFRESTBLACK 69 61 48* 70   VIANEY 10.4* 10.2* 9.5 9.0     Recent Labs   Lab Test 04/22/21  1044 01/28/21  1004 11/12/19  0920 10/22/19  1012   BILITOTAL 0.5 0.5 0.4 0.3   ALKPHOS 112 114 100 120   ALT 24 30 20 18   AST 15 16 11 9     CBC  Recent Labs   Lab Test 01/28/21  1004 10/22/19  1012 10/25/18  1140 05/11/18  0619   HGB 14.8 12.9 14.1 10.9*   WBC 9.9 8.4 8.9 16.9*   RBC 4.73 4.58 4.85 3.99   HCT 45.0 40.0 43.8 34.6*   MCV 95 87 90 87   MCH 31.3 28.2 29.1 27.3   MCHC 32.9 32.3 32.2 31.5   RDW 12.6 15.0 15.5* 15.2*    360 253 236     URINE STUDIES  Recent Labs   Lab Test 07/23/15  0925 07/16/15  2154   COLOR Yellow Light Yellow   APPEARANCE Clear Clear   URINEGLC Negative Negative   URINEBILI Negative Negative   URINEKETONE Negative Negative   SG 1.010 1.003   UBLD Negative Negative   URINEPH 5.5 5.5   PROTEIN Negative Negative   UROBILINOGEN 0.2  --    NITRITE Negative Negative   LEUKEST Negative Negative   RBCU  --  <1   WBCU  --  <1     No lab results found.  PTH  No lab results found.  IRON STUDIES  Recent Labs   Lab Test 10/17/16  0825   BRI 11         Rashawn Juarez MD    I was present with the fellow during the history and exam.  I discussed the case with the fellow and agree with the findings as documented in the assessment and plan.  Charlotte Helm

## 2021-05-13 ENCOUNTER — PRE VISIT (OUTPATIENT)
Dept: NEPHROLOGY | Facility: CLINIC | Age: 71
End: 2021-05-13

## 2021-05-19 ENCOUNTER — TELEPHONE (OUTPATIENT)
Dept: NEPHROLOGY | Facility: CLINIC | Age: 71
End: 2021-05-19

## 2021-05-19 NOTE — TELEPHONE ENCOUNTER
lvm for patient to call us back to schedule a 1 month f/u appt with Denice Frankie for any date  After the lab and Ultrasound on 6.17.21

## 2021-05-27 ENCOUNTER — TELEPHONE (OUTPATIENT)
Dept: CARDIOLOGY | Facility: CLINIC | Age: 71
End: 2021-05-27

## 2021-05-27 NOTE — TELEPHONE ENCOUNTER
"  Panel Management Review      Patient has the following on her problem list:       IVD   ASA: Passed    Last LDL:    Lab Results   Component Value Date    CHOL 168 01/28/2021     Lab Results   Component Value Date    HDL 62 01/28/2021     Lab Results   Component Value Date    LDL 73 01/28/2021     Lab Results   Component Value Date    TRIG 166 01/28/2021        Lab Results   Component Value Date    CHOLHDLRATIO 2.0 11/04/2015        Is the patient on a Statin? YES   Is the patient on Aspirin? YES                  Medications     HMG CoA Reductase Inhibitors     rosuvastatin (CRESTOR) 40 MG tablet       Salicylates     aspirin 81 MG EC tablet             Last three blood pressure readings:  BP Readings from Last 3 Encounters:   04/22/21 (!) 175/96   02/18/20 (!) 156/74   01/13/20 (!) 180/74        Tobacco History:     History   Smoking Status     Former Smoker     Packs/day: 0.50     Years: 40.00     Types: Cigarettes     Quit date: 4/25/2018   Smokeless Tobacco     Never Used       Summary:    Patient is due/failing the following:   BP CHECK    Called and spoke with Leann regarding her recent elevated blood pressure. Leann states her primary and Dr. Uriostegui recently increased her lisinopril to 20 mg every day on 5/12/21. Since then she has not been feeling well and last weekend felt \"sick to her stomach\".     Leann checked her BP today during the call and it was 170/69.   I helped her schedule a follow up visit. 's schedule was full into August. Since the patient had other apps on 6/17 and it is difficult for her to get around, she asked if she can been seen on that day in clinic. I schedule a visit with Nelli Kuhn for Leann for 6/17.   Please let me know if this is appropriate or if I need to reschedule her.    Delfino   "

## 2021-06-03 ENCOUNTER — MYC MEDICAL ADVICE (OUTPATIENT)
Dept: FAMILY MEDICINE | Facility: CLINIC | Age: 71
End: 2021-06-03
Payer: COMMERCIAL

## 2021-06-08 NOTE — TELEPHONE ENCOUNTER
Patient Quality Outreach      Summary:    Patient has the following on her problem list/HM:   Diabetes    Last A1C:  Lab Results   Component Value Date    A1C 6.7 04/22/2021    A1C 6.7 01/28/2021       Last LDL:    Lab Results   Component Value Date    LDL 73 01/28/2021       Is the patient on a Statin? Yes          Is the patient on Aspirin? Yes    Medications     HMG CoA Reductase Inhibitors     rosuvastatin (CRESTOR) 40 MG tablet       Salicylates     aspirin 81 MG EC tablet             Last three blood pressure readings:  BP Readings from Last 3 Encounters:   04/22/21 (!) 175/96   02/18/20 (!) 156/74   01/13/20 (!) 180/74            Tobacco Use      Smoking status: Former Smoker        Packs/day: 0.50        Years: 40.00        Pack years: 20        Types: Cigarettes        Quit date: 4/25/2018        Years since quitting: 3.1      Smokeless tobacco: Never Used          Hypertension   Last three blood pressure readings:  BP Readings from Last 3 Encounters:   04/22/21 (!) 175/96   02/18/20 (!) 156/74   01/13/20 (!) 180/74     Blood pressure: Failed    HTN Guidelines:  ? 139/89     Patient is due/failing the following:   Eye Exam and BP check    Type of outreach:    Sent [employer] message.    Questions for provider review:    None                                                                                                                                     GARY Pandey

## 2021-06-15 NOTE — TELEPHONE ENCOUNTER
"BPs overall ok for now, but let's schedule at least a virtual visit to discuss the remaining symptoms.  Thanks  Danuta \"Eduard\" PHILL Tan   "

## 2021-06-25 ENCOUNTER — TRANSFERRED RECORDS (OUTPATIENT)
Dept: HEALTH INFORMATION MANAGEMENT | Facility: CLINIC | Age: 71
End: 2021-06-25

## 2021-06-25 LAB — RETINOPATHY: NEGATIVE

## 2021-07-02 DIAGNOSIS — I25.83 CORONARY ARTERY DISEASE DUE TO LIPID RICH PLAQUE: ICD-10-CM

## 2021-07-02 DIAGNOSIS — I10 HYPERTENSION GOAL BP (BLOOD PRESSURE) < 140/90: ICD-10-CM

## 2021-07-02 DIAGNOSIS — I25.10 CORONARY ARTERY DISEASE DUE TO LIPID RICH PLAQUE: ICD-10-CM

## 2021-07-02 DIAGNOSIS — I73.9 PERIPHERAL VASCULAR DISEASE, UNSPECIFIED (H): ICD-10-CM

## 2021-07-07 DIAGNOSIS — E78.5 HYPERLIPIDEMIA LDL GOAL <100: Primary | ICD-10-CM

## 2021-07-07 RX ORDER — CLOPIDOGREL BISULFATE 75 MG/1
75 TABLET ORAL DAILY
Qty: 90 TABLET | Refills: 0 | Status: SHIPPED | OUTPATIENT
Start: 2021-07-07 | End: 2021-09-23

## 2021-07-07 RX ORDER — METOPROLOL SUCCINATE 100 MG/1
150 TABLET, EXTENDED RELEASE ORAL DAILY
Qty: 135 TABLET | Refills: 0 | Status: SHIPPED | OUTPATIENT
Start: 2021-07-07 | End: 2021-08-20

## 2021-07-07 NOTE — TELEPHONE ENCOUNTER
metoprolol succinate ER (TOPROL-XL) 100 MG 24 hr tablet    Take 1.5 tablets (150 mg) by mouth daily     Last Written Prescription Date:  7/14/20  Last Fill Quantity: 135,   # refills: 3  Last Office Visit : 2/18/20  Follow up 1 year  Future Office visit:  none    Routing refill request to provider for review/approval because:  Overdue visit and BP > 140/90. 90 day to pharmacy. Clinic notified.      04/22/21 (!) 175/96       clopidogrel (PLAVIX) 75 MG tablet    Take 1 tablet (75 mg) by mouth daily     Last Written Prescription Date:  5/10/21  Last Fill Quantity: 60,   # refills: 0    Routing refill request to provider for review/approval because:  3rd notice request. Overdue appointment.   Sent #60 to pharmacy on 5/10/21.   Pended.     Scheduling has been notified to contact the pt for appointment.

## 2021-07-20 ENCOUNTER — ANCILLARY PROCEDURE (OUTPATIENT)
Dept: ULTRASOUND IMAGING | Facility: CLINIC | Age: 71
End: 2021-07-20
Attending: INTERNAL MEDICINE
Payer: COMMERCIAL

## 2021-07-20 ENCOUNTER — LAB (OUTPATIENT)
Dept: LAB | Facility: CLINIC | Age: 71
End: 2021-07-20
Payer: COMMERCIAL

## 2021-07-20 DIAGNOSIS — E78.5 HYPERLIPIDEMIA LDL GOAL <100: ICD-10-CM

## 2021-07-20 DIAGNOSIS — R80.8 OTHER PROTEINURIA: ICD-10-CM

## 2021-07-20 DIAGNOSIS — I10 HYPERTENSION GOAL BP (BLOOD PRESSURE) < 140/90: ICD-10-CM

## 2021-07-20 DIAGNOSIS — N18.31 STAGE 3A CHRONIC KIDNEY DISEASE (H): ICD-10-CM

## 2021-07-20 LAB
CHOLEST SERPL-MCNC: 154 MG/DL
FASTING STATUS PATIENT QL REPORTED: YES
HDLC SERPL-MCNC: 57 MG/DL
LDLC SERPL CALC-MCNC: 63 MG/DL
NONHDLC SERPL-MCNC: 97 MG/DL
TRIGL SERPL-MCNC: 172 MG/DL

## 2021-07-20 PROCEDURE — 76770 US EXAM ABDO BACK WALL COMP: CPT | Mod: GC | Performed by: RADIOLOGY

## 2021-07-20 PROCEDURE — 80061 LIPID PANEL: CPT | Performed by: PATHOLOGY

## 2021-07-20 PROCEDURE — 36415 COLL VENOUS BLD VENIPUNCTURE: CPT | Performed by: PATHOLOGY

## 2021-07-23 DIAGNOSIS — I10 HYPERTENSION GOAL BP (BLOOD PRESSURE) < 140/90: ICD-10-CM

## 2021-07-23 DIAGNOSIS — I25.2 OLD MYOCARDIAL INFARCTION: ICD-10-CM

## 2021-07-23 PROBLEM — Z01.30 BLOOD PRESSURE CHECK: Status: RESOLVED | Noted: 2019-01-16 | Resolved: 2021-07-23

## 2021-07-23 PROBLEM — J44.89 COPD (CHRONIC OBSTRUCTIVE PULMONARY DISEASE) WITH CHRONIC BRONCHITIS (H): Status: RESOLVED | Noted: 2018-05-09 | Resolved: 2021-07-23

## 2021-07-23 NOTE — PROGRESS NOTES
"  [facility] - Video Visit  Cardiovascular Clinic Note      Referring Provider: Brayan Uriostegui   Primary Care Provider: Danuta Tan     Patient Name: Porsche Manning   MRN: [id number]       History of Present Illness:  Porsche Manning is a 71 year old female with PMH notable or HTN, HLD, hx of tobacco use, CAD (1st MI 1987, BMS to mRCA 2003), PAD (s/p bilateral aorto-iliac stenting 2005, repeat stenting to left common iliac artery 2006), subclavian stenosis, COPD, T2DM, CKD stage III, and hx of cervical cancer. She presents for follow up.    The patient was last seen by Dr. Uriostegui 2/2020. At that visit, she had mild claudication. She was started on spironolactone for hypertension. This was stopped per nephrology and the patient was started on lisinopril.     In the last month, the patient has had 2 separate issues.  First, she has noted that she has some hypotension associated with dizziness in the middle of the day.  She notes that when she first awakens in the morning, her systolic blood pressure is typically between 130 and 160.  She takes 20 mg of lisinopril, 360 mg of diltiazem, and 150 mg of metoprolol all at once between the hours of 7:30 AM and 9 AM.  She has noted over the last week that she has had dizziness/hypotension at approximately noon every day.  She notes that she will feel lightheaded, check her blood pressure, and that systolic blood pressures are typically between 95 and 115.  This typically slowly resolves throughout the afternoon. In addition, she notes that her resting HR is typically between 40-70 on machines at home. She has been checking her BP on her right arm only.    Second, the patient has noted that over the last month or so her \"left leg is worse\".  She feels as though she has lost muscle mass in this leg.  She feels as though she has more frequent and bothersome pain of this leg.  She notes that pain is most prevalent when she has her leg up on the " "ottoman or if she stands for long periods of time.  She has some loss of sensation just above and below her knee with onset of pain.  Pain/decreased sensation typically resolve with shifting her weight if she is sitting or by sitting if she is standing/walking.  She feels as though it is more difficult to perform activities of daily living.  She has not noted profound decrease pallor of her left lower extremity.  She does feel as though the left lower extremity is \"a bit\" cooler than the right.  She has been diagnosed with sciatica in the past and wonders if this is a recurrence of her sciatica or if this is related to her peripheral arterial disease.    The patient otherwise denies chest pain, shortness of breath, orthopnea, new lower extremity swelling, and palpitations.  She was recently seen by nephrology and was found to have worsening renal function.  The presumed diagnosis is secondary to midday hypotension.  She has been compliant with all medications.    Pertinent Cardiac Medications:  75mg Plavix daily  150mg metoprolol succinate daily  20mg lisinopril daily  40mg rosuvastatin daily  360mg diltiazem ER daily  81mg ASA daily      Past Medical History:  Pertinent past medical history as above.      Past Surgical History:  Past Surgical History:   Procedure Laterality Date     BIOPSY      Sample taken from back     CARDIAC SURGERY      Stents     COLONOSCOPY  1996    Results were ok     HYSTERECTOMY, PAP NO LONGER INDICATED  1989    ovaries only, no cervix per pt     SURGICAL HISTORY OF -   1995    bunionectomy     SURGICAL HISTORY OF -   10/10/03    cardiac stenting     SURGICAL HISTORY OF -       stent placed in both of her legs for pad     VASCULAR SURGERY      PAD         Family History:  Family History   Problem Relation Age of Onset     OREN.ACHERIE. Mother      Breast Cancer Mother      DIANN. Father      Diabetes Father      Hypertension Father      DIANN. Maternal Grandfather      CHRISSY Paternal " Grandfather      Neurologic Disorder Brother         epilepsy         Current Medications:  Current Outpatient Medications   Medication Sig Dispense Refill     albuterol (PROAIR HFA/PROVENTIL HFA/VENTOLIN HFA) 108 (90 Base) MCG/ACT inhaler INHALE 2 PUFFS INTO THE LUNGS EVERY 6 HOURS 18 g 0     aspirin 81 MG EC tablet Take 81 mg by mouth daily       BREO ELLIPTA 100-25 MCG/INH inhaler INHALE 1 PUFF BY MOUTH EVERY DAY 60 each 2     buPROPion (WELLBUTRIN XL) 150 MG 24 hr tablet Take 1 tablet (150 mg) by mouth every morning 90 tablet 1     Calcium Carbonate-Vit D-Min (CALCIUM 1200 PO) Take 600 mg by mouth daily        Cholecalciferol (VITAMIN D) 1000 UNIT capsule Take 1 capsule by mouth daily. Take one tablet daily       clopidogrel (PLAVIX) 75 MG tablet Take 1 tablet (75 mg) by mouth daily Call clinic to schedule follow up appointment. 90 tablet 0     diltiazem ER COATED BEADS (CARDIZEM CD) 360 MG 24 hr capsule Take 1 capsule (360 mg) by mouth daily 90 capsule 3     lisinopril (ZESTRIL) 20 MG tablet Take 1 tablet (20 mg) by mouth daily 90 tablet 3     metoprolol succinate ER (TOPROL-XL) 100 MG 24 hr tablet Take 1.5 tablets (150 mg) by mouth daily Please call Cardiology at [phone] to schedule annual visit. Thank you. 135 tablet 0     PROAIR  (90 Base) MCG/ACT inhaler INHALE 2 PUFFS INTO THE LUNGS EVERY 6 HOURS AS NEEDED FOR SHORTNESS OF BREATH / DYSPNEA OR WHEEZING 8.5 Inhaler 1     rosuvastatin (CRESTOR) 40 MG tablet Take 1 tablet (40 mg) by mouth daily For additional refills, please schedule a follow-up appointment at [phone] 90 tablet 3     tiotropium (SPIRIVA HANDIHALER) 18 MCG inhaled capsule INHALE 1 CAPSULE (18 MCG) INTO THE LUNGS DAILY 90 capsule 3         Social History:  Noncontributory    Physical Exam:  No vitals collected as this was a video visit  Wt Readings from Last 2 Encounters:   04/22/21 86.6 kg (191 lb)   02/18/20 86.2 kg (190 lb)     Constitutional: no acute distress, pleasant and  cooperative, appears overall well.  Eyes: sclera white, conjunctiva clear, without icterus or pallor   Cardiovascular: Visible extremities without edema/cyanosis.  Unable to visualize bilateral lower extremities due to video visit  Respiratory: Respirations nonlabored  Gastrointestinal: soft, nontender, non distended  Musculoskeletal: Visible muscle/joints normal  Skin: Visible skin without worrisome lesions  Neurologic: AOx3, gait smooth and symmetric  Psychiatric: appropriate affect, eye contact, intact thought and speech      Laboratory Data:  LIPID RESULTS:  Recent Labs   Lab Test 07/20/21  1202 01/28/21  1004 11/04/15  1142 07/09/15  1013   CHOL 154 168 141 125   HDL 57 62 71 61   LDL 63 73 48 35   TRIG 172* 166* 112 145   CHOLHDLRATIO  --   --  2.0 2.0        LIVER ENZYME RESULTS:  Recent Labs   Lab Test 04/22/21  1044 01/28/21  1004   AST 15 16   ALT 24 30       CBC RESULTS:  Recent Labs   Lab Test 01/28/21  1004 10/22/19  1012   WBC 9.9 8.4   HGB 14.8 12.9   HCT 45.0 40.0    360       BMP RESULTS:  Recent Labs   Lab Test 04/22/21  1044 01/28/21  1004    137   POTASSIUM 4.4 3.7   CHLORIDE 104 107   CO2 23 21   ANIONGAP 10 9   * 160*   BUN 13 15   CR 0.97 1.07*   VIANEY 10.4* 10.2*       A1C RESULTS:  Lab Results   Component Value Date    A1C 6.7 (H) 04/22/2021     Carotid US 2/2020:  1. RIGHT ICA: 50-69% diameter stenosis by grayscale imaging and  sonographic velocity criteria, unchanged from 2015.  2. LEFT ICA:  Less than 50% diameter narrowing by grayscale imaging  and sonographic velocity criteria, unchanged from 2015.  3. Subclavian steal physiology with retrograde left vertebral artery  flow, unchanged. Correlate for symptoms.    Assessment:  Porsche Manning is a 71 year old female with PMH notable or HTN, HLD, hx of tobacco use, CAD (1st MI 1987, BMS to mRCA 2003), PAD (s/p bilateral aorto-iliac stenting 2005, repeat stenting to left common iliac artery 2006), subclavian stenosis,  COPD, T2DM, CKD stage III, and hx of cervical cancer. She presents for follow up.    71-year-old female presenting to cardiology clinic with 2 separate issues.  1.  Worsening left lower extremity pain.  She will sometimes lose sensation just below and just above her left knee.  She notices intermittent coolness of the left lower extremity as compared to the right lower extremity.  Paresthesias/pain are typically resolved with changing bodily positions (if she is standing, she will sit down.  If she is sitting, she will adjust her seat).  I am concerned that this may represent worsening left lower extremity PAD given the known presence of stents and previous disease.  However, it may be concerning for sciatica as the patient has a history of this  2.  Midday hypotension/bradycardia      Plan:  # HTN  # HLD  BP in left arm is FALSELY low, presumably due to left subclavian artery stenosis. LDL 63 7/2021.  Patient has been checking home blood pressures solely in the left arm  - transition lisinopril to PM dose  - Reduce metoprolol to 100 mg daily  - For now continue diltiazem  - continue crestor    # LE PAD:   Stable mild claudication s/p bilateral aorto-iliac kissing stens years ago complicated by stent thrombosis at later date requiring urgent intervention.   - message sent to Dr. Uriostegui/peripheral team given new left lower extremity pain/paresthesia  - We will discuss recommendations at visit next month  - Patient instructed to present to emergency department urgently should she develop total loss of sensation, extreme pain, or pallor of the left foot  - Recommend IN PERSON PCP follow up for assessment of LLE for possible sciatica?    # CAD:   Single vessel CAD, dating back to 2003 at which time she had PCI of RCA. CTA with heavy calcification  - continue ASA, Plavix  - continue statin  - continue BB; reduce dose as above    # Carotid disease.    Testing 2/2020 without stenosis > 70%  - continue to monitor    # Left  subclavian artery stenosis.   There was a 57 mm Hg pressure gradient between the right and left arms.  CT angiogram showed subtotal occlusion of the ostium of the left subclavian artery.   She is asymptomatic and there is no evidence of a subclavian steal phenomenon, no need for intervention at this time  - continue to monitor       Follow-up: 1 month with me via virtual visit    A total of 27 minutes spent face to face with patient. An additional 15 minutes was spent providing coordination of care, chart review, and documentation    Nelli Kuhn PA-C  Interventional/Critical Care Cardiology  [phone]        CC  Patient Care Team:  Danuta Tan PA-C as PCP - General (Physician Assistant)  Brayan Uriostegui MD as Assigned Heart and Vascular Provider  Danuta Tan PA-C as Assigned PCP  Nelli Kuhn PA-C as Physician Assistant (Interventional Cardiology)  BRAYAN URIOSTEGUI

## 2021-07-26 RX ORDER — DILTIAZEM HYDROCHLORIDE 360 MG/1
360 CAPSULE, EXTENDED RELEASE ORAL DAILY
Qty: 90 CAPSULE | Refills: 0 | Status: SHIPPED | OUTPATIENT
Start: 2021-07-26 | End: 2021-10-12

## 2021-07-26 NOTE — TELEPHONE ENCOUNTER
DILTIAZEM 24H ER(CD) 360 MG CP      Last Written Prescription Date:  7/14/20  Last Fill Quantity: 90,   # refills: 3  Last Office Visit : Brayan Uriostegui MD  Interventional Cardiology  2/18/2020  [facility]  Recommended 1 year follow up  Future Office visit:  None scheduled    Routing refill request to provider for review/approval because:  Blood pressure out of range     BP Readings from Last 3 Encounters:   04/22/21 (!) 175/96   02/18/20 (!) 156/74   01/13/20 (!) 180/74       Note from 4/22/21 visit:  2. Hypertension: MEASURE BP IN RIGHT ARM ONLY  Long standing, chronic, UNcontrolled-  Needs better control, will discuss options with cardiology (Dr. Uriostegui and update patient. Most likely needs follow up with cardiology directly, but patient still hesitant at this time. Has follow up with nephrology 5/13/21 as well.  - Comprehensive metabolic panel  - Albumin Random Urine Quantitative with Creat Ratio  - BNP-N terminal pro  - lisinopril (ZESTRIL) 10 MG tablet; Take 1 tablet (10 mg) by mouth daily  Dispense: 90 tablet; Refill: 1    90 day refill provided, routed to cardiology

## 2021-07-27 ENCOUNTER — LAB (OUTPATIENT)
Dept: LAB | Facility: CLINIC | Age: 71
End: 2021-07-27
Payer: COMMERCIAL

## 2021-07-27 ENCOUNTER — VIRTUAL VISIT (OUTPATIENT)
Dept: NEPHROLOGY | Facility: CLINIC | Age: 71
End: 2021-07-27
Attending: PHYSICIAN ASSISTANT
Payer: COMMERCIAL

## 2021-07-27 DIAGNOSIS — I10 HYPERTENSION GOAL BP (BLOOD PRESSURE) < 140/90: ICD-10-CM

## 2021-07-27 DIAGNOSIS — N18.31 CHRONIC KIDNEY DISEASE (CKD) STAGE G3A/A1, MODERATELY DECREASED GLOMERULAR FILTRATION RATE (GFR) BETWEEN 45-59 ML/MIN/1.73 SQUARE METER AND ALBUMINURIA CREATININE RATIO LESS THAN 30 MG/G (H): Primary | ICD-10-CM

## 2021-07-27 DIAGNOSIS — N18.31 STAGE 3A CHRONIC KIDNEY DISEASE (H): ICD-10-CM

## 2021-07-27 DIAGNOSIS — R80.8 OTHER PROTEINURIA: ICD-10-CM

## 2021-07-27 DIAGNOSIS — Z11.59 NEED FOR HEPATITIS B SCREENING TEST: ICD-10-CM

## 2021-07-27 DIAGNOSIS — N18.31 STAGE 3A CHRONIC KIDNEY DISEASE (H): Primary | ICD-10-CM

## 2021-07-27 LAB
ALBUMIN SERPL-MCNC: 4 G/DL (ref 3.4–5)
ALBUMIN UR-MCNC: NEGATIVE MG/DL
ANION GAP SERPL CALCULATED.3IONS-SCNC: 8 MMOL/L (ref 3–14)
APPEARANCE UR: CLEAR
BILIRUB UR QL STRIP: NEGATIVE
BUN SERPL-MCNC: 13 MG/DL (ref 7–30)
CALCIUM SERPL-MCNC: 9.8 MG/DL (ref 8.5–10.1)
CHLORIDE BLD-SCNC: 102 MMOL/L (ref 94–109)
CO2 SERPL-SCNC: 25 MMOL/L (ref 20–32)
COLOR UR AUTO: ABNORMAL
CREAT SERPL-MCNC: 1.36 MG/DL (ref 0.52–1.04)
CREAT UR-MCNC: 22 MG/DL
ERYTHROCYTE [DISTWIDTH] IN BLOOD BY AUTOMATED COUNT: 12.5 % (ref 10–15)
GFR SERPL CREATININE-BSD FRML MDRD: 39 ML/MIN/1.73M2
GLUCOSE BLD-MCNC: 135 MG/DL (ref 70–99)
GLUCOSE UR STRIP-MCNC: NEGATIVE MG/DL
HCT VFR BLD AUTO: 41.2 % (ref 35–47)
HGB BLD-MCNC: 14.4 G/DL (ref 11.7–15.7)
HGB UR QL STRIP: NEGATIVE
KETONES UR STRIP-MCNC: NEGATIVE MG/DL
LEUKOCYTE ESTERASE UR QL STRIP: NEGATIVE
Lab: NORMAL
MCH RBC QN AUTO: 31.8 PG (ref 26.5–33)
MCHC RBC AUTO-ENTMCNC: 35 G/DL (ref 31.5–36.5)
MCV RBC AUTO: 91 FL (ref 78–100)
MUCOUS THREADS #/AREA URNS LPF: PRESENT /LPF
NITRATE UR QL: NEGATIVE
PERFORMING LABORATORY: NORMAL
PH UR STRIP: 6 [PH] (ref 5–7)
PHOSPHATE SERPL-MCNC: 3 MG/DL (ref 2.5–4.5)
PLATELET # BLD AUTO: 217 10E3/UL (ref 150–450)
POTASSIUM BLD-SCNC: 4 MMOL/L (ref 3.4–5.3)
PROT UR-MCNC: 0.23 G/L
PROT/CREAT 24H UR: 1.05 G/G CR (ref 0–0.2)
PTH-INTACT SERPL-MCNC: 24 PG/ML (ref 18–80)
RBC # BLD AUTO: 4.53 10E6/UL (ref 3.8–5.2)
RBC URINE: 1 /HPF
SODIUM SERPL-SCNC: 135 MMOL/L (ref 133–144)
SP GR UR STRIP: 1 (ref 1–1.03)
SPECIMEN STATUS: NORMAL
TEST NAME: NORMAL
TOTAL PROTEIN SERUM FOR ELP: 7 G/DL (ref 6.8–8.8)
UROBILINOGEN UR STRIP-MCNC: NORMAL MG/DL
WBC # BLD AUTO: 9 10E3/UL (ref 4–11)
WBC URINE: 1 /HPF

## 2021-07-27 PROCEDURE — 84166 PROTEIN E-PHORESIS/URINE/CSF: CPT | Mod: 26 | Performed by: PATHOLOGY

## 2021-07-27 PROCEDURE — 83970 ASSAY OF PARATHORMONE: CPT | Mod: 90 | Performed by: PATHOLOGY

## 2021-07-27 PROCEDURE — 86255 FLUORESCENT ANTIBODY SCREEN: CPT | Performed by: PATHOLOGY

## 2021-07-27 PROCEDURE — 99215 OFFICE O/P EST HI 40 MIN: CPT | Mod: 95 | Performed by: PHYSICIAN ASSISTANT

## 2021-07-27 PROCEDURE — 82088 ASSAY OF ALDOSTERONE: CPT | Mod: 90 | Performed by: PATHOLOGY

## 2021-07-27 PROCEDURE — 82306 VITAMIN D 25 HYDROXY: CPT | Mod: 90 | Performed by: PATHOLOGY

## 2021-07-27 PROCEDURE — 87389 HIV-1 AG W/HIV-1&-2 AB AG IA: CPT | Mod: 90 | Performed by: PATHOLOGY

## 2021-07-27 PROCEDURE — 86803 HEPATITIS C AB TEST: CPT | Mod: 90 | Performed by: PATHOLOGY

## 2021-07-27 PROCEDURE — 83876 ASSAY MYELOPEROXIDASE: CPT | Mod: 90 | Performed by: PATHOLOGY

## 2021-07-27 PROCEDURE — 85027 COMPLETE CBC AUTOMATED: CPT | Performed by: PATHOLOGY

## 2021-07-27 PROCEDURE — 84155 ASSAY OF PROTEIN SERUM: CPT | Mod: 90 | Performed by: PATHOLOGY

## 2021-07-27 PROCEDURE — 86335 IMMUNFIX E-PHORSIS/URINE/CSF: CPT | Mod: 26 | Performed by: PATHOLOGY

## 2021-07-27 PROCEDURE — 86160 COMPLEMENT ANTIGEN: CPT | Mod: 90 | Performed by: PATHOLOGY

## 2021-07-27 PROCEDURE — 82784 ASSAY IGA/IGD/IGG/IGM EACH: CPT | Mod: 90 | Performed by: PATHOLOGY

## 2021-07-27 PROCEDURE — 80069 RENAL FUNCTION PANEL: CPT | Performed by: PATHOLOGY

## 2021-07-27 PROCEDURE — 36415 COLL VENOUS BLD VENIPUNCTURE: CPT | Performed by: PATHOLOGY

## 2021-07-27 PROCEDURE — 87340 HEPATITIS B SURFACE AG IA: CPT | Mod: 90 | Performed by: PATHOLOGY

## 2021-07-27 PROCEDURE — 83883 ASSAY NEPHELOMETRY NOT SPEC: CPT | Mod: 90 | Performed by: PATHOLOGY

## 2021-07-27 PROCEDURE — 83516 IMMUNOASSAY NONANTIBODY: CPT | Mod: 90 | Performed by: PATHOLOGY

## 2021-07-27 PROCEDURE — 84165 PROTEIN E-PHORESIS SERUM: CPT | Mod: 26 | Performed by: PATHOLOGY

## 2021-07-27 PROCEDURE — 81001 URINALYSIS AUTO W/SCOPE: CPT | Performed by: PATHOLOGY

## 2021-07-27 PROCEDURE — 83520 IMMUNOASSAY QUANT NOS NONAB: CPT | Performed by: PATHOLOGY

## 2021-07-27 PROCEDURE — 84244 ASSAY OF RENIN: CPT | Mod: 90 | Performed by: PATHOLOGY

## 2021-07-27 PROCEDURE — 86704 HEP B CORE ANTIBODY TOTAL: CPT | Mod: 90 | Performed by: PATHOLOGY

## 2021-07-27 PROCEDURE — 84156 ASSAY OF PROTEIN URINE: CPT | Performed by: PATHOLOGY

## 2021-07-27 PROCEDURE — 86334 IMMUNOFIX E-PHORESIS SERUM: CPT | Mod: 26 | Performed by: PATHOLOGY

## 2021-07-27 PROCEDURE — 86038 ANTINUCLEAR ANTIBODIES: CPT | Mod: 90 | Performed by: PATHOLOGY

## 2021-07-27 PROCEDURE — 86706 HEP B SURFACE ANTIBODY: CPT | Mod: 90 | Performed by: PATHOLOGY

## 2021-07-27 NOTE — PROGRESS NOTES
NEPHROLOGY PROGRESS NOTE                                    Leann is a 71 year old who is being evaluated via a billable video visit.      How would you like to obtain your AVS? MyChart  If the video visit is dropped, the invitation should be resent by: Text to cell phone: [phone]  Will anyone else be joining your video visit? No      Video Start Time:  Video-Visit Details    Type of service:  Video Visit    Video End Time:    Originating Location (pt. Location): Home    Distant Location (provider location):  [facility] [place]     Platform used for Video Visit: [employer]     Reason for visit: follow up CKD, HTN      Assessment and Plan:    # CKD stage stage 3a with with subnephrotic proteinuria   Creatinine 0.97 with GFR of 59, on 4/22/2021  Review of her labs show that her creatinine has ranged between 0.94-1.49 in the last few years.  7/27/21 Creatinine 1.36, eGFR 39  ACR: 15 mg/gCr ( 2013) --> 158 mg/gCr ( 2015( --> 238 mg/gCr ( 2016) --> 0.5g/gCr ( 2017)--> 1g/gCr ( 2018) --> 1.3 g/gCr ( 2019) --> 3.3 g/gCr ( 1/28/21) --> 1.4 g/gCr ( 4/22/21) --> 1.05 g/gCr (7/27/21)  7/27/21 UA: 1 RBC, 1 WBC and mucus on microscopy, otherwise negative   h/o long standing  T2DM at least since 2003 .A1c 6.7 %. No h/o retinopathy  Hence ACR is gradually increased over the last 7 to 8 years.  ETIOLOGY : The pattern is very consistent with injury related to diabetic nephropathy.Longstanding hypertension along with multiple IV contrast studies over the years could have been contributary.  However differential diagnosis includes other forms of glomerulonephritis.    Following labs are pending:   vitamin D, PTH: These have been ordered.  Patient was requested to get them done.  We will also order the following labs for further assessment of Glomerulonephritis : pending  C3-C4  JUAN MANUEL  SPEP with immunofixation  UPEP with immunofixation  FLC ratio  MPO antibody, LA-3 antibody  Cryoglobulins  HIV, hep B,  hep C serology  Anti-GBM antibody  [id number] Ab  - Renal US 7/20/21 is normal    # Hypertension:   - 's to 120's/50's to low 60's  Current antihypertensive regimen:  Cardizem  mg daily  Lisinopril 10 mg daily  Metoprolol succinate 150 mg daily    Aldosterone 6 ng/dL, renin activity 0.9 ng/ml/hr in 2018  Repeat renin and melly pending  - does get dizzy occasionally, will check BP next dizzy episode to make sure it's not too lab  - has follow up with Cardiology on 8/20/21      # Anemia in chronic renal disease:   - 7/27/21 Hgb: 14.4  Patient is not anemic currently      # Electrolytes/Acid/Base: No acute issues  - 7/27/21 Potassium 4, sodium 135, bicarb 25      # Mineral Bone Disorder:   Hypercalcemia: Calcium 10.4 with albumin of 4.2.   We will check vitamin D, PTH, phosphorus along with repeating calcium again  Advised to eat/drink calcium rich food in moderation including milk products  Advise to hold calcium carbonate vitamin D supplementation but continues this.  Patient is also on vitamin D 1000 units daily    CAD s/p stent to RCA in 2003  Lower extremity significant PAD with multiple intervention  Carotid artery disease  Left subclavian stenosis  -- On aspirin, Plavix, Lipitor, antihypertensive meds  -- Follows with cardiology. With her worsening SOB  She was advised to schedule a cardiology follow up soon      COPD   With some  worsening SOB could be multifactorial   Though cigg smokning contributing heavily  Advised to follow up with Pulmonologist  Advisd her to quit smoking . She is down to 5 ciggs/day       Follow up : Pending additional lab results and further recommendations.      Patient verbalized understanding of what was discussed with her and agreed to follow up with the recommendations     Patience Wellington PA-C        Reason for Visit:  Follow up CKD, Hypertension, and proteinuria    HPI:  71-year-old female patient with COPD, CAD ( s/p stent to RCA in 2003)/PAD/HTN/HLD, smoker,  COPD  Still continues to smoke cigarettes  - 5/day     She has longstanding PAD  Had MRI which did not reveal any renal artery disease but revealed bilateral focal stenosis of both common iliac arteries, right worse than left.    -- In 2005 patient underwent aortography, peripheral angiography and bilateral aortoiliac stenting.  She subsequently underwent kissing stenting of the aortoiliac vessels.   -- In 2006 she underwent repeat angiography and intervention.  Left common iliac artery stent was thrombosed and she needed thrombolytic therapy.  She also underwent left common iliac artery stenting.  --She also has left subclavian stenosis with no evidence of steal phenomena. Most recent CT angiogram studies of bilateral upper extremities in 2013 showed subtotal stenosis of the origin of the left subclavian artery remainder of the upper extremity vasculature were patent.  Patient also has 70% stenosis of right ICA.  Less than 10% stenosis of left ICA.      Patient denies any chest pain  Has chronic SOB.Got worse  In the last 1 year   Sees cardiologist and pulmonologist    No  PND, orthopnea.  No leg swelling.   No wheezing or cough.   No dizziness, lightheadedness.  No focal weakness, altered sensation or confusion  No fever, chills  No abdominal pain nausea vomiting.  No black stools or rectal bleeding.    No voiding difficulty.  No hematuria.  No unintentional weight loss.    No skin rash    HOME VITALS : 162/66, HR       Renal History:   Negative NSAID use  No history of kidney stones or gout  No family history of kidney failure/dialysis      ROS:   A comprehensive review of systems was obtained and negative, except as noted in the HPI or PMH.    PMH:   Reviewed    PSH:   Reviewed    Family Hx:   Reviewed    Personal Hx:   Reviewed    Allergies:  Allergies   Allergen Reactions     Morphine Nausea and Vomiting     Penicillins Nausea and Vomiting       Medications:  Current Outpatient Medications   Medication Sig      albuterol (PROAIR HFA/PROVENTIL HFA/VENTOLIN HFA) 108 (90 Base) MCG/ACT inhaler INHALE 2 PUFFS INTO THE LUNGS EVERY 6 HOURS     aspirin 81 MG EC tablet Take 81 mg by mouth daily     BREO ELLIPTA 100-25 MCG/INH inhaler INHALE 1 PUFF BY MOUTH EVERY DAY     buPROPion (WELLBUTRIN XL) 150 MG 24 hr tablet Take 1 tablet (150 mg) by mouth every morning     Calcium Carbonate-Vit D-Min (CALCIUM 1200 PO) Take 600 mg by mouth daily      Cholecalciferol (VITAMIN D) 1000 UNIT capsule Take 1 capsule by mouth daily. Take one tablet daily     clopidogrel (PLAVIX) 75 MG tablet Take 1 tablet (75 mg) by mouth daily Call clinic to schedule follow up appointment.     diltiazem ER COATED BEADS (CARDIZEM CD) 360 MG 24 hr capsule Take 1 capsule (360 mg) by mouth daily For additional refills, please schedule a follow-up appointment at [phone]     lisinopril (ZESTRIL) 20 MG tablet Take 1 tablet (20 mg) by mouth daily     metoprolol succinate ER (TOPROL-XL) 100 MG 24 hr tablet Take 1.5 tablets (150 mg) by mouth daily Please call Cardiology at [phone] to schedule annual visit. Thank you.     PROAIR  (90 Base) MCG/ACT inhaler INHALE 2 PUFFS INTO THE LUNGS EVERY 6 HOURS AS NEEDED FOR SHORTNESS OF BREATH / DYSPNEA OR WHEEZING     rosuvastatin (CRESTOR) 40 MG tablet Take 1 tablet (40 mg) by mouth daily For additional refills, please schedule a follow-up appointment at [phone]     tiotropium (SPIRIVA HANDIHALER) 18 MCG inhaled capsule INHALE 1 CAPSULE (18 MCG) INTO THE LUNGS DAILY     No current facility-administered medications for this visit.      Vitals:  There were no vitals taken for this visit.    Exam:  On video patient appeared awake and alert, conversing normally, no conversational dyspnea and speaking in full sentences . Comprehension is normal      LABS:   CMP  Recent Labs   Lab Test 07/27/21  1019 04/22/21  1044 01/28/21  1004 03/10/20  0946 11/12/19  0920    137 137 134 137   POTASSIUM 4.0 4.4 3.7 4.5 4.3    CHLORIDE 102 104 107 103 104   CO2 25 23 21 23 25   ANIONGAP 8 10 9 8 8   * 164* 160* 153* 178*   BUN 13 13 15 19 12   CR 1.36* 0.97 1.07* 1.31* 0.96   GFRESTIMATED 39* 59* 53* 41* 61   GFRESTBLACK  --  69 61 48* 70   VIANEY 9.8 10.4* 10.2* 9.5 9.0     Recent Labs   Lab Test 04/22/21  1044 01/28/21  1004 11/12/19  0920 10/22/19  1012   BILITOTAL 0.5 0.5 0.4 0.3   ALKPHOS 112 114 100 120   ALT 24 30 20 18   AST 15 16 11 9     CBC  Recent Labs   Lab Test 07/27/21  1019 01/28/21  1004 10/22/19  1012 10/25/18  1140   HGB 14.4 14.8 12.9 14.1   WBC 9.0 9.9 8.4 8.9   RBC 4.53 4.73 4.58 4.85   HCT 41.2 45.0 40.0 43.8   MCV 91 95 87 90   MCH 31.8 31.3 28.2 29.1   MCHC 35.0 32.9 32.3 32.2   RDW 12.5 12.6 15.0 15.5*    260 360 253     URINE STUDIES  Recent Labs   Lab Test 07/27/21  1022 07/23/15  0925 07/16/15  2154   COLOR Straw Yellow Light Yellow   APPEARANCE Clear Clear Clear   URINEGLC Negative Negative Negative   URINEBILI Negative Negative Negative   URINEKETONE Negative Negative Negative   SG 1.003 1.010 1.003   UBLD Negative Negative Negative   URINEPH 6.0 5.5 5.5   PROTEIN Negative Negative Negative   UROBILINOGEN  --  0.2  --    NITRITE Negative Negative Negative   LEUKEST Negative Negative Negative   RBCU 1  --  <1   WBCU 1  --  <1     Recent Labs   Lab Test 07/27/21  1022   UTPG 1.05*     PTH  No lab results found.  IRON STUDIES  Recent Labs   Lab Test 10/17/16  0825   BRI 11         Patience Wellington PA-C    Video visit length 27 minutes. An additional 25 minutes was spent on date of service in chart review, documentation, and other activities as noted.     The author of this note documented a reason for not sharing it with the patient.

## 2021-07-27 NOTE — LETTER
7/27/2021       RE: Porsche Manning  [address]  [facility] [address]     Dear Colleague,    Thank you for referring your patient, Porsche Mannnig, to the [facility] [place] at [facility]. Please see a copy of my visit note below.          NEPHROLOGY PROGRESS NOTE                                    Leann is a 71 year old who is being evaluated via a billable video visit.      How would you like to obtain your AVS? MyChart  If the video visit is dropped, the invitation should be resent by: Text to cell phone: [phone]  Will anyone else be joining your video visit? No      Video Start Time:  Video-Visit Details    Type of service:  Video Visit    Video End Time:    Originating Location (pt. Location): Home    Distant Location (provider location):  [facility] [place]     Platform used for Video Visit: JulianneWell     Reason for visit: follow up CKD, HTN      Assessment and Plan:    # CKD stage stage 3a with with subnephrotic proteinuria   Creatinine 0.97 with GFR of 59, on 4/22/2021  Review of her labs show that her creatinine has ranged between 0.94-1.49 in the last few years.  7/27/21 Creatinine 1.36, eGFR 39  ACR: 15 mg/gCr ( 2013) --> 158 mg/gCr ( 2015( --> 238 mg/gCr ( 2016) --> 0.5g/gCr ( 2017)--> 1g/gCr ( 2018) --> 1.3 g/gCr ( 2019) --> 3.3 g/gCr ( 1/28/21) --> 1.4 g/gCr ( 4/22/21) --> 1.05 g/gCr (7/27/21)  7/27/21 UA: 1 RBC, 1 WBC and mucus on microscopy, otherwise negative   h/o long standing  T2DM at least since 2003 .A1c 6.7 %. No h/o retinopathy  Hence ACR is gradually increased over the last 7 to 8 years.  ETIOLOGY : The pattern is very consistent with injury related to diabetic nephropathy.Longstanding hypertension along with multiple IV contrast studies over the years could have been contributary.  However differential diagnosis includes other forms of  glomerulonephritis.    Following labs are pending:   vitamin D, PTH: These have been ordered.  Patient was requested to get them done.  We will also order the following labs for further assessment of Glomerulonephritis : pending  C3-C4  JUAN MANUEL  SPEP with immunofixation  UPEP with immunofixation  FLC ratio  MPO antibody, PA-3 antibody  Cryoglobulins  HIV, hep B, hep C serology  Anti-GBM antibody  [id number] Ab  - Renal US 7/20/21 is normal    # Hypertension:   - 's to 120's/50's to low 60's  Current antihypertensive regimen:  Cardizem  mg daily  Lisinopril 10 mg daily  Metoprolol succinate 150 mg daily    Aldosterone 6 ng/dL, renin activity 0.9 ng/ml/hr in 2018  Repeat renin and melly pending  - does get dizzy occasionally, will check BP next dizzy episode to make sure it's not too lab  - has follow up with Cardiology on 8/20/21      # Anemia in chronic renal disease:   - 7/27/21 Hgb: 14.4  Patient is not anemic currently      # Electrolytes/Acid/Base: No acute issues  - 7/27/21 Potassium 4, sodium 135, bicarb 25      # Mineral Bone Disorder:   Hypercalcemia: Calcium 10.4 with albumin of 4.2.   We will check vitamin D, PTH, phosphorus along with repeating calcium again  Advised to eat/drink calcium rich food in moderation including milk products  Advise to hold calcium carbonate vitamin D supplementation but continues this.  Patient is also on vitamin D 1000 units daily    CAD s/p stent to RCA in 2003  Lower extremity significant PAD with multiple intervention  Carotid artery disease  Left subclavian stenosis  -- On aspirin, Plavix, Lipitor, antihypertensive meds  -- Follows with cardiology. With her worsening SOB  She was advised to schedule a cardiology follow up soon      COPD   With some  worsening SOB could be multifactorial   Though cigg smokning contributing heavily  Advised to follow up with Pulmonologist  Advisd her to quit smoking . She is down to 5 ciggs/day       Follow up : Pending additional lab  results and further recommendations.      Patient verbalized understanding of what was discussed with her and agreed to follow up with the recommendations     Patience Wellington PA-C        Reason for Visit:  Follow up CKD, Hypertension, and proteinuria    HPI:  71-year-old female patient with COPD, CAD ( s/p stent to RCA in 2003)/PAD/HTN/HLD, smoker, COPD  Still continues to smoke cigarettes  - 5/day     She has longstanding PAD  Had MRI which did not reveal any renal artery disease but revealed bilateral focal stenosis of both common iliac arteries, right worse than left.    -- In 2005 patient underwent aortography, peripheral angiography and bilateral aortoiliac stenting.  She subsequently underwent kissing stenting of the aortoiliac vessels.   -- In 2006 she underwent repeat angiography and intervention.  Left common iliac artery stent was thrombosed and she needed thrombolytic therapy.  She also underwent left common iliac artery stenting.  --She also has left subclavian stenosis with no evidence of steal phenomena. Most recent CT angiogram studies of bilateral upper extremities in 2013 showed subtotal stenosis of the origin of the left subclavian artery remainder of the upper extremity vasculature were patent.  Patient also has 70% stenosis of right ICA.  Less than 10% stenosis of left ICA.      Patient denies any chest pain  Has chronic SOB.Got worse  In the last 1 year   Sees cardiologist and pulmonologist    No  PND, orthopnea.  No leg swelling.   No wheezing or cough.   No dizziness, lightheadedness.  No focal weakness, altered sensation or confusion  No fever, chills  No abdominal pain nausea vomiting.  No black stools or rectal bleeding.    No voiding difficulty.  No hematuria.  No unintentional weight loss.    No skin rash    HOME VITALS : 162/66, HR       Renal History:   Negative NSAID use  No history of kidney stones or gout  No family history of kidney failure/dialysis      ROS:   A comprehensive  review of systems was obtained and negative, except as noted in the HPI or PMH.    PMH:   Reviewed    PSH:   Reviewed    Family Hx:   Reviewed    Personal Hx:   Reviewed    Allergies:  Allergies   Allergen Reactions     Morphine Nausea and Vomiting     Penicillins Nausea and Vomiting       Medications:  Current Outpatient Medications   Medication Sig     albuterol (PROAIR HFA/PROVENTIL HFA/VENTOLIN HFA) 108 (90 Base) MCG/ACT inhaler INHALE 2 PUFFS INTO THE LUNGS EVERY 6 HOURS     aspirin 81 MG EC tablet Take 81 mg by mouth daily     BREO ELLIPTA 100-25 MCG/INH inhaler INHALE 1 PUFF BY MOUTH EVERY DAY     buPROPion (WELLBUTRIN XL) 150 MG 24 hr tablet Take 1 tablet (150 mg) by mouth every morning     Calcium Carbonate-Vit D-Min (CALCIUM 1200 PO) Take 600 mg by mouth daily      Cholecalciferol (VITAMIN D) 1000 UNIT capsule Take 1 capsule by mouth daily. Take one tablet daily     clopidogrel (PLAVIX) 75 MG tablet Take 1 tablet (75 mg) by mouth daily Call clinic to schedule follow up appointment.     diltiazem ER COATED BEADS (CARDIZEM CD) 360 MG 24 hr capsule Take 1 capsule (360 mg) by mouth daily For additional refills, please schedule a follow-up appointment at [phone]     lisinopril (ZESTRIL) 20 MG tablet Take 1 tablet (20 mg) by mouth daily     metoprolol succinate ER (TOPROL-XL) 100 MG 24 hr tablet Take 1.5 tablets (150 mg) by mouth daily Please call Cardiology at [phone] to schedule annual visit. Thank you.     PROAIR  (90 Base) MCG/ACT inhaler INHALE 2 PUFFS INTO THE LUNGS EVERY 6 HOURS AS NEEDED FOR SHORTNESS OF BREATH / DYSPNEA OR WHEEZING     rosuvastatin (CRESTOR) 40 MG tablet Take 1 tablet (40 mg) by mouth daily For additional refills, please schedule a follow-up appointment at [phone]     tiotropium (SPIRIVA HANDIHALER) 18 MCG inhaled capsule INHALE 1 CAPSULE (18 MCG) INTO THE LUNGS DAILY     No current facility-administered medications for this visit.      Vitals:  There were no  vitals taken for this visit.    Exam:  On video patient appeared awake and alert, conversing normally, no conversational dyspnea and speaking in full sentences . Comprehension is normal      LABS:   CMP  Recent Labs   Lab Test 07/27/21  1019 04/22/21  1044 01/28/21  1004 03/10/20  0946 11/12/19  0920    137 137 134 137   POTASSIUM 4.0 4.4 3.7 4.5 4.3   CHLORIDE 102 104 107 103 104   CO2 25 23 21 23 25   ANIONGAP 8 10 9 8 8   * 164* 160* 153* 178*   BUN 13 13 15 19 12   CR 1.36* 0.97 1.07* 1.31* 0.96   GFRESTIMATED 39* 59* 53* 41* 61   GFRESTBLACK  --  69 61 48* 70   VIANEY 9.8 10.4* 10.2* 9.5 9.0     Recent Labs   Lab Test 04/22/21  1044 01/28/21  1004 11/12/19  0920 10/22/19  1012   BILITOTAL 0.5 0.5 0.4 0.3   ALKPHOS 112 114 100 120   ALT 24 30 20 18   AST 15 16 11 9     CBC  Recent Labs   Lab Test 07/27/21  1019 01/28/21  1004 10/22/19  1012 10/25/18  1140   HGB 14.4 14.8 12.9 14.1   WBC 9.0 9.9 8.4 8.9   RBC 4.53 4.73 4.58 4.85   HCT 41.2 45.0 40.0 43.8   MCV 91 95 87 90   MCH 31.8 31.3 28.2 29.1   MCHC 35.0 32.9 32.3 32.2   RDW 12.5 12.6 15.0 15.5*    260 360 253     URINE STUDIES  Recent Labs   Lab Test 07/27/21  1022 07/23/15  0925 07/16/15  2154   COLOR Straw Yellow Light Yellow   APPEARANCE Clear Clear Clear   URINEGLC Negative Negative Negative   URINEBILI Negative Negative Negative   URINEKETONE Negative Negative Negative   SG 1.003 1.010 1.003   UBLD Negative Negative Negative   URINEPH 6.0 5.5 5.5   PROTEIN Negative Negative Negative   UROBILINOGEN  --  0.2  --    NITRITE Negative Negative Negative   LEUKEST Negative Negative Negative   RBCU 1  --  <1   WBCU 1  --  <1     Recent Labs   Lab Test 07/27/21  1022   UTPG 1.05*     PTH  No lab results found.  IRON STUDIES  Recent Labs   Lab Test 10/17/16  0825   BRI 11         Patience Wellington PA-C    Video visit length 27 minutes. An additional 25 minutes was spent on date of service in chart review, documentation, and other activities as  noted.     The author of this note documented a reason for not sharing it with the patient.      Again, thank you for allowing me to participate in the care of your patient.      Sincerely,    NOBLE DIAZ PA-C

## 2021-07-28 LAB
ALBUMIN MFR UR ELPH: 51.9 %
ALBUMIN SERPL ELPH-MCNC: 4.2 G/DL (ref 3.7–5.1)
ALPHA1 GLOB MFR UR ELPH: 10.4 %
ALPHA1 GLOB SERPL ELPH-MCNC: 0.4 G/DL (ref 0.2–0.4)
ALPHA2 GLOB MFR UR ELPH: 14.4 %
ALPHA2 GLOB SERPL ELPH-MCNC: 1.1 G/DL (ref 0.5–0.9)
B-GLOBULIN MFR UR ELPH: 12.2 %
B-GLOBULIN SERPL ELPH-MCNC: 0.7 G/DL (ref 0.6–1)
C3 SERPL-MCNC: 138 MG/DL (ref 81–157)
C4 SERPL-MCNC: 26 MG/DL (ref 13–39)
DEPRECATED CALCIDIOL+CALCIFEROL SERPL-MC: 48 UG/L (ref 20–75)
GAMMA GLOB MFR UR ELPH: 11.1 %
GAMMA GLOB SERPL ELPH-MCNC: 0.6 G/DL (ref 0.7–1.6)
HBV CORE AB SERPL QL IA: NONREACTIVE
HBV SURFACE AB SERPL IA-ACNC: 1.04 M[IU]/ML
HBV SURFACE AG SERPL QL IA: NONREACTIVE
HCV AB SERPL QL IA: NONREACTIVE
HIV 1+2 AB+HIV1 P24 AG SERPL QL IA: NONREACTIVE
KAPPA LC FREE SER-MCNC: 2.04 MG/DL (ref 0.33–1.94)
KAPPA LC FREE/LAMBDA FREE SER NEPH: 1.17 {RATIO} (ref 0.26–1.65)
LAMBDA LC FREE SERPL-MCNC: 1.75 MG/DL (ref 0.57–2.63)
M PROTEIN MFR UR ELPH: 0 %
M PROTEIN SERPL ELPH-MCNC: 0 G/DL
PROT ELPH PNL UR ELPH: NORMAL
PROT PATTERN SERPL ELPH-IMP: ABNORMAL
PROT PATTERN SERPL IFE-IMP: NORMAL
PROT PATTERN UR ELPH-IMP: ABNORMAL

## 2021-07-29 LAB — ANA SER QL IF: NEGATIVE

## 2021-07-30 LAB
ALDOST SERPL-MCNC: 8.3 NG/DL (ref 0–31)
GBM IGG SER IA-ACNC: <2.4 U/ML
GBM IGG SER IA-ACNC: NEGATIVE
MYELOPEROXIDASE AB SER IA-ACNC: <0.3 U/ML
MYELOPEROXIDASE AB SER IA-ACNC: NEGATIVE
PROTEINASE3 AB SER IA-ACNC: <1 U/ML
PROTEINASE3 AB SER IA-ACNC: NEGATIVE

## 2021-08-06 LAB — RENIN PLAS-CCNC: 1.7 NG/ML/HR

## 2021-08-16 DIAGNOSIS — N18.31 STAGE 3A CHRONIC KIDNEY DISEASE (H): Primary | ICD-10-CM

## 2021-08-17 ENCOUNTER — LAB (OUTPATIENT)
Dept: LAB | Facility: CLINIC | Age: 71
End: 2021-08-17
Payer: COMMERCIAL

## 2021-08-17 DIAGNOSIS — N18.31 STAGE 3A CHRONIC KIDNEY DISEASE (H): ICD-10-CM

## 2021-08-17 LAB
ALBUMIN SERPL-MCNC: 3.8 G/DL (ref 3.4–5)
ANION GAP SERPL CALCULATED.3IONS-SCNC: 13 MMOL/L (ref 3–14)
BUN SERPL-MCNC: 22 MG/DL (ref 7–30)
CALCIUM SERPL-MCNC: 9.5 MG/DL (ref 8.5–10.1)
CHLORIDE BLD-SCNC: 103 MMOL/L (ref 94–109)
CO2 SERPL-SCNC: 21 MMOL/L (ref 20–32)
CREAT SERPL-MCNC: 1.7 MG/DL (ref 0.52–1.04)
CREAT UR-MCNC: 30 MG/DL
ERYTHROCYTE [DISTWIDTH] IN BLOOD BY AUTOMATED COUNT: 12.5 % (ref 10–15)
GFR SERPL CREATININE-BSD FRML MDRD: 30 ML/MIN/1.73M2
GLUCOSE BLD-MCNC: 137 MG/DL (ref 70–99)
HCT VFR BLD AUTO: 38.4 % (ref 35–47)
HGB BLD-MCNC: 12.8 G/DL (ref 11.7–15.7)
MCH RBC QN AUTO: 31.4 PG (ref 26.5–33)
MCHC RBC AUTO-ENTMCNC: 33.3 G/DL (ref 31.5–36.5)
MCV RBC AUTO: 94 FL (ref 78–100)
PHOSPHATE SERPL-MCNC: 3.1 MG/DL (ref 2.5–4.5)
PLATELET # BLD AUTO: 219 10E3/UL (ref 150–450)
POTASSIUM BLD-SCNC: 4.4 MMOL/L (ref 3.4–5.3)
PROT UR-MCNC: 0.31 G/L
PROT/CREAT 24H UR: 1.03 G/G CR (ref 0–0.2)
RBC # BLD AUTO: 4.07 10E6/UL (ref 3.8–5.2)
SODIUM SERPL-SCNC: 137 MMOL/L (ref 133–144)
WBC # BLD AUTO: 9 10E3/UL (ref 4–11)

## 2021-08-17 PROCEDURE — 84156 ASSAY OF PROTEIN URINE: CPT | Performed by: PATHOLOGY

## 2021-08-17 PROCEDURE — 85027 COMPLETE CBC AUTOMATED: CPT | Performed by: PATHOLOGY

## 2021-08-17 PROCEDURE — 80069 RENAL FUNCTION PANEL: CPT | Performed by: PATHOLOGY

## 2021-08-17 PROCEDURE — 36415 COLL VENOUS BLD VENIPUNCTURE: CPT | Performed by: PATHOLOGY

## 2021-08-17 PROCEDURE — 81001 URINALYSIS AUTO W/SCOPE: CPT | Performed by: PATHOLOGY

## 2021-08-18 ENCOUNTER — VIRTUAL VISIT (OUTPATIENT)
Dept: NEPHROLOGY | Facility: CLINIC | Age: 71
End: 2021-08-18
Attending: PHYSICIAN ASSISTANT
Payer: COMMERCIAL

## 2021-08-18 DIAGNOSIS — N18.32 CHRONIC KIDNEY DISEASE, STAGE 3B (H): Primary | ICD-10-CM

## 2021-08-18 DIAGNOSIS — I10 HYPERTENSION, ESSENTIAL: ICD-10-CM

## 2021-08-18 LAB
ALBUMIN UR-MCNC: 30 MG/DL
APPEARANCE UR: CLEAR
BILIRUB UR QL STRIP: NEGATIVE
COLOR UR AUTO: ABNORMAL
GLUCOSE UR STRIP-MCNC: NEGATIVE MG/DL
HGB UR QL STRIP: NEGATIVE
KETONES UR STRIP-MCNC: NEGATIVE MG/DL
LEUKOCYTE ESTERASE UR QL STRIP: ABNORMAL
MAYO MISC RESULT: NORMAL
NITRATE UR QL: NEGATIVE
PH UR STRIP: 6 [PH] (ref 5–7)
RBC URINE: 1 /HPF
SP GR UR STRIP: 1 (ref 1–1.03)
SQUAMOUS EPITHELIAL: 5 /HPF
UROBILINOGEN UR STRIP-MCNC: NORMAL MG/DL
WBC URINE: 8 /HPF

## 2021-08-18 PROCEDURE — 99213 OFFICE O/P EST LOW 20 MIN: CPT | Mod: 95 | Performed by: STUDENT IN AN ORGANIZED HEALTH CARE EDUCATION/TRAINING PROGRAM

## 2021-08-18 NOTE — PROGRESS NOTES
Leann is a 71 year old who is being evaluated via a billable video visit.      How would you like to obtain your AVS? MyChart  If the video visit is dropped, the invitation should be resent by: Text to cell phone: [phone]  Will anyone else be joining your video visit? No    Video Start Time: 2:11PM  Video-Visit Details    Type of service:  Video Visit    Video End Time:2:34PM    Originating Location (pt. Location): Home    Distant Location (provider location):  [facility] [place]     Platform used for Video Visit: [facility]        Nephrology Clinic Follow Up Visit    Assessment and Plan:     # CKD 3b/4:  Cr 1.7 with eGFR 30 on yesterday's labs. Her Cr has been rising rapidly since April. She underwent full lab eval for GN and all tests returned negative. Suspect that this rise in Cr is hemodynamic due to low BP, low HR, and underlying diabetic renovascular disease. She is seeing Cardiology 8/20 and recommend that either dilt or metoprolol is reduced at that time. Will plan to see back in 2 months.   -has around 1 g/g proteinuria, not inconsistent with long-stand DM along with her cardiac hx and HTN hx as causes of CKD    # Hypertension:   Hx of HTN, currently on diltiazem XL 180mg and lisinopril 20mg daily. Her lisinopril was increased 5/12/21 from 10mg to 20mg. Her Cr has risen and additionally she is now having midday hypotension that may be worsening her kidney function. Due to proteinuria, her lisinopril should be maximized as much as possible for renal protection. She says her HR at home is low 40s to 59 which is likely decreasing renal perfusion. She sees Cardiology in 2 days and we recommend they consider decreasing either her metoprolol or diltiazem.  - Continue lisinopril 20mg  - Defer diltiazem and metoprolol dosing to Cardiology, but would prefer lowering one of these medications prior to reducing lisinopril      # Mineral Bone Disorder:   Ca 9.5, Phos 3.1, PTH 24, Vit D 48.  She had mild hyperCa back in 4/22, now resolved.    Assessment and plan was discussed with patient and she voiced her understanding and agreement.    I discussed the patient's plan of care with Dr. Clayton.    Danette Smith MD  Nephrology Fellow    Reason for Visit:  Porsche Manning is a 71 year old female with CKD who returns for nephrology follow-up.    HPI:  Leann has been feeling well for the most part since her last visit other than a decreased appetite. Her BP has been on the low side, especially by midday. Typically her BP will run in the 120s-130s/60s in the AM, but when she checks it around noon her pressure is often in the 90s/50s. She occasionally gets dizzy, but this is not associated with standing or body position changes. She has ongoing chronic low back pain that is unchanged. She uses tylenol for the pain and does not use NSAIDs.      ROS:  A comprehensive review of systems was obtained and negative, except as noted in the HPI or PMH.    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)     Advanced directives, counseling/discussion     Subclavian artery stenosis, left (H)     CKD (chronic kidney disease) stage 3, GFR 30-59 ml/min     Chronic obstructive pulmonary disease, unspecified COPD type (H)     Obesity (BMI 35.0-39.9) with comorbidity (H)       Personal Hx:   Social History     Tobacco Use     Smoking status: Former Smoker     Packs/day: 0.50     Years: 40.00     Pack years: 20.00     Types: Cigarettes     Quit date: 4/25/2018     Years since quitting: 3.3     Smokeless tobacco: Never Used   Substance Use Topics     Alcohol use: Yes     Comment: 2 to 4 beers or mixed  drinks weekly       Allergies: Reviewed by provider.     Medications:  Current Outpatient Medications   Medication Sig     albuterol (PROAIR HFA/PROVENTIL HFA/VENTOLIN HFA) 108 (90 Base) MCG/ACT inhaler INHALE 2 PUFFS INTO THE LUNGS EVERY 6 HOURS     aspirin 81 MG EC tablet Take 81 mg by mouth daily     BREO ELLIPTA 100-25 MCG/INH inhaler INHALE 1 PUFF BY MOUTH EVERY DAY     buPROPion (WELLBUTRIN XL) 150 MG 24 hr tablet Take 1 tablet (150 mg) by mouth every morning     Calcium Carbonate-Vit D-Min (CALCIUM 1200 PO) Take 600 mg by mouth daily      Cholecalciferol (VITAMIN D) 1000 UNIT capsule Take 1 capsule by mouth daily. Take one tablet daily     clopidogrel (PLAVIX) 75 MG tablet Take 1 tablet (75 mg) by mouth daily Call clinic to schedule follow up appointment.     diltiazem ER COATED BEADS (CARDIZEM CD) 360 MG 24 hr capsule Take 1 capsule (360 mg) by mouth daily For additional refills, please schedule a follow-up appointment at [phone]     lisinopril (ZESTRIL) 20 MG tablet Take 1 tablet (20 mg) by mouth daily     metoprolol succinate ER (TOPROL-XL) 100 MG 24 hr tablet Take 1.5 tablets (150 mg) by mouth daily Please call Cardiology at [phone] to schedule annual visit. Thank you.     PROAIR  (90 Base) MCG/ACT inhaler INHALE 2 PUFFS INTO THE LUNGS EVERY 6 HOURS AS NEEDED FOR SHORTNESS OF BREATH / DYSPNEA OR WHEEZING     rosuvastatin (CRESTOR) 40 MG tablet Take 1 tablet (40 mg) by mouth daily For additional refills, please schedule a follow-up appointment at [phone]     tiotropium (SPIRIVA HANDIHALER) 18 MCG inhaled capsule INHALE 1 CAPSULE (18 MCG) INTO THE LUNGS DAILY     No current facility-administered medications for this visit.       Vitals:  There were no vitals taken for this visit.    Exam:  Vital signs were deferred for this telemedicine visit.    GENERAL APPEARANCE: alert and no distress  HENT: no obvious abnormalities on appearance  RESP: breathing appears unremarkable with normal  rate, no audible wheezing or cough and no apparent shortness of breath with conversation  MS: extremities normal - no gross deformities noted, no evidence of inflammation in joints, no muscle tenderness  SKIN: no apparent rash and normal skin tone  NEURO: speech is clear with no obvious neurological deficits  PSYCH: mentation appears normal and affect normal      LABS:   Recent renal panel, CBC, UA with micro, and random urine protein reviewed    CMP  Recent Labs   Lab Test 08/17/21  1159 07/27/21  1019 04/22/21  1044 01/28/21  1004 03/10/20  0946 11/12/19  0920    135 137 137 134 137   POTASSIUM 4.4 4.0 4.4 3.7 4.5 4.3   CHLORIDE 103 102 104 107 103 104   CO2 21 25 23 21 23 25   ANIONGAP 13 8 10 9 8 8   * 135* 164* 160* 153* 178*   BUN 22 13 13 15 19 12   CR 1.70* 1.36* 0.97 1.07* 1.31* 0.96   GFRESTIMATED 30* 39* 59* 53* 41* 61   GFRESTBLACK  --   --  69 61 48* 70   VIANEY 9.5 9.8 10.4* 10.2* 9.5 9.0     Recent Labs   Lab Test 04/22/21  1044 01/28/21  1004 11/12/19  0920 10/22/19  1012   BILITOTAL 0.5 0.5 0.4 0.3   ALKPHOS 112 114 100 120   ALT 24 30 20 18   AST 15 16 11 9     CBC  Recent Labs   Lab Test 08/17/21  1159 07/27/21  1019 01/28/21  1004 10/22/19  1012   HGB 12.8 14.4 14.8 12.9   WBC 9.0 9.0 9.9 8.4   RBC 4.07 4.53 4.73 4.58   HCT 38.4 41.2 45.0 40.0   MCV 94 91 95 87   MCH 31.4 31.8 31.3 28.2   MCHC 33.3 35.0 32.9 32.3   RDW 12.5 12.5 12.6 15.0    217 260 360     URINE STUDIES  Recent Labs   Lab Test 08/17/21  1221 07/27/21  1022 07/23/15  0925 07/16/15  2154   COLOR Straw Straw Yellow Light Yellow   APPEARANCE Clear Clear Clear Clear   URINEGLC Negative Negative Negative Negative   URINEBILI Negative Negative Negative Negative   URINEKETONE Negative Negative Negative Negative   SG 1.005 1.003 1.010 1.003   UBLD Negative Negative Negative Negative   URINEPH 6.0 6.0 5.5 5.5   PROTEIN 30 * Negative Negative Negative   UROBILINOGEN  --   --  0.2  --    NITRITE Negative Negative Negative  Negative   LEUKEST Small* Negative Negative Negative   RBCU 1 1  --  <1   WBCU 8* 1  --  <1     Recent Labs   Lab Test 08/17/21  1221 07/27/21  1022   UTPG 1.03* 1.05*     PTH  Recent Labs   Lab Test 07/27/21  1019   PTHI 24     IRON STUDIES  Recent Labs   Lab Test 10/17/16  0825   BRI 11       Divya Smith MD

## 2021-08-18 NOTE — LETTER
8/18/2021       RE: Porsche Manning  [address]  [facility] [address]     Dear Colleague,    Thank you for referring your patient, Porsche aMnning, to the [facility] [place] at [facility]. Please see a copy of my visit note below.    Leann is a 71 year old who is being evaluated via a billable video visit.      How would you like to obtain your AVS? MyChart  If the video visit is dropped, the invitation should be resent by: Text to cell phone: [phone]  Will anyone else be joining your video visit? No    Video Start Time: 2:11PM  Video-Visit Details    Type of service:  Video Visit    Video End Time:2:34PM    Originating Location (pt. Location): Home    Distant Location (provider location):  [facility] [place]     Platform used for Video Visit: [facility]        Nephrology Clinic Follow Up Visit    Assessment and Plan:     # CKD 3b/4:  Cr 1.7 with eGFR 30 on yesterday's labs. Her Cr has been rising rapidly since April. She underwent full lab eval for GN and all tests returned negative. Suspect that this rise in Cr is hemodynamic due to low BP, low HR, and underlying diabetic renovascular disease. She is seeing Cardiology 8/20 and recommend that either dilt or metoprolol is reduced at that time. Will plan to see back in 2 months.   -has around 1 g/g proteinuria, not inconsistent with long-stand DM along with her cardiac hx and HTN hx as causes of CKD    # Hypertension:   Hx of HTN, currently on diltiazem XL 180mg and lisinopril 20mg daily. Her lisinopril was increased 5/12/21 from 10mg to 20mg. Her Cr has risen and additionally she is now having midday hypotension that may be worsening her kidney function. Due to proteinuria, her lisinopril should be maximized as much as possible for renal protection. She says her HR at home is low 40s to 59 which is likely decreasing renal perfusion. She sees  Cardiology in 2 days and we recommend they consider decreasing either her metoprolol or diltiazem.  - Continue lisinopril 20mg  - Defer diltiazem and metoprolol dosing to Cardiology, but would prefer lowering one of these medications prior to reducing lisinopril      # Mineral Bone Disorder:   Ca 9.5, Phos 3.1, PTH 24, Vit D 48. She had mild hyperCa back in 4/22, now resolved.    Assessment and plan was discussed with patient and she voiced her understanding and agreement.    I discussed the patient's plan of care with Dr. Clayton.    Danette Smith MD  Nephrology Fellow    Reason for Visit:  Porsche Manning is a 71 year old female with CKD who returns for nephrology follow-up.    HPI:  Leann has been feeling well for the most part since her last visit other than a decreased appetite. Her BP has been on the low side, especially by midday. Typically her BP will run in the 120s-130s/60s in the AM, but when she checks it around noon her pressure is often in the 90s/50s. She occasionally gets dizzy, but this is not associated with standing or body position changes. She has ongoing chronic low back pain that is unchanged. She uses tylenol for the pain and does not use NSAIDs.      ROS:  A comprehensive review of systems was obtained and negative, except as noted in the HPI or PMH.    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)     Advanced directives, counseling/discussion     Subclavian artery stenosis, left (H)     CKD (chronic kidney disease) stage 3, GFR 30-59 ml/min     Chronic obstructive pulmonary disease, unspecified COPD type (H)     Obesity (BMI 35.0-39.9) with comorbidity (H)        Personal Hx:   Social History     Tobacco Use     Smoking status: Former Smoker     Packs/day: 0.50     Years: 40.00     Pack years: 20.00     Types: Cigarettes     Quit date: 4/25/2018     Years since quitting: 3.3     Smokeless tobacco: Never Used   Substance Use Topics     Alcohol use: Yes     Comment: 2 to 4 beers or mixed drinks weekly       Allergies: Reviewed by provider.     Medications:  Current Outpatient Medications   Medication Sig     albuterol (PROAIR HFA/PROVENTIL HFA/VENTOLIN HFA) 108 (90 Base) MCG/ACT inhaler INHALE 2 PUFFS INTO THE LUNGS EVERY 6 HOURS     aspirin 81 MG EC tablet Take 81 mg by mouth daily     BREO ELLIPTA 100-25 MCG/INH inhaler INHALE 1 PUFF BY MOUTH EVERY DAY     buPROPion (WELLBUTRIN XL) 150 MG 24 hr tablet Take 1 tablet (150 mg) by mouth every morning     Calcium Carbonate-Vit D-Min (CALCIUM 1200 PO) Take 600 mg by mouth daily      Cholecalciferol (VITAMIN D) 1000 UNIT capsule Take 1 capsule by mouth daily. Take one tablet daily     clopidogrel (PLAVIX) 75 MG tablet Take 1 tablet (75 mg) by mouth daily Call clinic to schedule follow up appointment.     diltiazem ER COATED BEADS (CARDIZEM CD) 360 MG 24 hr capsule Take 1 capsule (360 mg) by mouth daily For additional refills, please schedule a follow-up appointment at [phone]     lisinopril (ZESTRIL) 20 MG tablet Take 1 tablet (20 mg) by mouth daily     metoprolol succinate ER (TOPROL-XL) 100 MG 24 hr tablet Take 1.5 tablets (150 mg) by mouth daily Please call Cardiology at [phone] to schedule annual visit. Thank you.     PROAIR  (90 Base) MCG/ACT inhaler INHALE 2 PUFFS INTO THE LUNGS EVERY 6 HOURS AS NEEDED FOR SHORTNESS OF BREATH / DYSPNEA OR WHEEZING     rosuvastatin (CRESTOR) 40 MG tablet Take 1 tablet (40 mg) by mouth daily For additional refills, please schedule a follow-up appointment at [phone]     tiotropium (SPIRIVA HANDIHALER) 18 MCG inhaled capsule INHALE 1 CAPSULE (18 MCG) INTO THE LUNGS  DAILY     No current facility-administered medications for this visit.       Vitals:  There were no vitals taken for this visit.    Exam:  Vital signs were deferred for this telemedicine visit.    GENERAL APPEARANCE: alert and no distress  HENT: no obvious abnormalities on appearance  RESP: breathing appears unremarkable with normal rate, no audible wheezing or cough and no apparent shortness of breath with conversation  MS: extremities normal - no gross deformities noted, no evidence of inflammation in joints, no muscle tenderness  SKIN: no apparent rash and normal skin tone  NEURO: speech is clear with no obvious neurological deficits  PSYCH: mentation appears normal and affect normal      LABS:   Recent renal panel, CBC, UA with micro, and random urine protein reviewed    CMP  Recent Labs   Lab Test 08/17/21  1159 07/27/21  1019 04/22/21  1044 01/28/21  1004 03/10/20  0946 11/12/19  0920    135 137 137 134 137   POTASSIUM 4.4 4.0 4.4 3.7 4.5 4.3   CHLORIDE 103 102 104 107 103 104   CO2 21 25 23 21 23 25   ANIONGAP 13 8 10 9 8 8   * 135* 164* 160* 153* 178*   BUN 22 13 13 15 19 12   CR 1.70* 1.36* 0.97 1.07* 1.31* 0.96   GFRESTIMATED 30* 39* 59* 53* 41* 61   GFRESTBLACK  --   --  69 61 48* 70   VIANEY 9.5 9.8 10.4* 10.2* 9.5 9.0     Recent Labs   Lab Test 04/22/21  1044 01/28/21  1004 11/12/19  0920 10/22/19  1012   BILITOTAL 0.5 0.5 0.4 0.3   ALKPHOS 112 114 100 120   ALT 24 30 20 18   AST 15 16 11 9     CBC  Recent Labs   Lab Test 08/17/21  1159 07/27/21  1019 01/28/21  1004 10/22/19  1012   HGB 12.8 14.4 14.8 12.9   WBC 9.0 9.0 9.9 8.4   RBC 4.07 4.53 4.73 4.58   HCT 38.4 41.2 45.0 40.0   MCV 94 91 95 87   MCH 31.4 31.8 31.3 28.2   MCHC 33.3 35.0 32.9 32.3   RDW 12.5 12.5 12.6 15.0    217 260 360     URINE STUDIES  Recent Labs   Lab Test 08/17/21  1221 07/27/21  1022 07/23/15  0925 07/16/15  2154   COLOR Straw Straw Yellow Light Yellow   APPEARANCE Clear Clear Clear Clear   URINEGLC Negative  Negative Negative Negative   URINEBILI Negative Negative Negative Negative   URINEKETONE Negative Negative Negative Negative   SG 1.005 1.003 1.010 1.003   UBLD Negative Negative Negative Negative   URINEPH 6.0 6.0 5.5 5.5   PROTEIN 30 * Negative Negative Negative   UROBILINOGEN  --   --  0.2  --    NITRITE Negative Negative Negative Negative   LEUKEST Small* Negative Negative Negative   RBCU 1 1  --  <1   WBCU 8* 1  --  <1     Recent Labs   Lab Test 08/17/21  1221 07/27/21  1022   UTPG 1.03* 1.05*     PTH  Recent Labs   Lab Test 07/27/21  1019   PTHI 24     IRON STUDIES  Recent Labs   Lab Test 10/17/16  0825   BRI 11       Divya Smith MD    Attestation signed by Valerio Clayton MD at 8/20/2021  4:36 PM:  Physician Attestation   I, Valerio Clayton MD, saw this patient and agree with the findings and plan of care as documented in the note.      Items personally reviewed/procedural attestation: vitals, labs, and provider notes.    Valerio Clayton MD  Renal      Again, thank you for allowing me to participate in the care of your patient.      Sincerely,    Divya Smith MD

## 2021-08-20 ENCOUNTER — VIRTUAL VISIT (OUTPATIENT)
Dept: CARDIOLOGY | Facility: CLINIC | Age: 71
End: 2021-08-20
Attending: PHYSICIAN ASSISTANT
Payer: COMMERCIAL

## 2021-08-20 DIAGNOSIS — I25.10 CORONARY ARTERY DISEASE DUE TO LIPID RICH PLAQUE: ICD-10-CM

## 2021-08-20 DIAGNOSIS — I25.83 CORONARY ARTERY DISEASE DUE TO LIPID RICH PLAQUE: ICD-10-CM

## 2021-08-20 DIAGNOSIS — I10 HYPERTENSION GOAL BP (BLOOD PRESSURE) < 140/90: ICD-10-CM

## 2021-08-20 PROCEDURE — 99215 OFFICE O/P EST HI 40 MIN: CPT | Mod: 95 | Performed by: PHYSICIAN ASSISTANT

## 2021-08-20 RX ORDER — METOPROLOL SUCCINATE 100 MG/1
100 TABLET, EXTENDED RELEASE ORAL DAILY
Qty: 90 TABLET | Refills: 0 | COMMUNITY
Start: 2021-08-20 | End: 2021-10-04

## 2021-08-20 NOTE — PATIENT INSTRUCTIONS
You were seen today in the Cardiovascular Clinic at the [facility].  Today, you were seen by Nelli Kuhn PA-C for your cardiovascular care.     Changes Made Today:  1. Reduce metoprolol to 100mg daily  2. Take lisinopril at night rather than in AM    Follow Up Appointment/Tests:  1. Continue to check blood pressure/pulse once per day  2. [facility] need for testing of your leg  3. Please see PCP in person for LLE assessment  4. Please go to ED should you develop loss of sensation to left leg that doesn't improve, significant left leg pain, or pallor (whiteness) of your left foot      Questions and [phone].   First press #1 for the University and then press #3 for Medical Questions to reach the Cardiology triage nurse.     On Call Cardiologist for after hours or on weekends: [phone], press option #4 and ask to speak to the on-call Cardiologist.

## 2021-08-20 NOTE — PROGRESS NOTES
"Leann is a 71 year old who is being evaluated via a billable video visit.      How would you like to obtain your AVS? MyChart  If the video visit is dropped, the invitation should be resent by: Text to cell phone: [phone]  Will anyone else be joining your video visit? No      Vitals - Patient Reported  Systolic (Patient Reported): (!) 157  Diastolic (Patient Reported): 60  Weight (Patient Reported): 88.5 kg (195 lb)  Height (Patient Reported): 158.8 cm (5' 2.5\")  BMI (Based on Pt Reported Ht/Wt): 35.1  Pain Score: No Pain (0) (No SOB)    Vitals were taken and medications where reconciled.   Vasu Shirley, EMT  11:30 AM    "

## 2021-08-24 ENCOUNTER — TELEPHONE (OUTPATIENT)
Dept: CARDIOLOGY | Facility: CLINIC | Age: 71
End: 2021-08-24

## 2021-08-24 NOTE — TELEPHONE ENCOUNTER
----- Message from Nelli Kuhn PA-C sent at 8/20/2021 12:37 PM CDT -----  Regarding: Schedule  Hi all,    Can you schedule Leann with me on 9/21?  With a virtual visit?    Thanks!  Nelli

## 2021-08-30 DIAGNOSIS — N18.32 STAGE 3B CHRONIC KIDNEY DISEASE (H): Primary | ICD-10-CM

## 2021-09-03 NOTE — PROGRESS NOTES
"  [facility] (Video)  Cardiovascular Clinic Note      Referring Provider: Referred Self   Primary Care Provider: Danuta Tan     Patient Name: Porsche Manning   MRN: [id number]       History of Present Illness:  Porsche Manning is a 71 year old female with PMH notable or HTN, HLD, hx of tobacco use, CAD (1st MI 1987, BMS to mRCA 2003), PAD (s/p bilateral aorto-iliac stenting 2005, repeat stenting to left common iliac artery 2006), subclavian stenosis, COPD, T2DM, CKD stage III, and hx of cervical cancer. She presents for follow up.    The patient was last seen 8/2021 with left LE pain.  Given the patient's recent rise of her creatinine, we elected to pursue LE arterial doppler US for assessment of previous stents.  This showed possible femoropopliteal disease/small vessel disease of the RLE and possible iliac/small vessel disease of the left lower extremity (severe).  In addition, the patient had daytime dizziness with hypotension/bradycardia noted at home.  As such, we transitioned her lisinopril to PM dosing and reduced her metoprolol XL to 100mg daily.      In the last month, the patient has continued to struggle to move secondary to lower extremity pain. She notes that left lower extremity pain remains more severe than right. She has only been able to go to the grocery store \"a few times\" secondary to pain. Rest continues to reliably resolve pain.    In regards to the patient's previous complaints of dizziness, she reports that these episodes have largely resolved. She shares 5 blood pressures with me today:  On September 13 at 9 PM her blood pressure was 107/42 and her pulse was 48.   On September 17 at 6:30 PM her blood pressure was 150/63 and her pulse was 71.   On September 20 at 7:30 PM her blood pressure was 147/61 and her pulse was 76.   On September 21 at 8:30 AM her blood pressure was 144/61 and her pulse was 65.   On September 21 at noon her blood pressure " "was 117/67 and her pulse was 45.  She has had only \"a couple\" episodes of dizziness since reduction of her metoprolol approximately 1 month ago. She relates these episodes of dizziness to \"not eating enough\". She has not checked her blood pressure with these episodes of dizziness.    The patient otherwise denies chest pain, shortness of breath at rest or with exertion, new lower extremity swelling, palpitations, and syncope.    Pertinent Cardiac Medications:  75mg Plavix daily  100mg metoprolol succinate daily  20mg lisinopril daily  40mg rosuvastatin daily  360mg diltiazem ER daily  81mg ASA daily      Past Medical History:  Pertinent past medical history as above.      Past Surgical History:  Past Surgical History:   Procedure Laterality Date     BIOPSY      Sample taken from back     CARDIAC SURGERY      Stents     COLONOSCOPY  1996    Results were ok     HYSTERECTOMY, PAP NO LONGER INDICATED  1989    ovaries only, no cervix per pt     SURGICAL HISTORY OF -   1995    bunionectomy     SURGICAL HISTORY OF -   10/10/03    cardiac stenting     SURGICAL HISTORY OF -     stent placed in both of her legs for pad     VASCULAR SURGERY      PAD         Family History:  Family History   Problem Relation Age of Onset     C.A.D. Mother      Breast Cancer Mother      C.A.D. Father      Diabetes Father      Hypertension Father      C.A.D. Maternal Grandfather      C.A.D. Paternal Grandfather      Neurologic Disorder Brother         epilepsy         Current Medications:  Current Outpatient Medications   Medication Sig Dispense Refill     albuterol (PROAIR HFA/PROVENTIL HFA/VENTOLIN HFA) 108 (90 Base) MCG/ACT inhaler INHALE 2 PUFFS INTO THE LUNGS EVERY 6 HOURS 18 g 0     aspirin 81 MG EC tablet Take 81 mg by mouth daily       BREO ELLIPTA 100-25 MCG/INH inhaler INHALE 1 PUFF BY MOUTH EVERY DAY 60 each 2     buPROPion (WELLBUTRIN XL) 150 MG 24 hr tablet Take 1 tablet (150 mg) by mouth every morning 90 tablet 1     Calcium " Carbonate-Vit D-Min (CALCIUM 1200 PO) Take 600 mg by mouth daily        Cholecalciferol (VITAMIN D) 1000 UNIT capsule Take 1 capsule by mouth daily. Take one tablet daily       clopidogrel (PLAVIX) 75 MG tablet Take 1 tablet (75 mg) by mouth daily Call clinic to schedule follow up appointment. 90 tablet 0     diltiazem ER COATED BEADS (CARDIZEM CD) 360 MG 24 hr capsule Take 1 capsule (360 mg) by mouth daily For additional refills, please schedule a follow-up appointment at [phone] 90 capsule 0     lisinopril (ZESTRIL) 20 MG tablet Take 1 tablet (20 mg) by mouth daily 90 tablet 3     metoprolol succinate ER (TOPROL-XL) 100 MG 24 hr tablet Take 1 tablet (100 mg) by mouth daily 90 tablet 0     PROAIR  (90 Base) MCG/ACT inhaler INHALE 2 PUFFS INTO THE LUNGS EVERY 6 HOURS AS NEEDED FOR SHORTNESS OF BREATH / DYSPNEA OR WHEEZING 8.5 Inhaler 1     rosuvastatin (CRESTOR) 40 MG tablet Take 1 tablet (40 mg) by mouth daily For additional refills, please schedule a follow-up appointment at [phone] 90 tablet 3     tiotropium (SPIRIVA HANDIHALER) 18 MCG inhaled capsule INHALE 1 CAPSULE (18 MCG) INTO THE LUNGS DAILY 90 capsule 3         Social History:  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  Eyes:sclera white  Cardiovascular: Visible limbs without edema/cyanosis. Unable to visualize lower extremities  Respiratory: Respirations unlabored  Musculoskeletal: Visible muscle normal bulk/tone. Joints appear supple.  Skin: Visible skin normal without worrisome lesions  Neurologic: AOx3  Psychiatric: appropriate affect, eye contact, intact thought and speech      Laboratory Data:  LIPID  RESULTS:  Recent Labs   Lab Test 07/20/21  1202 01/28/21  1004 11/04/15  1142 07/09/15  1013   CHOL 154 168 141 125   HDL 57 62 71 61   LDL 63 73 48 35   TRIG 172* 166* 112 145   CHOLHDLRATIO  --   --  2.0 2.0      LIVER ENZYME RESULTS:  Recent Labs   Lab Test 04/22/21  1044 01/28/21  1004   AST 15 16   ALT 24 30     CBC RESULTS:  Recent Labs   Lab Test 08/17/21  1159 07/27/21  1019   WBC 9.0 9.0   HGB 12.8 14.4   HCT 38.4 41.2    217       BMP RESULTS:  Recent Labs   Lab Test 08/17/21  1159 07/27/21  1019    135   POTASSIUM 4.4 4.0   CHLORIDE 103 102   CO2 21 25   ANIONGAP 13 8   * 135*   BUN 22 13   CR 1.70* 1.36*   VIANYE 9.5 9.8       A1C RESULTS:  Lab Results   Component Value Date    A1C 6.7 (H) 04/22/2021         Pertinent Procedures/Imaging:  Arterial duplex ultrasound, bilateral and at 3 levels 9/13/2021:  1. RIGHT:       A. Resting CLAUDINE is ABNORMAL, 0.73, previously borderline, 0.93.       B. Resting TBI is ABNORMAL, 0.42.       C. Segmental pressures suggest femoropopliteal disease and small  vessel disease.  2. LEFT:       A. Resting CLAUDINE is ABNORMAL, 0.24, previously borderline, 0.93.       B. Resting TBI is ABNORMAL, 0.09.       C. Segmental pressures suggest iliac and small vessel disease.  3. Left brachial pressure now 68 mmHg less than the right (previously  21 mmHg) suggesting a left central stenosis. If clinically indicated,  further evaluation with CTA could be considered.  4. Exercise study not performed. The patient appeared unstable on her  feet and it did not seem safe to subject her to the treadmill test.     Carotid US 2/2020:  1. RIGHT ICA: 50-69% diameter stenosis by grayscale imaging and  sonographic velocity criteria, unchanged from 2015.  2. LEFT ICA:  Less than 50% diameter narrowing by grayscale imaging  and sonographic velocity criteria, unchanged from 2015.  3. Subclavian steal physiology with retrograde left vertebral artery  flow, unchanged. Correlate for  symptoms.      Assessment:  Porsche Manning is a 71 year old female with PMH notable or HTN, HLD, hx of tobacco use, CAD (1st MI 1987, BMS to mRCA 2003), PAD (s/p bilateral aorto-iliac stenting 2005, repeat stenting to left common iliac artery 2006), subclavian stenosis, COPD, T2DM, CKD stage III, and hx of cervical cancer. She presents for follow up.    71-year-old female with no cardiac complaints. However evidence of severe peripheral arterial disease in the setting of previous stenting as seen on arterial duplexUS. Blood pressure appears better as no longer hypotensive, but somewhat labile. I remain concerned about her episodes of bradycardia and that her resting heart rate is in the 40s.      Plan:  # Bilateral LE PAD:   - Interventional radiology referral; message sent to clinic RN  - Patient instructed to present to emergency department urgently should she develop total loss of sensation, extreme pain, or pallor of the left foot    # Bradycardia  # HTN  BP in left arm is FALSELY low, presumably due to left subclavian artery stenosis.   Patient has been checking home blood pressures solely in the left arm. I am concerned that her bradycardia may still be causing some degree of dizziness. In addition, patient better suited to beta-blockade therapy in the context of her history of coronary artery disease/peripheral arterial disease  - Continue lisinopril as p.m. dose  - Continue metoprolol 100 mg XL daily  - Reduce diltiazem to 300 mg ER daily  - Virtual visit in 3 weeks with me. Patient instructed to continue blood pressure once daily for ongoing monitoring  - patient instructed that if SBP consistently > 140 or DBP > 90, must inform clinic prior to visit    # HLD  LDL 63 7/2021.  - continue Crestor     # CAD:   Single vessel CAD, dating back to 2003 at which time she had PCI of RCA. CTA with heavy calcification  - continue ASA, Plavix  - continue statin  - continue BB     # Carotid disease.    Testing  2/2020 without stenosis > 70%  - continue to monitor     # Left subclavian artery stenosis.  There was a 57 mm Hg pressure gradient between the right and left arms.  CT angiogram showed subtotal occlusion of the ostium of the left subclavian artery.   She is asymptomatic and there is no evidence of a subclavian steal phenomenon, no need for intervention at this time  - continue to monitor    Follow-up: Per interventional radiology regarding her peripheral arterial disease. Message sent to clinic RN today. Follow-up with me in 3 weeks for blood pressure recheck    A total of  25 minutes spent face to face with patient. An additional 17 minutes was spent providing coordination of care, chart review, and documentation.    Nelli Kuhn PA-C  Interventional/Critical Care Cardiology  [phone]        CC  Patient Care Team:  Danuta Tan PA-C as PCP - General (Physician Assistant)  Danuta Tan PA-C as Assigned PCP  Nelli Kuhn PA-C as Physician Assistant (Interventional Cardiology)  Patience Wellington PA-C as Assigned Nephrology Provider  Patience [place] PA-C as Physician Assistant (Nephrology)  SELF, REFERRED

## 2021-09-13 ENCOUNTER — ANCILLARY PROCEDURE (OUTPATIENT)
Dept: ULTRASOUND IMAGING | Facility: CLINIC | Age: 71
End: 2021-09-13
Attending: INTERNAL MEDICINE
Payer: COMMERCIAL

## 2021-09-13 DIAGNOSIS — I73.9 PERIPHERAL VASCULAR DISEASE, UNSPECIFIED (H): ICD-10-CM

## 2021-09-13 PROCEDURE — 93923 UPR/LXTR ART STDY 3+ LVLS: CPT | Performed by: RADIOLOGY

## 2021-09-21 ENCOUNTER — VIRTUAL VISIT (OUTPATIENT)
Dept: CARDIOLOGY | Facility: CLINIC | Age: 71
End: 2021-09-21
Payer: COMMERCIAL

## 2021-09-21 DIAGNOSIS — I73.9 PERIPHERAL VASCULAR DISEASE, UNSPECIFIED (H): Primary | ICD-10-CM

## 2021-09-21 DIAGNOSIS — I10 HYPERTENSION, UNSPECIFIED TYPE: ICD-10-CM

## 2021-09-21 PROCEDURE — 99215 OFFICE O/P EST HI 40 MIN: CPT | Mod: 95 | Performed by: PHYSICIAN ASSISTANT

## 2021-09-21 RX ORDER — DILTIAZEM HYDROCHLORIDE 300 MG/1
300 CAPSULE, EXTENDED RELEASE ORAL DAILY
Qty: 90 CAPSULE | Refills: 0 | Status: SHIPPED | OUTPATIENT
Start: 2021-09-21 | End: 2021-09-29

## 2021-09-21 NOTE — PATIENT INSTRUCTIONS
Thank you for coming to the [facility] [place]; please note the following instructions:    1. Reduce diltiazem to 300mg ER once daily; new Rx sent  2. Continue BP check daily.  If top number always above 140 or bottom number always above 90, please call our clinic prior to the visit in 3 weeks as we should adjust your BP medication.  3. Referral sent to peripheral artery disease team.  They should be reaching out shortly to set up visit  4. Follow up with me in 4 weeks.      If you have any questions regarding your visit please contact your care team:     Cardiology  Telephone Number   Sari BARTH, RN  Lorin MORALES, RN   Emily RIVAS, RMA  Fani BORJAS, RMALYCIA BURKS, LPN   [phone] (option 1)   For scheduling appts:     [phone] (select option 1)       For the Device Clinic (Pacemakers and ICD's)  RN's :  Jazzy Mohan   During business hours: [phone]    *After business hours:  [phone] (select option 4)      Normal test result notifications will be released via [employer] or mailed within 7 business days.  All other test results, will be communicated via telephone once reviewed by your cardiologist.    If you need a medication refill please contact your pharmacy.  Please allow 3 business days for your refill to be completed.    As always, thank you for trusting us with your health care needs!

## 2021-09-21 NOTE — LETTER
"9/21/2021      RE: Porsche Manning  [address]  [facility] [address]       Dear Colleague,    Thank you for the opportunity to participate in the care of your patient, Porsche Manning, at the [facility]\"" at [facility]. Please see a copy of my visit note below.      [facility] Cardiology - [facility] (Video)  Cardiovascular Clinic Note      Referring Provider: Referred Self   Primary Care Provider: Danuta Tan     Patient Name: Porsche Manning   MRN: [id number]       History of Present Illness:  Porsche Manning is a 71 year old female with PMH notable or HTN, HLD, hx of tobacco use, CAD (1st MI 1987, BMS to mRCA 2003), PAD (s/p bilateral aorto-iliac stenting 2005, repeat stenting to left common iliac artery 2006), subclavian stenosis, COPD, T2DM, CKD stage III, and hx of cervical cancer. She presents for follow up.    The patient was last seen 8/2021 with left LE pain.  Given the patient's recent rise of her creatinine, we elected to pursue LE arterial doppler US for assessment of previous stents.  This showed possible femoropopliteal disease/small vessel disease of the RLE and possible iliac/small vessel disease of the left lower extremity (severe).  In addition, the patient had daytime dizziness with hypotension/bradycardia noted at home.  As such, we transitioned her lisinopril to PM dosing and reduced her metoprolol XL to 100mg daily.      In the last month, the patient has continued to struggle to move secondary to lower extremity pain. She notes that left lower extremity pain remains more severe than right. She has only been able to go to the grocery store \"a few times\" secondary to pain. Rest continues to reliably resolve pain.    In regards to the patient's previous complaints of dizziness, she reports that these episodes have largely resolved. She shares 5 blood pressures with me today:  On " "September 13 at 9 PM her blood pressure was 107/42 and her pulse was 48.   On September 17 at 6:30 PM her blood pressure was 150/63 and her pulse was 71.   On September 20 at 7:30 PM her blood pressure was 147/61 and her pulse was 76.   On September 21 at 8:30 AM her blood pressure was 144/61 and her pulse was 65.   On September 21 at noon her blood pressure was 117/67 and her pulse was 45.  She has had only \"a couple\" episodes of dizziness since reduction of her metoprolol approximately 1 month ago. She relates these episodes of dizziness to \"not eating enough\". She has not checked her blood pressure with these episodes of dizziness.    The patient otherwise denies chest pain, shortness of breath at rest or with exertion, new lower extremity swelling, palpitations, and syncope.    Pertinent Cardiac Medications:  75mg Plavix daily  100mg metoprolol succinate daily  20mg lisinopril daily  40mg rosuvastatin daily  360mg diltiazem ER daily  81mg ASA daily      Past Medical History:  Pertinent past medical history as above.      Past Surgical History:  Past Surgical History:   Procedure Laterality Date     BIOPSY      Sample taken from back     CARDIAC SURGERY      Stents     COLONOSCOPY  1996    Results were ok     HYSTERECTOMY, PAP NO LONGER INDICATED  1989    ovaries only, no cervix per pt     SURGICAL HISTORY OF -   1995    bunionectomy     SURGICAL HISTORY OF -   10/10/03    cardiac stenting     SURGICAL HISTORY OF -     stent placed in both of her legs for pad     VASCULAR SURGERY      PAD         Family History:  Family History   Problem Relation Age of Onset     C.A.D. Mother      Breast Cancer Mother      C.A.D. Father      Diabetes Father      Hypertension Father      C.A.D. Maternal Grandfather      CKAREN. Paternal Grandfather      Neurologic Disorder Brother         epilepsy         Current Medications:  Current Outpatient Medications   Medication Sig Dispense Refill     albuterol (PROAIR " HFA/PROVENTIL HFA/VENTOLIN HFA) 108 (90 Base) MCG/ACT inhaler INHALE 2 PUFFS INTO THE LUNGS EVERY 6 HOURS 18 g 0     aspirin 81 MG EC tablet Take 81 mg by mouth daily       BREO ELLIPTA 100-25 MCG/INH inhaler INHALE 1 PUFF BY MOUTH EVERY DAY 60 each 2     buPROPion (WELLBUTRIN XL) 150 MG 24 hr tablet Take 1 tablet (150 mg) by mouth every morning 90 tablet 1     Calcium Carbonate-Vit D-Min (CALCIUM 1200 PO) Take 600 mg by mouth daily        Cholecalciferol (VITAMIN D) 1000 UNIT capsule Take 1 capsule by mouth daily. Take one tablet daily       clopidogrel (PLAVIX) 75 MG tablet Take 1 tablet (75 mg) by mouth daily Call clinic to schedule follow up appointment. 90 tablet 0     diltiazem ER COATED BEADS (CARDIZEM CD) 360 MG 24 hr capsule Take 1 capsule (360 mg) by mouth daily For additional refills, please schedule a follow-up appointment at [phone] 90 capsule 0     lisinopril (ZESTRIL) 20 MG tablet Take 1 tablet (20 mg) by mouth daily 90 tablet 3     metoprolol succinate ER (TOPROL-XL) 100 MG 24 hr tablet Take 1 tablet (100 mg) by mouth daily 90 tablet 0     PROAIR  (90 Base) MCG/ACT inhaler INHALE 2 PUFFS INTO THE LUNGS EVERY 6 HOURS AS NEEDED FOR SHORTNESS OF BREATH / DYSPNEA OR WHEEZING 8.5 Inhaler 1     rosuvastatin (CRESTOR) 40 MG tablet Take 1 tablet (40 mg) by mouth daily For additional refills, please schedule a follow-up appointment at [phone] 90 tablet 3     tiotropium (SPIRIVA HANDIHALER) 18 MCG inhaled capsule INHALE 1 CAPSULE (18 MCG) INTO THE LUNGS DAILY 90 capsule 3         Social History:  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  Eyes:sclera  white  Cardiovascular: Visible limbs without edema/cyanosis. Unable to visualize lower extremities  Respiratory: Respirations unlabored  Musculoskeletal: Visible muscle normal bulk/tone. Joints appear supple.  Skin: Visible skin normal without worrisome lesions  Neurologic: AOx3  Psychiatric: appropriate affect, eye contact, intact thought and speech      Laboratory Data:  LIPID RESULTS:  Recent Labs   Lab Test 07/20/21  1202 01/28/21  1004 11/04/15  1142 07/09/15  1013   CHOL 154 168 141 125   HDL 57 62 71 61   LDL 63 73 48 35   TRIG 172* 166* 112 145   CHOLHDLRATIO  --   --  2.0 2.0      LIVER ENZYME RESULTS:  Recent Labs   Lab Test 04/22/21  1044 01/28/21  1004   AST 15 16   ALT 24 30     CBC RESULTS:  Recent Labs   Lab Test 08/17/21  1159 07/27/21  1019   WBC 9.0 9.0   HGB 12.8 14.4   HCT 38.4 41.2    217       BMP RESULTS:  Recent Labs   Lab Test 08/17/21  1159 07/27/21  1019    135   POTASSIUM 4.4 4.0   CHLORIDE 103 102   CO2 21 25   ANIONGAP 13 8   * 135*   BUN 22 13   CR 1.70* 1.36*   VIANEY 9.5 9.8       A1C RESULTS:  Lab Results   Component Value Date    A1C 6.7 (H) 04/22/2021         Pertinent Procedures/Imaging:  Arterial duplex ultrasound, bilateral and at 3 levels 9/13/2021:  1. RIGHT:       A. Resting CLAUDINE is ABNORMAL, 0.73, previously borderline, 0.93.       B. Resting TBI is ABNORMAL, 0.42.       C. Segmental pressures suggest femoropopliteal disease and small  vessel disease.  2. LEFT:       A. Resting CLAUDINE is ABNORMAL, 0.24, previously borderline, 0.93.       B. Resting TBI is ABNORMAL, 0.09.       C. Segmental pressures suggest iliac and small vessel disease.  3. Left brachial pressure now 68 mmHg less than the right (previously  21 mmHg) suggesting a left central stenosis. If clinically indicated,  further evaluation with CTA could be considered.  4. Exercise study not performed. The patient appeared unstable on her  feet and it did not seem safe to subject her to the treadmill  test.     Carotid US 2/2020:  1. RIGHT ICA: 50-69% diameter stenosis by grayscale imaging and  sonographic velocity criteria, unchanged from 2015.  2. LEFT ICA:  Less than 50% diameter narrowing by grayscale imaging  and sonographic velocity criteria, unchanged from 2015.  3. Subclavian steal physiology with retrograde left vertebral artery  flow, unchanged. Correlate for symptoms.      Assessment:  Porsche Manning is a 71 year old female with PMH notable or HTN, HLD, hx of tobacco use, CAD (1st MI 1987, BMS to mRCA 2003), PAD (s/p bilateral aorto-iliac stenting 2005, repeat stenting to left common iliac artery 2006), subclavian stenosis, COPD, T2DM, CKD stage III, and hx of cervical cancer. She presents for follow up.    71-year-old female with no cardiac complaints. However evidence of severe peripheral arterial disease in the setting of previous stenting as seen on arterial duplexUS. Blood pressure appears better as no longer hypotensive, but somewhat labile. I remain concerned about her episodes of bradycardia and that her resting heart rate is in the 40s.      Plan:  # Bilateral LE PAD:   - Interventional radiology referral; message sent to clinic RN  - Patient instructed to present to emergency department urgently should she develop total loss of sensation, extreme pain, or pallor of the left foot    # Bradycardia  # HTN  BP in left arm is FALSELY low, presumably due to left subclavian artery stenosis.   Patient has been checking home blood pressures solely in the left arm. I am concerned that her bradycardia may still be causing some degree of dizziness. In addition, patient better suited to beta-blockade therapy in the context of her history of coronary artery disease/peripheral arterial disease  - Continue lisinopril as p.m. dose  - Continue metoprolol 100 mg XL daily  - Reduce diltiazem to 300 mg ER daily  - Virtual visit in 3 weeks with me. Patient instructed to continue blood pressure once daily for  ongoing monitoring  - patient instructed that if SBP consistently > 140 or DBP > 90, must inform clinic prior to visit    # HLD  LDL 63 7/2021.  - continue Crestor     # CAD:   Single vessel CAD, dating back to 2003 at which time she had PCI of RCA. CTA with heavy calcification  - continue ASA, Plavix  - continue statin  - continue BB     # Carotid disease.    Testing 2/2020 without stenosis > 70%  - continue to monitor     # Left subclavian artery stenosis.  There was a 57 mm Hg pressure gradient between the right and left arms.  CT angiogram showed subtotal occlusion of the ostium of the left subclavian artery.   She is asymptomatic and there is no evidence of a subclavian steal phenomenon, no need for intervention at this time  - continue to monitor    Follow-up: Per interventional radiology regarding her peripheral arterial disease. Message sent to clinic RN today. Follow-up with me in 3 weeks for blood pressure recheck    A total of  25 minutes spent face to face with patient. An additional 17 minutes was spent providing coordination of care, chart review, and documentation.    Nelli Kuhn PA-C  Interventional/Critical Care Cardiology  [phone]      CC  Patient Care Team:  Danuta Tan PA-C as PCP - General (Physician Assistant)  Patience Wellington PA-C as Assigned Nephrology Provider

## 2021-09-22 DIAGNOSIS — I25.10 CORONARY ARTERY DISEASE DUE TO LIPID RICH PLAQUE: ICD-10-CM

## 2021-09-22 DIAGNOSIS — I25.83 CORONARY ARTERY DISEASE DUE TO LIPID RICH PLAQUE: ICD-10-CM

## 2021-09-22 DIAGNOSIS — I73.9 PERIPHERAL VASCULAR DISEASE (H): Primary | ICD-10-CM

## 2021-09-22 DIAGNOSIS — I73.9 PERIPHERAL VASCULAR DISEASE, UNSPECIFIED (H): ICD-10-CM

## 2021-09-22 DIAGNOSIS — I10 HYPERTENSION GOAL BP (BLOOD PRESSURE) < 140/90: ICD-10-CM

## 2021-09-22 DIAGNOSIS — I99.8 LIMB ISCHEMIA: ICD-10-CM

## 2021-09-22 NOTE — TELEPHONE ENCOUNTER
DIAGNOSIS: severe PAD   DATE RECEIVED: 9.29.21   NOTES STATUS DETAILS   OFFICE NOTE from referring provider Internal 9.22.21 JIAGR Kuhn [facility]   8.20.21   OFFICE NOTE from other specialist N/A    OPERATIVE REPORT N/A    MEDICATION LIST Internal    PERTINENT LABS Internal    CTA (CT ANGIOGRAPHY) N/A    CT N/A    MRI N/A    ULTRASOUND Internal 9.13.21 ext art doppler  7.20.21 renal complete

## 2021-09-23 RX ORDER — CLOPIDOGREL BISULFATE 75 MG/1
75 TABLET ORAL DAILY
Qty: 90 TABLET | Refills: 3 | Status: SHIPPED | OUTPATIENT
Start: 2021-09-23 | End: 2022-12-06

## 2021-09-23 NOTE — TELEPHONE ENCOUNTER
metoprolol succinate ER (TOPROL-XL) 100 MG    Last Written Prescription Date:  unk  Last Fill Quantity: unk,   # refills: unk  Last Office Visit : 9/21/21  Future Office visit:  10/12/21  Routing refill request to provider for review/approval because:  Drug not active on patient's medication list  /  Medication is reported/historical

## 2021-09-24 ENCOUNTER — TELEPHONE (OUTPATIENT)
Dept: VASCULAR SURGERY | Facility: CLINIC | Age: 71
End: 2021-09-24

## 2021-09-24 DIAGNOSIS — Z95.828 S/P INSERTION OF ILIAC ARTERY STENT: ICD-10-CM

## 2021-09-24 DIAGNOSIS — I73.9 PERIPHERAL VASCULAR DISEASE (H): Primary | ICD-10-CM

## 2021-09-24 DIAGNOSIS — I99.8 LIMB ISCHEMIA: ICD-10-CM

## 2021-09-24 DIAGNOSIS — I73.9 PAD (PERIPHERAL ARTERY DISEASE) (H): ICD-10-CM

## 2021-09-24 NOTE — TELEPHONE ENCOUNTER
I spoke with Dr. Bob about pt's CTA next week dt elevated creatinine. Dr. Bob would like imaging changed to aorta/iliac ultrasound and LLE duplex. I changed the orders and rescheduled pt's imaging for Tuesday 9/28 at 7:30 AM. I called pt and left her a detailed voicemail with appointment specifics, including needing to be NPO for 8 hours prior to imaging. I gave the patient my direct callback number should any questions or concerns arise.    JAMEE Leary, RN  [facility] - P Vascular Surgery  Ph: [phone]  Fax: [phone]

## 2021-09-27 DIAGNOSIS — J44.9 CHRONIC OBSTRUCTIVE PULMONARY DISEASE, UNSPECIFIED COPD TYPE (H): ICD-10-CM

## 2021-09-28 ENCOUNTER — ANCILLARY PROCEDURE (OUTPATIENT)
Dept: ULTRASOUND IMAGING | Facility: CLINIC | Age: 71
End: 2021-09-28
Attending: SURGERY
Payer: COMMERCIAL

## 2021-09-28 DIAGNOSIS — Z95.828 S/P INSERTION OF ILIAC ARTERY STENT: ICD-10-CM

## 2021-09-28 DIAGNOSIS — I73.9 PAD (PERIPHERAL ARTERY DISEASE) (H): ICD-10-CM

## 2021-09-28 PROCEDURE — 93978 VASCULAR STUDY: CPT | Mod: GC | Performed by: RADIOLOGY

## 2021-09-28 PROCEDURE — 93926 LOWER EXTREMITY STUDY: CPT | Mod: LT | Performed by: RADIOLOGY

## 2021-09-29 ENCOUNTER — VIRTUAL VISIT (OUTPATIENT)
Dept: VASCULAR SURGERY | Facility: CLINIC | Age: 71
End: 2021-09-29
Payer: COMMERCIAL

## 2021-09-29 ENCOUNTER — PRE VISIT (OUTPATIENT)
Dept: VASCULAR SURGERY | Facility: CLINIC | Age: 71
End: 2021-09-29

## 2021-09-29 DIAGNOSIS — I73.9 PERIPHERAL VASCULAR DISEASE, UNSPECIFIED (H): ICD-10-CM

## 2021-09-29 DIAGNOSIS — I70.222 ATHEROSCLEROSIS OF NATIVE ARTERY OF LEFT LOWER EXTREMITY WITH REST PAIN (H): ICD-10-CM

## 2021-09-29 DIAGNOSIS — I99.8 LIMB ISCHEMIA: Primary | ICD-10-CM

## 2021-09-29 DIAGNOSIS — I77.1 SUBCLAVIAN ARTERY STENOSIS, LEFT (H): ICD-10-CM

## 2021-09-29 DIAGNOSIS — N18.31 STAGE 3A CHRONIC KIDNEY DISEASE (H): ICD-10-CM

## 2021-09-29 PROCEDURE — 99205 OFFICE O/P NEW HI 60 MIN: CPT | Mod: 95 | Performed by: SURGERY

## 2021-09-29 RX ORDER — CEFAZOLIN SODIUM 2 G/50ML
2 SOLUTION INTRAVENOUS
Status: CANCELLED | OUTPATIENT
Start: 2021-09-29

## 2021-09-29 RX ORDER — CEFAZOLIN SODIUM 2 G/50ML
2 SOLUTION INTRAVENOUS SEE ADMIN INSTRUCTIONS
Status: CANCELLED | OUTPATIENT
Start: 2021-09-29

## 2021-09-29 NOTE — PROGRESS NOTES
Vascular Surgery Consultation Note     Patient:  Porsche Manning   Date of birth 1950, Medical record number [id number]  Date of Visit:  09/29/2021  Consult Requester:No att. providers found            Assessment and Recommendations:   ASSESSMENT / RECOMMENDATION:    71-year-old female with critical limb threatening ischemia and CLAUDINE of 0.24 on the left with near occlusion of the distal left common iliac artery with a history of diabetes and CKD 3.  I have recommended an outpatient left lower extremity arteriogram with possible intervention of the left iliac with dilute contrast and hydration.  I have asked her to continue taking her Plavix including the day of the procedure along with her statin.  We will plan for this in the very near future.  I have can test contact her primary care PA,  Ms. Tan, for a preprocedure history and physical.  We will obtain labs that day of the procedure.  She understands she will need someone to bring her and take her home as well as stay with her that evening after the procedure.  I discussed the risks and benefits including the need for further procedures and our limited use of contrast in order to help preserve her kidney function.  She had an opportunity to ask questions.  I will see her very soon for the procedure.    Many thanks for involving me in the care of this very pleasant patient. Should any questions or concerns arise, please don't hesitate to contact me.    Warm Regards,    Britni Bob MD, DFSVS, RPVI  Director, [facility] Vascular Services  Professor and Chief, Vascular and Endovascular Surgery  [facility]  [email or web address]          60 minutes spent on the date of the encounter doing chart review, review of outside records, review of test results, interpretation of tests, patient visit, documentation and discussion with other provider(s)           HPI: Ms Manning is a very pleasant 71-year-old female seen today on video virtual visit  for evaluation of left lower extremity critical limb ischemia with rest pain.  Many years ago-possibly even in the 90s-she underwent bilateral iliac kissing stents for claudication.  She had been a smoker throughout that time and up until 2018 when she quit in April.  She has been doing well until these last several months when she was having significant pain in her left lower extremity with walking but then also beginning to have pain in the top of her foot as a ache or feeling like it was broken.  She underwent a recent CLAUDINE which had been the first when she had had since 2012.  In 2012 her left CLAUDINE was 0.93 and most recently it is now 0.24.    She denies any particular issues at this time with her right lower extremity.  Denies tissue loss on either foot.  She has had issues with her back and even sometimes in her chest with sharp pains which seem to resolve with rest and come on with movement.  She finds she can manage this with Tylenol.  She had a carotid duplex evaluation in February 2021 showing no disease on the left and approximately 50% on the right which was stable from 6 years prior.      Review of Systems   Constitutional, HEENT, cardiovascular, pulmonary, GI, , musculoskeletal, neuro, skin, endocrine and psych systems are negative, except as otherwise noted.    Physical Exam   GENERAL: Healthy, alert and no distress  EYES: Eyes grossly normal to inspection.  No discharge or erythema, or obvious scleral/conjunctival abnormalities.  RESP: No audible wheeze, cough, or visible cyanosis.  No visible retractions or increased work of breathing.    SKIN: Visible skin clear. No significant rash, abnormal pigmentation or lesions.  NEURO: Cranial nerves grossly intact.  Mentation and speech appropriate for age.  PSYCH: Mentation appears normal, affect normal/bright, judgement and insight intact, normal speech and appearance well-groomed.                Leann is a 71 year old who is being evaluated via a billable  video visit.      How would you like to obtain your AVS? MyChart  If the video visit is dropped, the invitation should be resent by: Text to cell phone: [phone]  Will anyone else be joining your video visit? No    Video Start Time: 11 AM    Video-Visit Details    Type of service:  Video Visit    Video End Time:11:40 AM    Originating Location (pt. Location): Home    Distant Location (provider location):  [facility] VASCULAR CLINIC [place]     Platform used for Video Visit: Hole 19

## 2021-09-29 NOTE — NURSING NOTE
Chief Complaint   Patient presents with     Consult     Consult for severe PAD.  US Aorta/Ivc/Iliac Duplex Complete and US art low ext uni left completed on 09/28.  Pt c/o left LE pain and numbness with exertion.  Buring pain is above and below the knee.  She noted sxs cause her to walk hunched over and sometimes she feel like she may collapse.  Pt also reports back pain.       Medications and allergies reconciled.     Keesha Wilkinson LPN

## 2021-09-29 NOTE — LETTER
9/29/2021       RE: Porsche Manning  [address]  [facility] [address]     Dear Colleague,    Thank you for referring your patient, Porsche Manning, to the [facility] VASCULAR [facility]IN[facility] [place] at [facility]. Please see a copy of my visit note below.    Vascular Surgery Consultation Note     Patient:  Porsche Manning   Date of birth 1950, Medical record number [id number]  Date of Visit:  09/29/2021  Consult Requester:No att. providers found          Assessment and Recommendations:   ASSESSMENT / RECOMMENDATION:    71-year-old female with critical limb threatening ischemia and CLAUDINE of 0.24 on the left with near occlusion of the distal left common iliac artery with a history of diabetes and CKD 3.  I have recommended an outpatient left lower extremity arteriogram with possible intervention of the left iliac with dilute contrast and hydration.  I have asked her to continue taking her Plavix including the day of the procedure along with her statin.  We will plan for this in the very near future.  I have can test contact her primary care PA,  Ms. Tan, for a preprocedure history and physical.  We will obtain labs that day of the procedure.  She understands she will need someone to bring her and take her home as well as stay with her that evening after the procedure.  I discussed the risks and benefits including the need for further procedures and our limited use of contrast in order to help preserve her kidney function.  She had an opportunity to ask questions.  I will see her very soon for the procedure.    Many thanks for involving me in the care of this very pleasant patient. Should any questions or concerns arise, please don't hesitate to contact me.    Warm Regards,    Britni Bob MD, DFSVS, RPVI  Director, [facility] Vascular Services  Professor and Chief, Vascular and Endovascular Surgery  [facility]  [place]  [email or web address]        60 minutes spent on the date of the encounter doing chart review, review of outside records, review of test results, interpretation of tests, patient visit, documentation and discussion with other provider(s)         HPI: Ms Manning is a very pleasant 71-year-old female seen today on video virtual visit for evaluation of left lower extremity critical limb ischemia with rest pain.  Many years ago-possibly even in the 90s-she underwent bilateral iliac kissing stents for claudication.  She had been a smoker throughout that time and up until 2018 when she quit in April.  She has been doing well until these last several months when she was having significant pain in her left lower extremity with walking but then also beginning to have pain in the top of her foot as a ache or feeling like it was broken.  She underwent a recent CLAUDINE which had been the first when she had had since 2012.  In 2012 her left CLAUDINE was 0.93 and most recently it is now 0.24.    She denies any particular issues at this time with her right lower extremity.  Denies tissue loss on either foot.  She has had issues with her back and even sometimes in her chest with sharp pains which seem to resolve with rest and come on with movement.  She finds she can manage this with Tylenol.  She had a carotid duplex evaluation in February 2021 showing no disease on the left and approximately 50% on the right which was stable from 6 years prior.      Review of Systems   Constitutional, HEENT, cardiovascular, pulmonary, GI, , musculoskeletal, neuro, skin, endocrine and psych systems are negative, except as otherwise noted.    Physical Exam   GENERAL: Healthy, alert and no distress  EYES: Eyes grossly normal to inspection.  No discharge or erythema, or obvious scleral/conjunctival abnormalities.  RESP: No audible wheeze, cough, or visible cyanosis.  No visible retractions or increased work of breathing.    SKIN: Visible skin clear. No  significant rash, abnormal pigmentation or lesions.  NEURO: Cranial nerves grossly intact.  Mentation and speech appropriate for age.  PSYCH: Mentation appears normal, affect normal/bright, judgement and insight intact, normal speech and appearance well-groomed.    Again, thank you for allowing me to participate in the care of your patient.      Sincerely,    Britni Bob MD

## 2021-09-30 ENCOUNTER — TELEPHONE (OUTPATIENT)
Dept: FAMILY MEDICINE | Facility: CLINIC | Age: 71
End: 2021-09-30

## 2021-09-30 NOTE — TELEPHONE ENCOUNTER
Called and spoke with Leann today. She would like to hold off on scheduling the pre-op with MP until the outpatient arteriogram procedure has been scheduled.     Thanks!  Ashley ALMARAZ

## 2021-09-30 NOTE — PROGRESS NOTES
Staff message per vascular specialists 9/30/21:      Ms. Manning has left lower extremity rest pain and near occlusion of her left distal common iliac artery with an CLAUDINE of 0.24 on the left compared to 0.93 in 2012.  This area looks nearly occluded distal to her left common iliac stent placed years ago.  I have recommended an outpatient arteriogram with possible intervention in the very near future given the very low CLAUDINE.  Would you kindly be able to do a preoperative history and physical soon?  She needs no other testing or blood work at this time.  We will obtain a CBC and renal panel the day of the procedure.     Additionally she has been having some issues with her back and sharp pains on the left thoracic spine and lower back that are relieved by Tylenol and exacerbated with motion.  Would you be able to obtain a TLS spine plain films for evaluation of osteoarthritis?  I do not have a vascular etiology for any of this but I suspect there is some underlying spine disease.     Many thanks for your help!   Britni Bob MD, DFSVS, RPVI   Director, [facility] Vascular Services   Chief, Vascular and Endovascular Surgery

## 2021-10-04 RX ORDER — METOPROLOL SUCCINATE 100 MG/1
TABLET, EXTENDED RELEASE ORAL
Qty: 135 TABLET | Refills: 3 | Status: SHIPPED | OUTPATIENT
Start: 2021-10-04 | End: 2021-10-12

## 2021-10-04 RX ORDER — TIOTROPIUM BROMIDE 18 UG/1
CAPSULE ORAL; RESPIRATORY (INHALATION)
Qty: 90 CAPSULE | Refills: 3 | Status: SHIPPED | OUTPATIENT
Start: 2021-10-04 | End: 2022-09-15

## 2021-10-05 DIAGNOSIS — I73.9 PERIPHERAL VASCULAR DISEASE, UNSPECIFIED (H): Primary | ICD-10-CM

## 2021-10-05 DIAGNOSIS — Z11.59 ENCOUNTER FOR SCREENING FOR OTHER VIRAL DISEASES: ICD-10-CM

## 2021-10-05 DIAGNOSIS — Z98.890 POSTOPERATIVE STATE: ICD-10-CM

## 2021-10-06 ENCOUNTER — TELEPHONE (OUTPATIENT)
Dept: VASCULAR SURGERY | Facility: CLINIC | Age: 71
End: 2021-10-06

## 2021-10-06 NOTE — TELEPHONE ENCOUNTER
Spoke with patient to schedule procedure with Dr. Britni Bob    Procedure was scheduled on 10/19 at [facility] [place]  Patient will have H&P with Danuta Tan (PCP)     Patient is aware a COVID-19 test is needed before their procedure. The test should be with-in 4 days of their procedure.   Test Details: Date 10/15  Location  [facility]    PO Visit scheduled on:     2 week 11/15  4-6 week 11/16 and 11/17    Patient is aware a / is needed day of surgery.   Surgery letter was sent via [employer], patient has my direct contact information for any further questions.     Vendor requested: Not needed

## 2021-10-07 NOTE — PROGRESS NOTES
[facility] - [facility]  Cardiovascular Clinic Note      Referring Provider: No ref. provider found   Primary Care Provider: Danuta Tan     Patient Name: Porsche Manning   MRN: [id number]       History of Present Illness:  Porsche Manning is a 71 year old female with PMH notable or HTN, HLD, hx of tobacco use, CAD (1st MI 1987, BMS to mRCA 2003), PAD (s/p bilateral aorto-iliac stenting 2005, repeat stenting to left common iliac artery 2006), subclavian stenosis, COPD, T2DM, CKD stage III, and hx of cervical cancer. She presents for blood pressure follow up.    The patient was last seen 9/2021 with me. At that visit, we discussed recent LE US which showed severe stenosis of arteries. In addition, we reduced diltiazem due to ongoing bradycardia and hypotension.  Since that point in time, the patient denies chest pain, SOB at rest or with exertion, new LE swelling, and palpitations.  In the last two weeks, she has had a single episode of mild lightheadedness; at that point in time, she notes that her SBP was < 110.  She continues to complain of LE pain with ambulation.  She plans to undergo outpatient diagnostic/therapeutic testing with the vascular team 10/19.    The patient reports the following blood pressures and pulses:  10/12 11am: 150/61 pulse 70  10/11 at 7pm: 107/52 pulse 46  10/9 at 10am: 150-65 pulse 73  10/8 at 3pm: 121/50 pulse 46  10/7 2pm: 145/63 pulse 67  10/6 1pm 147/69 pulse 72  10/5 8pm 113/63 pulse 56  10/3 12pm: 142/63 pulse 65  10/2 5pm 144/62 pulse 66    Pertinent Cardiac Medications:  75mg Plavix daily - AM  100mg metoprolol succinate daily - AM  20mg lisinopril daily - noon  40mg rosuvastatin daily - AM  300mg diltiazem ER daily - AM  81mg ASA daily - AM    Past Medical History:  Pertinent past medical history as above.      Past Surgical History:  Past Surgical History:   Procedure Laterality Date     BIOPSY      Sample taken from back     CARDIAC SURGERY       Stents     COLONOSCOPY  1996    Results were ok     HYSTERECTOMY, PAP NO LONGER INDICATED  1989    ovaries only, no cervix per pt     SURGICAL HISTORY OF -   1995    bunionectomy     SURGICAL HISTORY OF -   10/10/03    cardiac stenting     SURGICAL HISTORY OF -     stent placed in both of her legs for pad     VASCULAR SURGERY      PAD         Family History:  Family History   Problem Relation Age of Onset     C.A.D. Mother      Breast Cancer Mother      C.A.D. Father      Diabetes Father      Hypertension Father      C.A.D. Maternal Grandfather      C.A.D. Paternal Grandfather      Neurologic Disorder Brother         epilepsy         Current Medications:  Current Outpatient Medications   Medication Sig Dispense Refill     albuterol (PROAIR HFA/PROVENTIL HFA/VENTOLIN HFA) 108 (90 Base) MCG/ACT inhaler INHALE 2 PUFFS INTO THE LUNGS EVERY 6 HOURS 18 g 0     aspirin 81 MG EC tablet Take 81 mg by mouth daily       BREO ELLIPTA 100-25 MCG/INH inhaler INHALE 1 PUFF BY MOUTH EVERY DAY 60 each 2     buPROPion (WELLBUTRIN XL) 150 MG 24 hr tablet Take 1 tablet (150 mg) by mouth every morning 90 tablet 1     Calcium Carbonate-Vit D-Min (CALCIUM 1200 PO) Take 600 mg by mouth daily        Cholecalciferol (VITAMIN D) 1000 UNIT capsule Take 1 capsule by mouth daily. Take one tablet daily       clopidogrel (PLAVIX) 75 MG tablet TAKE 1 TABLET (75 MG) BY MOUTH DAILY CALL CLINIC TO SCHEDULE FOLLOW UP APPOINTMENT. 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  Eyes: sclera white  Cardiovascular: visible extremities without edema  Respiratory: respirations non-labored  Musculoskeletal: visible muscle/joints normal.   Skin: visible skin appearance without worrisome lesions.   Neurologic: AOx3,  Psychiatric: appropriate affect, eye contact, intact thought and speech      Laboratory Data:  LIPID RESULTS:  Recent Labs   Lab Test 07/20/21  1202 01/28/21  1004 10/17/16  0825 11/04/15  1142 07/09/15  1013 07/09/15  1013   CHOL 154 168   < > 141   < > 125   HDL 57 62   < > 71   < > 61   LDL 63 73   < > 48   < > 35   TRIG 172* 166*   < > 112   < > 145   CHOLHDLRATIO  --   --   --  2.0  --  2.0    < > = values in this interval not displayed.        LIVER ENZYME RESULTS:  Recent Labs   Lab Test 04/22/21  1044 01/28/21  1004   AST 15 16   ALT 24 30       CBC RESULTS:  Recent Labs   Lab Test 08/17/21  1159 07/27/21  1019   WBC 9.0 9.0   HGB 12.8 14.4   HCT 38.4 41.2    217       BMP RESULTS:  Recent Labs   Lab Test 08/17/21  1159 07/27/21  1019    135   POTASSIUM 4.4 4.0   CHLORIDE 103 102   CO2 21 25   ANIONGAP 13 8   * 135*   BUN 22 13   CR 1.70* 1.36*   VIANEY 9.5 9.8     A1C RESULTS:  Lab Results   Component Value Date    A1C 6.7 (H) 04/22/2021       Pertinent Procedures/Imaging:  Arterial duplex ultrasound, bilateral and at 3 levels 9/13/2021:  1. RIGHT:       A. Resting CLAUDINE is ABNORMAL, 0.73, previously borderline, 0.93.       B. Resting TBI is ABNORMAL, 0.42.       C. Segmental pressures suggest femoropopliteal disease and small  vessel disease.  2. LEFT:       A. Resting CLAUDINE is ABNORMAL, 0.24, previously  borderline, 0.93.       B. Resting TBI is ABNORMAL, 0.09.       C. Segmental pressures suggest iliac and small vessel disease.  3. Left brachial pressure now 68 mmHg less than the right (previously  21 mmHg) suggesting a left central stenosis. If clinically indicated,  further evaluation with CTA could be considered.  4. Exercise study not performed. The patient appeared unstable on her  feet and it did not seem safe to subject her to the treadmill test.     Carotid US 2/2020:  1. RIGHT ICA: 50-69% diameter stenosis by grayscale imaging and  sonographic velocity criteria, unchanged from 2015.  2. LEFT ICA:  Less than 50% diameter narrowing by grayscale imaging  and sonographic velocity criteria, unchanged from 2015.  3. Subclavian steal physiology with retrograde left vertebral artery  flow, unchanged. Correlate for symptoms.      Assessment:  Porsche Manning is a 71 year old female with PMH notable or HTN, HLD, hx of tobacco use, CAD (1st MI 1987, BMS to mRCA 2003), PAD (s/p bilateral aorto-iliac stenting 2005, repeat stenting to left common iliac artery 2006), subclavian stenosis, COPD, T2DM, CKD stage III, and hx of cervical cancer. She presents for blood pressure follow up.    71 year old female presenting for BP follow up.  BP improved as compared to 2 months prior.  While still experiencing periods of bradycardia (at least on home monitor), no longer experiencing daily lightheadedness/dizziness.  2 months prior, discussed utilization of lisinopril in the afternoon rather than in the morning.  Noon BPs now higher, having more low BP at night.      Plan:  # Bradycardia  # HTN  BP in left arm is FALSELY low, presumably due to left subclavian artery stenosis.   Patient has been checking home blood pressures solely in the right arm.   - Continue lisinopril, begin taking once again in the morning  - Continue metoprolol 100 mg XL daily  - continue diltiazem to 300 mg ER daily  - instructed patient to take BP  approximately 2 hours after AM meds (between 9am and 10am).  - patient instructed that if SBP consistently > 140 or DBP > 90, must inform clinic    # CAD:   # HLD  Single vessel CAD, dating back to 2003 at which time she had PCI of RCA. CTA with heavy calcification. LDL 63 7/2021.  - continue ASA, Plavix  - continue statin  - continue BB    # Bilateral LE PAD  # Claudication  - Vascular procedure 10/19    # Carotid disease.    Testing 2/2020 without stenosis > 70%  - continue to monitor     # Left subclavian artery stenosis.  There was a 57 mm Hg pressure gradient between the right and left arms.  CT angiogram showed subtotal occlusion of the ostium of the left subclavian artery.   She is asymptomatic and there is no evidence of a subclavian steal phenomenon, no need for intervention at this time  - continue to monitor            Follow-up: 3 months with me.  Sooner if BP high.  Patient given instructions about when to call clinic.  Vascular follow up as scheduled.    A total of 19 minutes spent face to face with patient. An additional 15 minutes spent providing coordination of care, chart review, and documentation.    Nelli Kuhn PA-C  Interventional/Critical Care Cardiology  [phone]        CC  Patient Care Team:  Danuta Tan PA-C as PCP - General (Physician Assistant)  Danuta Tan PA-C as Assigned PCP  Nelli Kuhn PA-C as Physician Assistant (Interventional Cardiology)  Patience Wellington PA-C as Assigned Nephrology Provider  Patience Wellington PA-C as Physician Assistant (Nephrology)     Alert and oriented to person, place and time, memory intact, behavior appropriate to situation, PERRL.

## 2021-10-12 ENCOUNTER — VIRTUAL VISIT (OUTPATIENT)
Dept: CARDIOLOGY | Facility: CLINIC | Age: 71
End: 2021-10-12
Payer: COMMERCIAL

## 2021-10-12 DIAGNOSIS — I25.10 CORONARY ARTERY DISEASE DUE TO LIPID RICH PLAQUE: ICD-10-CM

## 2021-10-12 DIAGNOSIS — I25.2 OLD MYOCARDIAL INFARCTION: ICD-10-CM

## 2021-10-12 DIAGNOSIS — I10 HYPERTENSION GOAL BP (BLOOD PRESSURE) < 140/90: ICD-10-CM

## 2021-10-12 DIAGNOSIS — I25.83 CORONARY ARTERY DISEASE DUE TO LIPID RICH PLAQUE: ICD-10-CM

## 2021-10-12 PROCEDURE — 99214 OFFICE O/P EST MOD 30 MIN: CPT | Mod: 95 | Performed by: PHYSICIAN ASSISTANT

## 2021-10-12 RX ORDER — DILTIAZEM HYDROCHLORIDE 300 MG/1
300 CAPSULE, EXTENDED RELEASE ORAL DAILY
COMMUNITY
End: 2021-10-21

## 2021-10-12 RX ORDER — METOPROLOL SUCCINATE 100 MG/1
100 TABLET, EXTENDED RELEASE ORAL DAILY
Qty: 90 TABLET | Refills: 3 | COMMUNITY
Start: 2021-10-12 | End: 2022-03-22

## 2021-10-12 NOTE — PATIENT INSTRUCTIONS
You were seen today in the Cardiovascular Clinic at the [facility].  Today, you were seen by Nelli Kuhn PA-C for your cardiovascular care.     Changes Made Today:  1. Start taking lisinopril in the morning with the rest of medications  2. No other changes to medication doses    Follow Up Appointment/Tests:  1. Start taking BP approximately 2 hours after your morning medications  2. Continue BP log  3. If systolic BP (top number) is always greater than 140 or if diastolic BP (low number) is always greater than 90, please call our clinic  4. If you experience more lightheadedness, please call our clinic  5. If your pulse is always less than 60, please call our clinic.  6. Follow up with me in 3 months  7. Follow up with vascular as instructed.      Questions and [phone].   First press #1 for the University and then press #3 for Medical Questions to reach the Cardiology triage nurse.     On Call Cardiologist for after hours or on weekends: [phone], press option #4 and ask to speak to the on-call Cardiologist.

## 2021-10-12 NOTE — LETTER
10/12/2021      RE: Porsche Manning  [address]  [facility] [address]       Dear Colleague,    Thank you for the opportunity to participate in the care of your patient, Porsche Manning, at the [facility]. Please see a copy of my visit note below.      [facility] - [facility]  Cardiovascular Clinic Note      Referring Provider: No ref. provider found   Primary Care Provider: Danuta Tan     Patient Name: Porsche Manning   MRN: [id number]       History of Present Illness:  Porsche Manning is a 71 year old female with PMH notable or HTN, HLD, hx of tobacco use, CAD (1st MI 1987, BMS to mRCA 2003), PAD (s/p bilateral aorto-iliac stenting 2005, repeat stenting to left common iliac artery 2006), subclavian stenosis, COPD, T2DM, CKD stage III, and hx of cervical cancer. She presents for blood pressure follow up.    The patient was last seen 9/2021 with me. At that visit, we discussed recent LE US which showed severe stenosis of arteries. In addition, we reduced diltiazem due to ongoing bradycardia and hypotension.  Since that point in time, the patient denies chest pain, SOB at rest or with exertion, new LE swelling, and palpitations.  In the last two weeks, she has had a single episode of mild lightheadedness; at that point in time, she notes that her SBP was < 110.  She continues to complain of LE pain with ambulation.  She plans to undergo outpatient diagnostic/therapeutic testing with the vascular team 10/19.    The patient reports the following blood pressures and pulses:  10/12 11am: 150/61 pulse 70  10/11 at 7pm: 107/52 pulse 46  10/9 at 10am: 150-65 pulse 73  10/8 at 3pm: 121/50 pulse 46  10/7 2pm: 145/63 pulse 67  10/6 1pm 147/69 pulse 72  10/5 8pm 113/63 pulse 56  10/3 12pm: 142/63 pulse 65  10/2 5pm 144/62 pulse 66    Pertinent Cardiac Medications:  75mg Plavix daily -  AM  100mg metoprolol succinate daily - AM  20mg lisinopril daily - noon  40mg rosuvastatin daily - AM  300mg diltiazem ER daily - AM  81mg ASA daily - AM    Past Medical History:  Pertinent past medical history as above.      Past Surgical History:  Past Surgical History:   Procedure Laterality Date     BIOPSY      Sample taken from back     CARDIAC SURGERY      Stents     COLONOSCOPY  1996    Results were ok     HYSTERECTOMY, PAP NO LONGER INDICATED  1989    ovaries only, no cervix per pt     SURGICAL HISTORY OF -   1995    bunionectomy     SURGICAL HISTORY OF -   10/10/03    cardiac stenting     SURGICAL HISTORY OF -     stent placed in both of her legs for pad     VASCULAR SURGERY      PAD         Family History:  Family History   Problem Relation Age of Onset     C.A.D. Mother      Breast Cancer Mother      C.A.JOE. Father      Diabetes Father      Hypertension Father      OREN.A.JOE. Maternal Grandfather      OREN.A.JOE. Paternal Grandfather      Neurologic Disorder Brother         epilepsy         Current Medications:  Current Outpatient Medications   Medication Sig Dispense Refill     albuterol (PROAIR HFA/PROVENTIL HFA/VENTOLIN HFA) 108 (90 Base) MCG/ACT inhaler INHALE 2 PUFFS INTO THE LUNGS EVERY 6 HOURS 18 g 0     aspirin 81 MG EC tablet Take 81 mg by mouth daily       BREO ELLIPTA 100-25 MCG/INH inhaler INHALE 1 PUFF BY MOUTH EVERY DAY 60 each 2     buPROPion (WELLBUTRIN XL) 150 MG 24 hr tablet Take 1 tablet (150 mg) by mouth every morning 90 tablet 1     Calcium Carbonate-Vit D-Min (CALCIUM 1200 PO) Take 600 mg by mouth daily        Cholecalciferol (VITAMIN D) 1000 UNIT capsule Take 1 capsule by mouth daily. Take one tablet daily       clopidogrel (PLAVIX) 75 MG tablet TAKE 1 TABLET (75 MG) BY MOUTH DAILY CALL CLINIC TO SCHEDULE FOLLOW UP APPOINTMENT. 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  Eyes: sclera white  Cardiovascular: visible extremities without edema  Respiratory: respirations non-labored  Musculoskeletal: visible muscle/joints normal.   Skin: visible skin appearance without worrisome lesions.   Neurologic: AOx3,  Psychiatric: appropriate affect, eye contact, intact thought and speech      Laboratory Data:  LIPID RESULTS:  Recent Labs   Lab Test 07/20/21  1202 01/28/21  1004 10/17/16  0825 11/04/15  1142 07/09/15  1013 07/09/15  1013   CHOL 154 168   < > 141   < > 125   HDL 57 62   < > 71   < > 61   LDL 63 73   < > 48   < > 35   TRIG 172* 166*   < > 112   < > 145   CHOLHDLRATIO  --   --   --  2.0  --  2.0    < > = values in this interval not displayed.        LIVER ENZYME RESULTS:  Recent Labs   Lab Test 04/22/21  1044 01/28/21  1004   AST 15 16   ALT 24 30       CBC RESULTS:  Recent Labs   Lab Test 08/17/21  1159 07/27/21  1019   WBC 9.0 9.0   HGB 12.8 14.4   HCT 38.4 41.2    217       BMP RESULTS:  Recent Labs   Lab Test 08/17/21  1159 07/27/21  1019    135   POTASSIUM 4.4 4.0   CHLORIDE 103 102   CO2 21 25   ANIONGAP 13 8   * 135*   BUN 22 13   CR 1.70* 1.36*   VIANEY 9.5 9.8     A1C RESULTS:  Lab Results   Component Value Date    A1C 6.7 (H) 04/22/2021       Pertinent  Procedures/Imaging:  Arterial duplex ultrasound, bilateral and at 3 levels 9/13/2021:  1. RIGHT:       A. Resting CLAUDINE is ABNORMAL, 0.73, previously borderline, 0.93.       B. Resting TBI is ABNORMAL, 0.42.       C. Segmental pressures suggest femoropopliteal disease and small  vessel disease.  2. LEFT:       A. Resting CLAUDINE is ABNORMAL, 0.24, previously borderline, 0.93.       B. Resting TBI is ABNORMAL, 0.09.       C. Segmental pressures suggest iliac and small vessel disease.  3. Left brachial pressure now 68 mmHg less than the right (previously  21 mmHg) suggesting a left central stenosis. If clinically indicated,  further evaluation with CTA could be considered.  4. Exercise study not performed. The patient appeared unstable on her  feet and it did not seem safe to subject her to the treadmill test.     Carotid US 2/2020:  1. RIGHT ICA: 50-69% diameter stenosis by grayscale imaging and  sonographic velocity criteria, unchanged from 2015.  2. LEFT ICA:  Less than 50% diameter narrowing by grayscale imaging  and sonographic velocity criteria, unchanged from 2015.  3. Subclavian steal physiology with retrograde left vertebral artery  flow, unchanged. Correlate for symptoms.      Assessment:  Porsche Manning is a 71 year old female with PMH notable or HTN, HLD, hx of tobacco use, CAD (1st MI 1987, BMS to mRCA 2003), PAD (s/p bilateral aorto-iliac stenting 2005, repeat stenting to left common iliac artery 2006), subclavian stenosis, COPD, T2DM, CKD stage III, and hx of cervical cancer. She presents for blood pressure follow up.    71 year old female presenting for BP follow up.  BP improved as compared to 2 months prior.  While still experiencing periods of bradycardia (at least on home monitor), no longer experiencing daily lightheadedness/dizziness.  2 months prior, discussed utilization of lisinopril in the afternoon rather than in the morning.  Noon BPs now higher, having more low BP at night.      Plan:  #  Bradycardia  # HTN  BP in left arm is FALSELY low, presumably due to left subclavian artery stenosis.   Patient has been checking home blood pressures solely in the right arm.   - Continue lisinopril, begin taking once again in the morning  - Continue metoprolol 100 mg XL daily  - continue diltiazem to 300 mg ER daily  - instructed patient to take BP approximately 2 hours after AM meds (between 9am and 10am).  - patient instructed that if SBP consistently > 140 or DBP > 90, must inform clinic    # CAD:   # HLD  Single vessel CAD, dating back to 2003 at which time she had PCI of RCA. CTA with heavy calcification. LDL 63 7/2021.  - continue ASA, Plavix  - continue statin  - continue BB    # Bilateral LE PAD  # Claudication  - Vascular procedure 10/19    # Carotid disease.    Testing 2/2020 without stenosis > 70%  - continue to monitor     # Left subclavian artery stenosis.  There was a 57 mm Hg pressure gradient between the right and left arms.  CT angiogram showed subtotal occlusion of the ostium of the left subclavian artery.   She is asymptomatic and there is no evidence of a subclavian steal phenomenon, no need for intervention at this time  - continue to monitor    Follow-up: 3 months with me.  Sooner if BP high.  Patient given instructions about when to call clinic.  Vascular follow up as scheduled.    A total of 19 minutes spent face to face with patient. An additional 15 minutes spent providing coordination of care, chart review, and documentation.    Nelli Kuhn PA-C  Interventional/Critical Care Cardiology  [phone]    CC  Patient Care Team:  Danuta Tan PA-C as PCP - General (Physician Assistant)  Patience Wellington PA-C as Assigned Nephrology Provider

## 2021-10-13 NOTE — PROGRESS NOTES
[facility] [place]  [address] [facility] [address]  Phone: [phone]  Primary Provider: Danuta Tan  Pre-op Performing Provider: DANUTA TAN      PREOPERATIVE EVALUATION:  Today's date: 10/15/2021    Porsche Manning is a 71 year old female who presents for a preoperative evaluation.    Surgical Information:  Surgery/Procedure: Left iliac angiogram  Surgery Location: [facility]   Surgeon: Britni Bob MD  Surgery Date: 10/19/2021  Time of Surgery: 8:00am  Where patient plans to recover: At home with family  Fax number for surgical facility: Note does not need to be faxed, will be available electronically in Epic.    Type of Anesthesia Anticipated: Combined MAC with Local    Assessment & Plan     The proposed surgical procedure is considered INTERMEDIATE risk.      ICD-10-CM    1. Preop general physical exam  Z01.818 CBC with Platelets & Differential     Comprehensive metabolic panel     EKG 12-lead complete w/read - Clinics     CBC with Platelets & Differential     Comprehensive metabolic panel   2. Atherosclerosis of native arteries of extremities with rest pain, other extremity (H)  I70.228    3. Hypertension goal BP (blood pressure) < 140/90  I10    4. Type 2 diabetes mellitus with stage 3 chronic kidney disease, without long-term current use of insulin, unspecified whether stage 3a or 3b CKD (H)  E11.22 Hemoglobin A1c    N18.30 Hemoglobin A1c   5. Hyperlipidemia LDL goal <100  E78.5    6. Chronic obstructive pulmonary disease, unspecified COPD type (H)  J44.9    7. Stage 3b chronic kidney disease (H)  N18.32    8. Old myocardial infarction  I25.2    9. Osteopenia of multiple sites  M85.89 XR Thoracic Lumbar Spine 2 Views   10. Morbid obesity (H)  E66.01    11. Encounter for screening for other viral diseases  Z11.59 Asymptomatic COVID-19 Virus (Coronavirus) by PCR Nose   12. Need for prophylactic  vaccination and inoculation against influenza  Z23 INFLUENZA, QUAD, HIGH DOSE, PF, 65YR + (FLUZONE HD)         Risks and Recommendations:  The patient has the following additional risks and recommendations for perioperative complications:   - Consult Hospitalist / IM to assist with post-op medical management  Cardiovascular:   - Cardiology consulted 10/12/21  Assessment:  Porsche Manning is a 71 year old female with PMH notable or HTN, HLD, hx of tobacco use, CAD (1st MI 1987, BMS to mRCA 2003), PAD (s/p bilateral aorto-iliac stenting 2005, repeat stenting to left common iliac artery 2006), subclavian stenosis, COPD, T2DM, CKD stage III, and hx of cervical cancer. She presents for blood pressure follow up.     71 year old female presenting for BP follow up.  BP improved as compared to 2 months prior.  While still experiencing periods of bradycardia (at least on home monitor), no longer experiencing daily lightheadedness/dizziness.  2 months prior, discussed utilization of lisinopril in the afternoon rather than in the morning.  Noon BPs now higher, having more low BP at night.        Plan:  # Bradycardia  # HTN  BP in left arm is FALSELY low, presumably due to left subclavian artery stenosis.   Patient has been checking home blood pressures solely in the right arm.   - Continue lisinopril, begin taking once again in the morning  - Continue metoprolol 100 mg XL daily  - continue diltiazem to 300 mg ER daily  - instructed patient to take BP approximately 2 hours after AM meds (between 9am and 10am).  - patient instructed that if SBP consistently > 140 or DBP > 90, must inform clinic     # CAD:   # HLD  Single vessel CAD, dating back to 2003 at which time she had PCI of RCA. CTA with heavy calcification. LDL 63 7/2021.  - continue ASA, Plavix  - continue statin  - continue BB     # Bilateral LE PAD  # Claudication  - Vascular procedure 10/19     # Carotid disease.    Testing 2/2020 without stenosis > 70%  -  continue to monitor     # Left subclavian artery stenosis.  There was a 57 mm Hg pressure gradient between the right and left arms.  CT angiogram showed subtotal occlusion of the ostium of the left subclavian artery.   She is asymptomatic and there is no evidence of a subclavian steal phenomenon, no need for intervention at this time  - continue to monitor    Pulmonary:    - Incentive spirometry post-op   - Not currently an active nicotine user, continue smoking cessation     Medication Instructions:   - aspirin: Patient is at increased risk of thrombosis (e.g. stenting within the last year), continue aspirin without modification. Consider consultation with cardiology.    - clopidrogel (Plavix), prasugrel (Effient), ticagrelor (Brilinta): Patient has a cardiac stent. Medication will NOT be stopped until cleared by cardiology.    - ACE/ARB: May be continued on the day of surgery.    - Beta Blockers: Continue taking on the day of surgery.   - ibuprofen (Advil, Motrin): HOLD 1 day before surgery.    - SSRIs, SNRIs, TCAs, Antipsychotics: Continue without modification.    - LABA, inhaled corticosteroid, long-acting anticholinergics: Continue without modification.   - rescue Inhaler: Continue PRN. Bring to hospital on the day of surgery.    RECOMMENDATION:  APPROVAL GIVEN to proceed with proposed procedure, without further diagnostic evaluation.    Review of prior external note(s) from - cardiology, vascular, previous routine notes      40 minutes spent on the date of the encounter doing chart review, history and exam, documentation and further activities per the note        Subjective     HPI related to upcoming procedure: history of severe PAD with plans for left iliac angiogram with possible intervention    Preop Questions 10/9/2021   1. Have you ever had a heart attack or stroke? YES - MI, 1908s   2. Have you ever had surgery on your heart or blood vessels, such as a stent placement, a coronary artery bypass, or  surgery on an artery in your head, neck, heart, or legs? YES - stents   3. Do you have chest pain with activity? No   4. Do you have a history of  heart failure? No   5. Do you currently have a cold, bronchitis or symptoms of other infection? No   6. Do you have a cough, shortness of breath, or wheezing? No   7. Do you or anyone in your family have previous history of blood clots? No   8. Do you or does anyone in your family have a serious bleeding problem such as prolonged bleeding following surgeries or cuts? No   9. Have you ever had problems with anemia or been told to take iron pills? No   10. Have you had any abnormal blood loss such as black, tarry or bloody stools, or abnormal vaginal bleeding? No   11. Have you ever had a blood transfusion? No   12. Are you willing to have a blood transfusion if it is medically needed before, during, or after your surgery? Yes   13. Have you or any of your relatives ever had problems with anesthesia? No   14. Do you have sleep apnea, excessive snoring or daytime drowsiness? No   15. Do you have any artifical heart valves or other implanted medical devices like a pacemaker, defibrillator, or continuous glucose monitor? No   16. Do you have artificial joints? No   17. Are you allergic to latex? No       Health Care Directive:  Patient has a Health Care Directive on file      Preoperative Review of :   reviewed - no record of controlled substances prescribed.      Status of Chronic Conditions:  See problem list for active medical problems.  Problems all longstanding and stable, except as noted/documented.  See ROS for pertinent symptoms related to these conditions.    CAD - Patient has a longstanding history of moderate-severe CAD. Patient denies recent chest pain or NTG use, denies exercise induced dyspnea or PND. See cardiology notes above.     COPD - Patient has a longstanding history of moderate-severe COPD . Patient has been doing well overall noting NO SYMPTOMS and  continues on medication regimen consisting of daily inhalers and rescue inhalers prn without adverse reactions or side effects.    DEPRESSION - Patient has a long history of Depression of moderate severity requiring medication for control with recent symptoms being stable..Current symptoms of depression include none.     HYPERLIPIDEMIA - Patient has a long history of significant Hyperlipidemia requiring medication for treatment with recent good control. Patient reports no problems or side effects with the medication.     HYPERTENSION - Patient has longstanding history of HTN , currently denies any symptoms referable to elevated blood pressure. Specifically denies chest pain, palpitations, dyspnea, orthopnea, PND or peripheral edema. Blood pressure readings have not been in normal range, but followed closely by cardiology. Current medication regimen is as listed below. Patient denies any side effects of medication.     DIABETES - Patient has a longstanding history of DiabetesType Type II . Patient is being treated with diet and denies significant side effects. Control has been good. Complicating factors include but are not limited to: hypertension, hyperlipidemia, chronic kidney disease and CAD/PVD.       Review of Systems  Constitutional, neuro, ENT, endocrine, pulmonary, cardiac, gastrointestinal, genitourinary, musculoskeletal, integument and psychiatric systems are negative, except as otherwise noted.    Patient Active Problem List    Diagnosis Date Noted     Obesity (BMI 35.0-39.9) with comorbidity (H) 10/25/2018     Priority: Medium     Chronic obstructive pulmonary disease, unspecified COPD type (H) 03/08/2016     Priority: Medium     CKD (chronic kidney disease) stage 3, GFR 30-59 ml/min (H) 10/12/2015     Priority: Medium     Subclavian artery stenosis, left (H) 05/01/2013     Priority: Medium     Advanced directives, counseling/discussion 12/15/2011     Priority: Medium     Type 2 diabetes mellitus with  renal manifestations (H)      Priority: Medium     Gaol < 7%       Vitamin D deficiency disease 01/20/2011     Priority: Medium     Hypertension: MEASURE BP IN RIGHT ARM ONLY      Priority: Medium     Osteopenia      Priority: Medium     HYPERLIPIDEMIA LDL GOAL <100 05/09/2010     Priority: Medium     Peripheral vascular disease (H) 10/12/2006     Priority: Medium     Problem list name updated by automated process. Provider to review       Old myocardial infarction      Priority: Medium      Past Medical History:   Diagnosis Date     Brachial neuritis or radiculitis NOS      Chronic obstructive pulmonary disease, unspecified COPD type (H) 3/8/2016     Coronary artery disease     Doing fine     H/O tobacco use, presenting hazards to health      Hyperlipidemia LDL goal < 100      Hypertension goal BP (blood pressure) < 140/90      Malignant neoplasm of other specified sites of cervix      Old myocardial infarction      Osteopenia      Peripheral vascular disease, unspecified (H)      Type 2 diabetes, HbA1c goal < 7% (H)      Past Surgical History:   Procedure Laterality Date     BIOPSY      Sample taken from back     CARDIAC SURGERY      Stents     COLONOSCOPY  1996    Results were ok     HYSTERECTOMY, PAP NO LONGER INDICATED  1989    ovaries only, no cervix per pt     SURGICAL HISTORY OF - 1995    bunionectomy     SURGICAL HISTORY OF -   10/10/03    cardiac stenting     SURGICAL HISTORY OF -     stent placed in both of her legs for pad     VASCULAR SURGERY      PAD     Current Outpatient Medications   Medication Sig Dispense Refill     albuterol (PROAIR HFA/PROVENTIL HFA/VENTOLIN HFA) 108 (90 Base) MCG/ACT inhaler INHALE 2 PUFFS INTO THE LUNGS EVERY 6 HOURS 18 g 0     aspirin 81 MG EC tablet Take 81 mg by mouth daily       BREO ELLIPTA 100-25 MCG/INH inhaler INHALE 1 PUFF BY MOUTH EVERY DAY 60 each 2     buPROPion (WELLBUTRIN XL) 150 MG 24 hr tablet Take 1 tablet (150 mg) by mouth every morning 90 tablet 1  "    Calcium Carbonate-Vit D-Min (CALCIUM 1200 PO) Take 600 mg by mouth daily        Cholecalciferol (VITAMIN D) 1000 UNIT capsule Take 1 capsule by mouth daily. Take one tablet daily       clopidogrel (PLAVIX) 75 MG tablet TAKE 1 TABLET (75 MG) BY MOUTH DAILY CALL CLINIC TO SCHEDULE FOLLOW UP APPOINTMENT. 90 tablet 3     diltiazem ER (TIAZAC) 300 MG 24 hr ER beaded capsule Take 300 mg by mouth daily       lisinopril (ZESTRIL) 20 MG tablet Take 1 tablet (20 mg) by mouth daily 90 tablet 3     metoprolol succinate ER (TOPROL-XL) 100 MG 24 hr tablet Take 1 tablet (100 mg) by mouth daily 90 tablet 3     rosuvastatin (CRESTOR) 40 MG tablet Take 1 tablet (40 mg) by mouth daily For additional refills, please schedule a follow-up appointment at [phone] 90 tablet 3     SPIRIVA HANDIHALER 18 MCG inhaled capsule INHALE 1 CAPSULE (18 MCG) INTO THE LUNGS DAILY 90 capsule 3       Allergies   Allergen Reactions     Morphine Nausea and Vomiting     Penicillins Nausea and Vomiting        Social History     Tobacco Use     Smoking status: Former Smoker     Packs/day: 0.50     Years: 40.00     Pack years: 20.00     Types: Cigarettes     Quit date: 4/25/2018     Years since quitting: 3.4     Smokeless tobacco: Never Used   Substance Use Topics     Alcohol use: Yes     Comment: 2 to 4 beers or mixed drinks weekly     Family History   Problem Relation Age of Onset     C.A.D. Mother      Breast Cancer Mother      C.A.D. Father      Diabetes Father      Hypertension Father      C.A.D. Maternal Grandfather      C.A.D. Paternal Grandfather      Neurologic Disorder Brother         epilepsy     History   Drug Use No         Objective     BP (!) 171/72   Pulse 75   Temp 98  F (36.7  C)   Ht 1.575 m (5' 2\")   Wt 84.7 kg (186 lb 12.8 oz)   SpO2 94%   BMI 34.17 kg/m      Physical Exam    GENERAL APPEARANCE: healthy, alert and no distress     EYES: EOMI, PERRL     HENT: ear canals and TM's normal and nose and mouth without ulcers or " lesions     NECK: no adenopathy, no asymmetry, masses, or scars and thyroid normal to palpation     RESP: lungs clear to auscultation - no rales, rhonchi or wheezes     CV: regular rates and rhythm, normal S1 S2, no S3 or S4 and no murmur, click or rub     ABDOMEN:  soft, nontender, no HSM or masses and bowel sounds normal     MS: extremities normal- no gross deformities noted, no evidence of inflammation in joints, FROM in all extremities.     SKIN: no suspicious lesions or rashes     NEURO: Normal strength and tone, sensory exam grossly normal, mentation intact and speech normal     PSYCH: mentation appears normal. and affect normal/bright     LYMPHATICS: No cervical adenopathy    Recent Labs   Lab Test 08/17/21  1159 07/27/21  1019 04/22/21  1044 01/28/21  1004 01/28/21  1004   HGB 12.8 14.4  --   --  14.8    217  --   --  260    135 137   < > 137   POTASSIUM 4.4 4.0 4.4   < > 3.7   CR 1.70* 1.36* 0.97   < > 1.07*   A1C  --   --  6.7*  --  6.7*    < > = values in this interval not displayed.        Diagnostics:  Labs pending at this time.  Results will be reviewed when available.   EKG required for known coronary heart disease and peripheral arterial disease and not completed in the last 90 days.   EKG: unable to read in EPIC at this time    Revised Cardiac Risk Index (RCRI):  The patient has the following serious cardiovascular risks for perioperative complications:   - Coronary Artery Disease (MI, positive stress test, angina, Qs on EKG) = 1 point     RCRI Interpretation: 1 point: Class II (low risk - 0.9% complication rate)         Signed Electronically by: Danuta Tan PA-C  Copy of this evaluation report is provided to requesting physician.

## 2021-10-13 NOTE — PATIENT INSTRUCTIONS

## 2021-10-15 ENCOUNTER — ANCILLARY PROCEDURE (OUTPATIENT)
Dept: GENERAL RADIOLOGY | Facility: CLINIC | Age: 71
End: 2021-10-15
Attending: PHYSICIAN ASSISTANT
Payer: COMMERCIAL

## 2021-10-15 ENCOUNTER — OFFICE VISIT (OUTPATIENT)
Dept: FAMILY MEDICINE | Facility: CLINIC | Age: 71
End: 2021-10-15
Payer: COMMERCIAL

## 2021-10-15 VITALS
HEIGHT: 62 IN | HEART RATE: 75 BPM | TEMPERATURE: 98 F | WEIGHT: 186.8 LBS | BODY MASS INDEX: 34.37 KG/M2 | SYSTOLIC BLOOD PRESSURE: 171 MMHG | OXYGEN SATURATION: 94 % | DIASTOLIC BLOOD PRESSURE: 72 MMHG

## 2021-10-15 DIAGNOSIS — M85.89 OSTEOPENIA OF MULTIPLE SITES: ICD-10-CM

## 2021-10-15 DIAGNOSIS — E78.5 HYPERLIPIDEMIA LDL GOAL <100: ICD-10-CM

## 2021-10-15 DIAGNOSIS — N18.32 STAGE 3B CHRONIC KIDNEY DISEASE (H): ICD-10-CM

## 2021-10-15 DIAGNOSIS — Z01.818 PREOP GENERAL PHYSICAL EXAM: Primary | ICD-10-CM

## 2021-10-15 DIAGNOSIS — I10 HYPERTENSION GOAL BP (BLOOD PRESSURE) < 140/90: ICD-10-CM

## 2021-10-15 DIAGNOSIS — Z11.59 ENCOUNTER FOR SCREENING FOR OTHER VIRAL DISEASES: ICD-10-CM

## 2021-10-15 DIAGNOSIS — E66.01 MORBID OBESITY (H): ICD-10-CM

## 2021-10-15 DIAGNOSIS — J44.9 CHRONIC OBSTRUCTIVE PULMONARY DISEASE, UNSPECIFIED COPD TYPE (H): ICD-10-CM

## 2021-10-15 DIAGNOSIS — N18.30 TYPE 2 DIABETES MELLITUS WITH STAGE 3 CHRONIC KIDNEY DISEASE, WITHOUT LONG-TERM CURRENT USE OF INSULIN, UNSPECIFIED WHETHER STAGE 3A OR 3B CKD (H): ICD-10-CM

## 2021-10-15 DIAGNOSIS — I25.2 OLD MYOCARDIAL INFARCTION: ICD-10-CM

## 2021-10-15 DIAGNOSIS — Z23 NEED FOR PROPHYLACTIC VACCINATION AND INOCULATION AGAINST INFLUENZA: ICD-10-CM

## 2021-10-15 DIAGNOSIS — E11.22 TYPE 2 DIABETES MELLITUS WITH STAGE 3 CHRONIC KIDNEY DISEASE, WITHOUT LONG-TERM CURRENT USE OF INSULIN, UNSPECIFIED WHETHER STAGE 3A OR 3B CKD (H): ICD-10-CM

## 2021-10-15 DIAGNOSIS — I70.228 ATHEROSCLEROSIS OF NATIVE ARTERIES OF EXTREMITIES WITH REST PAIN, OTHER EXTREMITY (H): ICD-10-CM

## 2021-10-15 LAB
ALBUMIN SERPL-MCNC: 3.8 G/DL (ref 3.4–5)
ALP SERPL-CCNC: 98 U/L (ref 40–150)
ALT SERPL W P-5'-P-CCNC: 27 U/L (ref 0–50)
ANION GAP SERPL CALCULATED.3IONS-SCNC: 9 MMOL/L (ref 3–14)
AST SERPL W P-5'-P-CCNC: 19 U/L (ref 0–45)
BASOPHILS # BLD AUTO: 0 10E3/UL (ref 0–0.2)
BASOPHILS NFR BLD AUTO: 0 %
BILIRUB SERPL-MCNC: 0.4 MG/DL (ref 0.2–1.3)
BUN SERPL-MCNC: 9 MG/DL (ref 7–30)
CALCIUM SERPL-MCNC: 9.4 MG/DL (ref 8.5–10.1)
CHLORIDE BLD-SCNC: 107 MMOL/L (ref 94–109)
CO2 SERPL-SCNC: 20 MMOL/L (ref 20–32)
CREAT SERPL-MCNC: 0.9 MG/DL (ref 0.52–1.04)
EOSINOPHIL # BLD AUTO: 0.3 10E3/UL (ref 0–0.7)
EOSINOPHIL NFR BLD AUTO: 3 %
ERYTHROCYTE [DISTWIDTH] IN BLOOD BY AUTOMATED COUNT: 12.7 % (ref 10–15)
GFR SERPL CREATININE-BSD FRML MDRD: 65 ML/MIN/1.73M2
GLUCOSE BLD-MCNC: 143 MG/DL (ref 70–99)
HBA1C MFR BLD: 6 % (ref 0–5.6)
HCT VFR BLD AUTO: 42.2 % (ref 35–47)
HGB BLD-MCNC: 13.7 G/DL (ref 11.7–15.7)
LYMPHOCYTES # BLD AUTO: 1.7 10E3/UL (ref 0.8–5.3)
LYMPHOCYTES NFR BLD AUTO: 15 %
MCH RBC QN AUTO: 31.9 PG (ref 26.5–33)
MCHC RBC AUTO-ENTMCNC: 32.5 G/DL (ref 31.5–36.5)
MCV RBC AUTO: 98 FL (ref 78–100)
MONOCYTES # BLD AUTO: 1.1 10E3/UL (ref 0–1.3)
MONOCYTES NFR BLD AUTO: 10 %
NEUTROPHILS # BLD AUTO: 8.1 10E3/UL (ref 1.6–8.3)
NEUTROPHILS NFR BLD AUTO: 72 %
PLATELET # BLD AUTO: 236 10E3/UL (ref 150–450)
POTASSIUM BLD-SCNC: 3.7 MMOL/L (ref 3.4–5.3)
PROT SERPL-MCNC: 7.2 G/DL (ref 6.8–8.8)
RBC # BLD AUTO: 4.29 10E6/UL (ref 3.8–5.2)
SODIUM SERPL-SCNC: 136 MMOL/L (ref 133–144)
WBC # BLD AUTO: 11.3 10E3/UL (ref 4–11)

## 2021-10-15 PROCEDURE — 80053 COMPREHEN METABOLIC PANEL: CPT | Performed by: PHYSICIAN ASSISTANT

## 2021-10-15 PROCEDURE — 99214 OFFICE O/P EST MOD 30 MIN: CPT | Mod: 25 | Performed by: PHYSICIAN ASSISTANT

## 2021-10-15 PROCEDURE — 36415 COLL VENOUS BLD VENIPUNCTURE: CPT | Performed by: PHYSICIAN ASSISTANT

## 2021-10-15 PROCEDURE — 99000 SPECIMEN HANDLING OFFICE-LAB: CPT | Performed by: PHYSICIAN ASSISTANT

## 2021-10-15 PROCEDURE — 72080 X-RAY EXAM THORACOLMB 2/> VW: CPT | Mod: FY | Performed by: RADIOLOGY

## 2021-10-15 PROCEDURE — U0003 INFECTIOUS AGENT DETECTION BY NUCLEIC ACID (DNA OR RNA); SEVERE ACUTE RESPIRATORY SYNDROME CORONAVIRUS 2 (SARS-COV-2) (CORONAVIRUS DISEASE [COVID-19]), AMPLIFIED PROBE TECHNIQUE, MAKING USE OF HIGH THROUGHPUT TECHNOLOGIES AS DESCRIBED BY CMS-2020-01-R: HCPCS | Mod: 90 | Performed by: PHYSICIAN ASSISTANT

## 2021-10-15 PROCEDURE — 83036 HEMOGLOBIN GLYCOSYLATED A1C: CPT | Performed by: PHYSICIAN ASSISTANT

## 2021-10-15 PROCEDURE — G0008 ADMIN INFLUENZA VIRUS VAC: HCPCS | Performed by: PHYSICIAN ASSISTANT

## 2021-10-15 PROCEDURE — U0005 INFEC AGEN DETEC AMPLI PROBE: HCPCS | Mod: 90 | Performed by: PHYSICIAN ASSISTANT

## 2021-10-15 PROCEDURE — 85025 COMPLETE CBC W/AUTO DIFF WBC: CPT | Performed by: PHYSICIAN ASSISTANT

## 2021-10-15 PROCEDURE — 90662 IIV NO PRSV INCREASED AG IM: CPT | Performed by: PHYSICIAN ASSISTANT

## 2021-10-15 ASSESSMENT — MIFFLIN-ST. JEOR: SCORE: 1320.57

## 2021-10-15 NOTE — Clinical Note
"Dr. Bob,  Is patient ok to continue blood thinners (plavix/asa) prior to surgery or do you prefer she stop them??    Thanks  Danuta \"Eduard\" PHILL Tan  "

## 2021-10-16 NOTE — RESULT ENCOUNTER NOTE
"Daisy Morrell  Your attached labs are within normal limits! Best of luck with your upcoming procedure!  Please contact the office with any questions or concerns.    Danuta Martinez \"Eduard\" PHILL Tan    "

## 2021-10-17 LAB — SARS-COV-2 RNA RESP QL NAA+PROBE: NOT DETECTED

## 2021-10-18 ENCOUNTER — ANESTHESIA EVENT (OUTPATIENT)
Dept: SURGERY | Facility: CLINIC | Age: 71
End: 2021-10-18
Payer: COMMERCIAL

## 2021-10-18 ASSESSMENT — COPD QUESTIONNAIRES: COPD: 1

## 2021-10-18 NOTE — ANESTHESIA PREPROCEDURE EVALUATION
Anesthesia Pre-Procedure Evaluation    Patient: Porsche Manning   MRN: [id number] : 1950        Preoperative Diagnosis: Atherosclerosis of native artery of left lower extremity with rest pain (H) [I70.222]    Procedure : Procedure(s):  Left iliac angiogram  possible intervention          Past Medical History:   Diagnosis Date     Brachial neuritis or radiculitis NOS      Chronic obstructive pulmonary disease, unspecified COPD type (H) 3/8/2016     Coronary artery disease     Doing fine     H/O tobacco use, presenting hazards to health      Hyperlipidemia LDL goal < 100      Hypertension goal BP (blood pressure) < 140/90      Malignant neoplasm of other specified sites of cervix      Old myocardial infarction      Osteopenia      Peripheral vascular disease, unspecified (H)      Type 2 diabetes, HbA1c goal < 7% (H)       Past Surgical History:   Procedure Laterality Date     BIOPSY      Sample taken from back     CARDIAC SURGERY      Stents     COLONOSCOPY      Results were ok     HYSTERECTOMY, PAP NO LONGER INDICATED      ovaries only, no cervix per pt     SURGICAL HISTORY OF -       bunionectomy     SURGICAL HISTORY OF -   10/10/03    cardiac stenting     SURGICAL HISTORY OF -       stent placed in both of her legs for pad     VASCULAR SURGERY      PAD      Allergies   Allergen Reactions     Morphine Nausea and Vomiting     Penicillins Nausea and Vomiting      Social History     Tobacco Use     Smoking status: Former Smoker     Packs/day: 0.50     Years: 40.00     Pack years: 20.00     Types: Cigarettes     Quit date: 2018     Years since quitting: 3.4     Smokeless tobacco: Never Used   Substance Use Topics     Alcohol use: Yes     Comment: 2 to 4 beers or mixed drinks weekly      Wt Readings from Last 1 Encounters:   10/15/21 84.7 kg (186 lb 12.8 oz)        Anesthesia Evaluation   Pt has had prior anesthetic. Type: General and MAC.    No history of anesthetic complications        ROS/MED HX  ENT/Pulmonary:     (+) tobacco use, Past use, COPD,     Neurologic:  - neg neurologic ROS  (-) no CVA and no TIA   Cardiovascular: Comment: 1. RIGHT ICA: 50-69% diameter stenosis by grayscale imaging and  sonographic velocity criteria, unchanged from 2015.     2. LEFT ICA:  Less than 50% diameter narrowing by grayscale imaging  and sonographic velocity criteria, unchanged from 2015.     3. Subclavian steal physiology with retrograde left vertebral artery  flow, unchanged. Correlate for symptoms.    (+) Dyslipidemia hypertension-Peripheral Vascular Disease (left subclavian stenosis)-- Carotid Stenosis and Other. CAD -past MI -stent-2003. No previous cardiac testing     METS/Exercise Tolerance:     Hematologic:  - neg hematologic  ROS  (-) anemia   Musculoskeletal:   (+) arthritis,     GI/Hepatic:  - neg GI/hepatic ROS  (-) GERD and liver disease   Renal/Genitourinary:     (+) renal disease, type: CRI, Pt does not require dialysis,     Endo:     (+) type II DM, Diabetic complications: nephropathy cardiac problems. Obesity,     Psychiatric/Substance Use:  - neg psychiatric ROS     Infectious Disease: Comment: COVID NEGATIVE 10/15/21 - neg infectious disease ROS  (-) Recent Fever   Malignancy:  - neg malignancy ROS  (-) malignancy   Other:  - neg other ROS   (-) Any chance pregnant       Physical Exam    Airway        Mallampati: II   TM distance: > 3 FB   Neck ROM: full   Mouth opening: > 3 cm    Respiratory Devices and Support         Dental  no notable dental history         Cardiovascular   cardiovascular exam normal          Pulmonary   pulmonary exam normal        breath sounds clear to auscultation           OUTSIDE LABS:  CBC:   Lab Results   Component Value Date    WBC 11.3 (H) 10/15/2021    WBC 9.0 08/17/2021    HGB 13.7 10/15/2021    HGB 12.8 08/17/2021    HCT 42.2 10/15/2021    HCT 38.4 08/17/2021     10/15/2021     08/17/2021     BMP:   Lab Results   Component Value Date      10/15/2021     08/17/2021    POTASSIUM 3.7 10/15/2021    POTASSIUM 4.4 08/17/2021    CHLORIDE 107 10/15/2021    CHLORIDE 103 08/17/2021    CO2 20 10/15/2021    CO2 21 08/17/2021    BUN 9 10/15/2021    BUN 22 08/17/2021    CR 0.90 10/15/2021    CR 1.70 (H) 08/17/2021     (H) 10/15/2021     (H) 08/17/2021     COAGS:   Lab Results   Component Value Date    PTT 37 10/21/2006    INR 0.97 10/18/2006     POC:   Lab Results   Component Value Date     (H) 05/11/2018     HEPATIC:   Lab Results   Component Value Date    ALBUMIN 3.8 10/15/2021    PROTTOTAL 7.2 10/15/2021    ALT 27 10/15/2021    AST 19 10/15/2021    ALKPHOS 98 10/15/2021    BILITOTAL 0.4 10/15/2021     OTHER:   Lab Results   Component Value Date    A1C 6.0 (H) 10/15/2021    VIANEY 9.4 10/15/2021    PHOS 3.1 08/17/2021    MAG 2.1 07/18/2015    TSH 1.14 04/22/2021    SED 8 06/27/2006       Anesthesia Plan    ASA Status:  3   NPO Status:  NPO Appropriate    Anesthesia Type: MAC.   Induction: Intravenous.   Maintenance: Balanced.        Consents    Anesthesia Plan(s) and associated risks, benefits, and realistic alternatives discussed. Questions answered and patient/representative(s) expressed understanding.     - Discussed with:  Patient      - Extended Intubation/Ventilatory Support Discussed: No.      - Patient is DNR/DNI Status: No         Postoperative Care    Pain management: Multi-modal analgesia.   PONV prophylaxis: Ondansetron (or other 5HT-3), Dexamethasone or Solumedrol     Comments:                Jaime Gould MD

## 2021-10-18 NOTE — RESULT ENCOUNTER NOTE
"Daisy Morrell  Your attached spine xray shows no acute changes or fractures, but signs of arthritis or degenerative changes are present.  Please contact the office with any questions or concerns.    Danuta Martinez \"Eduard\" PHILL Tan    "

## 2021-10-19 ENCOUNTER — HOSPITAL ENCOUNTER (OUTPATIENT)
Facility: CLINIC | Age: 71
Discharge: HOME | End: 2021-10-19
Attending: SURGERY | Admitting: SURGERY
Payer: COMMERCIAL

## 2021-10-19 ENCOUNTER — APPOINTMENT (OUTPATIENT)
Dept: INTERVENTIONAL RADIOLOGY/VASCULAR | Facility: CLINIC | Age: 71
End: 2021-10-19
Attending: SURGERY
Payer: COMMERCIAL

## 2021-10-19 ENCOUNTER — ANESTHESIA (OUTPATIENT)
Dept: SURGERY | Facility: CLINIC | Age: 71
End: 2021-10-19
Payer: COMMERCIAL

## 2021-10-19 VITALS
DIASTOLIC BLOOD PRESSURE: 62 MMHG | OXYGEN SATURATION: 96 % | HEART RATE: 64 BPM | SYSTOLIC BLOOD PRESSURE: 114 MMHG | WEIGHT: 182.1 LBS | TEMPERATURE: 97.4 F | RESPIRATION RATE: 16 BRPM | HEIGHT: 62 IN | BODY MASS INDEX: 33.51 KG/M2

## 2021-10-19 DIAGNOSIS — I70.222 ATHEROSCLEROSIS OF NATIVE ARTERY OF LEFT LOWER EXTREMITY WITH REST PAIN (H): ICD-10-CM

## 2021-10-19 DIAGNOSIS — I70.228: ICD-10-CM

## 2021-10-19 LAB
ABO/RH(D): NORMAL
ANION GAP SERPL CALCULATED.3IONS-SCNC: 7 MMOL/L (ref 3–14)
ANTIBODY SCREEN: NEGATIVE
BUN SERPL-MCNC: 11 MG/DL (ref 7–30)
CALCIUM SERPL-MCNC: 9.4 MG/DL (ref 8.5–10.1)
CHLORIDE BLD-SCNC: 111 MMOL/L (ref 94–109)
CO2 SERPL-SCNC: 23 MMOL/L (ref 20–32)
CREAT SERPL-MCNC: 1.08 MG/DL (ref 0.52–1.04)
ERYTHROCYTE [DISTWIDTH] IN BLOOD BY AUTOMATED COUNT: 12.8 % (ref 10–15)
GFR SERPL CREATININE-BSD FRML MDRD: 52 ML/MIN/1.73M2
GLUCOSE BLD-MCNC: 158 MG/DL (ref 70–99)
GLUCOSE BLDC GLUCOMTR-MCNC: 121 MG/DL (ref 70–99)
GLUCOSE BLDC GLUCOMTR-MCNC: 158 MG/DL (ref 70–99)
HCT VFR BLD AUTO: 40.1 % (ref 35–47)
HGB BLD-MCNC: 13.2 G/DL (ref 11.7–15.7)
MCH RBC QN AUTO: 32.1 PG (ref 26.5–33)
MCHC RBC AUTO-ENTMCNC: 32.9 G/DL (ref 31.5–36.5)
MCV RBC AUTO: 98 FL (ref 78–100)
PLATELET # BLD AUTO: 216 10E3/UL (ref 150–450)
POTASSIUM BLD-SCNC: 3.7 MMOL/L (ref 3.4–5.3)
RBC # BLD AUTO: 4.11 10E6/UL (ref 3.8–5.2)
SODIUM SERPL-SCNC: 141 MMOL/L (ref 133–144)
SPECIMEN EXPIRATION DATE: NORMAL
WBC # BLD AUTO: 8.8 10E3/UL (ref 4–11)

## 2021-10-19 PROCEDURE — 258N000003 HC RX IP 258 OP 636: Performed by: SURGERY

## 2021-10-19 PROCEDURE — 999N000083 HC STATISTIC IR STAFF TIME IN THE OR

## 2021-10-19 PROCEDURE — 250N000011 HC RX IP 250 OP 636: Performed by: STUDENT IN AN ORGANIZED HEALTH CARE EDUCATION/TRAINING PROGRAM

## 2021-10-19 PROCEDURE — C1887 CATHETER, GUIDING: HCPCS | Performed by: SURGERY

## 2021-10-19 PROCEDURE — 76499 UNLISTED DX RADIOGRAPHIC PX: CPT

## 2021-10-19 PROCEDURE — C1894 INTRO/SHEATH, NON-LASER: HCPCS | Performed by: SURGERY

## 2021-10-19 PROCEDURE — 710N000012 HC RECOVERY PHASE 2, PER MINUTE: Performed by: SURGERY

## 2021-10-19 PROCEDURE — 75625 CONTRAST EXAM ABDOMINL AORTA: CPT

## 2021-10-19 PROCEDURE — C1730 CATH, EP, 19 OR FEW ELECT: HCPCS | Performed by: SURGERY

## 2021-10-19 PROCEDURE — 255N000002 HC RX 255 OP 636: Performed by: SURGERY

## 2021-10-19 PROCEDURE — 82962 GLUCOSE BLOOD TEST: CPT

## 2021-10-19 PROCEDURE — 258N000003 HC RX IP 258 OP 636: Performed by: STUDENT IN AN ORGANIZED HEALTH CARE EDUCATION/TRAINING PROGRAM

## 2021-10-19 PROCEDURE — 85027 COMPLETE CBC AUTOMATED: CPT | Performed by: SURGERY

## 2021-10-19 PROCEDURE — 250N000009 HC RX 250: Performed by: SURGERY

## 2021-10-19 PROCEDURE — 370N000017 HC ANESTHESIA TECHNICAL FEE, PER MIN: Performed by: SURGERY

## 2021-10-19 PROCEDURE — 250N000013 HC RX MED GY IP 250 OP 250 PS 637: Performed by: SURGERY

## 2021-10-19 PROCEDURE — C1876 STENT, NON-COA/NON-COV W/DEL: HCPCS | Performed by: SURGERY

## 2021-10-19 PROCEDURE — 86901 BLOOD TYPING SEROLOGIC RH(D): CPT | Performed by: STUDENT IN AN ORGANIZED HEALTH CARE EDUCATION/TRAINING PROGRAM

## 2021-10-19 PROCEDURE — 250N000009 HC RX 250: Performed by: STUDENT IN AN ORGANIZED HEALTH CARE EDUCATION/TRAINING PROGRAM

## 2021-10-19 PROCEDURE — 80048 BASIC METABOLIC PNL TOTAL CA: CPT | Performed by: SURGERY

## 2021-10-19 PROCEDURE — 36415 COLL VENOUS BLD VENIPUNCTURE: CPT | Performed by: SURGERY

## 2021-10-19 PROCEDURE — C1725 CATH, TRANSLUMIN NON-LASER: HCPCS | Performed by: SURGERY

## 2021-10-19 PROCEDURE — 272N000002 HC OR SUPPLY OTHER OPNP: Performed by: SURGERY

## 2021-10-19 PROCEDURE — 37221 PR REVASC ILIAC ART, ANGIO/STENT, INIT VESSEL: CPT | Mod: 50 | Performed by: SURGERY

## 2021-10-19 PROCEDURE — C1760 CLOSURE DEV, VASC: HCPCS | Performed by: SURGERY

## 2021-10-19 PROCEDURE — 360N000082 HC SURGERY LEVEL 2 W/ FLUORO, PER MIN: Performed by: SURGERY

## 2021-10-19 PROCEDURE — C1877 STENT, NON-COAT/COV W/O DEL: HCPCS | Performed by: SURGERY

## 2021-10-19 PROCEDURE — 272N000001 HC OR GENERAL SUPPLY STERILE: Performed by: SURGERY

## 2021-10-19 PROCEDURE — C1769 GUIDE WIRE: HCPCS | Performed by: SURGERY

## 2021-10-19 PROCEDURE — 999N000141 HC STATISTIC PRE-PROCEDURE NURSING ASSESSMENT: Performed by: SURGERY

## 2021-10-19 PROCEDURE — 250N000011 HC RX IP 250 OP 636: Performed by: SURGERY

## 2021-10-19 DEVICE — IMPLANTABLE DEVICE: Type: IMPLANTABLE DEVICE | Site: ILIAC/FEMORALS | Status: FUNCTIONAL

## 2021-10-19 RX ORDER — CEFAZOLIN SODIUM 2 G/100ML
2 INJECTION, SOLUTION INTRAVENOUS SEE ADMIN INSTRUCTIONS
Status: DISCONTINUED | OUTPATIENT
Start: 2021-10-19 | End: 2021-10-19 | Stop reason: HOSPADM

## 2021-10-19 RX ORDER — LIDOCAINE HYDROCHLORIDE 20 MG/ML
INJECTION, SOLUTION INFILTRATION; PERINEURAL PRN
Status: DISCONTINUED | OUTPATIENT
Start: 2021-10-19 | End: 2021-10-19

## 2021-10-19 RX ORDER — SODIUM CHLORIDE, SODIUM LACTATE, POTASSIUM CHLORIDE, CALCIUM CHLORIDE 600; 310; 30; 20 MG/100ML; MG/100ML; MG/100ML; MG/100ML
INJECTION, SOLUTION INTRAVENOUS CONTINUOUS
Status: DISCONTINUED | OUTPATIENT
Start: 2021-10-19 | End: 2021-10-19 | Stop reason: HOSPADM

## 2021-10-19 RX ORDER — ACETAMINOPHEN 325 MG/1
650 TABLET ORAL EVERY 6 HOURS PRN
Status: DISCONTINUED | OUTPATIENT
Start: 2021-10-19 | End: 2021-10-19 | Stop reason: HOSPADM

## 2021-10-19 RX ORDER — CLOPIDOGREL BISULFATE 75 MG/1
150 TABLET ORAL ONCE
Status: COMPLETED | OUTPATIENT
Start: 2021-10-19 | End: 2021-10-19

## 2021-10-19 RX ORDER — PHENYLEPHRINE HCL IN 0.9% NACL 50MG/250ML
.5-1.25 PLASTIC BAG, INJECTION (ML) INTRAVENOUS CONTINUOUS
Status: DISCONTINUED | OUTPATIENT
Start: 2021-10-19 | End: 2021-10-19 | Stop reason: HOSPADM

## 2021-10-19 RX ORDER — HEPARIN SODIUM 1000 [USP'U]/ML
INJECTION, SOLUTION INTRAVENOUS; SUBCUTANEOUS PRN
Status: DISCONTINUED | OUTPATIENT
Start: 2021-10-19 | End: 2021-10-19

## 2021-10-19 RX ORDER — SODIUM CHLORIDE, SODIUM LACTATE, POTASSIUM CHLORIDE, CALCIUM CHLORIDE 600; 310; 30; 20 MG/100ML; MG/100ML; MG/100ML; MG/100ML
INJECTION, SOLUTION INTRAVENOUS CONTINUOUS PRN
Status: DISCONTINUED | OUTPATIENT
Start: 2021-10-19 | End: 2021-10-19

## 2021-10-19 RX ORDER — LIDOCAINE 40 MG/G
CREAM TOPICAL
Status: DISCONTINUED | OUTPATIENT
Start: 2021-10-19 | End: 2021-10-19 | Stop reason: HOSPADM

## 2021-10-19 RX ORDER — OXYCODONE HYDROCHLORIDE 5 MG/1
5 TABLET ORAL EVERY 4 HOURS PRN
Status: DISCONTINUED | OUTPATIENT
Start: 2021-10-19 | End: 2021-10-19 | Stop reason: HOSPADM

## 2021-10-19 RX ORDER — DEXMEDETOMIDINE HYDROCHLORIDE 4 UG/ML
0.2-1.2 INJECTION, SOLUTION INTRAVENOUS CONTINUOUS
Status: DISCONTINUED | OUTPATIENT
Start: 2021-10-19 | End: 2021-10-19 | Stop reason: HOSPADM

## 2021-10-19 RX ORDER — ONDANSETRON 4 MG/1
4 TABLET, ORALLY DISINTEGRATING ORAL EVERY 30 MIN PRN
Status: DISCONTINUED | OUTPATIENT
Start: 2021-10-19 | End: 2021-10-19 | Stop reason: HOSPADM

## 2021-10-19 RX ORDER — SODIUM CHLORIDE 9 MG/ML
INJECTION, SOLUTION INTRAVENOUS CONTINUOUS
Status: DISCONTINUED | OUTPATIENT
Start: 2021-10-19 | End: 2021-10-19 | Stop reason: HOSPADM

## 2021-10-19 RX ORDER — DEXMEDETOMIDINE HYDROCHLORIDE 4 UG/ML
0.2-0.7 INJECTION, SOLUTION INTRAVENOUS CONTINUOUS
Status: DISCONTINUED | OUTPATIENT
Start: 2021-10-19 | End: 2021-10-19 | Stop reason: HOSPADM

## 2021-10-19 RX ORDER — PROPOFOL 10 MG/ML
INJECTION, EMULSION INTRAVENOUS CONTINUOUS PRN
Status: DISCONTINUED | OUTPATIENT
Start: 2021-10-19 | End: 2021-10-19

## 2021-10-19 RX ORDER — ONDANSETRON 2 MG/ML
4 INJECTION INTRAMUSCULAR; INTRAVENOUS EVERY 30 MIN PRN
Status: DISCONTINUED | OUTPATIENT
Start: 2021-10-19 | End: 2021-10-19 | Stop reason: HOSPADM

## 2021-10-19 RX ORDER — CEFAZOLIN SODIUM 2 G/100ML
2 INJECTION, SOLUTION INTRAVENOUS
Status: DISCONTINUED | OUTPATIENT
Start: 2021-10-19 | End: 2021-10-19 | Stop reason: HOSPADM

## 2021-10-19 RX ORDER — IODIXANOL 320 MG/ML
INJECTION, SOLUTION INTRAVASCULAR PRN
Status: DISCONTINUED | OUTPATIENT
Start: 2021-10-19 | End: 2021-10-19 | Stop reason: HOSPADM

## 2021-10-19 RX ORDER — FENTANYL CITRATE 50 UG/ML
25 INJECTION, SOLUTION INTRAMUSCULAR; INTRAVENOUS EVERY 5 MIN PRN
Status: DISCONTINUED | OUTPATIENT
Start: 2021-10-19 | End: 2021-10-19 | Stop reason: HOSPADM

## 2021-10-19 RX ORDER — MEPERIDINE HYDROCHLORIDE 25 MG/ML
12.5 INJECTION INTRAMUSCULAR; INTRAVENOUS; SUBCUTANEOUS
Status: DISCONTINUED | OUTPATIENT
Start: 2021-10-19 | End: 2021-10-19 | Stop reason: HOSPADM

## 2021-10-19 RX ORDER — PROPOFOL 10 MG/ML
INJECTION, EMULSION INTRAVENOUS PRN
Status: DISCONTINUED | OUTPATIENT
Start: 2021-10-19 | End: 2021-10-19

## 2021-10-19 RX ORDER — HYDROMORPHONE HCL IN WATER/PF 6 MG/30 ML
0.2 PATIENT CONTROLLED ANALGESIA SYRINGE INTRAVENOUS EVERY 5 MIN PRN
Status: DISCONTINUED | OUTPATIENT
Start: 2021-10-19 | End: 2021-10-19 | Stop reason: HOSPADM

## 2021-10-19 RX ORDER — LIDOCAINE HYDROCHLORIDE 10 MG/ML
INJECTION, SOLUTION EPIDURAL; INFILTRATION; INTRACAUDAL; PERINEURAL PRN
Status: DISCONTINUED | OUTPATIENT
Start: 2021-10-19 | End: 2021-10-19 | Stop reason: HOSPADM

## 2021-10-19 RX ORDER — EPHEDRINE SULFATE 50 MG/ML
INJECTION, SOLUTION INTRAVENOUS PRN
Status: DISCONTINUED | OUTPATIENT
Start: 2021-10-19 | End: 2021-10-19

## 2021-10-19 RX ORDER — FENTANYL CITRATE 50 UG/ML
25 INJECTION, SOLUTION INTRAMUSCULAR; INTRAVENOUS
Status: DISCONTINUED | OUTPATIENT
Start: 2021-10-19 | End: 2021-10-19 | Stop reason: HOSPADM

## 2021-10-19 RX ADMIN — PROPOFOL 20 MG: 10 INJECTION, EMULSION INTRAVENOUS at 08:14

## 2021-10-19 RX ADMIN — PROPOFOL 20 MG: 10 INJECTION, EMULSION INTRAVENOUS at 08:38

## 2021-10-19 RX ADMIN — EPHEDRINE SULFATE 5 MG: 50 INJECTION, SOLUTION INTRAVENOUS at 08:20

## 2021-10-19 RX ADMIN — LIDOCAINE HYDROCHLORIDE 60 MG: 20 INJECTION, SOLUTION INFILTRATION; PERINEURAL at 07:57

## 2021-10-19 RX ADMIN — PROPOFOL 20 MG: 10 INJECTION, EMULSION INTRAVENOUS at 08:54

## 2021-10-19 RX ADMIN — CEFAZOLIN 2 G: 10 INJECTION, POWDER, FOR SOLUTION INTRAVENOUS at 08:10

## 2021-10-19 RX ADMIN — HEPARIN SODIUM 4000 UNITS: 1000 INJECTION INTRAVENOUS; SUBCUTANEOUS at 08:52

## 2021-10-19 RX ADMIN — CLOPIDOGREL BISULFATE 150 MG: 75 TABLET ORAL at 11:24

## 2021-10-19 RX ADMIN — SODIUM CHLORIDE, POTASSIUM CHLORIDE, SODIUM LACTATE AND CALCIUM CHLORIDE: 600; 310; 30; 20 INJECTION, SOLUTION INTRAVENOUS at 07:48

## 2021-10-19 RX ADMIN — Medication 0.2 MCG/KG/MIN: at 08:17

## 2021-10-19 RX ADMIN — HEPARIN SODIUM 2000 UNITS: 1000 INJECTION INTRAVENOUS; SUBCUTANEOUS at 09:05

## 2021-10-19 RX ADMIN — PROPOFOL 20 MG: 10 INJECTION, EMULSION INTRAVENOUS at 08:10

## 2021-10-19 RX ADMIN — PROPOFOL 50 MCG/KG/MIN: 10 INJECTION, EMULSION INTRAVENOUS at 07:57

## 2021-10-19 RX ADMIN — DEXMEDETOMIDINE 0.5 MCG/KG/HR: 100 INJECTION, SOLUTION, CONCENTRATE INTRAVENOUS at 07:57

## 2021-10-19 ASSESSMENT — LIFESTYLE VARIABLES: TOBACCO_USE: 1

## 2021-10-19 ASSESSMENT — MIFFLIN-ST. JEOR: SCORE: 1299.25

## 2021-10-19 NOTE — OP NOTE
Date: October 19, 2021          Preop Dx: Left lower extremity rest pain and right lower extremity claudication        Postop Dx: Same    Procedure: 1.  Left common iliac artery angioplasty and restenting with 8 mm x 57 mm and 8 mm at 37 mm balloon expandable stents, 2.  Right common iliac artery angioplasty for right common iliac artery in-stent stenosis, 3.  Multiple aortograms, 4.  Bilateral ultrasound-guided access of the common femoral arteries, 5.  Perclose closure of the bilateral common femoral arteries, 6.  Ultrasound imaging of the bilateral groins after Perclose closure        Surgeon: Britni Bob MD    Assistant: Jorje Cha MD and Diaz Ferraro MD      Anesthesia: Conscious sedation      Complications: None      EBL: <10 ml      Radiation dose 418 mGy      Contrast: 50 mL      Indications: This 71 year old female had  left lower extremity rest pain and right lower extremity claudication.  I recommended arteriography and possible intervention. She understood the risks and benefits and wished to proceed.      Procedure in Detail:    The patient was brought to the operating room and placed in the supine position. After a timeout was performed the bilateral groins were prepped and draped ariel sterile fashion. Ultrasound-guided access was gained in the bilateral common femoral artery with micropuncture access and ultimate placement of a 5F sheath over a 035 Mac wire. A 5F omniflush catheter was advanced to the aortic bifurcation and aortogram performed.  This revealed known occlusion of the left common iliac stent with patency of the right common iliac artery stent with approximately 60% in-stent stenosis.    6000 Units of IV heparin was given.  With a combination of a quick cross catheter and 035 angle-tip stiff Glidewire we were able to traverse the occluded left common iliac stent.  With balloon inflation in the right common iliac artery using an 8 mm x 60 mm balloon, we placed an 8 mm x 57 mm and 8 mm  and 37 mm balloon expandable stents into the left common iliac artery and occluded left common iliac stent to the level of the iliac bifurcation.  Completion aortogram showed excellent flow through both iliac artery systems including the bilateral hypogastric arteries.  Perclose devices were successfully deployed bilaterally.  A completion duplex of the groin showed no evidence of deep vein thrombosis and patency of the common femoral arteries bilaterally without extravasation or hematoma appreciated.  There were Doppler posterior tibial signals present bilaterally.    The patient appeared to have tolerated the procedure well without immediate complication. Sponge, needle and instrument counts were reported as correct at the end of the case. I was present throughout the entire procedure.     Britni Bob MD, DFSVS, RPVI  Chief, Vascular and Endovascular Surgery  [facility]  [email or web address]

## 2021-10-19 NOTE — BRIEF OP NOTE
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 (H) [I70.222]  Post-operative diagnosis Same as pre-operative diagnosis    Procedure:   1) Ultrasound guided access of right common femoral artery   2) Ultrasound guided access of left common femoral artery   3) Aortogram  4) Placement of Visi-Pro 8 x 57 mm balloon expandable stent, 8 x 37 mm balloon expandable stent   5) Percutaneous closure of bilateral arteriotomy sites with Perclose ProGlide     Surgeon: Surgeon(s) and Role:     * Britni Bob MD - Primary  Anesthesia: Combined MAC with Local   Estimated Blood Loss: Less than 50 ml    Drains: None    Specimens: None     Findings: Relined left common iliac artery stent with 8 mm stent. Completion duplex showed appropriate flow through bilateral common, superficial, and profunda femoral artery. Multiphasic R and L PT signal.       Complications: None.  Implants:   Implant Name Type Inv. Item Serial No.  Lot No. LRB No. Used Action   STENT BILLIARY VISI-PRO 7FR [id number] 80CM [id number] - [id number] Stent STENT BILLIARY VISI-PRO 7FR [id number] 80CM [id number]  [employer] [id number] Left 1 Implanted   STENT BILIARY VISAPRO GPS BALLOON EXP 8X37MM [id number] - [id number] Stent STENT BILIARY VISAPRO GPS BALLOON EXP 8X37MM [id number]  [employer] [id number] Left 1 Implanted

## 2021-10-19 NOTE — ANESTHESIA POSTPROCEDURE EVALUATION
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    Anesthesia Type:  MAC    Note:  Disposition: Outpatient   Postop Pain Control: Uneventful            Sign Out: Well controlled pain   PONV: No   Neuro/Psych: Uneventful            Sign Out: Acceptable/Baseline neuro status   Airway/Respiratory: Uneventful            Sign Out: Acceptable/Baseline resp. status   CV/Hemodynamics: Uneventful            Sign Out: Acceptable CV status; No obvious hypovolemia; No obvious fluid overload   Other NRE: NONE   DID A NON-ROUTINE EVENT OCCUR? No           Last vitals:  Vitals Value Taken Time   /62 10/19/21 1220   Temp 36.3  C (97.4  F) 10/19/21 1220   Pulse 64 10/19/21 1220   Resp 16 10/19/21 1220   SpO2 96 % 10/19/21 1220       Electronically Signed By: Cj Conrad MD  October 19, 2021  12:51 PM

## 2021-10-19 NOTE — ANESTHESIA CARE TRANSFER NOTE
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    Anesthesia Type:   MAC     Note:    Oropharynx: oropharynx clear of all foreign objects  Level of Consciousness: awake  Oxygen Supplementation: blow-by O2    Independent Airway: airway patency satisfactory and stable  Dentition: dentition unchanged  Vital Signs Stable: post-procedure vital signs reviewed and stable  Report to RN Given: handoff report given  Patient transferred to: Phase II  Comments: VSS. Patient denies pain, n/v, all questions answered to RN.   Handoff Report: Identifed the Patient, Identified the Reponsible Provider, Reviewed the pertinent medical history, Discussed the surgical course, Reviewed Intra-OP anesthesia mangement and issues during anesthesia, Set expectations for post-procedure period and Allowed opportunity for questions and acknowledgement of understanding      Vitals:  Vitals Value Taken Time   /53 10/19/21 1100   Temp 36.5  C (97.7  F) 10/19/21 1020   Pulse 66 10/19/21 1020   Resp 16 10/19/21 1100   SpO2 97 % 10/19/21 1100       Electronically Signed By: Jaime Gould MD  October 19, 2021  11:17 AM

## 2021-10-20 ENCOUNTER — TELEPHONE (OUTPATIENT)
Dept: VASCULAR SURGERY | Facility: CLINIC | Age: 71
End: 2021-10-20

## 2021-10-20 NOTE — TELEPHONE ENCOUNTER
Spoke with Leann regarding how pt is doing s/p angioplasty w/ stent placement.    Pt reports their pain is minimal and they are using tylenol intermittently to control their pain. The incisions are approximated and free of signs of infection. However pt did report some bleeding from incisions yesterday after walking to the bathroom. She states she removed dressings from the incisions and gently cleaned the area and bleeding appeared to have stopped. No active bleeding today. Pt is on plavix and aspirin. If bleeding occurs again I instructed her to apply pressure and to call us if bleeding does not stop. Pt did say her leg symptoms are significantly improved. She denies claudication and also says CMS is normal in both legs/feet. Pt reports they are eating and drinking, but that appetite is not the best. Pt is voiding w/o difficulty and has not had a bowel movement since yesterday. I encouraged her to watch for constipation.    VQI measures: Pt has been prescribed aspirin and plavix and reports they are taking medication as prescribed.    Confirmed follow up appointments and provided callback number for further questions/concerns.

## 2021-10-21 ENCOUNTER — MYC MEDICAL ADVICE (OUTPATIENT)
Dept: CARDIOLOGY | Facility: CLINIC | Age: 71
End: 2021-10-21

## 2021-10-21 DIAGNOSIS — I73.9 PERIPHERAL VASCULAR DISEASE (H): Primary | ICD-10-CM

## 2021-10-21 RX ORDER — DILTIAZEM HYDROCHLORIDE 300 MG/1
300 CAPSULE, EXTENDED RELEASE ORAL DAILY
Qty: 90 CAPSULE | Refills: 3 | Status: SHIPPED | OUTPATIENT
Start: 2021-10-21 | End: 2021-10-27

## 2021-10-25 DIAGNOSIS — N18.32 CHRONIC KIDNEY DISEASE, STAGE 3B (H): Primary | ICD-10-CM

## 2021-10-26 ENCOUNTER — LAB (OUTPATIENT)
Dept: LAB | Facility: CLINIC | Age: 71
End: 2021-10-26
Attending: STUDENT IN AN ORGANIZED HEALTH CARE EDUCATION/TRAINING PROGRAM
Payer: COMMERCIAL

## 2021-10-26 DIAGNOSIS — N18.32 CHRONIC KIDNEY DISEASE, STAGE 3B (H): ICD-10-CM

## 2021-10-26 LAB
ALBUMIN SERPL-MCNC: 3.6 G/DL (ref 3.4–5)
ALBUMIN UR-MCNC: NEGATIVE MG/DL
ANION GAP SERPL CALCULATED.3IONS-SCNC: 7 MMOL/L (ref 3–14)
APPEARANCE UR: CLEAR
BILIRUB UR QL STRIP: NEGATIVE
BUN SERPL-MCNC: 9 MG/DL (ref 7–30)
CALCIUM SERPL-MCNC: 10 MG/DL (ref 8.5–10.1)
CHLORIDE BLD-SCNC: 105 MMOL/L (ref 94–109)
CO2 SERPL-SCNC: 26 MMOL/L (ref 20–32)
COLOR UR AUTO: ABNORMAL
CREAT SERPL-MCNC: 0.93 MG/DL (ref 0.52–1.04)
CREAT UR-MCNC: 18 MG/DL
ERYTHROCYTE [DISTWIDTH] IN BLOOD BY AUTOMATED COUNT: 12.8 % (ref 10–15)
GFR SERPL CREATININE-BSD FRML MDRD: 62 ML/MIN/1.73M2
GLUCOSE BLD-MCNC: 146 MG/DL (ref 70–99)
GLUCOSE UR STRIP-MCNC: NEGATIVE MG/DL
HCT VFR BLD AUTO: 40.2 % (ref 35–47)
HGB BLD-MCNC: 13.2 G/DL (ref 11.7–15.7)
HGB UR QL STRIP: NEGATIVE
KETONES UR STRIP-MCNC: NEGATIVE MG/DL
LEUKOCYTE ESTERASE UR QL STRIP: ABNORMAL
MCH RBC QN AUTO: 31.7 PG (ref 26.5–33)
MCHC RBC AUTO-ENTMCNC: 32.8 G/DL (ref 31.5–36.5)
MCV RBC AUTO: 97 FL (ref 78–100)
NITRATE UR QL: NEGATIVE
PH UR STRIP: 6 [PH] (ref 5–7)
PHOSPHATE SERPL-MCNC: 3.5 MG/DL (ref 2.5–4.5)
PLATELET # BLD AUTO: 337 10E3/UL (ref 150–450)
POTASSIUM BLD-SCNC: 3.6 MMOL/L (ref 3.4–5.3)
PROT UR-MCNC: 0.19 G/L
PROT/CREAT 24H UR: 1.06 G/G CR (ref 0–0.2)
RBC # BLD AUTO: 4.16 10E6/UL (ref 3.8–5.2)
RBC URINE: 1 /HPF
SODIUM SERPL-SCNC: 138 MMOL/L (ref 133–144)
SP GR UR STRIP: 1 (ref 1–1.03)
SQUAMOUS EPITHELIAL: 1 /HPF
UROBILINOGEN UR STRIP-MCNC: NORMAL MG/DL
WBC # BLD AUTO: 10.3 10E3/UL (ref 4–11)
WBC URINE: 2 /HPF

## 2021-10-26 PROCEDURE — 80069 RENAL FUNCTION PANEL: CPT | Performed by: PATHOLOGY

## 2021-10-26 PROCEDURE — 84156 ASSAY OF PROTEIN URINE: CPT | Performed by: PATHOLOGY

## 2021-10-26 PROCEDURE — 81001 URINALYSIS AUTO W/SCOPE: CPT | Performed by: PATHOLOGY

## 2021-10-26 PROCEDURE — 85027 COMPLETE CBC AUTOMATED: CPT | Performed by: PATHOLOGY

## 2021-10-26 PROCEDURE — 36415 COLL VENOUS BLD VENIPUNCTURE: CPT | Performed by: PATHOLOGY

## 2021-10-27 ENCOUNTER — VIRTUAL VISIT (OUTPATIENT)
Dept: NEPHROLOGY | Facility: CLINIC | Age: 71
End: 2021-10-27
Attending: STUDENT IN AN ORGANIZED HEALTH CARE EDUCATION/TRAINING PROGRAM
Payer: COMMERCIAL

## 2021-10-27 DIAGNOSIS — N18.31 STAGE 3A CHRONIC KIDNEY DISEASE (H): Primary | ICD-10-CM

## 2021-10-27 DIAGNOSIS — I73.9 PERIPHERAL VASCULAR DISEASE (H): ICD-10-CM

## 2021-10-27 DIAGNOSIS — R80.8 OTHER PROTEINURIA: ICD-10-CM

## 2021-10-27 DIAGNOSIS — I10 HYPERTENSION GOAL BP (BLOOD PRESSURE) < 140/90: ICD-10-CM

## 2021-10-27 PROCEDURE — 99213 OFFICE O/P EST LOW 20 MIN: CPT | Mod: 95 | Performed by: STUDENT IN AN ORGANIZED HEALTH CARE EDUCATION/TRAINING PROGRAM

## 2021-10-27 PROCEDURE — G0463 HOSPITAL OUTPT CLINIC VISIT: HCPCS | Mod: PN,RTG | Performed by: STUDENT IN AN ORGANIZED HEALTH CARE EDUCATION/TRAINING PROGRAM

## 2021-10-27 RX ORDER — DILTIAZEM HYDROCHLORIDE 300 MG/1
300 CAPSULE, EXTENDED RELEASE ORAL DAILY
Qty: 90 CAPSULE | Refills: 3 | Status: SHIPPED | OUTPATIENT
Start: 2021-10-27 | End: 2023-01-04

## 2021-10-27 NOTE — LETTER
10/27/2021     RE: Porsche Manning  [address]  [facility] [address]     Dear Colleague,    Thank you for referring your patient, Porsche Manning, to the [facility] [place] at [facility]. Please see a copy of my visit note below.    Leann is a 71 year old who is being evaluated via a billable video visit.      How would you like to obtain your AVS? MyChart  If the video visit is dropped, the invitation should be resent by: Text to cell phone: [phone]  Will anyone else be joining your video visit? No    Video Start Time: 2:00PM  Video-Visit Details  Type of service:  Video Visit  Video End Time:2:25PM  Originating Location (pt. Location): Home  Distant Location (provider location):  [facility] [place]   Platform used for Video Visit: Alise RIVAS CMA    Nephrology Clinic Follow Up Visit    Assessment and Plan:     # CKD 2/3a: Most recent Cr 0.93 after MICHELLE in August which was presumed to be hemodynamic in setting of renovascular disease and hypotension & bradycardia. She has ~1g/g of proteinuria, not inconsistent with long-standing DM along with her cardiac hx and HTN hx as causes of CKD.  - Follow up in 6 months     # Hypertension:   Controlled. Currently on diltiazem ER 300mg daily, lisinopril 20mg daily, and metoprolol succinate 100mg daily.   - No changes    # Mineral Bone Disorder:   Ca near upper limit of normal. Vit D replete on last check and PTH 24.   Check 1,25 dihydroxy vitamin D with next labs    Assessment and plan was discussed with patient and she voiced her understanding and agreement.    I discussed the patient's plan of care with Dr. Dunaway.    Danette Smith MD  Nephrology Fellow      Reason for Visit:  Porsche Manning is a 71 year old female with CKD who returns for nephrology follow-up.    HPI:  Since last visit, Leann underwent bilateral iliac artery  angioplasty with re-stenting in her left iliac. She has had resolution of her claudication symptoms and has reportedly been more active. Her metoprolol was reduced in August from 150mg to 100mg daily. Her BP has been better controlled (not low) with BP primarily in 120s-130s/50-60s. Occasionally her SBP is in the 140-150 range in the AM before she takes her meds. She is no longer having bradycardic episodes.     Home BP: 120s-140s/50-60s    ROS:  A comprehensive review of systems was obtained and negative, except as noted in the HPI or PMH.    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)     Advanced directives, counseling/discussion     Subclavian artery stenosis, left (H)     CKD (chronic kidney disease) stage 3, GFR 30-59 ml/min (H)     Chronic obstructive pulmonary disease, unspecified COPD type (H)     Obesity (BMI 35.0-39.9) with comorbidity (H)       Personal Hx:   Social History     Tobacco Use     Smoking status: Former Smoker     Packs/day: 0.50     Years: 40.00     Pack years: 20.00     Types: Cigarettes     Quit date: 4/25/2018     Years since quitting: 3.5     Smokeless tobacco: Never Used   Substance Use Topics     Alcohol use: Yes     Comment: 2 to 4 beers or mixed drinks weekly       Allergies: Reviewed by provider.     Medications:  Current Outpatient Medications   Medication Sig     albuterol (PROAIR HFA/PROVENTIL HFA/VENTOLIN HFA) 108 (90 Base) MCG/ACT inhaler INHALE 2 PUFFS INTO THE LUNGS EVERY 6 HOURS     aspirin 81 MG EC tablet Take 81 mg by mouth daily     BREO ELLIPTA 100-25 MCG/INH inhaler INHALE 1 PUFF BY MOUTH EVERY DAY     buPROPion (WELLBUTRIN XL) 150 MG 24 hr tablet Take 1 tablet (150 mg) by mouth every morning      Calcium Carbonate-Vit D-Min (CALCIUM 1200 PO) Take 600 mg by mouth daily      Cholecalciferol (VITAMIN D) 1000 UNIT capsule Take 1 capsule by mouth daily. Take one tablet daily     clopidogrel (PLAVIX) 75 MG tablet TAKE 1 TABLET (75 MG) BY MOUTH DAILY CALL CLINIC TO SCHEDULE FOLLOW UP APPOINTMENT.     diltiazem ER (TIAZAC) 300 MG 24 hr ER beaded capsule Take 1 capsule (300 mg) by mouth daily     lisinopril (ZESTRIL) 20 MG tablet Take 1 tablet (20 mg) by mouth daily     metoprolol succinate ER (TOPROL-XL) 100 MG 24 hr tablet Take 1 tablet (100 mg) by mouth daily     rosuvastatin (CRESTOR) 40 MG tablet Take 1 tablet (40 mg) by mouth daily For additional refills, please schedule a follow-up appointment at [phone]     SPIRIVA HANDIHALER 18 MCG inhaled capsule INHALE 1 CAPSULE (18 MCG) INTO THE LUNGS DAILY     No current facility-administered medications for this visit.     Vitals:  There were no vitals taken for this visit.    Exam:  GEN: appears well, NAD  RESP: no increased WOB    LABS:   CMP  Recent Labs   Lab Test 10/26/21  1002 10/19/21  1049 10/19/21  0705 10/19/21  0633 10/15/21  1435 08/17/21  1159 08/17/21  1159 07/27/21  1019 04/22/21  1044 01/28/21  1004 01/28/21  1004 03/10/20  0946 03/10/20  0946 11/12/19  0920 11/12/19  0920     --  141  --  136  --  137   < > 137   < > 137   < > 134   < > 137   POTASSIUM 3.6  --  3.7  --  3.7  --  4.4   < > 4.4   < > 3.7   < > 4.5   < > 4.3   CHLORIDE 105  --  111*  --  107  --  103   < > 104   < > 107   < > 103   < > 104   CO2 26  --  23  --  20  --  21   < > 23   < > 21   < > 23   < > 25   ANIONGAP 7  --  7  --  9  --  13   < > 10   < > 9   < > 8   < > 8   * 121* 158* 158* 143*   < > 137*   < > 164*   < > 160*   < > 153*   < > 178*   BUN 9  --  11  --  9  --  22   < > 13   < > 15   < > 19   < > 12   CR 0.93  --  1.08*  --  0.90  --  1.70*   < > 0.97   < > 1.07*   < > 1.31*   < > 0.96   GFRESTIMATED 62  --  52*  --  65  --  30*   < > 59*   < > 53*   < >  41*   < > 61   GFRESTBLACK  --   --   --   --   --   --   --   --  69  --  61  --  48*  --  70   VIANEY 10.0  --  9.4  --  9.4  --  9.5   < > 10.4*   < > 10.2*   < > 9.5   < > 9.0    < > = values in this interval not displayed.     Recent Labs   Lab Test 10/15/21  1435 04/22/21  1044 01/28/21  1004 11/12/19  0920   BILITOTAL 0.4 0.5 0.5 0.4   ALKPHOS 98 112 114 100   ALT 27 24 30 20   AST 19 15 16 11     CBC  Recent Labs   Lab Test 10/26/21  1002 10/19/21  0705 10/15/21  1435 08/17/21  1159   HGB 13.2 13.2 13.7 12.8   WBC 10.3 8.8 11.3* 9.0   RBC 4.16 4.11 4.29 4.07   HCT 40.2 40.1 42.2 38.4   MCV 97 98 98 94   MCH 31.7 32.1 31.9 31.4   MCHC 32.8 32.9 32.5 33.3   RDW 12.8 12.8 12.7 12.5    216 236 219     URINE STUDIES  Recent Labs   Lab Test 10/26/21  1008 08/17/21  1221 07/27/21  1022 07/23/15  0925 07/16/15  2154 07/16/15  2154   COLOR Straw Straw Straw Yellow   < > Light Yellow   APPEARANCE Clear Clear Clear Clear   < > Clear   URINEGLC Negative Negative Negative Negative   < > Negative   URINEBILI Negative Negative Negative Negative   < > Negative   URINEKETONE Negative Negative Negative Negative   < > Negative   SG 1.004 1.005 1.003 1.010   < > 1.003   UBLD Negative Negative Negative Negative   < > Negative   URINEPH 6.0 6.0 6.0 5.5   < > 5.5   PROTEIN Negative 30 * Negative Negative   < > Negative   UROBILINOGEN  --   --   --  0.2  --   --    NITRITE Negative Negative Negative Negative   < > Negative   LEUKEST Trace* Small* Negative Negative   < > Negative   RBCU 1 1 1  --   --  <1   WBCU 2 8* 1  --   --  <1    < > = values in this interval not displayed.     Recent Labs   Lab Test 10/26/21  1008 08/17/21  1221 07/27/21  1022   UTPG 1.06* 1.03* 1.05*     PTH  Recent Labs   Lab Test 07/27/21  1019   PTHI 24     IRON STUDIES  Recent Labs   Lab Test 10/17/16  0825   BRI 11     Divya Smith MD  Attestation signed by Tesha Dunaway MD at 12/3/2021  9:49 AM:  Attestation:  Porsche Manning has  been evaluated and examined by me, Tesha Dunaway MD.  Discussed with the fellow or resident and agree with the findings and plan in this note.  I have reviewed Medications, Vital Signs, and Labs as well as provider notes.    Date of Service (when I saw the patient): 10/27/2021  I was on the video from 2:24 pm-2:27 pm    Tesha Dunaway MD  [facility]  Department of Medicine  Division of Renal Disease and Hypertension

## 2021-10-27 NOTE — PROGRESS NOTES
Leann is a 71 year old who is being evaluated via a billable video visit.      How would you like to obtain your AVS? MyChart  If the video visit is dropped, the invitation should be resent by: Text to cell phone: [phone]  Will anyone else be joining your video visit? No    Video Start Time: 2:00PM  Video-Visit Details    Type of service:  Video Visit    Video End Time:2:25PM    Originating Location (pt. 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 She has ~1g/g of proteinuria, not inconsistent with long-standing DM along with her cardiac hx and HTN hx as causes of CKD.  - Follow up in 6 months     # Hypertension:   Controlled. Currently on diltiazem ER 300mg daily, lisinopril 20mg daily, and metoprolol succinate 100mg daily.   - No changes    # Mineral Bone Disorder:   Ca near upper limit of normal. Vit D replete on last check and PTH 24.   Check 1,25 dihydroxy vitamin D with next labs      Assessment and plan was discussed with patient and she voiced her understanding and agreement.    I discussed the patient's plan of care with Dr. Dunaway.    Danette Smith MD  Nephrology Fellow      Reason for Visit:  Porsche Manning is a 71 year old female with CKD who returns for nephrology follow-up.    HPI:  Since last visit, Leann underwent bilateral iliac artery angioplasty with re-stenting in her left iliac. She has had resolution of her claudication symptoms and has reportedly been more active. Her metoprolol was reduced in August from 150mg to 100mg daily. Her BP has been better controlled (not low) with BP primarily in 120s-130s/50-60s. Occasionally her SBP is in the  140-150 range in the AM before she takes her meds. She is no longer having bradycardic episodes.       Home BP: 120s-140s/50-60s    ROS:  A comprehensive review of systems was obtained and negative, except as noted in the HPI or PMH.    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)     Advanced directives, counseling/discussion     Subclavian artery stenosis, left (H)     CKD (chronic kidney disease) stage 3, GFR 30-59 ml/min (H)     Chronic obstructive pulmonary disease, unspecified COPD type (H)     Obesity (BMI 35.0-39.9) with comorbidity (H)       Personal Hx:   Social History     Tobacco Use     Smoking status: Former Smoker     Packs/day: 0.50     Years: 40.00     Pack years: 20.00     Types: Cigarettes     Quit date: 4/25/2018     Years since quitting: 3.5     Smokeless tobacco: Never Used   Substance Use Topics     Alcohol use: Yes     Comment: 2 to 4 beers or mixed drinks weekly       Allergies: Reviewed by provider.     Medications:  Current Outpatient Medications   Medication Sig     albuterol (PROAIR HFA/PROVENTIL HFA/VENTOLIN HFA) 108 (90 Base) MCG/ACT inhaler INHALE 2 PUFFS INTO THE LUNGS EVERY 6 HOURS     aspirin 81 MG EC tablet Take 81 mg by mouth daily     BREO ELLIPTA 100-25 MCG/INH inhaler INHALE 1 PUFF BY MOUTH EVERY DAY     buPROPion (WELLBUTRIN XL) 150 MG 24 hr tablet Take 1 tablet (150 mg) by mouth every morning     Calcium Carbonate-Vit D-Min (CALCIUM 1200 PO) Take 600 mg by mouth daily      Cholecalciferol (VITAMIN D) 1000 UNIT capsule Take 1 capsule by mouth daily. Take one tablet daily     clopidogrel (PLAVIX) 75 MG tablet TAKE 1 TABLET (75 MG) BY MOUTH DAILY CALL CLINIC TO SCHEDULE FOLLOW UP APPOINTMENT.     diltiazem ER  (TIAZAC) 300 MG 24 hr ER beaded capsule Take 1 capsule (300 mg) by mouth daily     lisinopril (ZESTRIL) 20 MG tablet Take 1 tablet (20 mg) by mouth daily     metoprolol succinate ER (TOPROL-XL) 100 MG 24 hr tablet Take 1 tablet (100 mg) by mouth daily     rosuvastatin (CRESTOR) 40 MG tablet Take 1 tablet (40 mg) by mouth daily For additional refills, please schedule a follow-up appointment at [phone]     SPIRIVA HANDIHALER 18 MCG inhaled capsule INHALE 1 CAPSULE (18 MCG) INTO THE LUNGS DAILY     No current facility-administered medications for this visit.       Vitals:  There were no vitals taken for this visit.    Exam:  GEN: appears well, NAD  RESP: no increased WOB    LABS:   CMP  Recent Labs   Lab Test 10/26/21  1002 10/19/21  1049 10/19/21  0705 10/19/21  0633 10/15/21  1435 08/17/21  1159 08/17/21  1159 07/27/21  1019 04/22/21  1044 01/28/21  1004 01/28/21  1004 03/10/20  0946 03/10/20  0946 11/12/19  0920 11/12/19  0920     --  141  --  136  --  137   < > 137   < > 137   < > 134   < > 137   POTASSIUM 3.6  --  3.7  --  3.7  --  4.4   < > 4.4   < > 3.7   < > 4.5   < > 4.3   CHLORIDE 105  --  111*  --  107  --  103   < > 104   < > 107   < > 103   < > 104   CO2 26  --  23  --  20  --  21   < > 23   < > 21   < > 23   < > 25   ANIONGAP 7  --  7  --  9  --  13   < > 10   < > 9   < > 8   < > 8   * 121* 158* 158* 143*   < > 137*   < > 164*   < > 160*   < > 153*   < > 178*   BUN 9  --  11  --  9  --  22   < > 13   < > 15   < > 19   < > 12   CR 0.93  --  1.08*  --  0.90  --  1.70*   < > 0.97   < > 1.07*   < > 1.31*   < > 0.96   GFRESTIMATED 62  --  52*  --  65  --  30*   < > 59*   < > 53*   < > 41*   < > 61   GFRESTBLACK  --   --   --   --   --   --   --   --  69  --  61  --  48*  --  70   VIANEY 10.0  --  9.4  --  9.4  --  9.5   < > 10.4*   < > 10.2*   < > 9.5   < > 9.0    < > = values in this interval not displayed.     Recent Labs   Lab Test 10/15/21  1435 04/22/21  1044 01/28/21  1004 11/12/19  0920    BILITOTAL 0.4 0.5 0.5 0.4   ALKPHOS 98 112 114 100   ALT 27 24 30 20   AST 19 15 16 11     CBC  Recent Labs   Lab Test 10/26/21  1002 10/19/21  0705 10/15/21  1435 08/17/21  1159   HGB 13.2 13.2 13.7 12.8   WBC 10.3 8.8 11.3* 9.0   RBC 4.16 4.11 4.29 4.07   HCT 40.2 40.1 42.2 38.4   MCV 97 98 98 94   MCH 31.7 32.1 31.9 31.4   MCHC 32.8 32.9 32.5 33.3   RDW 12.8 12.8 12.7 12.5    216 236 219     URINE STUDIES  Recent Labs   Lab Test 10/26/21  1008 08/17/21  1221 07/27/21  1022 07/23/15  0925 07/16/15  2154 07/16/15  2154   COLOR Straw Straw Straw Yellow   < > Light Yellow   APPEARANCE Clear Clear Clear Clear   < > Clear   URINEGLC Negative Negative Negative Negative   < > Negative   URINEBILI Negative Negative Negative Negative   < > Negative   URINEKETONE Negative Negative Negative Negative   < > Negative   SG 1.004 1.005 1.003 1.010   < > 1.003   UBLD Negative Negative Negative Negative   < > Negative   URINEPH 6.0 6.0 6.0 5.5   < > 5.5   PROTEIN Negative 30 * Negative Negative   < > Negative   UROBILINOGEN  --   --   --  0.2  --   --    NITRITE Negative Negative Negative Negative   < > Negative   LEUKEST Trace* Small* Negative Negative   < > Negative   RBCU 1 1 1  --   --  <1   WBCU 2 8* 1  --   --  <1    < > = values in this interval not displayed.     Recent Labs   Lab Test 10/26/21  1008 08/17/21  1221 07/27/21  1022   UTPG 1.06* 1.03* 1.05*     PTH  Recent Labs   Lab Test 07/27/21  1019   PTHI 24     IRON STUDIES  Recent Labs   Lab Test 10/17/16  0825   BRI 11       Divya Smith MD

## 2021-10-29 DIAGNOSIS — J44.1 COPD EXACERBATION (H): ICD-10-CM

## 2021-11-05 ENCOUNTER — VIRTUAL VISIT (OUTPATIENT)
Dept: VASCULAR SURGERY | Facility: CLINIC | Age: 71
End: 2021-11-05
Attending: NURSE PRACTITIONER
Payer: COMMERCIAL

## 2021-11-05 DIAGNOSIS — I73.9 PAD (PERIPHERAL ARTERY DISEASE) (H): Primary | ICD-10-CM

## 2021-11-05 PROCEDURE — 99213 OFFICE O/P EST LOW 20 MIN: CPT | Mod: 95 | Performed by: NURSE PRACTITIONER

## 2021-11-05 RX ORDER — LISINOPRIL 10 MG/1
10 TABLET ORAL DAILY
COMMUNITY
End: 2022-06-15

## 2021-11-05 NOTE — PROGRESS NOTES
VASCULAR SURGERY VIDEO VISIT NOTE:  Reason for Visit:  2 week follow up angiogram with Dr. Bob    HPI:  This 71 year old female had  left lower extremity rest pain and right lower extremity claudication.    She is now 2 weeks post right common iliac artery angioplasty and left common iliac artery angioplasty with restenting.    Subjective:  Denies any pain at rest or with ambulation, she is ambulating much more than before, she is extremely happy with her results.  No concerns with her groin puncture sites.  Denies any pain, redness, swelling, or drainage.  Denies fevers or chills.  States she does have very mild swelling in bilateral feet.    PHYSICAL EXAM: Limited as this is video visit  General: The patient is alert and oriented.  Moves all extremities.   HEENT: Color appropriate for race, warm, dry  Lungs: Breathing unlabored    Neurologic: Grossly intact, EOMs grossly intact    Assessment:  Excellent early outcome status post restenting of the left common iliac and balloon angioplasty of the right common iliac.  Resolution of rest pain and claudication.  Patient extremely happy with her results.    Plan:  Follow-up as already scheduled with Dr. Bob with ultrasounds prior.  Continue aspirin, statin, and Plavix.    Saji Walsh [facility]  Vascular Surgery

## 2021-11-05 NOTE — NURSING NOTE
[facility] Vascular Clinic        Patient is here for a 2 week post op to discuss angio    Pt is currently taking Aspirin, Statin and Plavix.    There were no vitals taken for this visit.    The provider has been notified that the patient has no concerns.     Questions patient would like addressed today are: N/A.    Refills are needed: No    Has homecare services and agency name:  Frances Singh

## 2021-11-16 ENCOUNTER — ANCILLARY PROCEDURE (OUTPATIENT)
Dept: ULTRASOUND IMAGING | Facility: CLINIC | Age: 71
End: 2021-11-16
Attending: SURGERY
Payer: COMMERCIAL

## 2021-11-16 DIAGNOSIS — Z98.890 POSTOPERATIVE STATE: ICD-10-CM

## 2021-11-16 DIAGNOSIS — I73.9 PERIPHERAL VASCULAR DISEASE, UNSPECIFIED (H): ICD-10-CM

## 2021-11-16 PROCEDURE — 93922 UPR/L XTREMITY ART 2 LEVELS: CPT | Mod: GC | Performed by: RADIOLOGY

## 2021-11-17 ENCOUNTER — VIRTUAL VISIT (OUTPATIENT)
Dept: VASCULAR SURGERY | Facility: CLINIC | Age: 71
End: 2021-11-17
Payer: COMMERCIAL

## 2021-11-17 DIAGNOSIS — I73.9 PAD (PERIPHERAL ARTERY DISEASE) (H): Primary | ICD-10-CM

## 2021-11-17 PROCEDURE — 99024 POSTOP FOLLOW-UP VISIT: CPT | Performed by: SURGERY

## 2021-11-17 NOTE — PATIENT INSTRUCTIONS
Thank you so much for choosing us for your care. It was a pleasure to see you at the vascular clinic today.     Follow-up recommendations: We will see you back in clinic in 6 months. Please do not hesitate to reach out to us in the meantime if you need anything.    Additional testing/imaging ordered today: Repeat ABIs in 6 months prior to seeing Dr. Bob.      Our scheduling team will get in touch with you to set up any follow-up testing/imaging and/or appointments. Please be aware that any testing/imaging recommended today will need to completed prior to your next visit with the provider. If testing/imaging is not completed prior to your next visit, your visit may be rescheduled.        If you have any questions, please call Shivani Harris RN at [phone]. We also encourage the use of [employer] to communicate with your HealthCare Provider.      If you have an urgent need after business hours (8:00 am to 4:30 pm) please call [phone], option 4, and ask for the vascular attending on call. For non-urgent after hours needs, please call the vascular nurse at [phone] and leave a detailed voicemail. For scheduling needs, please call our scheduling line at [phone].      Patient Education     Peripheral Artery Disease (PAD)   Peripheral artery disease (PAD) occurs when the arteries that carry blood to the limbs are narrowed or blocked. This is usually due to a buildup of a fatty substance called plaque in the walls of the arteries.  PAD most often affects the arteries in the legs. When these arteries are narrowed or blocked, blood flow to the legs is reduced. This can cause leg and foot pain and other symptoms. If severe enough, reduced blood flow to the legs can lead to tissue death (gangrene) and the loss of a toe, foot, or leg. Having PAD also makes it more likely that arteries in other body areas are blocked. For instance, arteries that carry blood to the heart or brain may be affected. This raises the  chances of heart attack and stroke.  Risk factors  Certain factors can make PAD more likely. They include:    Smoking    Diabetes    High blood pressure    Unhealthy cholesterol levels    Obesity    Inactive lifestyle    Older age    Family history of PAD  Symptoms  Many people with PAD have no symptoms. If symptoms do occur, they can include:    Pain in the muscles of the calves, thighs, or hips that gets worse with activity and better with rest (intermittent claudication)    Achy, tired, or heavy feeling in the legs    Weakness, numbness, tingling, or loss of feeling in the legs    Changes in skin color of the legs    Sores on the legs and feet    Cold leg, feet, or toes    Pain the feet or toes even when lying down (rest pain)  Home care  PAD is a chronic (lifelong) condition. Treatment is focused on managing your condition and lowering your health risks. This may include doing the following:    If you smoke, quit. This helps prevent further damage to your arteries and lowers your health risks. Ask your provider about medicines or products that can help you quit smoking. Also consider joining a stop-smoking program or support group.    Be more active. This helps you lose weight and manage problems such as high blood pressure and unhealthy cholesterol levels. Start a walking program if advised to by your provider. Your provider may also help you form a safe exercise program that is right for your needs.    Make healthy eating changes. This includes eating less fat, salt, and sugar.    Take medicines for high blood pressure, unhealthy cholesterol levels, and diabetes as directed.    Have your blood pressure and cholesterol levels checked as often as directed.    If you have diabetes, try to keep your blood sugar well controlled. Test your blood sugar as directed.    If you are overweight, talk to your provider about a weight-loss plan.    Watch for cuts, scrapes, or open sores on your feet. Poor blood flow to the  feet may slow healing and increase the risk of infection from these problems.   Follow-up care  Follow up with your healthcare provider, or as advised. If imaging tests such as ultrasound were done, they will be reviewed by a doctor. You will be told the results and any new findings that may affect your care.  When to seek medical advice   Call your healthcare provider right away if any of these occur:    Sudden severe pain in the legs or feet    Sudden cold, pale or blue color in the legs or feet    Weakness or numbness in the legs or feet that worsens    Any sore or wound in the legs or feet that won t heal    Weak pulse in your legs or feet  Know the signs of heart attack and stroke  People with PAD are at high risk for heart attack and stroke. Knowing the signs of these problems can help you protect your health and get help when you need it. Call 911 right away if you have any of the following:    Chest discomfort, such as pain, aching, tightness, or pressure that lasts more than a few minutes, or that comes and goes    Pain or discomfort in the arms, back, shoulders, neck, or jaw    Shortness of breath    Sweating (often a cold, clammy sweat)    Nausea    Lightheadedness    Sudden numbness or weakness of the face, arms, or legs, especially on one side    Sudden confusion or trouble speaking or understanding    Sudden trouble seeing in one or both eyes    Sudden trouble walking, dizziness, or loss of balance    Sudden, severe headache with no known cause  Tj last reviewed this educational content on 3/1/2018    0733-2988 [employer]. All rights reserved. This information is not intended as a substitute for professional medical care. Always follow your healthcare professional's instructions.

## 2021-11-17 NOTE — PROGRESS NOTES
Vascular Surgery Clinic Post-Procedure Follow Up Visit    November 17, 2021    Porsche Manning  [id number]      HPI: Patient follows up today s/p left iliac artery angioplasty and stenting along with right iliac angioplasty. Feeling well with resolution of her left lower extremity rest pain. Denies chest pain, fever, chills.       Please see Epic rooming record from today documenting vital signs and current medications.    On exam, pleasant 71 year old in NAD.  Puncture sites are well-healed.    CLAUDINE is 0.81 on the right and 0.72 on the left.      AP: Doing well s/p left iliac artery angioplasty and stenting and right iliac artery angioplasty for left lower extremity rest pain. Will return for follow up in 6 months with ankle only CLAUDINE study. She knows to call if issues arise before then.      Many thanks for involving me in the care of this very pleasant patient. Should any questions or concerns arise, please don't hesitate to contact me.    Warm Regards,    Britni Bob MD, DFSVS, RPVI  Director, [place] [facility]  Professor and Chief, Vascular and Endovascular Surgery  [facility]  [email or web address]          Leann is a 71 year old who is being evaluated via a billable video visit.      How would you like to obtain your AVS? MyChart  If the video visit is dropped, the invitation should be resent by: Text to cell phone: [phone]  Will anyone else be joining your video visit? No      Video Start Time: 1 PM    Video-Visit Details    Type of service:  Video Visit    Video End Time:1:30 PM    Originating Location (pt. Location): Home    Distant Location (provider location):  [facility]ASCULAR [facility]IN[facility] [place]     Platform used for Video Visit: [employer]

## 2021-11-17 NOTE — NURSING NOTE
Chief Complaint   Patient presents with     Follow Up     4 week post op.  US CLAUDINE Doppler No Exercise completed 11/16.         Medications and allergies reconciled.  Vitals collected.    Keesha Wilkinson LPN

## 2021-11-17 NOTE — LETTER
11/17/2021       RE: Porsche Manning  [address]  [place] [address]     Dear Colleague,    Thank you for referring your patient, Porsche Manning, to the [facility] VASCULAR CLINIC [place] at [facility]. Please see a copy of my visit note below.    Vascular Surgery Clinic Post-Procedure Follow Up Visit    November 17, 2021    Porsche Manning  [id number]      HPI: Patient follows up today s/p left iliac artery angioplasty and stenting along with right iliac angioplasty. Feeling well with resolution of her left lower extremity rest pain. Denies chest pain, fever, chills.       Please see Epic rooming record from today documenting vital signs and current medications.    On exam, loreta 71 year old in NAD.  Puncture sites are well-healed.    CLAUDINE is 0.81 on the right and 0.72 on the left.      AP: Doing well s/p left iliac artery angioplasty and stenting and right iliac artery angioplasty for left lower extremity rest pain. Will return for follow up in 6 months with ankle only CLAUDINE study. She knows to call if issues arise before then.      Many thanks for involving me in the care of this very pleasant patient. Should any questions or concerns arise, please don't hesitate to contact me.    Warm Regards,    Britni Bob MD, DFSVS, RPVI  Director, [place] [facility]  Professor and Chief, Vascular and Endovascular Surgery  [facility]  [email or web address]          Again, thank you for allowing me to participate in the care of your patient.      Sincerely,    Britni Bob MD

## 2021-11-30 ENCOUNTER — ANCILLARY PROCEDURE (OUTPATIENT)
Dept: MAMMOGRAPHY | Facility: CLINIC | Age: 71
End: 2021-11-30
Attending: PHYSICIAN ASSISTANT
Payer: COMMERCIAL

## 2021-11-30 DIAGNOSIS — Z12.31 VISIT FOR SCREENING MAMMOGRAM: ICD-10-CM

## 2021-11-30 PROCEDURE — 77063 BREAST TOMOSYNTHESIS BI: CPT | Mod: TC | Performed by: RADIOLOGY

## 2021-11-30 PROCEDURE — 77067 SCR MAMMO BI INCL CAD: CPT | Mod: TC | Performed by: RADIOLOGY

## 2021-12-08 ENCOUNTER — MYC MEDICAL ADVICE (OUTPATIENT)
Dept: FAMILY MEDICINE | Facility: CLINIC | Age: 71
End: 2021-12-08
Payer: COMMERCIAL

## 2021-12-23 ENCOUNTER — ANCILLARY PROCEDURE (OUTPATIENT)
Dept: MAMMOGRAPHY | Facility: CLINIC | Age: 71
End: 2021-12-23
Attending: PHYSICIAN ASSISTANT
Payer: COMMERCIAL

## 2021-12-23 DIAGNOSIS — R92.8 OTHER ABNORMAL AND INCONCLUSIVE FINDINGS ON DIAGNOSTIC IMAGING OF BREAST: ICD-10-CM

## 2021-12-23 DIAGNOSIS — Z12.31 ENCOUNTER FOR SCREENING MAMMOGRAM FOR BREAST CANCER: ICD-10-CM

## 2021-12-23 DIAGNOSIS — R59.1 GENERALIZED ENLARGED LYMPH NODES: ICD-10-CM

## 2021-12-23 DIAGNOSIS — N63.0 LUMP OR MASS IN BREAST: ICD-10-CM

## 2021-12-23 PROCEDURE — 77065 DX MAMMO INCL CAD UNI: CPT | Mod: RT | Performed by: RADIOLOGY

## 2021-12-23 PROCEDURE — 76642 ULTRASOUND BREAST LIMITED: CPT | Mod: RT | Performed by: RADIOLOGY

## 2022-03-18 DIAGNOSIS — I25.10 CORONARY ARTERY DISEASE DUE TO LIPID RICH PLAQUE: ICD-10-CM

## 2022-03-18 DIAGNOSIS — I10 HYPERTENSION GOAL BP (BLOOD PRESSURE) < 140/90: ICD-10-CM

## 2022-03-18 DIAGNOSIS — E78.5 HYPERLIPIDEMIA LDL GOAL <70: ICD-10-CM

## 2022-03-18 DIAGNOSIS — I25.83 CORONARY ARTERY DISEASE DUE TO LIPID RICH PLAQUE: ICD-10-CM

## 2022-03-19 ENCOUNTER — MYC MEDICAL ADVICE (OUTPATIENT)
Dept: FAMILY MEDICINE | Facility: CLINIC | Age: 72
End: 2022-03-19
Payer: COMMERCIAL

## 2022-03-22 ENCOUNTER — TELEPHONE (OUTPATIENT)
Dept: CARDIOLOGY | Facility: CLINIC | Age: 72
End: 2022-03-22
Payer: COMMERCIAL

## 2022-03-22 DIAGNOSIS — I25.10 CORONARY ARTERY DISEASE DUE TO LIPID RICH PLAQUE: ICD-10-CM

## 2022-03-22 DIAGNOSIS — I25.83 CORONARY ARTERY DISEASE DUE TO LIPID RICH PLAQUE: ICD-10-CM

## 2022-03-22 DIAGNOSIS — I10 HYPERTENSION GOAL BP (BLOOD PRESSURE) < 140/90: ICD-10-CM

## 2022-03-22 RX ORDER — METOPROLOL SUCCINATE 100 MG/1
100 TABLET, EXTENDED RELEASE ORAL DAILY
Qty: 90 TABLET | Refills: 0 | Status: SHIPPED | OUTPATIENT
Start: 2022-03-22 | End: 2022-06-08

## 2022-03-22 NOTE — TELEPHONE ENCOUNTER
[facility] Call Center    Phone Message    May a detailed message be left on voicemail: yes     Reason for Call: Medication Refill Request    Has the patient contacted the pharmacy for the refill? Yes   Name of medication being requested: metoprolol succinate ER (TOPROL-XL) 100 MG 24 hr tablet  Provider who prescribed the medication: Nelli Lutz  Pharmacy: [id number] - [place], [place] - [address]  Date medication is needed: ASAP - has been out a week  Action Taken: Message routed to:  [facility] ([facility]): leatha    Travel Screening: Not Applicable

## 2022-03-22 NOTE — TELEPHONE ENCOUNTER
Refill sent for metoprolol, pt overdue for follow up.  Was schd with Nelli pryor 4/19 but clinic that day cx      Marge Elam has clinic on 4/6-- appt held for this pt at 830 am.     Will call pt to offer appt

## 2022-03-22 NOTE — TELEPHONE ENCOUNTER
Pt scheduled for virtual 4/6 appointment with Marge Elam. Pt requested video visit, I confirmed with RN that this was okay.     Yanique CHEEK, Visit Facilitator

## 2022-03-23 RX ORDER — METOPROLOL SUCCINATE 100 MG/1
TABLET, EXTENDED RELEASE ORAL
Qty: 135 TABLET | OUTPATIENT
Start: 2022-03-23

## 2022-03-23 RX ORDER — ROSUVASTATIN CALCIUM 40 MG/1
40 TABLET, COATED ORAL DAILY
Qty: 90 TABLET | Refills: 1 | Status: SHIPPED | OUTPATIENT
Start: 2022-03-23 | End: 2022-09-09

## 2022-04-06 ENCOUNTER — VIRTUAL VISIT (OUTPATIENT)
Dept: CARDIOLOGY | Facility: CLINIC | Age: 72
End: 2022-04-06
Payer: COMMERCIAL

## 2022-04-06 DIAGNOSIS — I25.10 CORONARY ARTERY DISEASE INVOLVING NATIVE CORONARY ARTERY OF NATIVE HEART WITHOUT ANGINA PECTORIS: Primary | ICD-10-CM

## 2022-04-06 DIAGNOSIS — I21.A1 MYOCARDIAL INFARCTION TYPE 2 (H): ICD-10-CM

## 2022-04-06 PROCEDURE — 99213 OFFICE O/P EST LOW 20 MIN: CPT | Mod: 95 | Performed by: NURSE PRACTITIONER

## 2022-04-06 NOTE — LETTER
4/6/2022      RE: Porsche Manning  [address]  [place] [address]       Dear Colleague,    Thank you for the opportunity to participate in the care of your patient, Porsche Manning, at the [facility] HEART CLINIC [facility]. Please see a copy of my visit note below.          Cardiology Clinic Note    Start: 08:45, end: 09:01    HPI: Porsche Manning is a 71 year old female with PMHx notable or HTN, HLD, hx of tobacco use, CAD (1st MI 1987, BMS to mRCA 2003), PAD (s/p bilateral aorto-iliac stenting 2005, repeat stenting to left common iliac artery 2006), subclavian stenosis, COPD, T2DM, CKD stage III, and hx of cervical cancer. She presents via virtual visit for follow up. Last seen by BAR Kuhn 10/2021 at which time, no med changes were made.     Since her last visit, she has been feeling well. Her BP is very well controlled int he 110-115/50's range with HR in the 50-70's. She denies any cardiac symptoms such as chest pain, light headedness, dizziness, SOB. She is mostly sedentary and plans to garden and walk when the weather is nicer. No other complaints voiced.       Current Outpatient Medications   Medication Sig Dispense Refill     albuterol (PROAIR HFA/PROVENTIL HFA/VENTOLIN HFA) 108 (90 Base) MCG/ACT inhaler INHALE 2 PUFFS INTO THE LUNGS EVERY 6 HOURS 18 g 0     aspirin 81 MG EC tablet Take 81 mg by mouth daily       BREO ELLIPTA 100-25 MCG/INH inhaler INHALE 1 PUFF BY MOUTH EVERY DAY 60 each 1     buPROPion (WELLBUTRIN XL) 150 MG 24 hr tablet Take 1 tablet (150 mg) by mouth every morning 90 tablet 1     Calcium Carbonate-Vit D-Min (CALCIUM 1200 PO) Take 600 mg by mouth daily        Cholecalciferol (VITAMIN D) 1000 UNIT capsule Take 1 capsule by mouth daily. Take one tablet daily       clopidogrel (PLAVIX) 75 MG tablet TAKE 1 TABLET (75 MG) BY MOUTH DAILY CALL CLINIC TO SCHEDULE FOLLOW UP APPOINTMENT. 90 tablet 3      diltiazem ER (TIAZAC) 300 MG 24 hr ER beaded capsule Take 1 capsule (300 mg) by mouth daily 90 capsule 3     lisinopril (ZESTRIL) 10 MG tablet Take 10 mg by mouth daily       metoprolol succinate ER (TOPROL-XL) 100 MG 24 hr tablet Take 1 tablet (100 mg) by mouth daily No refills without follow up appt in cardiology. 90 tablet 0     rosuvastatin (CRESTOR) 40 MG tablet Take 1 tablet (40 mg) by mouth daily 90 tablet 1     SPIRIVA HANDIHALER 18 MCG inhaled capsule INHALE 1 CAPSULE (18 MCG) INTO THE LUNGS DAILY 90 capsule 3       Past Medical History:   Diagnosis Date     Brachial neuritis or radiculitis NOS      Chronic obstructive pulmonary disease, unspecified COPD type (H) 3/8/2016     Coronary artery disease     Doing fine     H/O tobacco use, presenting hazards to health      Hyperlipidemia LDL goal < 100      Hypertension goal BP (blood pressure) < 140/90      Malignant neoplasm of other specified sites of cervix      Old myocardial infarction      Osteopenia      Peripheral vascular disease, unspecified (H)      Type 2 diabetes, HbA1c goal < 7% (H)        Past Surgical History:   Procedure Laterality Date     ANGIOGRAM Left 10/19/2021    Procedure: Left iliac angioplasty and stenting, Right common iliac angioplasty;  Surgeon: Britni Bob MD;  Location: [facility] OR     ANGIOPLASTY N/A 10/19/2021    Procedure: possible intervention;  Surgeon: Britni Bob MD;  Location: [facility] OR     BIOPSY      Sample taken from back     CARDIAC SURGERY      Stents     COLONOSCOPY  1996    Results were ok     HYSTERECTOMY, PAP NO LONGER INDICATED  1989    ovaries only, no cervix per pt     IR OR ANGIOGRAM  10/19/2021     SURGICAL HISTORY OF -   1995    bunionectomy     SURGICAL HISTORY OF -   10/10/03    cardiac stenting     SURGICAL HISTORY OF -       stent placed in both of her legs for pad     VASCULAR SURGERY      PAD       Family History   Problem Relation Age of Onset     C.A.D. Mother      Breast Cancer Mother      [email or web address]  Father      Diabetes Father      Hypertension Father      C.A.D. Maternal Grandfather      C.A.D. Paternal Grandfather      Neurologic Disorder Brother         epilepsy       Social History     Tobacco Use     Smoking status: Former Smoker     Packs/day: 0.50     Years: 40.00     Pack years: 20.00     Types: Cigarettes     Quit date: 4/25/2018     Years since quitting: 3.9     Smokeless tobacco: Never Used   Substance Use Topics     Alcohol use: Yes     Comment: 2 to 4 beers or mixed drinks weekly       Allergies   Allergen Reactions     Morphine Nausea and Vomiting     Penicillins Nausea and Vomiting         ROS:   Negative besides that noted in HPI      Physical Examination:  Vitals: There were no vitals taken for this visit.  BMI= There is no height or weight on file to calculate BMI.  Gen: well appearing, NAD  Video visit    Laboratory/Imaging:   Pertinent Procedures/Imaging:  Arterial duplex ultrasound, bilateral and at 3 levels 9/13/2021:  1. RIGHT:       A. Resting CLAUDINE is ABNORMAL, 0.73, previously borderline, 0.93.       B. Resting TBI is ABNORMAL, 0.42.       C. Segmental pressures suggest femoropopliteal disease and small  vessel disease.  2. LEFT:       A. Resting CLAUDINE is ABNORMAL, 0.24, previously borderline, 0.93.       B. Resting TBI is ABNORMAL, 0.09.       C. Segmental pressures suggest iliac and small vessel disease.  3. Left brachial pressure now 68 mmHg less than the right (previously  21 mmHg) suggesting a left central stenosis. If clinically indicated,  further evaluation with CTA could be considered.  4. Exercise study not performed. The patient appeared unstable on her  feet and it did not seem safe to subject her to the treadmill test.     Carotid US 2/2020:  1. RIGHT ICA: 50-69% diameter stenosis by grayscale imaging and  sonographic velocity criteria, unchanged from 2015.  2. LEFT ICA:  Less than 50% diameter narrowing by grayscale imaging  and sonographic velocity criteria, unchanged from  2015.  3. Subclavian steal physiology with retrograde left vertebral artery  flow, unchanged. Correlate for symptoms.      Assessment:    # Bradycardia  # HTN  BP well controlled BP in left arm is FALSELY low, presumably due to left subclavian artery stenosis.   Patient has been checking home blood pressures solely in the right arm.   - Continue lisinopril  - Continue metoprolol 100 mg XL daily  - continue diltiazem to 300 mg ER daily  - BMP one week     # CAD:   # HLD  Single vessel CAD, dating back to 2003 at which time she had PCI of RCA. CTA with heavy calcification. LDL pending  - continue ASA, Plavix  - continue statin  - continue BB     # Bilateral LE PAD  # Claudication  - Vascular procedure 10/19     # Carotid disease.    Testing 2/2020 without stenosis > 70%     # Left subclavian artery stenosis.  There was a 57 mm Hg pressure gradient between the right and left arms in past.  CT angiogram showed subtotal occlusion of the ostium of the left subclavian artery.   She is asymptomatic and there is no evidence of a subclavian steal phenomenon, no need for intervention at this time    Recommendations:  - Due for updated lipids , which I will add on to her labs for her next visit with PCP  - Discussed patient is doing an excellent job with risk factor modification. Recommend initiating exercise in her life when feasible  - Follow up one year, sooner if concerns.     AARON Alejandre, CNP  [facility] Cardiology  [phone]    Time Spent on this Encounter   I spent 19 minutes on the care of Porsche Manning. Over 50% of my time was spent on the following:   - Counseling the patient and/or family regarding: diagnostic results, prognosis, risks and benefits of treatment options and recommended follow-up  - Coordination of care with the: consultant(s) and care coordinator/    AARON Claros CNP

## 2022-04-06 NOTE — PATIENT INSTRUCTIONS
Thank you for coming to the [facility] @ [place]; please note the following instructions:    1.         If you have any questions regarding your visit please contact your care team:     Cardiology  Telephone Number   Sari PEREZ., RN  Lorin MORALES, RN   Emily RIVAS, SAGE BORJAS, RMALYCIA Cook., VIDHI CHEEK, Visit Facilitator   [phone] (option [phone])   For scheduling appts:     [phone] (select option 1)       For the Device Clinic (Pacemakers and ICD's)  RN's :  Jazzy Mohan   During business hours: [phone]    *After business hours:  [phone] (select option 4)      Normal test result notifications will be released via [employer] or mailed within 7 business days.  All other test results, will be communicated via telephone once reviewed by your cardiologist.    If you need a medication refill please contact your pharmacy.  Please allow 3 business days for your refill to be completed.    As always, thank you for trusting us with your health care needs!

## 2022-04-06 NOTE — PROGRESS NOTES
Cardiology Clinic Note    Start: 08:45, end: 09:01    HPI: Porsche Manning is a 71 year old female with PMHx notable or HTN, HLD, hx of tobacco use, CAD (1st MI 1987, BMS to mRCA 2003), PAD (s/p bilateral aorto-iliac stenting 2005, repeat stenting to left common iliac artery 2006), subclavian stenosis, COPD, T2DM, CKD stage III, and hx of cervical cancer. She presents via virtual visit for follow up. Last seen by ABR Kuhn 10/2021 at which time, no med changes were made.     Since her last visit, she has been feeling well. Her BP is very well controlled int he 110-115/50's range with HR in the 50-70's. She denies any cardiac symptoms such as chest pain, light headedness, dizziness, SOB. She is mostly sedentary and plans to garden and walk when the weather is nicer. No other complaints voiced.       Current Outpatient Medications   Medication Sig Dispense Refill     albuterol (PROAIR HFA/PROVENTIL HFA/VENTOLIN HFA) 108 (90 Base) MCG/ACT inhaler INHALE 2 PUFFS INTO THE LUNGS EVERY 6 HOURS 18 g 0     aspirin 81 MG EC tablet Take 81 mg by mouth daily       BREO ELLIPTA 100-25 MCG/INH inhaler INHALE 1 PUFF BY MOUTH EVERY DAY 60 each 1     buPROPion (WELLBUTRIN XL) 150 MG 24 hr tablet Take 1 tablet (150 mg) by mouth every morning 90 tablet 1     Calcium Carbonate-Vit D-Min (CALCIUM 1200 PO) Take 600 mg by mouth daily        Cholecalciferol (VITAMIN D) 1000 UNIT capsule Take 1 capsule by mouth daily. Take one tablet daily       clopidogrel (PLAVIX) 75 MG tablet TAKE 1 TABLET (75 MG) BY MOUTH DAILY CALL CLINIC TO SCHEDULE FOLLOW UP APPOINTMENT. 90 tablet 3     diltiazem ER (TIAZAC) 300 MG 24 hr ER beaded capsule Take 1 capsule (300 mg) by mouth daily 90 capsule 3     lisinopril (ZESTRIL) 10 MG tablet Take 10 mg by mouth daily       metoprolol succinate ER (TOPROL-XL) 100 MG 24 hr tablet Take 1 tablet (100 mg) by mouth daily No refills without follow up appt in cardiology. 90 tablet 0     rosuvastatin (CRESTOR) 40  MG tablet Take 1 tablet (40 mg) by mouth daily 90 tablet 1     SPIRIVA HANDIHALER 18 MCG inhaled capsule INHALE 1 CAPSULE (18 MCG) INTO THE LUNGS DAILY 90 capsule 3       Past Medical History:   Diagnosis Date     Brachial neuritis or radiculitis NOS      Chronic obstructive pulmonary disease, unspecified COPD type (H) 3/8/2016     Coronary artery disease     Doing fine     H/O tobacco use, presenting hazards to health      Hyperlipidemia LDL goal < 100      Hypertension goal BP (blood pressure) < 140/90      Malignant neoplasm of other specified sites of cervix      Old myocardial infarction      Osteopenia      Peripheral vascular disease, unspecified (H)      Type 2 diabetes, HbA1c goal < 7% (H)        Past Surgical History:   Procedure Laterality Date     ANGIOGRAM Left 10/19/2021    Procedure: Left iliac angioplasty and stenting, Right common iliac angioplasty;  Surgeon: Britni Bob MD;  Location: [facility] OR     ANGIOPLASTY N/A 10/19/2021    Procedure: possible intervention;  Surgeon: Britni Bob MD;  Location: [facility] OR     BIOPSY      Sample taken from back     CARDIAC SURGERY      Stents     COLONOSCOPY  1996    Results were ok     HYSTERECTOMY, PAP NO LONGER INDICATED  1989    ovaries only, no cervix per pt     IR OR ANGIOGRAM  10/19/2021     SURGICAL HISTORY OF -   1995    bunionectomy     SURGICAL HISTORY OF -   10/10/03    cardiac stenting     SURGICAL HISTORY OF -     stent placed in both of her legs for pad     VASCULAR SURGERY      PAD       Family History   Problem Relation Age of Onset     C.A.D. Mother      Breast Cancer Mother      C.A.D. Father      Diabetes Father      Hypertension Father      C.A.JOE. Maternal Grandfather      C.A.JOE. Paternal Grandfather      Neurologic Disorder Brother         epilepsy       Social History     Tobacco Use     Smoking status: Former Smoker     Packs/day: 0.50     Years: 40.00     Pack years: 20.00     Types: Cigarettes     Quit date: 4/25/2018     Years since  quitting: 3.9     Smokeless tobacco: Never Used   Substance Use Topics     Alcohol use: Yes     Comment: 2 to 4 beers or mixed drinks weekly       Allergies   Allergen Reactions     Morphine Nausea and Vomiting     Penicillins Nausea and Vomiting         ROS:   Negative besides that noted in HPI      Physical Examination:  Vitals: There were no vitals taken for this visit.  BMI= There is no height or weight on file to calculate BMI.  Gen: well appearing, NAD  Video visit    Laboratory/Imaging:   Pertinent Procedures/Imaging:  Arterial duplex ultrasound, bilateral and at 3 levels 9/13/2021:  1. RIGHT:       A. Resting CLAUDINE is ABNORMAL, 0.73, previously borderline, 0.93.       B. Resting TBI is ABNORMAL, 0.42.       C. Segmental pressures suggest femoropopliteal disease and small  vessel disease.  2. LEFT:       A. Resting CLAUDINE is ABNORMAL, 0.24, previously borderline, 0.93.       B. Resting TBI is ABNORMAL, 0.09.       C. Segmental pressures suggest iliac and small vessel disease.  3. Left brachial pressure now 68 mmHg less than the right (previously  21 mmHg) suggesting a left central stenosis. If clinically indicated,  further evaluation with CTA could be considered.  4. Exercise study not performed. The patient appeared unstable on her  feet and it did not seem safe to subject her to the treadmill test.     Carotid US 2/2020:  1. RIGHT ICA: 50-69% diameter stenosis by grayscale imaging and  sonographic velocity criteria, unchanged from 2015.  2. LEFT ICA:  Less than 50% diameter narrowing by grayscale imaging  and sonographic velocity criteria, unchanged from 2015.  3. Subclavian steal physiology with retrograde left vertebral artery  flow, unchanged. Correlate for symptoms.      Assessment:    # Bradycardia  # HTN  BP well controlled BP in left arm is FALSELY low, presumably due to left subclavian artery stenosis.   Patient has been checking home blood pressures solely in the right arm.   - Continue  lisinopril  - Continue metoprolol 100 mg XL daily  - continue diltiazem to 300 mg ER daily  - BMP one week     # CAD:   # HLD  Single vessel CAD, dating back to 2003 at which time she had PCI of RCA. CTA with heavy calcification. LDL pending  - continue ASA, Plavix  - continue statin  - continue BB     # Bilateral LE PAD  # Claudication  - Vascular procedure 10/19     # Carotid disease.    Testing 2/2020 without stenosis > 70%     # Left subclavian artery stenosis.  There was a 57 mm Hg pressure gradient between the right and left arms in past.  CT angiogram showed subtotal occlusion of the ostium of the left subclavian artery.   She is asymptomatic and there is no evidence of a subclavian steal phenomenon, no need for intervention at this time    Recommendations:  - Due for updated lipids , which I will add on to her labs for her next visit with PCP  - Discussed patient is doing an excellent job with risk factor modification. Recommend initiating exercise in her life when feasible  - Follow up one year, sooner if concerns.     AARON Alejandre, CNP  [facility]  [phone]    Time Spent on this Encounter   I spent 19 minutes on the care of Porsche Manning. Over 50% of my time was spent on the following:   - Counseling the patient and/or family regarding: diagnostic results, prognosis, risks and benefits of treatment options and recommended follow-up  - Coordination of care with the: consultant(s) and care coordinator/    AARON Claros CNP

## 2022-04-15 DIAGNOSIS — N18.31 STAGE 3A CHRONIC KIDNEY DISEASE (H): Primary | ICD-10-CM

## 2022-04-15 DIAGNOSIS — D63.1 ANEMIA IN CHRONIC KIDNEY DISEASE (CODE): ICD-10-CM

## 2022-04-18 ENCOUNTER — LAB (OUTPATIENT)
Dept: LAB | Facility: CLINIC | Age: 72
End: 2022-04-18
Payer: COMMERCIAL

## 2022-04-18 DIAGNOSIS — D63.1 ANEMIA IN CHRONIC KIDNEY DISEASE (CODE): ICD-10-CM

## 2022-04-18 DIAGNOSIS — N18.31 STAGE 3A CHRONIC KIDNEY DISEASE (H): ICD-10-CM

## 2022-04-18 DIAGNOSIS — I25.10 CORONARY ARTERY DISEASE INVOLVING NATIVE CORONARY ARTERY OF NATIVE HEART WITHOUT ANGINA PECTORIS: ICD-10-CM

## 2022-04-18 DIAGNOSIS — I21.A1 MYOCARDIAL INFARCTION TYPE 2 (H): ICD-10-CM

## 2022-04-18 LAB
ALBUMIN SERPL-MCNC: 3.9 G/DL (ref 3.4–5)
ALBUMIN UR-MCNC: NEGATIVE MG/DL
ANION GAP SERPL CALCULATED.3IONS-SCNC: 8 MMOL/L (ref 3–14)
APPEARANCE UR: CLEAR
BACTERIA #/AREA URNS HPF: ABNORMAL /HPF
BILIRUB UR QL STRIP: NEGATIVE
BUN SERPL-MCNC: 16 MG/DL (ref 7–30)
CALCIUM SERPL-MCNC: 9.9 MG/DL (ref 8.5–10.1)
CHLORIDE BLD-SCNC: 107 MMOL/L (ref 94–109)
CHOLEST SERPL-MCNC: 161 MG/DL
CO2 SERPL-SCNC: 23 MMOL/L (ref 20–32)
COLOR UR AUTO: YELLOW
CREAT SERPL-MCNC: 1.1 MG/DL (ref 0.52–1.04)
CREAT UR-MCNC: 10 MG/DL
ERYTHROCYTE [DISTWIDTH] IN BLOOD BY AUTOMATED COUNT: 13.1 % (ref 10–15)
FASTING STATUS PATIENT QL REPORTED: YES
FERRITIN SERPL-MCNC: 62 NG/ML (ref 8–252)
GFR SERPL CREATININE-BSD FRML MDRD: 53 ML/MIN/1.73M2
GLUCOSE BLD-MCNC: 143 MG/DL (ref 70–99)
GLUCOSE UR STRIP-MCNC: NEGATIVE MG/DL
HBA1C MFR BLD: 6.4 % (ref 0–5.6)
HCT VFR BLD AUTO: 43.5 % (ref 35–47)
HDLC SERPL-MCNC: 63 MG/DL
HGB BLD-MCNC: 14.2 G/DL (ref 11.7–15.7)
HGB UR QL STRIP: ABNORMAL
IRON SATN MFR SERPL: 26 % (ref 15–46)
IRON SERPL-MCNC: 83 UG/DL (ref 35–180)
KETONES UR STRIP-MCNC: NEGATIVE MG/DL
LDLC SERPL CALC-MCNC: 75 MG/DL
LEUKOCYTE ESTERASE UR QL STRIP: ABNORMAL
MCH RBC QN AUTO: 30.9 PG (ref 26.5–33)
MCHC RBC AUTO-ENTMCNC: 32.6 G/DL (ref 31.5–36.5)
MCV RBC AUTO: 95 FL (ref 78–100)
NITRATE UR QL: NEGATIVE
NONHDLC SERPL-MCNC: 98 MG/DL
PH UR STRIP: 7 [PH] (ref 5–7)
PHOSPHATE SERPL-MCNC: 3.2 MG/DL (ref 2.5–4.5)
PLATELET # BLD AUTO: 253 10E3/UL (ref 150–450)
POTASSIUM BLD-SCNC: 4.2 MMOL/L (ref 3.4–5.3)
PROT UR-MCNC: 0.15 G/L
PROT/CREAT 24H UR: 1.5 G/G CR (ref 0–0.2)
RBC # BLD AUTO: 4.6 10E6/UL (ref 3.8–5.2)
RBC #/AREA URNS AUTO: ABNORMAL /HPF
SODIUM SERPL-SCNC: 138 MMOL/L (ref 133–144)
SP GR UR STRIP: <=1.005 (ref 1–1.03)
SQUAMOUS #/AREA URNS AUTO: ABNORMAL /LPF
TIBC SERPL-MCNC: 316 UG/DL (ref 240–430)
TRIGL SERPL-MCNC: 117 MG/DL
UROBILINOGEN UR STRIP-ACNC: 0.2 E.U./DL
WBC # BLD AUTO: 10.3 10E3/UL (ref 4–11)
WBC #/AREA URNS AUTO: ABNORMAL /HPF

## 2022-04-18 PROCEDURE — 36415 COLL VENOUS BLD VENIPUNCTURE: CPT

## 2022-04-18 PROCEDURE — 82306 VITAMIN D 25 HYDROXY: CPT

## 2022-04-18 PROCEDURE — 84156 ASSAY OF PROTEIN URINE: CPT

## 2022-04-18 PROCEDURE — 85027 COMPLETE CBC AUTOMATED: CPT

## 2022-04-18 PROCEDURE — 82728 ASSAY OF FERRITIN: CPT

## 2022-04-18 PROCEDURE — 80061 LIPID PANEL: CPT

## 2022-04-18 PROCEDURE — 83550 IRON BINDING TEST: CPT

## 2022-04-18 PROCEDURE — 80069 RENAL FUNCTION PANEL: CPT

## 2022-04-18 PROCEDURE — 81001 URINALYSIS AUTO W/SCOPE: CPT

## 2022-04-18 PROCEDURE — 83036 HEMOGLOBIN GLYCOSYLATED A1C: CPT

## 2022-04-19 LAB — DEPRECATED CALCIDIOL+CALCIFEROL SERPL-MC: 46 UG/L (ref 20–75)

## 2022-04-19 NOTE — PROGRESS NOTES
"SUBJECTIVE:   Porsche Manning is a 72 year old female who presents for Preventive Visit.      Patient has been advised of split billing requirements and indicates understanding: Yes  Are you in the first 12 months of your Medicare coverage?  No    Healthy Habits:     In general, how would you rate your overall health?  Good    Frequency of exercise:  1 day/week    Duration of exercise:  15-30 minutes    Do you usually eat at least 4 servings of fruit and vegetables a day, include whole grains    & fiber and avoid regularly eating high fat or \"junk\" foods?  Yes    Taking medications regularly:  Yes    Medication side effects:  Not applicable    Ability to successfully perform activities of daily living:  No assistance needed    Home Safety:  No safety concerns identified    Hearing Impairment:  No hearing concerns    In the past 6 months, have you been bothered by leaking of urine?  No    In general, how would you rate your overall mental or emotional health?  Good      PHQ-2 Total Score: 0    Additional concerns today:  No    Patient followed by nephrology with last visit 4/20/22 and labs updated around that time as well.  Followed by cardiology as well with last visit 4/6/22.    Do you feel safe in your environment? Yes    Have you ever done Advance Care Planning? (For example, a Health Directive, POLST, or a discussion with a medical provider or your loved ones about your wishes): Yes, advance care planning is on file.       Fall risk  Unable to complete due to virtual visit; need for additional assessment in future face-to-face visit    Cognitive Screening Unable to complete due to virtual visit; need for additional assessment in future face-to-face visit    Reviewed and updated as needed this visit by clinical staff   Tobacco  Allergies  Meds  Problems  Med Hx  Surg Hx  Fam Hx            Reviewed and updated as needed this visit by Provider   Tobacco  Allergies  Meds  Problems  Med Hx  Surg Hx  " Fam Hx           Social History     Tobacco Use     Smoking status: Former Smoker     Packs/day: 0.00     Years: 40.00     Pack years: 0.00     Types: Cigarettes     Quit date: 2018     Years since quittin.0     Smokeless tobacco: Never Used   Substance Use Topics     Alcohol use: Yes     Comment: 2 to 4 beers or mixed drinks weekly     If you drink alcohol do you typically have >3 drinks per day or >7 drinks per week? No    Alcohol Use 2022   Prescreen: >3 drinks/day or >7 drinks/week? No   Prescreen: >3 drinks/day or >7 drinks/week? -   No flowsheet data found.          Current providers sharing in care for this patient include: Patient Care Team:  Danuta Tan PA-C as PCP - General (Physician Assistant)  Danuta Tan PA-C as Assigned PCP  Nelli Kuhn PA-C as Physician Assistant (Interventional Cardiology)  Patience Wellington PA-C as Assigned Nephrology Provider  Patience Wellington PA-C as Physician Assistant (Nephrology)  Nelli Kuhn PA-C as Assigned Heart and Vascular Provider    The following health maintenance items are reviewed in Epic and correct as of today:  Health Maintenance Due   Topic Date Due     ANNUAL REVIEW OF  ORDERS  Never done     DIABETIC FOOT EXAM  10/22/2020     LUNG CANCER SCREENING  2021     FALL RISK ASSESSMENT  2022     Labs reviewed in EPIC  BP Readings from Last 3 Encounters:   10/19/21 114/62   10/15/21 (!) 171/72   21 (!) 175/96    Wt Readings from Last 3 Encounters:   10/19/21 82.6 kg (182 lb 1.6 oz)   10/15/21 84.7 kg (186 lb 12.8 oz)   21 86.6 kg (191 lb)                  Patient Active Problem List   Diagnosis     Old myocardial infarction     Peripheral vascular disease (H)     HYPERLIPIDEMIA LDL GOAL <100     Hypertension: MEASURE BP IN RIGHT ARM ONLY     Osteopenia     Vitamin D deficiency disease     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)     Advanced directives,  counseling/discussion     Subclavian artery stenosis, left (H)     CKD (chronic kidney disease) stage 3, GFR 30-59 ml/min (H)     Chronic obstructive pulmonary disease, unspecified COPD type (H)     Obesity (BMI 35.0-39.9) with comorbidity (H)     Past Surgical History:   Procedure Laterality Date     ANGIOGRAM Left 10/19/2021    Procedure: Left iliac angioplasty and stenting, Right common iliac angioplasty;  Surgeon: Britni Bob MD;  Location: [facility]     ANGIOPLASTY N/A 10/19/2021    Procedure: possible intervention;  Surgeon: Britni Bob MD;  Location: [facility] OR     BIOPSY      Sample taken from back     CARDIAC SURGERY      Stents     COLONOSCOPY      Results were ok     HYSTERECTOMY, PAP NO LONGER INDICATED      ovaries only, no cervix per pt     IR OR ANGIOGRAM  10/19/2021     SURGICAL HISTORY OF -       bunionectomy     SURGICAL HISTORY OF -   10/10/03    cardiac stenting     SURGICAL HISTORY OF     stent placed in both of her legs for pad     VASCULAR SURGERY      PAD       Social History     Tobacco Use     Smoking status: Former Smoker     Packs/day: 0.00     Years: 40.00     Pack years: 0.00     Types: Cigarettes     Quit date: 2018     Years since quittin.0     Smokeless tobacco: Never Used   Substance Use Topics     Alcohol use: Yes     Comment: 2 to 4 beers or mixed drinks weekly     Family History   Problem Relation Age of Onset     C.A.D. Mother      Breast Cancer Mother      C.A.D. Father      Diabetes Father      Hypertension Father      C.A.D. Maternal Grandfather      C.A.D. Paternal Grandfather      Neurologic Disorder Brother         epilepsy         Current Outpatient Medications   Medication Sig Dispense Refill     albuterol (PROAIR HFA/PROVENTIL HFA/VENTOLIN HFA) 108 (90 Base) MCG/ACT inhaler INHALE 2 PUFFS INTO THE LUNGS EVERY 6 HOURS 18 g 0     aspirin 81 MG EC tablet Take 81 mg by mouth daily       BREO ELLIPTA 100-25 MCG/INH inhaler INHALE 1 PUFF BY MOUTH  EVERY DAY 60 each 1     buPROPion (WELLBUTRIN XL) 150 MG 24 hr tablet Take 1 tablet (150 mg) by mouth every morning 90 tablet 1     Calcium Carbonate-Vit D-Min (CALCIUM 1200 PO) Take 600 mg by mouth daily        Cholecalciferol (VITAMIN D) 1000 UNIT capsule Take 1 capsule by mouth daily Take one tablet daily       clopidogrel (PLAVIX) 75 MG tablet TAKE 1 TABLET (75 MG) BY MOUTH DAILY CALL CLINIC TO SCHEDULE FOLLOW UP APPOINTMENT. 90 tablet 3     diltiazem ER (TIAZAC) 300 MG 24 hr ER beaded capsule Take 1 capsule (300 mg) by mouth daily 90 capsule 3     lisinopril (ZESTRIL) 10 MG tablet Take 10 mg by mouth daily       metoprolol succinate ER (TOPROL-XL) 100 MG 24 hr tablet Take 1 tablet (100 mg) by mouth daily No refills without follow up appt in cardiology. 90 tablet 0     rosuvastatin (CRESTOR) 40 MG tablet Take 1 tablet (40 mg) by mouth daily 90 tablet 1     SPIRIVA HANDIHALER 18 MCG inhaled capsule INHALE 1 CAPSULE (18 MCG) INTO THE LUNGS DAILY 90 capsule 3     Allergies   Allergen Reactions     Morphine Nausea and Vomiting     Penicillins Nausea and Vomiting     Recent Labs   Lab Test 04/18/22  1058 10/26/21  1002 10/19/21  0705 10/15/21  1435 07/27/21  1019 07/20/21  1202 04/22/21  1044 01/28/21  1004 11/12/19  0920 10/22/19  1012   A1C 6.4*  --   --  6.0*  --   --  6.7* 6.7*  --  6.5*   LDL 75  --   --   --   --  63  --  73  --  79   HDL 63  --   --   --   --  57  --  62  --  53   TRIG 117  --   --   --   --  172*  --  166*  --  135   ALT  --   --   --  27  --   --  24 30   < > 18   CR 1.10* 0.93   < > 0.90   < >  --  0.97 1.07*   < > 1.10*   GFRESTIMATED 53* 62   < > 65   < >  --  59* 53*   < > 51*   GFRESTBLACK  --   --   --   --   --   --  69 61   < > 60*   POTASSIUM 4.2 3.6   < > 3.7   < >  --  4.4 3.7   < > 4.3   TSH  --   --   --   --   --   --  1.14  --   --  1.92    < > = values in this interval not displayed.      Review of Systems   Constitutional: Negative for chills and fever.   HENT: Negative for  "congestion, ear pain, hearing loss and sore throat.    Eyes: Negative for pain and visual disturbance.   Respiratory: Negative for cough and shortness of breath.    Cardiovascular: Negative for chest pain, palpitations and peripheral edema.   Gastrointestinal: Negative for abdominal pain, constipation, diarrhea, heartburn, hematochezia and nausea.   Breasts:  Negative for tenderness, breast mass and discharge.   Genitourinary: Negative for dysuria, frequency, genital sores, hematuria, pelvic pain, urgency, vaginal bleeding and vaginal discharge.   Musculoskeletal: Negative for arthralgias, joint swelling and myalgias.   Skin: Negative for rash.   Neurological: Negative for dizziness, weakness, headaches and paresthesias.   Psychiatric/Behavioral: Negative for mood changes. The patient is not nervous/anxious.        OBJECTIVE:   There were no vitals taken for this visit. Estimated body mass index is 33.31 kg/m  as calculated from the following:    Height as of 10/19/21: 1.575 m (5' 2\").    Weight as of 10/19/21: 82.6 kg (182 lb 1.6 oz).  Physical Exam  Vitals:  No vitals were obtained today due to virtual visit.     Healthy, alert and no distress  PSYCH: Alert and oriented times 3; coherent speech, normal   rate and volume, able to articulate logical thoughts, able   to abstract reason, no tangential thoughts, no hallucinations   or delusions  Affect is normal  RESP: No cough, no audible wheezing, able to talk in full sentences  Remainder of exam unable to be completed due to telephone visits.      ASSESSMENT / PLAN:       ICD-10-CM    1. Encounter for Medicare annual wellness exam  Z00.00    2. Chronic obstructive pulmonary disease, unspecified COPD type (H)  J44.9    3. Type 2 diabetes mellitus with stage 3 chronic kidney disease, without long-term current use of insulin, unspecified whether stage 3a or 3b CKD (H)  E11.22     N18.30    4. Stage 3a chronic kidney disease (H)  N18.31    5. Morbid obesity (H)  E66.01  " "  6. Personal history of tobacco use  Z87.891 Prof fee: Shared Decision Making for Lung Cancer Screening     CT Chest Lung Cancer Scrn Low Dose wo       Patient has been advised of split billing requirements and indicates understanding: Yes    COUNSELING:  Reviewed preventive health counseling, as reflected in patient instructions       Regular exercise       Healthy diet/nutrition       Vision screening       Hearing screening    Estimated body mass index is 33.31 kg/m  as calculated from the following:    Height as of 10/19/21: 1.575 m (5' 2\").    Weight as of 10/19/21: 82.6 kg (182 lb 1.6 oz).    Weight management plan: Discussed healthy diet and exercise guidelines    She reports that she quit smoking about 4 years ago. Her smoking use included cigarettes. She smoked 0.00 packs per day for 40.00 years. She has never used smokeless tobacco.      Appropriate preventive services were discussed with this patient, including applicable screening as appropriate for cardiovascular disease, diabetes, osteopenia/osteoporosis, and glaucoma.  As appropriate for age/gender, discussed screening for colorectal cancer, prostate cancer, breast cancer, and cervical cancer. Checklist reviewing preventive services available has been given to the patient.    Reviewed patients plan of care and provided an AVS. The Basic Care Plan (routine screening as documented in Health Maintenance) for Porsche meets the Care Plan requirement. This Care Plan has been established and reviewed with the Patient.    Counseling Resources:  ATP IV Guidelines  Pooled Cohorts Equation Calculator  Breast Cancer Risk Calculator  Breast Cancer: Medication to Reduce Risk  FRAX Risk Assessment  ICSI Preventive Guidelines  Dietary Guidelines for Americans, 2010  [employer] MyPlate  ASA Prophylaxis  Lung CA Screening    [facility]    Identified Health Risks:  Lung Cancer Screening Shared Decision Making Visit "     Porsche Manning, a 72 year old female, is eligible for lung cancer screening    History   Smoking Status     Former Smoker     Packs/day: 0.00     Years: 40.00     Types: Cigarettes     Quit date: 4/25/2018   Smokeless Tobacco     Never Used       I have discussed with patient the risks and benefits of screening for lung cancer with low-dose CT.     The risks include:    radiation exposure: one low dose chest CT has as much ionizing radiation as about 15 chest x-rays, or 6 months of background radiation living in [place]      false positives: most findings/nodules are NOT cancer, but some might still require additional diagnostic evaluation, including biopsy    over-diagnosis: some slow growing cancers that might never have been clinically significant will be detected and treated unnecessarily     The benefit of early detection of lung cancer is contingent upon adherence to annual screening or more frequent follow up if indicated.     Furthermore, to benefit from screening, Porsche must be willing and able to undergo diagnostic procedures, if indicated. Although no specific guide is available for determining severity of comorbidities, it is reasonable to withhold screening in patients who have greater mortality risk from other diseases.     We did discuss that the best way to prevent lung cancer is to not smoke.    Some patients may value a numeric estimation of lung cancer risk when evaluating if lung cancer screening is right for them, here is one calculator:    ShouldIScreen

## 2022-04-20 ENCOUNTER — VIRTUAL VISIT (OUTPATIENT)
Dept: NEPHROLOGY | Facility: CLINIC | Age: 72
End: 2022-04-20
Attending: STUDENT IN AN ORGANIZED HEALTH CARE EDUCATION/TRAINING PROGRAM
Payer: COMMERCIAL

## 2022-04-20 DIAGNOSIS — J44.9 CHRONIC OBSTRUCTIVE PULMONARY DISEASE, UNSPECIFIED COPD TYPE (H): ICD-10-CM

## 2022-04-20 DIAGNOSIS — E66.01 MORBID OBESITY (H): ICD-10-CM

## 2022-04-20 DIAGNOSIS — I73.9 PERIPHERAL VASCULAR DISEASE (H): ICD-10-CM

## 2022-04-20 DIAGNOSIS — N18.31 CHRONIC KIDNEY DISEASE, STAGE 3A (H): Primary | ICD-10-CM

## 2022-04-20 DIAGNOSIS — I10 HYPERTENSION GOAL BP (BLOOD PRESSURE) < 140/90: ICD-10-CM

## 2022-04-20 PROCEDURE — 99213 OFFICE O/P EST LOW 20 MIN: CPT | Mod: GC | Performed by: STUDENT IN AN ORGANIZED HEALTH CARE EDUCATION/TRAINING PROGRAM

## 2022-04-20 PROCEDURE — G0463 HOSPITAL OUTPT CLINIC VISIT: HCPCS | Mod: PN,RTG | Performed by: STUDENT IN AN ORGANIZED HEALTH CARE EDUCATION/TRAINING PROGRAM

## 2022-04-20 NOTE — PROGRESS NOTES
Leann is a 72 year old who is being evaluated via a billable video visit.      How would you like to obtain your AVS? MyChart  If the video visit is dropped, the invitation should be resent by: Text to cell phone: [phone]  Will anyone else be joining your video visit? No    Video Start Time: 2:04PM  Video-Visit Details    Type of service:  Video Visit    Video End Time:2:30PM    Originating Location (pt. 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 She has ~1-1.5g/g of proteinuria, not inconsistent with long-standing DM. Possible hypertensive nephrosclerosis? If persistent or rising, might need additional work up at next visit.  - Follow up in 6 months     # Hypertension:   Controlled. Currently on diltiazem ER 300mg daily, lisinopril 10mg daily.  - No changes     # Mineral Bone Disorder:   Ca 9.9, Vit D replete. PTH WNL on last check.    Assessment and plan was discussed with patient and she voiced her understanding and agreement.    I discussed the patient's plan of care with Dr. Clayton.    Danette Smith MD  Nephrology Fellow    Reason for Visit:  Porsche Manning is a 71 year old female with CKD who returns for nephrology follow-up.       HPI:  Leann was last seen in clinic in October 2021. Since then, she has not had any significant changes to her health or medications. Her  BP fluctuates - 146/60, HR 71; 138/63, 118/52, but is mostly controlled. She takes medications at 8am in the morning and then checks pressure in the afternoon and finds that BP is typically in the the normal range. She feels well. No dizziness, lightheadedness, or orthostatic symptoms.       ROS:  A comprehensive review of systems  was obtained and negative, except as noted in the HPI or PMH.    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)     Advanced directives, counseling/discussion     Subclavian artery stenosis, left (H)     CKD (chronic kidney disease) stage 3, GFR 30-59 ml/min (H)     Chronic obstructive pulmonary disease, unspecified COPD type (H)     Obesity (BMI 35.0-39.9) with comorbidity (H)       Personal Hx:   Social History     Tobacco Use     Smoking status: Former Smoker     Packs/day: 0.50     Years: 40.00     Pack years: 20.00     Types: Cigarettes     Quit date: 2018     Years since quittin.0     Smokeless tobacco: Never Used   Substance Use Topics     Alcohol use: Yes     Comment: 2 to 4 beers or mixed drinks weekly       Allergies: Reviewed by provider.     Medications:  Current Outpatient Medications   Medication Sig     albuterol (PROAIR HFA/PROVENTIL HFA/VENTOLIN HFA) 108 (90 Base) MCG/ACT inhaler INHALE 2 PUFFS INTO THE LUNGS EVERY 6 HOURS     aspirin 81 MG EC tablet Take 81 mg by mouth daily     BREO ELLIPTA 100-25 MCG/INH inhaler INHALE 1 PUFF BY MOUTH EVERY DAY     buPROPion (WELLBUTRIN XL) 150 MG 24 hr tablet Take 1 tablet (150 mg) by mouth every morning     Calcium Carbonate-Vit D-Min (CALCIUM 1200 PO) Take 600 mg by mouth daily      Cholecalciferol (VITAMIN D) 1000 UNIT capsule Take 1 capsule by mouth daily Take one tablet daily     clopidogrel (PLAVIX) 75 MG tablet TAKE 1 TABLET (75 MG) BY MOUTH DAILY CALL CLINIC TO SCHEDULE FOLLOW UP APPOINTMENT.     diltiazem ER (TIAZAC) 300 MG 24 hr ER beaded capsule Take 1 capsule (300 mg) by mouth daily     lisinopril (ZESTRIL) 10 MG tablet Take 10 mg by mouth daily     metoprolol succinate ER  (TOPROL-XL) 100 MG 24 hr tablet Take 1 tablet (100 mg) by mouth daily No refills without follow up appt in cardiology.     rosuvastatin (CRESTOR) 40 MG tablet Take 1 tablet (40 mg) by mouth daily     SPIRIVA HANDIHALER 18 MCG inhaled capsule INHALE 1 CAPSULE (18 MCG) INTO THE LUNGS DAILY     No current facility-administered medications for this visit.       Vitals:  There were no vitals taken for this visit.    Exam:  Vital signs were deferred for this telemedicine visit.    GENERAL APPEARANCE: alert and no distress  HENT: no obvious abnormalities on appearance  RESP: breathing appears unremarkable with normal rate, no audible wheezing or cough and no apparent shortness of breath with conversation  SKIN: no apparent rash and normal skin tone  NEURO: speech is clear with no obvious neurological deficits  PSYCH: mentation appears normal and affect normal      LABS:   I reviewed the patients BMP, UA, CBC.  CMP  Recent Labs   Lab Test 04/18/22  1058 10/26/21  1002 10/19/21  1049 10/19/21  0705 10/19/21  0633 10/15/21  1435 07/27/21  1019 04/22/21  1044 01/28/21  1004 03/10/20  0946 11/12/19  0920    138  --  141  --  136   < > 137 137 134 137   POTASSIUM 4.2 3.6  --  3.7  --  3.7   < > 4.4 3.7 4.5 4.3   CHLORIDE 107 105  --  111*  --  107   < > 104 107 103 104   CO2 23 26  --  23  --  20   < > 23 21 23 25   ANIONGAP 8 7  --  7  --  9   < > 10 9 8 8   * 146* 121* 158*   < > 143*   < > 164* 160* 153* 178*   BUN 16 9  --  11  --  9   < > 13 15 19 12   CR 1.10* 0.93  --  1.08*  --  0.90   < > 0.97 1.07* 1.31* 0.96   GFRESTIMATED 53* 62  --  52*  --  65   < > 59* 53* 41* 61   GFRESTBLACK  --   --   --   --   --   --   --  69 61 48* 70   VIANEY 9.9 10.0  --  9.4  --  9.4   < > 10.4* 10.2* 9.5 9.0    < > = values in this interval not displayed.     Recent Labs   Lab Test 10/15/21  1435 04/22/21  1044 01/28/21  1004 11/12/19  0920   BILITOTAL 0.4 0.5 0.5 0.4   ALKPHOS 98 112 114 100   ALT 27 24 30 20   AST 19 15 16 11      CBC  Recent Labs   Lab Test 04/18/22  1058 10/26/21  1002 10/19/21  0705 10/15/21  1435   HGB 14.2 13.2 13.2 13.7   WBC 10.3 10.3 8.8 11.3*   RBC 4.60 4.16 4.11 4.29   HCT 43.5 40.2 40.1 42.2   MCV 95 97 98 98   MCH 30.9 31.7 32.1 31.9   MCHC 32.6 32.8 32.9 32.5   RDW 13.1 12.8 12.8 12.7    337 216 236     URINE STUDIES  Recent Labs   Lab Test 04/18/22  1058 10/26/21  1008 08/17/21  1221 07/27/21  1022 07/23/15  0925   COLOR Yellow Straw Straw Straw Yellow   APPEARANCE Clear Clear Clear Clear Clear   URINEGLC Negative Negative Negative Negative Negative   URINEBILI Negative Negative Negative Negative Negative   URINEKETONE Negative Negative Negative Negative Negative   SG <=1.005 1.004 1.005 1.003 1.010   UBLD Trace* Negative Negative Negative Negative   URINEPH 7.0 6.0 6.0 6.0 5.5   PROTEIN Negative Negative 30 * Negative Negative   UROBILINOGEN 0.2  --   --   --  0.2   NITRITE Negative Negative Negative Negative Negative   LEUKEST Trace* Trace* Small* Negative Negative   RBCU 0-2 1 1 1  --    WBCU 0-5 2 8* 1  --      Recent Labs   Lab Test 04/18/22  1058 10/26/21  1008 08/17/21  1221 07/27/21  1022   UTPG 1.50* 1.06* 1.03* 1.05*     PTH  Recent Labs   Lab Test 07/27/21  1019   PTHI 24     IRON STUDIES  Recent Labs   Lab Test 04/18/22  1058 10/17/16  0825   IRON 83  --      --    IRONSAT 26  --    BRI 62 11       Divya Meaghan Smith MD

## 2022-04-20 NOTE — LETTER
4/20/2022       RE: Porsche Manning  [address]  [facility] [address]     Dear Colleague,    Thank you for referring your patient, Porsche Manning, to the [facility] [place] at [facility]. Please see a copy of my visit note below.    Leann is a 72 year old who is being evaluated via a billable video visit.      How would you like to obtain your AVS? MyChart  If the video visit is dropped, the invitation should be resent by: Text to cell phone: [phone]  Will anyone else be joining your video visit? No    Video Start Time: 2:04PM  Video-Visit Details    Type of service:  Video Visit    Video End Time:2:30PM    Originating Location (pt. 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 She has ~1-1.5g/g of proteinuria, not inconsistent with long-standing DM. Possible hypertensive nephrosclerosis? If persistent or rising, might need additional work up at next visit.  - Follow up in 6 months     # Hypertension:   Controlled. Currently on diltiazem ER 300mg daily, lisinopril 10mg daily.  - No changes     # Mineral Bone Disorder:   Ca 9.9, Vit D replete. PTH WNL on last check.    Assessment and plan was discussed with patient and she voiced her understanding and agreement.    I discussed the patient's plan of care with Dr. Clayton.    Danette Smith MD  Nephrology Fellow    Reason for Visit:  Porsche Manning is a 71 year old female with CKD who returns for nephrology follow-up.       HPI:  Leann was last seen in clinic in October 2021. Since then, she has not had any significant changes to her health or medications. Her  BP  fluctuates - 146/60, HR 71; 138/63, 118/52, but is mostly controlled. She takes medications at 8am in the morning and then checks pressure in the afternoon and finds that BP is typically in the the normal range. She feels well. No dizziness, lightheadedness, or orthostatic symptoms.       ROS:  A comprehensive review of systems was obtained and negative, except as noted in the HPI or PMH.    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)     Advanced directives, counseling/discussion     Subclavian artery stenosis, left (H)     CKD (chronic kidney disease) stage 3, GFR 30-59 ml/min (H)     Chronic obstructive pulmonary disease, unspecified COPD type (H)     Obesity (BMI 35.0-39.9) with comorbidity (H)       Personal Hx:   Social History     Tobacco Use     Smoking status: Former Smoker     Packs/day: 0.50     Years: 40.00     Pack years: 20.00     Types: Cigarettes     Quit date: 2018     Years since quittin.0     Smokeless tobacco: Never Used   Substance Use Topics     Alcohol use: Yes     Comment: 2 to 4 beers or mixed drinks weekly       Allergies: Reviewed by provider.     Medications:  Current Outpatient Medications   Medication Sig     albuterol (PROAIR HFA/PROVENTIL HFA/VENTOLIN HFA) 108 (90 Base) MCG/ACT inhaler INHALE 2 PUFFS INTO THE LUNGS EVERY 6 HOURS     aspirin 81 MG EC tablet Take 81 mg by mouth daily     BREO ELLIPTA 100-25 MCG/INH inhaler INHALE 1 PUFF BY MOUTH EVERY DAY     buPROPion (WELLBUTRIN XL) 150 MG 24 hr tablet Take 1 tablet (150 mg) by mouth every morning     Calcium Carbonate-Vit D-Min (CALCIUM 1200 PO) Take 600 mg by mouth daily      Cholecalciferol (VITAMIN D) 1000 UNIT capsule Take 1 capsule by mouth daily  Take one tablet daily     clopidogrel (PLAVIX) 75 MG tablet TAKE 1 TABLET (75 MG) BY MOUTH DAILY CALL CLINIC TO SCHEDULE FOLLOW UP APPOINTMENT.     diltiazem ER (TIAZAC) 300 MG 24 hr ER beaded capsule Take 1 capsule (300 mg) by mouth daily     lisinopril (ZESTRIL) 10 MG tablet Take 10 mg by mouth daily     metoprolol succinate ER (TOPROL-XL) 100 MG 24 hr tablet Take 1 tablet (100 mg) by mouth daily No refills without follow up appt in cardiology.     rosuvastatin (CRESTOR) 40 MG tablet Take 1 tablet (40 mg) by mouth daily     SPIRIVA HANDIHALER 18 MCG inhaled capsule INHALE 1 CAPSULE (18 MCG) INTO THE LUNGS DAILY     No current facility-administered medications for this visit.       Vitals:  There were no vitals taken for this visit.    Exam:  Vital signs were deferred for this telemedicine visit.    GENERAL APPEARANCE: alert and no distress  HENT: no obvious abnormalities on appearance  RESP: breathing appears unremarkable with normal rate, no audible wheezing or cough and no apparent shortness of breath with conversation  SKIN: no apparent rash and normal skin tone  NEURO: speech is clear with no obvious neurological deficits  PSYCH: mentation appears normal and affect normal      LABS:   I reviewed the patients BMP, UA, CBC.  CMP  Recent Labs   Lab Test 04/18/22  1058 10/26/21  1002 10/19/21  1049 10/19/21  0705 10/19/21  0633 10/15/21  1435 07/27/21  1019 04/22/21  1044 01/28/21  1004 03/10/20  0946 11/12/19  0920    138  --  141  --  136   < > 137 137 134 137   POTASSIUM 4.2 3.6  --  3.7  --  3.7   < > 4.4 3.7 4.5 4.3   CHLORIDE 107 105  --  111*  --  107   < > 104 107 103 104   CO2 23 26  --  23  --  20   < > 23 21 23 25   ANIONGAP 8 7  --  7  --  9   < > 10 9 8 8   * 146* 121* 158*   < > 143*   < > 164* 160* 153* 178*   BUN 16 9  --  11  --  9   < > 13 15 19 12   CR 1.10* 0.93  --  1.08*  --  0.90   < > 0.97 1.07* 1.31* 0.96   GFRESTIMATED 53* 62  --  52*  --  65   < > 59* 53* 41* 61    GFRESTBLACK  --   --   --   --   --   --   --  69 61 48* 70   VIANEY 9.9 10.0  --  9.4  --  9.4   < > 10.4* 10.2* 9.5 9.0    < > = values in this interval not displayed.     Recent Labs   Lab Test 10/15/21  1435 04/22/21  1044 01/28/21  1004 11/12/19  0920   BILITOTAL 0.4 0.5 0.5 0.4   ALKPHOS 98 112 114 100   ALT 27 24 30 20   AST 19 15 16 11     CBC  Recent Labs   Lab Test 04/18/22  1058 10/26/21  1002 10/19/21  0705 10/15/21  1435   HGB 14.2 13.2 13.2 13.7   WBC 10.3 10.3 8.8 11.3*   RBC 4.60 4.16 4.11 4.29   HCT 43.5 40.2 40.1 42.2   MCV 95 97 98 98   MCH 30.9 31.7 32.1 31.9   MCHC 32.6 32.8 32.9 32.5   RDW 13.1 12.8 12.8 12.7    337 216 236     URINE STUDIES  Recent Labs   Lab Test 04/18/22  1058 10/26/21  1008 08/17/21  1221 07/27/21  1022 07/23/15  0925   COLOR Yellow Straw Straw Straw Yellow   APPEARANCE Clear Clear Clear Clear Clear   URINEGLC Negative Negative Negative Negative Negative   URINEBILI Negative Negative Negative Negative Negative   URINEKETONE Negative Negative Negative Negative Negative   SG <=1.005 1.004 1.005 1.003 1.010   UBLD Trace* Negative Negative Negative Negative   URINEPH 7.0 6.0 6.0 6.0 5.5   PROTEIN Negative Negative 30 * Negative Negative   UROBILINOGEN 0.2  --   --   --  0.2   NITRITE Negative Negative Negative Negative Negative   LEUKEST Trace* Trace* Small* Negative Negative   RBCU 0-2 1 1 1  --    WBCU 0-5 2 8* 1  --      Recent Labs   Lab Test 04/18/22  1058 10/26/21  1008 08/17/21  1221 07/27/21  1022   UTPG 1.50* 1.06* 1.03* 1.05*     PTH  Recent Labs   Lab Test 07/27/21  1019   PTHI 24     IRON STUDIES  Recent Labs   Lab Test 04/18/22  1058 10/17/16  0825   IRON 83  --      --    IRONSAT 26  --    BRI 62 11       Divya Smith MD    Attestation signed by Valerio Clayton MD at 4/28/2022  7:42 PM:  Physician Attestation   I, Valerio Clayton MD, saw this patient and agree with the findings and plan of care as documented in the note.       Items personally reviewed/procedural attestation: vitals, labs, history, and exam.    Valerio Clayton MD

## 2022-04-25 ENCOUNTER — VIRTUAL VISIT (OUTPATIENT)
Dept: FAMILY MEDICINE | Facility: CLINIC | Age: 72
End: 2022-04-25
Payer: COMMERCIAL

## 2022-04-25 DIAGNOSIS — N18.30 TYPE 2 DIABETES MELLITUS WITH STAGE 3 CHRONIC KIDNEY DISEASE, WITHOUT LONG-TERM CURRENT USE OF INSULIN, UNSPECIFIED WHETHER STAGE 3A OR 3B CKD (H): ICD-10-CM

## 2022-04-25 DIAGNOSIS — Z00.00 ENCOUNTER FOR MEDICARE ANNUAL WELLNESS EXAM: Primary | ICD-10-CM

## 2022-04-25 DIAGNOSIS — N18.31 STAGE 3A CHRONIC KIDNEY DISEASE (H): ICD-10-CM

## 2022-04-25 DIAGNOSIS — E11.22 TYPE 2 DIABETES MELLITUS WITH STAGE 3 CHRONIC KIDNEY DISEASE, WITHOUT LONG-TERM CURRENT USE OF INSULIN, UNSPECIFIED WHETHER STAGE 3A OR 3B CKD (H): ICD-10-CM

## 2022-04-25 DIAGNOSIS — Z87.891 PERSONAL HISTORY OF TOBACCO USE: ICD-10-CM

## 2022-04-25 DIAGNOSIS — E66.01 MORBID OBESITY (H): ICD-10-CM

## 2022-04-25 DIAGNOSIS — J44.9 CHRONIC OBSTRUCTIVE PULMONARY DISEASE, UNSPECIFIED COPD TYPE (H): ICD-10-CM

## 2022-04-25 PROCEDURE — G0296 VISIT TO DETERM LDCT ELIG: HCPCS | Performed by: PHYSICIAN ASSISTANT

## 2022-04-25 PROCEDURE — 99397 PER PM REEVAL EST PAT 65+ YR: CPT | Performed by: PHYSICIAN ASSISTANT

## 2022-04-25 ASSESSMENT — ENCOUNTER SYMPTOMS
FEVER: 0
DIARRHEA: 0
HEARTBURN: 0
CONSTIPATION: 0
DIZZINESS: 0
HEMATURIA: 0
NAUSEA: 0
COUGH: 0
NERVOUS/ANXIOUS: 0
EYE PAIN: 0
WEAKNESS: 0
PARESTHESIAS: 0
PALPITATIONS: 0
SHORTNESS OF BREATH: 0
ARTHRALGIAS: 0
HEADACHES: 0
SORE THROAT: 0
BREAST MASS: 0
FREQUENCY: 0
CHILLS: 0
HEMATOCHEZIA: 0
JOINT SWELLING: 0
ABDOMINAL PAIN: 0
MYALGIAS: 0
DYSURIA: 0

## 2022-04-25 ASSESSMENT — ACTIVITIES OF DAILY LIVING (ADL): CURRENT_FUNCTION: NO ASSISTANCE NEEDED

## 2022-04-25 NOTE — PATIENT INSTRUCTIONS
Patient to call radiology to make appointment: [phone]      Did you know?      You can schedule a video visit for follow-up appointments as well as future appointments for certain conditions.  Please see the below link.     Video Visits ([email or web address])     If you have not already done so,  I encourage you to sign up for MOV[employer] ([email or web address]).  This will allow you to review your results, securely communicate with a provider, and schedule virtual visits as well.  Lung Cancer Screening   Frequently Asked Questions  If you are at high-risk for lung cancer, getting screened with low-dose computed tomography (LDCT) every year can help save your life. This handout offers answers to some of the most common questions about lung cancer screening. If you have other questions, please call [phone] ([phone]).     What is it?  Lung cancer screening uses special X-ray technology to create an image of your lung tissue. The exam is quick and easy and takes less than 10 seconds. We don t give you any medicine or use any needles. You can eat before and after the exam. You don t need to change your clothes as long as the clothing on your chest doesn t contain metal. But, you do need to be able to hold your breath for at least 6 seconds during the exam.    What is the goal of lung cancer screening?  The goal of lung cancer screening is to save lives. Many times, lung cancer is not found until a person starts having physical symptoms. Lung cancer screening can help detect lung cancer in the earliest stages when it may be easier to treat.    Who should be screened for lung cancer?  We suggest lung cancer screening for anyone who is at high-risk for lung cancer. You are in the high-risk group if you:      are between the ages of 55 and 79, and    have smoked at least 1 pack of cigarettes a day for 20 or more years, and    still smoke or have quit within the past 15  years.    However, if you have a new cough or shortness of breath, you should talk to your doctor before being screened.    Why does it matter if I have symptoms?  Certain symptoms can be a sign that you have a condition in your lungs that should be checked and treated by your doctor. These symptoms include fever, chest pain, a new or changing cough, shortness of breath that you have never felt before, coughing up blood or unexplained weight loss. Having any of these symptoms can greatly affect the results of lung cancer screening.       Should all smokers get an LDCT lung cancer screening exam?  It depends. Lung cancer screening is for a very specific group of men and women who have a history of heavy smoking over a long period of time (see  Who should be screened for lung cancer  above).  I am in the high-risk group, but have been diagnosed with cancer in the past. Is LDCT lung cancer screening right for me?  In some cases, you should not have LDCT lung screening, such as when your doctor is already following your cancer with CT scan studies. Your doctor will help you decide if LDCT lung screening is right for you.  Do I need to have a screening exam every year?  Yes. If you are in the high-risk group described earlier, you should get an LDCT lung cancer screening exam every year until you are 79, or are no longer willing or able to undergo screening and possible procedures to diagnose and treat lung cancer.  How effective is LDCT at preventing death from lung cancer?  Studies have shown that LDCT lung cancer screening can lower the risk of death from lung cancer by 20 percent in people who are at high-risk.  What are the risks?  There are some risks and limitations of LDCT lung cancer screening. We want to make sure you understand the risks and benefits, so please let us know if you have any questions. Your doctor may want to talk with you more about these risks.    Radiation exposure: As with any exam that uses  radiation, there is a very small increased risk of cancer. The amount of radiation in LDCT is small--about the same amount a person would get from a mammogram. Your doctor orders the exam when he or she feels the potential benefits outweigh the risks.    False negatives: No test is perfect, including LDCT. It is possible that you may have a medical condition, including lung cancer, that is not found during your exam. This is called a false negative result.    False positives and more testing: LDCT very often finds something in the lung that could be cancer, but in fact is not. This is called a false positive result. False positive tests often cause anxiety. To make sure these findings are not cancer, you may need to have more tests. These tests will be done only if you give us permission. Sometimes patients need a treatment that can have side effects, such as a biopsy. For more information on false positives, see  What can I expect from the results?     Findings not related to lung cancer: Your LDCT exam also takes pictures of areas of your body next to your lungs. In a very small number of cases, the CT scan will show an abnormal finding in one of these areas, such as your kidneys, adrenal glands, liver or thyroid. This finding may not be serious, but you may need more tests. Your doctor can help you decide what other tests you may need, if any.  What can I expect from the results?  About 1 out of 4 LDCT exams will find something that may need more tests. Most of the time, these findings are lung nodules. Lung nodules are very small collections of tissue in the lung. These nodules are very common, and the vast majority--more than 97 percent--are not cancer (benign). Most are normal lymph nodes or small areas of scarring from past infections.  But, if a small lung nodule is found to be cancer, the cancer can be cured more than 90 percent of the time. To know if the nodule is cancer, we may need to get more images  before your next yearly screening exam. If the nodule has suspicious features (for example, it is large, has an odd shape or grows over time), we will refer you to a specialist for further testing.  Will my doctor also get the results?  Yes. Your doctor will get a copy of your results.  Is it okay to keep smoking now that there s a cancer screening exam?  No. Tobacco is one of the strongest cancer-causing agents. It causes not only lung cancer, but other cancers and cardiovascular (heart) diseases as well. The damage caused by smoking builds over time. This means that the longer you smoke, the higher your risk of disease. While it is never too late to quit, the sooner you quit, the better.  Where can I find help to quit smoking?  The best way to prevent lung cancer is to stop smoking. If you have already quit smoking, congratulations and keep it up! For help on quitting smoking, please call [employer] at 9-874-QUITNOW (1-479.286.8144) or the [employer] at 1-320.638.6898 to find local resources near you.  One-on-one health coaching:  If you d prefer to work individually with a health care provider on tobacco cessation, we offer:      Medication Therapy Management:  Our specially trained pharmacists work closely with you and your doctor to help you quit smoking.  Call [phone] or [phone] (toll free).

## 2022-05-06 ENCOUNTER — ANCILLARY PROCEDURE (OUTPATIENT)
Dept: ULTRASOUND IMAGING | Facility: CLINIC | Age: 72
End: 2022-05-06
Attending: SURGERY
Payer: COMMERCIAL

## 2022-05-06 ENCOUNTER — ANCILLARY PROCEDURE (OUTPATIENT)
Dept: CT IMAGING | Facility: CLINIC | Age: 72
End: 2022-05-06
Attending: PHYSICIAN ASSISTANT
Payer: COMMERCIAL

## 2022-05-06 DIAGNOSIS — Z87.891 PERSONAL HISTORY OF TOBACCO USE: ICD-10-CM

## 2022-05-06 DIAGNOSIS — I73.9 PAD (PERIPHERAL ARTERY DISEASE) (H): ICD-10-CM

## 2022-05-06 PROCEDURE — 71271 CT THORAX LUNG CANCER SCR C-: CPT | Mod: GC | Performed by: RADIOLOGY

## 2022-05-06 PROCEDURE — 93922 UPR/L XTREMITY ART 2 LEVELS: CPT | Performed by: RADIOLOGY

## 2022-05-12 ENCOUNTER — TELEPHONE (OUTPATIENT)
Dept: FAMILY MEDICINE | Facility: CLINIC | Age: 72
End: 2022-05-12
Payer: COMMERCIAL

## 2022-05-12 NOTE — TELEPHONE ENCOUNTER
MP,  Imaging calling to make sure you are aware of 5/6/ CT findings.  Please advise.  Thanks,  Kandi King RN           2. Significant Incidental Finding(s):  Category S: Yes.  a.  coronary artery calcium moderate or severe   b.  Mild centrilobular nodularity which may be related to respiratory  bronchiolitis

## 2022-05-18 ENCOUNTER — VIRTUAL VISIT (OUTPATIENT)
Dept: VASCULAR SURGERY | Facility: CLINIC | Age: 72
End: 2022-05-18
Payer: COMMERCIAL

## 2022-05-18 DIAGNOSIS — Z95.828 S/P INSERTION OF ILIAC ARTERY STENT: ICD-10-CM

## 2022-05-18 DIAGNOSIS — I73.9 PAD (PERIPHERAL ARTERY DISEASE) (H): Primary | ICD-10-CM

## 2022-05-18 PROCEDURE — 99215 OFFICE O/P EST HI 40 MIN: CPT | Mod: 95 | Performed by: SURGERY

## 2022-05-18 NOTE — LETTER
5/18/2022       RE: Porsche Manning  [address]  [facility] [address]     Dear Colleague,    Thank you for referring your patient, Porsche Manning, to the [facility] VASCULAR [facility]IN[facility] [place] at [facility]. Please see a copy of my visit note below.      Vascular Surgery Consultation Note     Patient:  Porsche Manning   Date of birth 1950, Medical record number [id number]  Date of Visit:  05/18/2022  Consult Requester:No att. providers found            Assessment and Recommendations:   ASSESSMENT / RECOMMENDATION:    Doing well status post iliac artery angioplasty and stenting with ABIs of 0.94 on the right and 0.93 in the left (previously 8.81 on the right and 0.66 on the left).  She will continue on her antiplatelet and statin therapy.  We can stretch her visit out to an annual 1 with an CLAUDINE in 1 year.  I encouraged her to reach out if any issues arise before then.    Many thanks for involving me in the care of this very pleasant patient. Should any questions or concerns arise, please don't hesitate to contact me.    Warm Regards,    Britni Bob MD, DFSVS, RPVI  Director, [facility] Vascular Services  Professor and Chief, Vascular and Endovascular Surgery  [facility]  Pager: 1. Send message or 10 digit call back number Securely via Fort[employer] with the Vocera Web Console (learn more here)              2. Outside of [facility]? Call [phone]        45 minutes spent on the date of the encounter doing chart review, review of outside records, review of test results, interpretation of tests, patient visit and documentation           HPI: Leann follows up today after her left iliac artery angioplasty and stenting and right common iliac artery angioplasty this past year for disabling claudication.  She notes no issues with her lower extremities.  She is able to do everything she would like to and states her legs feel  great.  She recently had some yard work which required a fair amount of raking and lifting so she notes some muscle soreness but is otherwise doing well.  She is working on trying to change her mattress which is difficult given her living on the second floor of an old home.  Denies any issues with bleeding from her Plavix and aspirin.      Review of Systems   Constitutional, HEENT, cardiovascular, pulmonary, gi and gu systems are negative, except as otherwise noted.    Physical Exam   GENERAL: Healthy, alert and no distress  EYES: Eyes grossly normal to inspection.  No discharge or erythema, or obvious scleral/conjunctival abnormalities.  RESP: No audible wheeze, cough, or visible cyanosis.  No visible retractions or increased work of breathing.    SKIN: Visible skin clear. No significant rash, abnormal pigmentation or lesions.  NEURO: Cranial nerves grossly intact.  Mentation and speech appropriate for age.  PSYCH: Mentation appears normal, affect normal/bright, judgement and insight intact, normal speech and appearance well-groomed.    Imaging: CLAUDINE of 0.94 on the right and 0.93 in the left.    Video Start Time: 1130  Video End Time: 1230      Sincerely,    Britni Bob MD

## 2022-05-18 NOTE — PROGRESS NOTES
Vascular Surgery Consultation Note     Patient:  Porsche Manning   Date of birth 1950, Medical record number [id number]  Date of Visit:  05/18/2022  Consult Requester:No att. providers found            Assessment and Recommendations:   ASSESSMENT / RECOMMENDATION:    Doing well status post iliac artery angioplasty and stenting with ABIs of 0.94 on the right and 0.93 in the left (previously 8.81 on the right and 0.66 on the left).  She will continue on her antiplatelet and statin therapy.  We can stretch her visit out to an annual 1 with an CLAUDINE in 1 year.  I encouraged her to reach out if any issues arise before then.    Many thanks for involving me in the care of this very pleasant patient. Should any questions or concerns arise, please don't hesitate to contact me.    Warm Regards,    Britni Bob MD, DFSVS, RPVI  Director, [facility] Vascular Services  Professor and Chief, Vascular and Endovascular Surgery  [facility]  Pager: 1. Send message or 10 digit call back number Securely via Center'd with the Center'd Web Console (learn more here)              2. Outside of [facility]? Call [phone]        45 minutes spent on the date of the encounter doing chart review, review of outside records, review of test results, interpretation of tests, patient visit and documentation           HPI: Leann follows up today after her left iliac artery angioplasty and stenting and right common iliac artery angioplasty this past year for disabling claudication.  She notes no issues with her lower extremities.  She is able to do everything she would like to and states her legs feel great.  She recently had some yard work which required a fair amount of raking and lifting so she notes some muscle soreness but is otherwise doing well.  She is working on trying to change her mattress which is difficult given her living on the second floor of an old home.  Denies any issues with bleeding from her Plavix  and aspirin.      Review of Systems   Constitutional, HEENT, cardiovascular, pulmonary, gi and gu systems are negative, except as otherwise noted.    Physical Exam   GENERAL: Healthy, alert and no distress  EYES: Eyes grossly normal to inspection.  No discharge or erythema, or obvious scleral/conjunctival abnormalities.  RESP: No audible wheeze, cough, or visible cyanosis.  No visible retractions or increased work of breathing.    SKIN: Visible skin clear. No significant rash, abnormal pigmentation or lesions.  NEURO: Cranial nerves grossly intact.  Mentation and speech appropriate for age.  PSYCH: Mentation appears normal, affect normal/bright, judgement and insight intact, normal speech and appearance well-groomed.    Imaging: CLAUDINE of 0.94 on the right and 0.93 in the left.    Video Start Time: 1130  Video End Time: 1230        Leann is a 72 year old who is being evaluated via a billable video visit.      How would you like to obtain your AVS? MyChart  If the video visit is dropped, the invitation should be resent by: Text to cell phone: [phone]   Will anyone else be joining your video visit? No   Patient is currently in the [facility]: Yes         Jodie Young/VIDHI, 5/18/2022       Originating Location (pt. Location): Home    Distant Location (provider location):  [facility] VASCULAR CLINIC [place]     Platform used for Video Visit: Kahuna

## 2022-05-18 NOTE — PATIENT INSTRUCTIONS
Thank you so much for choosing us for your care. It was a pleasure to see you at the vascular clinic today.     Follow-up recommendations: We will see you back in 1 year. Please let us know if any issues arise in the meantime.    Additional testing/imaging ordered today: Repeat ABIs in 1 year.      Our scheduling team will get in touch with you to set up any follow-up testing/imaging and/or appointments. Please be aware that any testing/imaging recommended today will need to completed prior to your next visit with the provider. If testing/imaging is not completed prior to your next visit, your visit may be rescheduled.        If you have any questions, please call Shivani Harris RN at [phone] or contact our clinic at [phone]. We also encourage the use of [employer] to communicate with your HealthCare Provider.      If you have an urgent need after business hours (8:00 am to 4:30 pm) please call [phone], option 4, and ask for the vascular attending on call. For non-urgent after hours needs, please call the vascular nurse at [phone] and leave a detailed voicemail. For scheduling needs, please call our clinic directly at [phone].

## 2022-05-18 NOTE — NURSING NOTE
Chief Complaint   Patient presents with     Follow Up       Patient confirms medications and allergies are accurate via patients echeck in completion, and or denies any changes since last reviewed/verified.     Ashley Diop, Virtual Facilitator

## 2022-06-06 DIAGNOSIS — I25.83 CORONARY ARTERY DISEASE DUE TO LIPID RICH PLAQUE: ICD-10-CM

## 2022-06-06 DIAGNOSIS — I10 HYPERTENSION GOAL BP (BLOOD PRESSURE) < 140/90: ICD-10-CM

## 2022-06-06 DIAGNOSIS — J44.1 COPD EXACERBATION (H): ICD-10-CM

## 2022-06-06 DIAGNOSIS — I25.10 CORONARY ARTERY DISEASE DUE TO LIPID RICH PLAQUE: ICD-10-CM

## 2022-06-06 RX ORDER — ALBUTEROL SULFATE 90 UG/1
2 AEROSOL, METERED RESPIRATORY (INHALATION) EVERY 6 HOURS
Qty: 18 G | Refills: 2 | Status: SHIPPED | OUTPATIENT
Start: 2022-06-06 | End: 2022-11-29

## 2022-06-08 DIAGNOSIS — I10 HYPERTENSION GOAL BP (BLOOD PRESSURE) < 140/90: ICD-10-CM

## 2022-06-08 DIAGNOSIS — I25.10 CORONARY ARTERY DISEASE DUE TO LIPID RICH PLAQUE: ICD-10-CM

## 2022-06-08 DIAGNOSIS — I25.83 CORONARY ARTERY DISEASE DUE TO LIPID RICH PLAQUE: ICD-10-CM

## 2022-06-08 RX ORDER — METOPROLOL SUCCINATE 100 MG/1
100 TABLET, EXTENDED RELEASE ORAL DAILY
Qty: 90 TABLET | Refills: 3 | Status: SHIPPED | OUTPATIENT
Start: 2022-06-08 | End: 2023-05-24

## 2022-06-14 DIAGNOSIS — I10 ESSENTIAL (PRIMARY) HYPERTENSION: ICD-10-CM

## 2022-06-14 NOTE — TELEPHONE ENCOUNTER
Routing refill request to provider for review/approval because:  Medication is reported/historical  Tami ONEILL RN

## 2022-06-15 RX ORDER — LISINOPRIL 10 MG/1
TABLET ORAL
Qty: 90 TABLET | Refills: 3 | Status: SHIPPED | OUTPATIENT
Start: 2022-06-15 | End: 2023-01-04 | Stop reason: DRUGHIGH

## 2022-06-27 ENCOUNTER — TRANSFERRED RECORDS (OUTPATIENT)
Dept: HEALTH INFORMATION MANAGEMENT | Facility: CLINIC | Age: 72
End: 2022-06-27

## 2022-06-27 LAB — RETINOPATHY: NEGATIVE

## 2022-09-06 DIAGNOSIS — E78.5 HYPERLIPIDEMIA LDL GOAL <70: ICD-10-CM

## 2022-09-09 ENCOUNTER — MYC MEDICAL ADVICE (OUTPATIENT)
Dept: FAMILY MEDICINE | Facility: CLINIC | Age: 72
End: 2022-09-09

## 2022-09-09 RX ORDER — ROSUVASTATIN CALCIUM 40 MG/1
TABLET, COATED ORAL
Qty: 90 TABLET | Refills: 1 | Status: SHIPPED | OUTPATIENT
Start: 2022-09-09 | End: 2023-03-02

## 2022-09-09 NOTE — TELEPHONE ENCOUNTER
Routing refill request to provider for review/approval because:  Med ordered by previous PCP.  Anne CHEEK RN

## 2022-09-12 DIAGNOSIS — J44.9 CHRONIC OBSTRUCTIVE PULMONARY DISEASE, UNSPECIFIED COPD TYPE (H): ICD-10-CM

## 2022-09-13 ENCOUNTER — MYC MEDICAL ADVICE (OUTPATIENT)
Dept: FAMILY MEDICINE | Facility: CLINIC | Age: 72
End: 2022-09-13

## 2022-09-15 RX ORDER — TIOTROPIUM BROMIDE 18 UG/1
CAPSULE ORAL; RESPIRATORY (INHALATION)
Qty: 90 CAPSULE | Refills: 3 | Status: SHIPPED | OUTPATIENT
Start: 2022-09-15 | End: 2023-09-14

## 2022-09-15 NOTE — TELEPHONE ENCOUNTER
Routing refill request to provider for review/approval because:  Medication last ordered by non [place] provider.  Anne CHEEK RN

## 2022-09-15 NOTE — TELEPHONE ENCOUNTER
SPIRIVA 18 MCG CP-HANDIHALER  Last Written Prescription Date:  10/4/2021  Last Fill Quantity: 90,   # refills: 3  Last Office Visit :  4/6/2022  Future Office visit:  None    Routing refill request to provider for review/approval because:  Please send new updated order with updated Providers signature for Pt care.   Last seen by Marge MUHAMMAD.  Thank you       Mercedes Frederick RN  Central Triage Red Flags/Med Refills

## 2022-09-26 ENCOUNTER — MYC MEDICAL ADVICE (OUTPATIENT)
Dept: FAMILY MEDICINE | Facility: CLINIC | Age: 72
End: 2022-09-26

## 2022-10-13 DIAGNOSIS — N18.31 CHRONIC KIDNEY DISEASE, STAGE 3A (H): Primary | ICD-10-CM

## 2022-10-14 ENCOUNTER — OFFICE VISIT (OUTPATIENT)
Dept: NEPHROLOGY | Facility: CLINIC | Age: 72
End: 2022-10-14
Attending: INTERNAL MEDICINE
Payer: COMMERCIAL

## 2022-10-14 ENCOUNTER — LAB (OUTPATIENT)
Dept: LAB | Facility: CLINIC | Age: 72
End: 2022-10-14
Payer: COMMERCIAL

## 2022-10-14 VITALS
OXYGEN SATURATION: 91 % | WEIGHT: 178.8 LBS | DIASTOLIC BLOOD PRESSURE: 80 MMHG | TEMPERATURE: 97.5 F | BODY MASS INDEX: 32.7 KG/M2 | SYSTOLIC BLOOD PRESSURE: 187 MMHG | HEART RATE: 72 BPM

## 2022-10-14 DIAGNOSIS — I25.10 CORONARY ARTERY DISEASE INVOLVING NATIVE HEART WITHOUT ANGINA PECTORIS, UNSPECIFIED VESSEL OR LESION TYPE: ICD-10-CM

## 2022-10-14 DIAGNOSIS — I10 HYPERTENSION, ESSENTIAL: ICD-10-CM

## 2022-10-14 DIAGNOSIS — N18.31 STAGE 3A CHRONIC KIDNEY DISEASE (H): Primary | ICD-10-CM

## 2022-10-14 DIAGNOSIS — E66.811 CLASS 1 OBESITY WITH SERIOUS COMORBIDITY AND BODY MASS INDEX (BMI) OF 32.0 TO 32.9 IN ADULT, UNSPECIFIED OBESITY TYPE: ICD-10-CM

## 2022-10-14 DIAGNOSIS — R73.03 PREDIABETES: ICD-10-CM

## 2022-10-14 DIAGNOSIS — N18.31 CHRONIC KIDNEY DISEASE, STAGE 3A (H): ICD-10-CM

## 2022-10-14 DIAGNOSIS — I73.9 PAD (PERIPHERAL ARTERY DISEASE) (H): ICD-10-CM

## 2022-10-14 LAB
ALBUMIN MFR UR ELPH: 24.2 MG/DL
ALBUMIN SERPL BCG-MCNC: 4.3 G/DL (ref 3.5–5.2)
ALBUMIN UR-MCNC: ABNORMAL MG/DL
ANION GAP SERPL CALCULATED.3IONS-SCNC: 13 MMOL/L (ref 7–15)
APPEARANCE UR: CLEAR
BILIRUB UR QL STRIP: NEGATIVE
BUN SERPL-MCNC: 12.9 MG/DL (ref 8–23)
CALCIUM SERPL-MCNC: 10.3 MG/DL (ref 8.8–10.2)
CHLORIDE SERPL-SCNC: 100 MMOL/L (ref 98–107)
COLOR UR AUTO: YELLOW
CREAT SERPL-MCNC: 1.09 MG/DL (ref 0.51–0.95)
CREAT UR-MCNC: 23.7 MG/DL
DEPRECATED HCO3 PLAS-SCNC: 24 MMOL/L (ref 22–29)
ERYTHROCYTE [DISTWIDTH] IN BLOOD BY AUTOMATED COUNT: 13 % (ref 10–15)
GFR SERPL CREATININE-BSD FRML MDRD: 54 ML/MIN/1.73M2
GLUCOSE SERPL-MCNC: 156 MG/DL (ref 70–99)
GLUCOSE UR STRIP-MCNC: NEGATIVE MG/DL
HCT VFR BLD AUTO: 44.8 % (ref 35–47)
HGB BLD-MCNC: 14.9 G/DL (ref 11.7–15.7)
HGB UR QL STRIP: NEGATIVE
KETONES UR STRIP-MCNC: NEGATIVE MG/DL
LEUKOCYTE ESTERASE UR QL STRIP: ABNORMAL
MCH RBC QN AUTO: 31.3 PG (ref 26.5–33)
MCHC RBC AUTO-ENTMCNC: 33.3 G/DL (ref 31.5–36.5)
MCV RBC AUTO: 94 FL (ref 78–100)
NITRATE UR QL: NEGATIVE
PH UR STRIP: 6.5 [PH] (ref 5–7)
PHOSPHATE SERPL-MCNC: 3.2 MG/DL (ref 2.5–4.5)
PLATELET # BLD AUTO: 270 10E3/UL (ref 150–450)
POTASSIUM SERPL-SCNC: 4.2 MMOL/L (ref 3.4–5.3)
PROT/CREAT 24H UR: 1.02 MG/MG CR (ref 0–0.2)
RBC # BLD AUTO: 4.76 10E6/UL (ref 3.8–5.2)
RBC URINE: 1 /HPF
SODIUM SERPL-SCNC: 137 MMOL/L (ref 136–145)
SP GR UR STRIP: 1.01 (ref 1–1.03)
SQUAMOUS EPITHELIAL: 3 /HPF
UROBILINOGEN UR STRIP-MCNC: NORMAL MG/DL
WBC # BLD AUTO: 10 10E3/UL (ref 4–11)
WBC URINE: 4 /HPF

## 2022-10-14 PROCEDURE — 84156 ASSAY OF PROTEIN URINE: CPT | Performed by: PATHOLOGY

## 2022-10-14 PROCEDURE — 85027 COMPLETE CBC AUTOMATED: CPT | Performed by: PATHOLOGY

## 2022-10-14 PROCEDURE — 81001 URINALYSIS AUTO W/SCOPE: CPT | Performed by: PATHOLOGY

## 2022-10-14 PROCEDURE — 36415 COLL VENOUS BLD VENIPUNCTURE: CPT | Performed by: PATHOLOGY

## 2022-10-14 PROCEDURE — 99214 OFFICE O/P EST MOD 30 MIN: CPT | Mod: GC | Performed by: INTERNAL MEDICINE

## 2022-10-14 PROCEDURE — G0463 HOSPITAL OUTPT CLINIC VISIT: HCPCS

## 2022-10-14 PROCEDURE — 80069 RENAL FUNCTION PANEL: CPT | Performed by: PATHOLOGY

## 2022-10-14 RX ORDER — DAPAGLIFLOZIN 5 MG/1
5 TABLET, FILM COATED ORAL DAILY
Status: DISCONTINUED | OUTPATIENT
Start: 2022-10-14 | End: 2022-10-14

## 2022-10-14 ASSESSMENT — PAIN SCALES - GENERAL: PAINLEVEL: NO PAIN (0)

## 2022-10-14 NOTE — NURSING NOTE
Chief Complaint   Patient presents with     RECHECK     Return visit.     Blood pressure (!) 187/80, pulse 72, temperature 97.5  F (36.4  C), weight 81.1 kg (178 lb 12.8 oz), SpO2 91 %, not currently breastfeeding.    ELDER HESS

## 2022-10-14 NOTE — LETTER
"10/14/2022       RE: Porsche Manning  [address]  [facility] [address]     Dear Colleague,    Thank you for referring your patient, Porsche Manning, to the [facility] NEPHROLOGY CLINIC [place] at [facility]. Please see a copy of my visit note below.    NEPHROLOGY CLINIC NOTE     Established patient     PCP: Danuta Tan PA-C    October 14, 2022    CC:  CKD stage 3a    HPI: Porsche Manning is a 72 year old female with PMHx of COPD, CAD (s/p stent to RCA in 2003) / PAD / R ICA stenosis / HTN / HLD, smoker (currently not smoking for the last couple of weeks), and CKD.     Blood Pressure control: checks at home in the afternoon, usually ~ 125-145/60-70. Takes meds in am (metoprolol  mg every day, Diltiazem ER 300mg, Lisinopril 10 mg).     Diet / Fluid intake: Tries to drink 3 bottles of water of 16 oz each. Appetite good.      SOB / MORALES +and worsened since quitting smoking. No orthopnea, no chest pain. Very occasional lightheadedness. No falls or near falls. Inhalers not doing such a \"good job\" for breathing. No LAURA. Exercise limited due to respiratory status.    Lab Investigations  BMP: creatinine 1.09, GFR 54 (stable), Ca 10.3  Proteinuria: +, 24.2  Uprot/creat ratio: 1.02  Hgb: 14.9    Renal Ultrasound / Other imaging:    No recent.     Allergies   Allergen Reactions     Morphine Nausea and Vomiting     Penicillins Nausea and Vomiting       BREO ELLIPTA 100-25 MCG/INH inhaler, INHALE 1 PUFF BY MOUTH EVERY DAY  albuterol (PROAIR HFA/PROVENTIL HFA/VENTOLIN HFA) 108 (90 Base) MCG/ACT inhaler, INHALE 2 PUFFS INTO THE LUNGS EVERY 6 HOURS  aspirin 81 MG EC tablet, Take 81 mg by mouth daily  buPROPion (WELLBUTRIN XL) 150 MG 24 hr tablet, Take 1 tablet (150 mg) by mouth every morning  Calcium Carbonate-Vit D-Min (CALCIUM 1200 PO), Take 600 mg by mouth daily   Cholecalciferol (VITAMIN D) 1000 UNIT capsule, Take 1 capsule by mouth " daily Take one tablet daily  clopidogrel (PLAVIX) 75 MG tablet, TAKE 1 TABLET (75 MG) BY MOUTH DAILY CALL CLINIC TO SCHEDULE FOLLOW UP APPOINTMENT.  diltiazem ER (TIAZAC) 300 MG 24 hr ER beaded capsule, Take 1 capsule (300 mg) by mouth daily  lisinopril (ZESTRIL) 10 MG tablet, TAKE 1 TABLET BY MOUTH EVERY DAY  metoprolol succinate ER (TOPROL XL) 100 MG 24 hr tablet, Take 1 tablet (100 mg) by mouth daily No refills without follow up appt in cardiology.  rosuvastatin (CRESTOR) 40 MG tablet, TAKE 1 TABLET BY MOUTH EVERY DAY  tiotropium (SPIRIVA HANDIHALER) 18 MCG inhaled capsule, INHALE 1 CAPSULE (18 MCG) INTO THE LUNGS DAILY    No current facility-administered medications on file prior to visit.      Past Medical History:   Diagnosis Date     Brachial neuritis or radiculitis NOS      Chronic obstructive pulmonary disease, unspecified COPD type (H) 3/8/2016     Coronary artery disease     Doing fine     H/O tobacco use, presenting hazards to health      Hyperlipidemia LDL goal < 100      Hypertension goal BP (blood pressure) < 140/90      Malignant neoplasm of other specified sites of cervix      Old myocardial infarction      Osteopenia      Peripheral vascular disease, unspecified (H)      Type 2 diabetes, HbA1c goal < 7% (H)        Past Surgical History:   Procedure Laterality Date     ANGIOGRAM Left 10/19/2021    Procedure: Left iliac angioplasty and stenting, Right common iliac angioplasty;  Surgeon: Britni Bob MD;  Location: [facility] OR     ANGIOPLASTY N/A 10/19/2021    Procedure: possible intervention;  Surgeon: Britni Bob MD;  Location: [facility] OR     BIOPSY      Sample taken from back     CARDIAC SURGERY      Stents     COLONOSCOPY  1996    Results were ok     HYSTERECTOMY, PAP NO LONGER INDICATED  1989    ovaries only, no cervix per pt     IR OR ANGIOGRAM  10/19/2021     SURGICAL HISTORY OF -   1995    bunionectomy     SURGICAL HISTORY OF -   10/10/03    cardiac stenting     SURGICAL HISTORY OF -     stent  placed in both of her legs for pad     VASCULAR SURGERY      PAD       Social History     Tobacco Use     Smoking status: Former     Packs/day: 0.00     Years: 40.00     Pack years: 0.00     Types: Cigarettes     Quit date: 2018     Years since quittin.4     Smokeless tobacco: Never   Substance Use Topics     Alcohol use: Yes     Comment: 2 to 4 beers or mixed drinks weekly     Drug use: No       Family History   Problem Relation Age of Onset     C.A.D. Mother      Breast Cancer Mother      C.A.D. Father      Diabetes Father      Hypertension Father      C.A.D. Maternal Grandfather      C.A.D. Paternal Grandfather      Neurologic Disorder Brother         epilepsy         ROS: A 12 system review of systems was negative other than noted here or above.       Exam:  There were no vitals taken for this visit.    GENERAL APPEARANCE: alert and no distress  EYES: PERRL, no scleral icterus  HENT: mouth without ulcers or lesions  NECK: supple, no adenopathy  RESP: lungs clear to auscultation   CV: regular rhythm, normal rate, no rub  Extremities: no clubbing, cyanosis, or edema  SKIN: no rash  NEURO: mentation intact and speech normal  PSYCH: affect normal/bright      Results:    No visits with results within 1 Day(s) from this visit.   Latest known visit with results is:   Transferred Records on 2022   Component Date Value Ref Range Status     RETINOPATHY 2022 NEGATIVE   Final           Assessment/Plan:    # CKD 3a: Cr baseline ~0.9-1.1 follwing prior MICHELLE, renovascular disease, preDM and hypertension. Possible hypertensive nephrosclerosis.   - Start SGLT2 inhibitor (Jardiance 10 mg every day)  - Continue to follow up with nephrology      # Hypertension:   Controlled. Currently on diltiazem ER 300mg daily, Metoprolol  mg, lisinopril 10mg daily.  - No changes at this time. However, needs to follow up with Cardiology, unclear why she is on Nifedipine and Metoprolol. If only for BP we could optimize  Lisinopril and possibly stop Nifedipine.      # Mineral Bone Disorder:   Ca 10.3 (on Ca supplements), Vit D pending. PTH WNL on last check, pending new recheck    Follow up: 6 months      I have discussed patient and plan with Dr. Mackey.      Tiffany Helms MD  Nephrology Fellow       Again, thank you for allowing me to participate in the care of your patient.      Sincerely,    Tiffany Lawton MD

## 2022-10-14 NOTE — PROGRESS NOTES
"NEPHROLOGY CLINIC NOTE     Established patient     PCP: Danuta Tan PA-C    October 14, 2022    CC:  CKD stage 3a    HPI: Porsche Manning is a 72 year old female with PMHx of COPD, CAD (s/p stent to RCA in 2003) / PAD / R ICA stenosis / HTN / HLD, smoker (currently not smoking for the last couple of weeks), and CKD.     Blood Pressure control: checks at home in the afternoon, usually ~ 125-145/60-70. Takes meds in am (metoprolol  mg every day, Diltiazem ER 300mg, Lisinopril 10 mg).     Diet / Fluid intake: Tries to drink 3 bottles of water of 16 oz each. Appetite good.      SOB / MORALES +and worsened since quitting smoking. No orthopnea, no chest pain. Very occasional lightheadedness. No falls or near falls. Inhalers not doing such a \"good job\" for breathing. No LAURA. Exercise limited due to respiratory status.    Lab Investigations  BMP: creatinine 1.09, GFR 54 (stable), Ca 10.3  Proteinuria: +, 24.2  Uprot/creat ratio: 1.02  Hgb: 14.9    Renal Ultrasound / Other imaging:    No recent.     Allergies   Allergen Reactions     Morphine Nausea and Vomiting     Penicillins Nausea and Vomiting       BREO ELLIPTA 100-25 MCG/INH inhaler, INHALE 1 PUFF BY MOUTH EVERY DAY  albuterol (PROAIR HFA/PROVENTIL HFA/VENTOLIN HFA) 108 (90 Base) MCG/ACT inhaler, INHALE 2 PUFFS INTO THE LUNGS EVERY 6 HOURS  aspirin 81 MG EC tablet, Take 81 mg by mouth daily  buPROPion (WELLBUTRIN XL) 150 MG 24 hr tablet, Take 1 tablet (150 mg) by mouth every morning  Calcium Carbonate-Vit D-Min (CALCIUM 1200 PO), Take 600 mg by mouth daily   Cholecalciferol (VITAMIN D) 1000 UNIT capsule, Take 1 capsule by mouth daily Take one tablet daily  clopidogrel (PLAVIX) 75 MG tablet, TAKE 1 TABLET (75 MG) BY MOUTH DAILY CALL CLINIC TO SCHEDULE FOLLOW UP APPOINTMENT.  diltiazem ER (TIAZAC) 300 MG 24 hr ER beaded capsule, Take 1 capsule (300 mg) by mouth daily  lisinopril (ZESTRIL) 10 MG tablet, TAKE 1 TABLET BY MOUTH EVERY DAY  metoprolol succinate " ER (TOPROL XL) 100 MG 24 hr tablet, Take 1 tablet (100 mg) by mouth daily No refills without follow up appt in cardiology.  rosuvastatin (CRESTOR) 40 MG tablet, TAKE 1 TABLET BY MOUTH EVERY DAY  tiotropium (SPIRIVA HANDIHALER) 18 MCG inhaled capsule, INHALE 1 CAPSULE (18 MCG) INTO THE LUNGS DAILY    No current facility-administered medications on file prior to visit.      Past Medical History:   Diagnosis Date     Brachial neuritis or radiculitis NOS      Chronic obstructive pulmonary disease, unspecified COPD type (H) 3/8/2016     Coronary artery disease     Doing fine     H/O tobacco use, presenting hazards to health      Hyperlipidemia LDL goal < 100      Hypertension goal BP (blood pressure) < 140/90      Malignant neoplasm of other specified sites of cervix      Old myocardial infarction      Osteopenia      Peripheral vascular disease, unspecified (H)      Type 2 diabetes, HbA1c goal < 7% (H)        Past Surgical History:   Procedure Laterality Date     ANGIOGRAM Left 10/19/2021    Procedure: Left iliac angioplasty and stenting, Right common iliac angioplasty;  Surgeon: Britni Bob MD;  Location: [facility] OR     ANGIOPLASTY N/A 10/19/2021    Procedure: possible intervention;  Surgeon: Britni Bob MD;  Location: [facility] OR     BIOPSY      Sample taken from back     CARDIAC SURGERY      Stents     COLONOSCOPY      Results were ok     HYSTERECTOMY, PAP NO LONGER INDICATED      ovaries only, no cervix per pt     IR OR ANGIOGRAM  10/19/2021     SURGICAL HISTORY OF -       bunionectomy     SURGICAL HISTORY OF -   10/10/03    cardiac stenting     SURGICAL HISTORY OF -       stent placed in both of her legs for pad     VASCULAR SURGERY      PAD       Social History     Tobacco Use     Smoking status: Former     Packs/day: 0.00     Years: 40.00     Pack years: 0.00     Types: Cigarettes     Quit date: 2018     Years since quittin.4     Smokeless tobacco: Never   Substance Use Topics     Alcohol use:  Yes     Comment: 2 to 4 beers or mixed drinks weekly     Drug use: No       Family History   Problem Relation Age of Onset     C.A.D. Mother      Breast Cancer Mother      C.A.D. Father      Diabetes Father      Hypertension Father      C.A.D. Maternal Grandfather      C.A.D. Paternal Grandfather      Neurologic Disorder Brother         epilepsy         ROS: A 12 system review of systems was negative other than noted here or above.       Exam:  There were no vitals taken for this visit.    GENERAL APPEARANCE: alert and no distress  EYES: PERRL, no scleral icterus  HENT: mouth without ulcers or lesions  NECK: supple, no adenopathy  RESP: lungs clear to auscultation   CV: regular rhythm, normal rate, no rub  Extremities: no clubbing, cyanosis, or edema  SKIN: no rash  NEURO: mentation intact and speech normal  PSYCH: affect normal/bright      Results:    No visits with results within 1 Day(s) from this visit.   Latest known visit with results is:   Transferred Records on 06/27/2022   Component Date Value Ref Range Status     RETINOPATHY 06/27/2022 NEGATIVE   Final           Assessment/Plan:    # CKD 3a: Cr baseline ~0.9-1.1 follwing prior MICHELLE, renovascular disease, preDM and hypertension. Possible hypertensive nephrosclerosis.   - Start SGLT2 inhibitor (Jardiance 10 mg every day)  - Continue to follow up with nephrology      # Hypertension:   Controlled. Currently on diltiazem ER 300mg daily, Metoprolol  mg, lisinopril 10mg daily.  - No changes at this time. However, needs to follow up with Cardiology, unclear why she is on Nifedipine and Metoprolol. If only for BP we could optimize Lisinopril and possibly stop Nifedipine.      # Mineral Bone Disorder:   Ca 10.3 (on Ca supplements), Vit D pending. PTH WNL on last check, pending new recheck    Follow up: 6 months      I have discussed patient and plan with Dr. Mackey.      Tiffany Helms MD  Nephrology Fellow

## 2022-10-20 ENCOUNTER — TELEPHONE (OUTPATIENT)
Dept: NEPHROLOGY | Facility: CLINIC | Age: 72
End: 2022-10-20

## 2022-10-26 DIAGNOSIS — N18.32 STAGE 3B CHRONIC KIDNEY DISEASE (H): Primary | ICD-10-CM

## 2022-11-04 DIAGNOSIS — N18.31 STAGE 3A CHRONIC KIDNEY DISEASE (H): Primary | ICD-10-CM

## 2022-11-17 ENCOUNTER — OFFICE VISIT (OUTPATIENT)
Dept: FAMILY MEDICINE | Facility: CLINIC | Age: 72
End: 2022-11-17
Payer: COMMERCIAL

## 2022-11-17 VITALS
SYSTOLIC BLOOD PRESSURE: 138 MMHG | TEMPERATURE: 97.3 F | OXYGEN SATURATION: 94 % | HEART RATE: 54 BPM | WEIGHT: 173 LBS | DIASTOLIC BLOOD PRESSURE: 64 MMHG | BODY MASS INDEX: 31.83 KG/M2 | HEIGHT: 62 IN

## 2022-11-17 DIAGNOSIS — E11.22 TYPE 2 DIABETES MELLITUS WITH STAGE 3 CHRONIC KIDNEY DISEASE, WITHOUT LONG-TERM CURRENT USE OF INSULIN, UNSPECIFIED WHETHER STAGE 3A OR 3B CKD (H): Primary | ICD-10-CM

## 2022-11-17 DIAGNOSIS — N18.30 TYPE 2 DIABETES MELLITUS WITH STAGE 3 CHRONIC KIDNEY DISEASE, WITHOUT LONG-TERM CURRENT USE OF INSULIN, UNSPECIFIED WHETHER STAGE 3A OR 3B CKD (H): Primary | ICD-10-CM

## 2022-11-17 DIAGNOSIS — I10 HYPERTENSION GOAL BP (BLOOD PRESSURE) < 140/90: ICD-10-CM

## 2022-11-17 LAB — HBA1C MFR BLD: 6.1 % (ref 0–5.6)

## 2022-11-17 PROCEDURE — 99213 OFFICE O/P EST LOW 20 MIN: CPT | Performed by: PHYSICIAN ASSISTANT

## 2022-11-17 PROCEDURE — 99207 PR FOOT EXAM NO CHARGE: CPT | Performed by: PHYSICIAN ASSISTANT

## 2022-11-17 PROCEDURE — 83036 HEMOGLOBIN GLYCOSYLATED A1C: CPT | Performed by: PHYSICIAN ASSISTANT

## 2022-11-17 PROCEDURE — 36415 COLL VENOUS BLD VENIPUNCTURE: CPT | Performed by: PHYSICIAN ASSISTANT

## 2022-11-17 NOTE — PROGRESS NOTES
"  Assessment & Plan     Type 2 diabetes mellitus with stage 3 chronic kidney disease, without long-term current use of insulin, unspecified whether stage 3a or 3b CKD (H)  Long standing, chronic, controlled- regular refills to be sent to pharmacy as needed; continue working on healthy diet and exercise.  - HEMOGLOBIN A1C; Future  - FOOT EXAM  - HEMOGLOBIN A1C    Hypertension: MEASURE BP IN RIGHT ARM ONLY  Long standing, chronic, controlled- follow up with cardiology as scheduled and consider discharge of diltiazem (per nephrology with potential increase in lisinopril instead).      Review of prior external note(s) from - previous routine and speciality notes  20 minutes spent on the date of the encounter doing chart review, history and exam, documentation and further activities per the note       BMI:   Estimated body mass index is 31.85 kg/m  as calculated from the following:    Height as of this encounter: 1.57 m (5' 1.8\").    Weight as of this encounter: 78.5 kg (173 lb).   Weight management plan: Discussed healthy diet and exercise guidelines    There are no Patient Instructions on file for this visit.    Return in about 6 months (around 5/17/2023) for BP Recheck, Routine Visit, or sooner with worsening symptoms.    Danuta Tan PA-C  [facility]   Leann is a 72 year old presenting for the following health issues:  Diabetes      History of Present Illness       Diabetes:   She presents for follow up of diabetes.  She is not checking blood glucose. She has no concerns regarding her diabetes at this time.  She is not experiencing numbness or burning in feet, excessive thirst, blurry vision, weight changes or redness, sores or blisters on feet.         She eats 0-1 servings of fruits and vegetables daily.She consumes 0 sweetened beverage(s) daily.She exercises with enough effort to increase her heart rate 10 to 19 minutes per day.  She exercises with enough effort to " "increase her heart rate 3 or less days per week.   She is taking medications regularly.     Patient seen by nephrology 10/2022 -     \"# CKD 3a: Cr baseline ~0.9-1.1 follwing prior MICHELLE, renovascular disease, preDM and hypertension. Possible hypertensive nephrosclerosis.   - Start SGLT2 inhibitor (Jardiance 10 mg every day)  - Continue to follow up with nephrology      # Hypertension:   Controlled. Currently on diltiazem ER 300mg daily, Metoprolol  mg, lisinopril 10mg daily.  - No changes at this time. However, needs to follow up with Cardiology, unclear why she is on Nifedipine (diltiazem) and Metoprolol. If only for BP we could optimize Lisinopril and possibly stop Nifedipine.\"    Due to follow up with cardiology in April 2022  -  BP readings at home within normal limits, <140/90 (average 120s)  Seen by vascular specialist 5/2022.      Review of Systems   Constitutional, HEENT, cardiovascular, pulmonary, GI, , musculoskeletal, neuro, skin, endocrine and psych systems are negative, except as otherwise noted.      Objective    /64   Pulse 54   Temp 97.3  F (36.3  C) (Temporal)   Ht 1.57 m (5' 1.8\")   Wt 78.5 kg (173 lb)   SpO2 94%   BMI 31.85 kg/m    Body mass index is 31.85 kg/m .  Physical Exam   GENERAL: healthy, alert and no distress  NECK: no adenopathy, no asymmetry, masses, or scars and thyroid normal to palpation  RESP: lungs clear to auscultation - no rales, rhonchi or wheezes  CV: regular rate and rhythm, normal S1 S2, no S3 or S4, no murmur, click or rub, no peripheral edema and peripheral pulses strong  MS: no gross musculoskeletal defects noted, no edema  PSYCH: mentation appears normal, affect normal/bright  Diabetic foot: normal pulses, color, temperature, sensation                "

## 2022-11-17 NOTE — RESULT ENCOUNTER NOTE
"Daisy Morrell  Your attached labs are great. Keep up the good work!  Please contact the office with any questions or concerns.    Danuta Martinez \"Eduard\" PHILL Tan    "

## 2022-11-25 ENCOUNTER — VIRTUAL VISIT (OUTPATIENT)
Dept: PHARMACY | Facility: CLINIC | Age: 72
End: 2022-11-25
Payer: COMMERCIAL

## 2022-11-25 DIAGNOSIS — N18.30 TYPE 2 DIABETES MELLITUS WITH STAGE 3 CHRONIC KIDNEY DISEASE, WITHOUT LONG-TERM CURRENT USE OF INSULIN, UNSPECIFIED WHETHER STAGE 3A OR 3B CKD (H): ICD-10-CM

## 2022-11-25 DIAGNOSIS — N18.32 STAGE 3B CHRONIC KIDNEY DISEASE (H): ICD-10-CM

## 2022-11-25 DIAGNOSIS — I25.2 OLD MYOCARDIAL INFARCTION: ICD-10-CM

## 2022-11-25 DIAGNOSIS — Z78.9 TAKES DIETARY SUPPLEMENTS: ICD-10-CM

## 2022-11-25 DIAGNOSIS — I10 HYPERTENSION GOAL BP (BLOOD PRESSURE) < 140/90: Primary | ICD-10-CM

## 2022-11-25 DIAGNOSIS — I77.1 SUBCLAVIAN ARTERY STENOSIS, LEFT (H): ICD-10-CM

## 2022-11-25 DIAGNOSIS — J44.9 CHRONIC OBSTRUCTIVE PULMONARY DISEASE, UNSPECIFIED COPD TYPE (H): ICD-10-CM

## 2022-11-25 DIAGNOSIS — E11.22 TYPE 2 DIABETES MELLITUS WITH STAGE 3 CHRONIC KIDNEY DISEASE, WITHOUT LONG-TERM CURRENT USE OF INSULIN, UNSPECIFIED WHETHER STAGE 3A OR 3B CKD (H): ICD-10-CM

## 2022-11-25 DIAGNOSIS — I73.9 PERIPHERAL VASCULAR DISEASE (H): ICD-10-CM

## 2022-11-25 DIAGNOSIS — E78.5 HYPERLIPIDEMIA LDL GOAL <100: ICD-10-CM

## 2022-11-25 PROCEDURE — 99605 MTMS BY PHARM NP 15 MIN: CPT | Performed by: PHARMACIST

## 2022-11-25 PROCEDURE — 99607 MTMS BY PHARM ADDL 15 MIN: CPT | Performed by: PHARMACIST

## 2022-11-25 NOTE — LETTER
November 25, 2022  Porsche IRENA Nigel  [address]  [facility] [address]    Dear JIGAR Ruiz [facility] YOANA     Thank you for talking with me on Nov 25, 2022 about your health and medications. As a follow-up to our conversation, I have included two documents:      1. Your Recommended To-Do List has steps you should take to get the best results from your medications.  2. Your Medication List will help you keep track of your medications and how to take them.    If you want to talk about these documents, please call Nalini Ring RPH at phone: [phone], Monday-Friday 8-4:30pm.    I look forward to working with you and your doctors to make sure your medications work well for you.    Sincerely,  Nalini Ring RPH  [facility] Pharmacist, [facility]

## 2022-11-25 NOTE — LETTER
_  Medication List        Prepared on: Nov 25, 2022     Bring your Medication List when you go to the doctor, hospital, or   emergency room. And, share it with your family or caregivers.     Note any changes to how you take your medications.  Cross out medications when you no longer use them.    Medication How I take it Why I use it Prescriber   albuterol (PROAIR HFA/PROVENTIL HFA/VENTOLIN HFA) 108 (90 Base) MCG/ACT inhaler INHALE 2 PUFFS INTO THE LUNGS EVERY 6 HOURS COPD Exacerbation (H) Danuta Tan PA-C   aspirin 81 MG EC tablet Take 81 mg by mouth daily [facility] Patient Reported   BREO ELLIPTA 100-25 MCG/INH inhaler INHALE 1 PUFF BY MOUTH EVERY DAY COPD Exacerbation (H) Danuta Tan PA-C   Calcium Carbonate-Vit D-Min (CALCIUM 1200 PO) Take 600 mg by mouth daily Once every other day General Health   Entered By History Unknown   Cholecalciferol (VITAMIN D) 1000 UNIT capsule Take 1 capsule by mouth daily Take one tablet daily [facility] Patient Reported   clopidogrel (PLAVIX) 75 MG tablet TAKE 1 TABLET (75 MG) BY MOUTH DAILY CALL CLINIC TO SCHEDULE FOLLOW UP APPOINTMENT. Unspecified Peripheral Vascular Disease (H) Brayan Uriostegui MD   diltiazem ER (TIAZAC) 300 MG 24 hr ER beaded capsule Take 1 capsule (300 mg) by mouth daily Peripheral Vascular Disease (H) Nelli Kuhn PA-C   empagliflozin (JARDIANCE) 10 MG TABS tablet Take 1 tablet (10 mg) by mouth daily Stage 3a chronic kidney disease Tiffany Lawton MD   lisinopril (ZESTRIL) 10 MG tablet TAKE 1 TABLET BY MOUTH EVERY DAY Essential (Primary) Hypertension Danuta Tan PA-C   metoprolol succinate ER (TOPROL XL) 100 MG 24 hr tablet Take 1 tablet (100 mg) by mouth daily No refills without follow up appt in cardiology. Hypertension Goal BP (Blood Pressure) < 140/90; Coronary artery disease due to lipid rich plaque Danuta Tan PA-C   rosuvastatin (CRESTOR) 40 MG tablet TAKE 1 TABLET BY MOUTH EVERY  DAY Hyperlipidemia LDL Goal <70 Danuta Tan PA-C   tiotropium (SPIRIVA HANDIHALER) 18 MCG inhaled capsule INHALE 1 CAPSULE (18 MCG) INTO THE LUNGS DAILY Chronic obstructive pulmonary disease, unspecified COPD type (H) Danuta Tan PA-C         Add new medications, over-the-counter drugs, herbals, vitamins, or  minerals in the blank rows below.    Medication How I take it Why I use it Prescriber                          Allergies:      morphine; penicillins        Side effects I have had:               Other Information:              My notes and questions:

## 2022-11-25 NOTE — Clinical Note
JUNIOR CHAVEZ note, thanks!  Sarah Beth Ring, PharmD Medication Therapy Management Pharmacist [phone]

## 2022-11-25 NOTE — PROGRESS NOTES
Medication Therapy Management (MTM) Encounter    ASSESSMENT:                            Medication Adherence/Access: No issues identified    Hypertension/CAD/peripheral vascular disease/CKD stage 3: blood pressure at goal < 140/90, will connect with cardiology to review if diltiazem can be stopped, and request of nephrology, maximize lisinopril for renal protection.     Type 2 Diabetes:  A1C at goal < 7%.    Hyperlipidemia: Stable.  Patient is on high intensity statin which is indicated based on 2019 ACC/AHA guidelines for lipid management.      COPD: Stable. Did discuss using albuterol prior to activity/exercize.     Supplements: Stable.     PLAN:                            1. Sarah Beth will contact Marge Elam APRN CNP in cardiology to see if you need both diltiazem and metoprolol.     Suggestions/questions for Marge Elam NP:  José Miguel Bird,    I met with Leann for a medication review appointment and wanted to touch base with you. She is currently on diltiazem and metoprolol, and I'm wondering if the diltiazem can be stopped? Nephrology would like her lisinopril dose maximized (currently on lisinopril 10 mg daily) if possible. To me it doesn't appear she needs to be on both a calcium channel blocker and beta blocker (no a fib or heart failure diagnoses), but wanted to check and see if you'd like to keep her on both or if stopping the diltiazem is alright and increasing lisinopril, while maintaining metoprolol?    Thanks!  Sarah Beth Ring, PharmD  Medication Therapy Management Pharmacist  [phone]    Addendum 11/28/22:  Marge Elam APRN CNP Rust, Jaclyn, [facility]  Yes, absolutely we can stop the dilt! Good thought!     Follow-up: No follow-ups on file.    SUBJECTIVE/OBJECTIVE:                          Porsche Manning is a 72 year old female called for an initial visit. She was referred to me from insurance plan.      Reason for visit: med review, kidney doctor questioned why on both diltiazem and  metoprolol.    Allergies/ADRs: Reviewed in chart  Past Medical History: Reviewed in chart  Tobacco: She reports that she quit smoking about 4 years ago. Her smoking use included cigarettes. She has never used smokeless tobacco.  Alcohol: 1-3 beverages / week  Caffeine: 2 cups coffee/day    Medication Adherence/Access: Patient uses pill box(es).  Patient takes medications 1 time(s) per day.   Per patient, misses medication 0 times per week.   [facility].    Hypertension/CAD/peripheral vascular disease/CKD stage 3:  Diltiazem  mg daily  Metoprolol 100 mg daily   lisinopril 10 mg daily   Clopidogrel 75 mg daily (lifelong)  Aspirin 81 mg daily   Jardiance (as below)    Follows with Cardiology, Marge Elam APRN CNP and nephrology, Scooter Lawton MD. Nephrology questioned why she's on both diltiazem and metoprolol, they recommended she ask at her next cardiology appointment.   Per last nephrology note: However, needs to follow up with Cardiology, unclear why she is on Nifedipine and Metoprolol. If only for BP we could optimize Lisinopril and possibly stop Nifedipine.     Cardiology history:  CAD (1st MI 1987, BMS to mRCA 2003), PAD (s/p bilateral aorto-iliac stenting 2005, repeat stenting to left common iliac artery 2006  Patient does self-monitor blood pressure. Home BP monitoring in range of 130-145's systolic over 60-70's diastolic.  Patient reports no current medication side effects.  BP Readings from Last 3 Encounters:   11/17/22 138/64   10/14/22 (!) 187/80   10/19/21 114/62     GFR Estimate   Date Value Ref Range Status   10/14/2022 54 (L) >60 mL/min/1.73m2 Final     Comment:     Effective December 21, 2021 eGFRcr in adults is calculated using the 2021 CKD-EPI creatinine equation which includes age and gender (Tiffany lehman al., NEJM, DOI: 10.1056/UUPEkq9055905)   04/18/2022 53 (L) >60 mL/min/1.73m2 Final     Comment:     Effective December 21, 2021 eGFRcr in adults is calculated using the 2021 CKD-EPI  creatinine equation which includes age and gender (Tiffany lehman al., NEJM, DOI: 10.1056/JACVui2391763)   10/26/2021 62 >60 mL/min/1.73m2 Final     Comment:     As of July 11, 2021, eGFR is calculated by the CKD-EPI creatinine equation, without race adjustment. eGFR can be influenced by muscle mass, exercise, and diet. The reported eGFR is an estimation only and is only applicable if the renal function is stable.   04/22/2021 59 (L) >60 mL/min/[1.73_m2] Final     Comment:     Non  GFR Calc  Starting 12/18/2018, serum creatinine based estimated GFR (eGFR) will be   calculated using the Chronic Kidney Disease Epidemiology Collaboration   (CKD-EPI) equation.     01/28/2021 53 (L) >60 mL/min/[1.73_m2] Final     Comment:     Non  GFR Calc  Starting 12/18/2018, serum creatinine based estimated GFR (eGFR) will be   calculated using the Chronic Kidney Disease Epidemiology Collaboration   (CKD-EPI) equation.     03/10/2020 41 (L) >60 mL/min/[1.73_m2] Final     Comment:     Non  GFR Calc  Starting 12/18/2018, serum creatinine based estimated GFR (eGFR) will be   calculated using the Chronic Kidney Disease Epidemiology Collaboration   (CKD-EPI) equation.       Type 2 Diabetes:    Jardiance 10 mg daily (was initially sent to [facility], now straightened around)    Patient is not experiencing side effects.  Aspirin: Taking 81mg daily for secondary prevention   Statin: Yes: rosuvastatin   ACEi/ARB: Yes: lisinopril.   Urine Albumin:   Lab Results   Component Value Date    UMALCR 1,427.45 (H) 04/22/2021      Lab Results   Component Value Date    A1C 6.1 11/17/2022    A1C 6.4 04/18/2022    A1C 6.0 10/15/2021    A1C 6.7 04/22/2021    A1C 6.7 01/28/2021    A1C 6.5 10/22/2019    A1C 6.9 05/02/2019    A1C 6.1 08/16/2018     Hyperlipidemia:  rosuvastatin 40mg daily    Patient reports no significant myalgias or other side effects.  Recent Labs   Lab Test 04/18/22  1058 07/20/21  1202  10/17/16  0825 11/04/15  1142 07/09/15  1013   CHOL 161 154   < > 141 125   HDL 63 57   < > 71 61   LDL 75 63   < > 48 35   TRIG 117 172*   < > 112 145   CHOLHDLRATIO  --   --   --  2.0 2.0    < > = values in this interval not displayed.     COPD:    ICS/LABA- Breo 1 puff(s) once daily  LAMA- Spiriva Handihaler 1 puff(s) once daily.   Albuterol as needed (usually if going to have a busy day)    Patient is not experiencing side effects.   Patient reports the following symptoms: increased shortness of breath upon exertion .    Supplements:   Vitamin D 1000 international unit(s) daily  Calcium 600 mg every other day    She was told her calcium was a little high, so reduced to as above.    No reported issues at this time. .  Vitamin D Deficiency Screening Results:  Lab Results   Component Value Date    VITDT 46 04/18/2022    VITDT 48 07/27/2021    VITDT 32 07/09/2015    VITDT 30 04/16/2013     Today's Vitals: There were no vitals taken for this visit.  ----------------      I spent 27 minutes with this patient today. All changes were made via collaborative practice agreement with Danuta Tan PA-C. A copy of the visit note was provided to the patient's provider(s).    The patient was sent via [employer] a summary of these recommendations.     Sarah Beth Ring, PharmD  Medication Therapy Management Pharmacist  [phone]    Telemedicine Visit Details  Type of service:  Telephone visit  Start Time: 11:22 AM  End Time: 11:49 AM  Originating Location (pt. Location): Home      Distant Location (provider location):  Off-site  Provider has received verbal consent for a visit from the patient? Yes     Medication Therapy Recommendations  Hypertension goal BP (blood pressure) < 140/90    Current Medication: diltiazem ER (TIAZAC) 300 MG 24 hr ER beaded capsule   Rationale: More effective medication available - Ineffective medication - Effectiveness   Recommendation: Change Medication - lisinopril 20 MG tablet   Status:  Contact Provider - Awaiting Response

## 2022-11-25 NOTE — LETTER
"Recommended To-Do List      Prepared on: Nov 25, 2022       You can get the best results from your medications by completing the items on this \"To-Do List.\"      Bring your To-Do List when you go to your doctor. And, share it with your family or caregivers.    My To-Do List:  What we talked about: What I should do:   A medication that is not working    Change the medication you are taking from diltiazem ER (TIAZAC) to lisinopril (ZESTRIL)/increase dose          What we talked about: What I should do:                       "

## 2022-11-28 ENCOUNTER — TELEPHONE (OUTPATIENT)
Dept: PHARMACY | Facility: CLINIC | Age: 72
End: 2022-11-28

## 2022-11-28 NOTE — TELEPHONE ENCOUNTER
Called Leann and PATRICE, will also send mychart.     Sarah Beth Ring, PharmD  Medication Therapy Management Pharmacist  [phone]

## 2022-12-01 DIAGNOSIS — I73.9 PERIPHERAL VASCULAR DISEASE, UNSPECIFIED (H): ICD-10-CM

## 2022-12-06 RX ORDER — CLOPIDOGREL BISULFATE 75 MG/1
75 TABLET ORAL DAILY
Qty: 90 TABLET | Refills: 1 | Status: SHIPPED | OUTPATIENT
Start: 2022-12-06 | End: 2023-05-26

## 2022-12-06 NOTE — TELEPHONE ENCOUNTER
CLOPIDOGREL 75 MG TABLET  Last Written Prescription Date:  9/23/2021  Last Fill Quantity: 90,   # refills: 3  Last Office Visit :  4/6/2022  Future Office visit:  None    Routing refill request to provider for review/approval because:  Last filled under Dr. Brayan Uriostegui who is on leave at the present time.  Please send a new order with updated Providers signature for Pt care.   Thank you       Mercedes Frederick RN  Central Triage Red Flags/Med Refills

## 2022-12-14 ENCOUNTER — ANCILLARY PROCEDURE (OUTPATIENT)
Dept: MAMMOGRAPHY | Facility: CLINIC | Age: 72
End: 2022-12-14
Attending: PHYSICIAN ASSISTANT
Payer: COMMERCIAL

## 2022-12-14 DIAGNOSIS — Z12.31 VISIT FOR SCREENING MAMMOGRAM: ICD-10-CM

## 2022-12-14 PROCEDURE — 77067 SCR MAMMO BI INCL CAD: CPT | Mod: TC | Performed by: RADIOLOGY

## 2022-12-14 PROCEDURE — 77063 BREAST TOMOSYNTHESIS BI: CPT | Mod: TC | Performed by: RADIOLOGY

## 2023-01-04 ENCOUNTER — MYC MEDICAL ADVICE (OUTPATIENT)
Dept: PHARMACY | Facility: CLINIC | Age: 73
End: 2023-01-04

## 2023-01-04 DIAGNOSIS — I73.9 PERIPHERAL VASCULAR DISEASE (H): ICD-10-CM

## 2023-01-04 DIAGNOSIS — I10 ESSENTIAL (PRIMARY) HYPERTENSION: ICD-10-CM

## 2023-01-04 DIAGNOSIS — I10 HYPERTENSION GOAL BP (BLOOD PRESSURE) < 140/90: Primary | ICD-10-CM

## 2023-01-04 RX ORDER — LISINOPRIL 20 MG/1
20 TABLET ORAL DAILY
Qty: 90 TABLET | Refills: 0 | Status: SHIPPED | OUTPATIENT
Start: 2023-01-04 | End: 2023-01-16 | Stop reason: DRUGHIGH

## 2023-01-16 RX ORDER — LISINOPRIL 10 MG/1
10 TABLET ORAL DAILY
Qty: 90 TABLET | Refills: 1 | Status: SHIPPED | OUTPATIENT
Start: 2023-01-16 | End: 2023-06-28

## 2023-01-16 RX ORDER — DILTIAZEM HYDROCHLORIDE 300 MG/1
300 CAPSULE, EXTENDED RELEASE ORAL DAILY
Qty: 90 CAPSULE | Refills: 1 | Status: SHIPPED | OUTPATIENT
Start: 2023-01-16 | End: 2023-07-10

## 2023-01-16 NOTE — TELEPHONE ENCOUNTER
Leann called me back today and would like to back on diltiazem, the lisinopril 20 mg daily is not working, blood pressure 169/78, 196/80, 188/79, 186/85. She has no symptoms of hypertensive emergency. Discussed can increase lisinopril dose to 40 mg daily, or resume diltiazem 300 mg daily + lisinopril 10 mg daily, which is what she previously was on, she prefers the latter. New prescriptions sent in.    Follow-up: MTM appt 1/30/22    Sarah Beth Ring, EliseoD  Medication Therapy Management Pharmacist  [phone]

## 2023-01-30 ENCOUNTER — VIRTUAL VISIT (OUTPATIENT)
Dept: PHARMACY | Facility: CLINIC | Age: 73
End: 2023-01-30
Payer: COMMERCIAL

## 2023-01-30 DIAGNOSIS — Z78.9 TAKES DIETARY SUPPLEMENTS: ICD-10-CM

## 2023-01-30 DIAGNOSIS — I77.1 SUBCLAVIAN ARTERY STENOSIS, LEFT (H): ICD-10-CM

## 2023-01-30 DIAGNOSIS — I25.2 OLD MYOCARDIAL INFARCTION: ICD-10-CM

## 2023-01-30 DIAGNOSIS — I10 HYPERTENSION GOAL BP (BLOOD PRESSURE) < 140/90: ICD-10-CM

## 2023-01-30 DIAGNOSIS — J44.9 CHRONIC OBSTRUCTIVE PULMONARY DISEASE, UNSPECIFIED COPD TYPE (H): ICD-10-CM

## 2023-01-30 DIAGNOSIS — N18.30 TYPE 2 DIABETES MELLITUS WITH STAGE 3 CHRONIC KIDNEY DISEASE, WITHOUT LONG-TERM CURRENT USE OF INSULIN, UNSPECIFIED WHETHER STAGE 3A OR 3B CKD (H): ICD-10-CM

## 2023-01-30 DIAGNOSIS — N18.32 STAGE 3B CHRONIC KIDNEY DISEASE (H): ICD-10-CM

## 2023-01-30 DIAGNOSIS — I73.9 PERIPHERAL VASCULAR DISEASE (H): Primary | ICD-10-CM

## 2023-01-30 DIAGNOSIS — I10 ESSENTIAL (PRIMARY) HYPERTENSION: ICD-10-CM

## 2023-01-30 DIAGNOSIS — E78.5 HYPERLIPIDEMIA LDL GOAL <100: ICD-10-CM

## 2023-01-30 DIAGNOSIS — E11.22 TYPE 2 DIABETES MELLITUS WITH STAGE 3 CHRONIC KIDNEY DISEASE, WITHOUT LONG-TERM CURRENT USE OF INSULIN, UNSPECIFIED WHETHER STAGE 3A OR 3B CKD (H): ICD-10-CM

## 2023-01-30 PROCEDURE — 99605 MTMS BY PHARM NP 15 MIN: CPT | Performed by: PHARMACIST

## 2023-01-30 NOTE — Clinical Note
Hi Danuta Mary Leann wasn't aware she had diabetes, I did some brief education on this today but she plans to discuss further with you at your next visit.   Thanks, Sarah Beth Ring, PharmD Medication Therapy Management Pharmacist [phone]

## 2023-01-30 NOTE — LETTER
"Recommended To-Do List      Prepared on: Jan 30, 2023       You can get the best results from your medications by completing the items on this \"To-Do List.\"      Bring your To-Do List when you go to your doctor. And, share it with your family or caregivers.    My To-Do List:  What we talked about: What I should do:   What my medicines are for, how to know if my medicines are working, made sure my medicines are safe for me and reviewed how to take my medicines.      Take my medicines every day                 "

## 2023-01-30 NOTE — PATIENT INSTRUCTIONS
"Recommendations from today's MTM visit:                                                         1. Continue current medications.      Follow-up: Return in about 1 year (around 1/30/2024) for Medication Therapy Management.    It was great speaking with you today.  I value your experience and would be very thankful for your time in providing feedback in our clinic survey. In the next few days, you may receive an email or text message from [employer] with a link to a survey related to your  clinical pharmacist.\"     To schedule another MTM appointment, please call the clinic directly or you may call the MTM scheduling line at [phone] or toll-free at [phone].     My Clinical Pharmacist's contact information:                                                      Please feel free to contact me with any questions or concerns you have.      Sarah Beth Ring, PharmD  Medication Therapy Management Pharmacist  [phone]   "

## 2023-01-30 NOTE — LETTER
January 30, 2023  Porsche IRENA Nigel  [address]  [facility] [address]    Dear JIGAR Ruiz [facility] YOANA     Thank you for talking with me on Jan 30, 2023 about your health and medications. As a follow-up to our conversation, I have included two documents:      1. Your Recommended To-Do List has steps you should take to get the best results from your medications.  2. Your Medication List will help you keep track of your medications and how to take them.    If you want to talk about these documents, please call Nalini Ring RPH at phone: [phone], Monday-Friday 8-4:30pm.    I look forward to working with you and your doctors to make sure your medications work well for you.    Sincerely,  Nalini Ring RPH  [facility] Pharmacist, [facility]

## 2023-01-30 NOTE — PROGRESS NOTES
Medication Therapy Management (MTM) Encounter    ASSESSMENT:                            Medication Adherence/Access: No issues identified    Hypertension/CAD/peripheral vascular disease/CKD stage 3: blood pressure at goal < 140/90, reviewed it's possible she just needs a higher dose of lisinopril, such as 40 mg, when changing from diltiazem. No changes at this time.     Type 2 Diabetes:  A1C at goal < 7%. Did some education on diabetes, she will discuss further with PCP at next appointment. Offered testing supplies however she declined.     Hyperlipidemia: Stable.  Patient is on high intensity statin which is indicated based on 2019 ACC/AHA guidelines for lipid management.      COPD: Stable.     Supplements: Stable.     PLAN:                            1. Continue current medications.    Follow-up: Return in about 1 year (around 1/30/2024) for Medication Therapy Management.    SUBJECTIVE/OBJECTIVE:                          Porsche Manning is a 72 year old female called for a follow-up visit.  Today's visit is a follow-up MTM visit from 11/25/22 and subsequent myc messages and telephone calls.     Reason for visit: blood pressure follow-up.    Allergies/ADRs: Reviewed in chart  Past Medical History: Reviewed in chart  Tobacco: She reports that she quit smoking about 4 years ago. Her smoking use included cigarettes. She has never used smokeless tobacco.  Alcohol: 1-3 beverages / week  Caffeine: 2 cups coffee/day    Medication Adherence/Access: Patient uses pill box(es).  Patient takes medications 1 time(s) per day.   Per patient, misses medication 0 times per week.   [facility].    Hypertension/CAD/peripheral vascular disease/CKD stage 3:  Diltiazem  mg daily  Metoprolol 100 mg daily   lisinopril 10 mg daily   Clopidogrel 75 mg daily (lifelong)  Aspirin 81 mg daily   Jardiance (as below)    Blood pressure is back to normal after resuming her historical regimen. Blood pressure got out of control when  changing diltiazem to a lisinopril 20 mg daily.   138/67 today  125/75 yesterday  127/74, day before  110/63  Follows with Cardiology, Marge Elam APRN CNP and nephrology, Scooter Lawton MD. I've reviewed with cardiology that she doesn't need diltiazem, nephrology would like lisinopril optiomized, however Leann prefers no further changes at this time.     Cardiology history:  CAD (1st MI 1987, BMS to mRCA 2003), PAD (s/p bilateral aorto-iliac stenting 2005, repeat stenting to left common iliac artery 2006  Patient does self-monitor blood pressure. Home BP monitoring in range of (as above).  Patient reports no current medication side effects.  BP Readings from Last 3 Encounters:   11/17/22 138/64   10/14/22 (!) 187/80   10/19/21 114/62     GFR Estimate   Date Value Ref Range Status   10/14/2022 54 (L) >60 mL/min/1.73m2 Final     Comment:     Effective December 21, 2021 eGFRcr in adults is calculated using the 2021 CKD-EPI creatinine equation which includes age and gender ( et al., NEJM, DOI: 10.1056/OGCRvr8152060)   04/18/2022 53 (L) >60 mL/min/1.73m2 Final     Comment:     Effective December 21, 2021 eGFRcr in adults is calculated using the 2021 CKD-EPI creatinine equation which includes age and gender ( et al., NEJM, DOI: 10.1056/AERVsf1137572)   10/26/2021 62 >60 mL/min/1.73m2 Final     Comment:     As of July 11, 2021, eGFR is calculated by the CKD-EPI creatinine equation, without race adjustment. eGFR can be influenced by muscle mass, exercise, and diet. The reported eGFR is an estimation only and is only applicable if the renal function is stable.   04/22/2021 59 (L) >60 mL/min/[1.73_m2] Final     Comment:     Non  GFR Calc  Starting 12/18/2018, serum creatinine based estimated GFR (eGFR) will be   calculated using the Chronic Kidney Disease Epidemiology Collaboration   (CKD-EPI) equation.     01/28/2021 53 (L) >60 mL/min/[1.73_m2] Final     Comment:     Non  GFR  Calc  Starting 12/18/2018, serum creatinine based estimated GFR (eGFR) will be   calculated using the Chronic Kidney Disease Epidemiology Collaboration   (CKD-EPI) equation.     03/10/2020 41 (L) >60 mL/min/[1.73_m2] Final     Comment:     Non  GFR Calc  Starting 12/18/2018, serum creatinine based estimated GFR (eGFR) will be   calculated using the Chronic Kidney Disease Epidemiology Collaboration   (CKD-EPI) equation.       Type 2 Diabetes:    Jardiance 10 mg daily - started by nephrology last October.     Has never been told she has diabetes, not checking blood sugars, was just told this would be monitored.   Patient is not experiencing side effects.  Aspirin: Taking 81mg daily for secondary prevention   Statin: Yes: rosuvastatin   ACEi/ARB: Yes: lisinopril.   Urine Albumin:   Lab Results   Component Value Date    UMALCR 1,427.45 (H) 04/22/2021     Lab Results   Component Value Date    A1C 6.1 11/17/2022    A1C 6.4 04/18/2022    A1C 6.0 10/15/2021    A1C 6.7 04/22/2021    A1C 6.7 01/28/2021    A1C 6.5 10/22/2019    A1C 6.9 05/02/2019    A1C 6.1 08/16/2018     Hyperlipidemia:  rosuvastatin 40mg daily    Patient reports no significant myalgias or other side effects.  Recent Labs   Lab Test 04/18/22  1058 07/20/21  1202 10/17/16  0825 11/04/15  1142 07/09/15  1013   CHOL 161 154   < > 141 125   HDL 63 57   < > 71 61   LDL 75 63   < > 48 35   TRIG 117 172*   < > 112 145   CHOLHDLRATIO  --   --   --  2.0 2.0    < > = values in this interval not displayed.     COPD:    ICS/LABA- Breo 1 puff(s) once daily  LAMA- Spiriva Handihaler 1 puff(s) once daily.   Albuterol as needed (usually if going to have a busy day)    Patient is not experiencing side effects.   Patient reports the following symptoms: increased shortness of breath upon exertion sometimes.    Supplements:   Vitamin D 1000 international unit(s) daily  Calcium 600 mg every other day    She was told her calcium was a little high, so reduced to as  above.    No reported issues at this time. .  Vitamin D Deficiency Screening Results:  Lab Results   Component Value Date    VITDT 46 04/18/2022    VITDT 48 07/27/2021    VITDT 32 07/09/2015    VITDT 30 04/16/2013     Today's Vitals: There were no vitals taken for this visit.  ----------------      I spent 10 minutes with this patient today. All changes were made via collaborative practice agreement with Danuta Tan PA-C. A copy of the visit note was provided to the patient's provider(s).    A summary of these recommendations was sent via [place].    Sarah Beth Ring, PharmD  Medication Therapy Management Pharmacist  [phone]    Telemedicine Visit Details  Type of service:  Telephone visit  Start Time: 1:33 PM  End Time: 1:43 PM     Medication Therapy Recommendations  Hypertension goal BP (blood pressure) < 140/90    Current Medication: diltiazem ER (TIAZAC) 300 MG 24 hr ER beaded capsule (Discontinued)   Rationale: More effective medication available - Ineffective medication - Effectiveness   Recommendation: Change Medication - lisinopril 20 MG tablet   Status: Accepted per Provider

## 2023-01-30 NOTE — LETTER
_  Medication List        Prepared on: Jan 30, 2023     Bring your Medication List when you go to the doctor, hospital, or   emergency room. And, share it with your family or caregivers.     Note any changes to how you take your medications.  Cross out medications when you no longer use them.    Medication How I take it Why I use it Prescriber   albuterol (PROAIR HFA/PROVENTIL HFA/VENTOLIN HFA) 108 (90 Base) MCG/ACT inhaler INHALE 2 PUFFS INTO THE LUNGS EVERY 6 HOURS COPD Exacerbation (H) Danuta Tan PA-C   aspirin 81 MG EC tablet Take 81 mg by mouth daily Heart health Patient Reported   BREO ELLIPTA 100-25 MCG/INH inhaler INHALE 1 PUFF BY MOUTH EVERY DAY COPD Exacerbation (H) Danuta Tan PA-C   Calcium Carbonate-Vit D-Min (CALCIUM 1200 PO) Take 600 mg by mouth daily Once every other day General [facility] Danuta Tan PA-C   Cholecalciferol (VITAMIN D) 1000 UNIT capsule Take 1 capsule by mouth daily Take one tablet daily General Health Patient Reported   clopidogrel (PLAVIX) 75 MG tablet Take 1 tablet (75 mg) by mouth daily Unspecified Peripheral Vascular Disease (H) AARON Reeves CNP   diltiazem ER (TIAZAC) 300 MG 24 hr ER beaded capsule Take 1 capsule (300 mg) by mouth daily Peripheral Vascular Disease (H) Danuta Tan PA-C   empagliflozin (JARDIANCE) 10 MG TABS tablet Take 1 tablet (10 mg) by mouth daily Stage 3a chronic kidney disease Tiffany Lawton MD   lisinopril (ZESTRIL) 10 MG tablet Take 1 tablet (10 mg) by mouth daily Essential (Primary) Hypertension Danuta Tan PA-C   metoprolol succinate ER (TOPROL XL) 100 MG 24 hr tablet Take 1 tablet (100 mg) by mouth daily No refills without follow up appt in cardiology. Hypertension Goal BP (Blood Pressure) < 140/90; Coronary artery disease due to lipid rich plaque Danuta Tan PA-C   rosuvastatin (CRESTOR) 40 MG tablet TAKE 1 TABLET BY MOUTH EVERY DAY Hyperlipidemia LDL Goal <70  Danuta Tan PA-C   tiotropium (SPIRIVA HANDIHALER) 18 MCG inhaled capsule INHALE 1 CAPSULE (18 MCG) INTO THE LUNGS DAILY Chronic obstructive pulmonary disease, unspecified COPD type (H) Danuta Tan PA-C         Add new medications, over-the-counter drugs, herbals, vitamins, or  minerals in the blank rows below.    Medication How I take it Why I use it Prescriber                          Allergies:      morphine; penicillins        Side effects I have had:               Other Information:              My notes and questions:

## 2023-03-02 DIAGNOSIS — E78.5 HYPERLIPIDEMIA LDL GOAL <70: ICD-10-CM

## 2023-03-02 RX ORDER — ROSUVASTATIN CALCIUM 40 MG/1
TABLET, COATED ORAL
Qty: 90 TABLET | Refills: 1 | Status: SHIPPED | OUTPATIENT
Start: 2023-03-02 | End: 2023-08-16

## 2023-04-04 NOTE — PROGRESS NOTES
[facility]    HPI: Ms. Porsche Manning is a 73 year old  female with PMH significant for    -Active smoker  -Hypertension.   -CAD (1st MI 1987, BMS to mRCA 2003)  -PAD (s/p bilateral aorto-iliac stenting 2005, repeat stenting to left common iliac artery 2006)  -Left subclavian artery subclavian stenosis  -COPD  -T2DM  -CKD stage III  -hx of cervical cancer.   She is being seen today for annual follow-up. Last seen by in cardiology a year ago.   Since her last visit, she has been at her baseline.  Reports shortness of breath with stairs but this is not new to her.  She tells me she has COPD.  Denies chest pain.  No dizziness, palpitations, lower extremity edema.  She tells me she has a sedentary lifestyle.  Recently started smoking again over the last 1-1/2-month.  She smokes 4 to 5 cigarettes a day.   She follows with vascular surgery with regards to PAD.  Unfortunately she fell on her driveway today when she was coming here.  She has some mild pain in her back.   Current medications:  Clopidogrel 75 mg  Aspirin 81 mg  Diltiazem 300 mg  Jardiance 10 mg  Lisinopril 10 mg  Crestor 40 mg  Metoprolol 100 mg    Medications, personal, family, and social history reviewed with patient and revised.    PAST MEDICAL HISTORY:  Past Medical History:   Diagnosis Date     Brachial neuritis or radiculitis NOS      Chronic obstructive pulmonary disease, unspecified COPD type (H) 3/8/2016     Coronary artery disease     Doing fine     H/O tobacco use, presenting hazards to health      Hyperlipidemia LDL goal < 100      Hypertension goal BP (blood pressure) < 140/90      Malignant neoplasm of other specified sites of cervix      Old myocardial infarction      Osteopenia      Peripheral vascular disease, unspecified (H)      Type 2 diabetes, HbA1c goal < 7% (H)        CURRENT MEDICATIONS:  Current Outpatient Medications    Medication Sig Dispense Refill     albuterol (PROAIR HFA/PROVENTIL HFA/VENTOLIN HFA) 108 (90 Base) MCG/ACT inhaler INHALE 2 PUFFS INTO THE LUNGS EVERY 6 HOURS 18 g 2     aspirin 81 MG EC tablet Take 81 mg by mouth daily       BREO ELLIPTA 100-25 MCG/INH inhaler INHALE 1 PUFF BY MOUTH EVERY DAY 90 each 3     Calcium Carbonate-Vit D-Min (CALCIUM 1200 PO) Take 600 mg by mouth daily Once every other day       Cholecalciferol (VITAMIN D) 1000 UNIT capsule Take 1 capsule by mouth daily Take one tablet daily       clopidogrel (PLAVIX) 75 MG tablet Take 1 tablet (75 mg) by mouth daily 90 tablet 1     diltiazem ER (TIAZAC) 300 MG 24 hr ER beaded capsule Take 1 capsule (300 mg) by mouth daily 90 capsule 1     empagliflozin (JARDIANCE) 10 MG TABS tablet Take 1 tablet (10 mg) by mouth daily 90 tablet 3     lisinopril (ZESTRIL) 10 MG tablet Take 1 tablet (10 mg) by mouth daily 90 tablet 1     metoprolol succinate ER (TOPROL XL) 100 MG 24 hr tablet Take 1 tablet (100 mg) by mouth daily No refills without follow up appt in cardiology. 90 tablet 3     rosuvastatin (CRESTOR) 40 MG tablet TAKE 1 TABLET BY MOUTH EVERY DAY 90 tablet 1     tiotropium (SPIRIVA HANDIHALER) 18 MCG inhaled capsule INHALE 1 CAPSULE (18 MCG) INTO THE LUNGS DAILY 90 capsule 3       PAST SURGICAL HISTORY:  Past Surgical History:   Procedure Laterality Date     ANGIOGRAM Left 10/19/2021    Procedure: Left iliac angioplasty and stenting, Right common iliac angioplasty;  Surgeon: Britni Bob MD;  Location: [facility] OR     ANGIOPLASTY N/A 10/19/2021    Procedure: possible intervention;  Surgeon: Britni Bob MD;  Location: [facility] OR     BIOPSY      Sample taken from back     CARDIAC SURGERY      Stents     COLONOSCOPY  1996    Results were ok     HYSTERECTOMY, PAP NO LONGER INDICATED  1989    ovaries only, no cervix per pt     IR OR ANGIOGRAM  10/19/2021     SURGICAL HISTORY OF -   1995    bunionectomy     SURGICAL HISTORY OF -   10/10/03    cardiac stenting     SURGICAL  HISTORY OF -       stent placed in both of her legs for pad     VASCULAR SURGERY      PAD       ALLERGIES:     Allergies   Allergen Reactions     Morphine Nausea and Vomiting     Penicillins Nausea and Vomiting       FAMILY HISTORY:  Family History   Problem Relation Age of Onset     C.A.D. Mother      Breast Cancer Mother      C.A.D. Father      Diabetes Father      Hypertension Father      C.A.D. Maternal Grandfather      OREN.A.JOE. Paternal Grandfather      Neurologic Disorder Brother         epilepsy         SOCIAL HISTORY:  Social History     Tobacco Use     Smoking status: Former     Packs/day: 0.00     Years: 40.00     Pack years: 0.00     Types: Cigarettes     Quit date: 2018     Years since quittin.9     Smokeless tobacco: Never   Substance Use Topics     Alcohol use: Yes     Comment: 2 to 4 beers or mixed drinks weekly     Drug use: No       ROS:   Constitutional: No fever, chills, or sweats. Weight stable.   Cardiovascular: As per HPI.       Exam:  BP (!) 150/58   Pulse 71   Wt 77.9 kg (171 lb 12.8 oz)   SpO2 92%   BMI 31.63 kg/m    GENERAL APPEARANCE: alert and no distress  HEENT: no icterus, no central cyanosis  LYMPH/NECK: no adenopathy, no asymmetry, JVP not elevated, no carotid bruits.  RESPIRATORY: lungs clear to auscultation - no rales, rhonchi or wheezes, no use of accessory muscles, no retractions, respirations are unlabored, normal respiratory rate  CARDIOVASCULAR: regular rhythm, normal S1, S2, no S3 or S4 and no murmur, click or rub, precordium quiet with normal PMI.  GI: soft, non tender  EXTREMITIES: peripheral pulses normal, no edema  NEURO: alert, normal speech,and affect  SKIN: no ecchymoses, no rashes     I have reviewed the labs and personally reviewed the imaging below and made my comment in the assessment and plan.    Labs:  CBC RESULTS:   Lab Results   Component Value Date    WBC 10.0 10/14/2022    WBC 9.9 2021    RBC 4.76 10/14/2022    RBC 4.73 2021     HGB 14.9 10/14/2022    HGB 14.8 01/28/2021    HCT 44.8 10/14/2022    HCT 45.0 01/28/2021    MCV 94 10/14/2022    MCV 95 01/28/2021    MCH 31.3 10/14/2022    MCH 31.3 01/28/2021    MCHC 33.3 10/14/2022    MCHC 32.9 01/28/2021    RDW 13.0 10/14/2022    RDW 12.6 01/28/2021     10/14/2022     01/28/2021       BMP RESULTS:  Lab Results   Component Value Date     10/14/2022     04/22/2021    POTASSIUM 4.2 10/14/2022    POTASSIUM 4.2 04/18/2022    POTASSIUM 4.4 04/22/2021    CHLORIDE 100 10/14/2022    CHLORIDE 107 04/18/2022    CHLORIDE 104 04/22/2021    CO2 24 10/14/2022    CO2 23 04/18/2022    CO2 23 04/22/2021    ANIONGAP 13 10/14/2022    ANIONGAP 8 04/18/2022    ANIONGAP 10 04/22/2021     (H) 10/14/2022     (H) 04/18/2022     (H) 04/22/2021    BUN 12.9 10/14/2022    BUN 16 04/18/2022    BUN 13 04/22/2021    CR 1.09 (H) 10/14/2022    CR 0.97 04/22/2021    GFRESTIMATED 54 (L) 10/14/2022    GFRESTIMATED 59 (L) 04/22/2021    GFRESTBLACK 69 04/22/2021    VIANEY 10.3 (H) 10/14/2022    VIANEY 10.4 (H) 04/22/2021      Arterial duplex ultrasound, bilateral and at 3 levels 9/13/2021:  1. RIGHT:       A. Resting CLAUDINE is ABNORMAL, 0.73, previously borderline, 0.93.       B. Resting TBI is ABNORMAL, 0.42.       C. Segmental pressures suggest femoropopliteal disease and small  vessel disease.  2. LEFT:       A. Resting CLAUDINE is ABNORMAL, 0.24, previously borderline, 0.93.       B. Resting TBI is ABNORMAL, 0.09.       C. Segmental pressures suggest iliac and small vessel disease.  3. Left brachial pressure now 68 mmHg less than the right (previously  21 mmHg) suggesting a left central stenosis. If clinically indicated,  further evaluation with CTA could be considered.  4. Exercise study not performed. The patient appeared unstable on her  feet and it did not seem safe to subject her to the treadmill test.     Carotid US 2/2020:  1. RIGHT ICA: 50-69% diameter stenosis by grayscale imaging  and  sonographic velocity criteria, unchanged from 2015.  2. LEFT ICA:  Less than 50% diameter narrowing by grayscale imaging  and sonographic velocity criteria, unchanged from 2015.  3. Subclavian steal physiology with retrograde left vertebral artery  flow, unchanged. Correlate for symptoms.      Assessment:     #Current smoker  -Counseled to quit smoking    # HTN  # left subclavian artery stenosis.    - Patient has been checking home blood pressures solely in the right arm.  She brought a log which shows well-controlled blood pressure at home.  - Continue lisinopril 10 mg  - Continue metoprolol 100 mg XL daily  - continue diltiazem to 300 mg ER daily    # CAD  -Continue aspirin and Plavix.  No chest pain.    # HLD  -LDL 75 mg/deciliter.  Patient on Crestor 40 mg daily.  Continue current treatment.     #PAD status post iliac stenting  -No claudication.  -Follows with vascular surgery.  On dual antiplatelet therapy.     # Carotid disease.    -I have heard a significant murmur on the right carotid artery.  -Recommend a repeat Doppler ultrasound.  Previous one in 2020 showed nonobstructive carotid artery disease.     # Left subclavian artery stenosis.  -There was a 57 mm Hg pressure gradient between the right and left arms in past.  CT angiogram showed subtotal occlusion of the ostium of the left subclavian artery.   -Follow-up with vascular surgery.    No medication changes today.  Return to clinic as needed.     Total time spent today for this visit is 40 minutes including precharting, face-to-face clinic visit, review of labs/imaging and medical documentation.    Arleth HATCH MD  [facility] Division of Cardiology  Pager [phone]

## 2023-04-05 ENCOUNTER — OFFICE VISIT (OUTPATIENT)
Dept: CARDIOLOGY | Facility: CLINIC | Age: 73
End: 2023-04-05
Attending: NURSE PRACTITIONER
Payer: COMMERCIAL

## 2023-04-05 VITALS
DIASTOLIC BLOOD PRESSURE: 58 MMHG | BODY MASS INDEX: 31.63 KG/M2 | HEART RATE: 71 BPM | WEIGHT: 171.8 LBS | OXYGEN SATURATION: 92 % | SYSTOLIC BLOOD PRESSURE: 150 MMHG

## 2023-04-05 DIAGNOSIS — I25.10 CORONARY ARTERY DISEASE INVOLVING NATIVE CORONARY ARTERY OF NATIVE HEART WITHOUT ANGINA PECTORIS: Primary | ICD-10-CM

## 2023-04-05 DIAGNOSIS — R09.89 BILATERAL CAROTID BRUITS: ICD-10-CM

## 2023-04-05 DIAGNOSIS — I77.1 STENOSIS OF LEFT SUBCLAVIAN ARTERY (H): ICD-10-CM

## 2023-04-05 DIAGNOSIS — I65.23 BILATERAL CAROTID ARTERY STENOSIS: ICD-10-CM

## 2023-04-05 DIAGNOSIS — F17.200 CURRENT SMOKER: ICD-10-CM

## 2023-04-05 DIAGNOSIS — I73.9 PERIPHERAL ARTERIAL DISEASE (H): ICD-10-CM

## 2023-04-05 PROCEDURE — 99215 OFFICE O/P EST HI 40 MIN: CPT | Performed by: INTERNAL MEDICINE

## 2023-04-05 NOTE — NURSING NOTE
"Chief Complaint   Patient presents with     Follow Up     Pt reports no recent symptoms, Dr. Renee annual return (former [facility] pt) dx CAD.       Initial BP (!) 168/78 (BP Location: Right arm, Patient Position: Sitting, Cuff Size: Adult Regular)   Pulse 71   Wt 77.9 kg (171 lb 12.8 oz)   SpO2 92%   BMI 31.63 kg/m   Estimated body mass index is 31.63 kg/m  as calculated from the following:    Height as of 11/17/22: 1.57 m (5' 1.8\").    Weight as of this encounter: 77.9 kg (171 lb 12.8 oz)..  BP completed using cuff size: regular    EDDIE Watson    "

## 2023-04-05 NOTE — PATIENT INSTRUCTIONS
Thank you for coming to the [facility] at [place]; please note the following instructions:    1. Carotid ultrasound    2. Cardiology follow up as needed        If you have any questions regarding your visit, please contact your care team:     CARDIOLOGY  TELEPHONE NUMBER   Sari PEREZMargarette, Registered Nurse  Lorin MORALES, Registered Nurse  Fani BORJAS, Registered Medical Assistant  Britni JACOB, Certified Medical Assistant  Yanique CHEEK, Visit Facilitator [phone] (select option 1)    *After hours: [phone]   For Scheduling Appts:     [phone] (select option 1)    *After hours: [phone]   For the Device Clinic (Pacemakers and ICD's)  Jazzy NUNO, Registered Nurse   During business hours: [phone]    *After business hours:  [phone] (select option 4)      Normal test result notifications will be released via markedup or mailed within 7 business days.  All other test results, will be communicated via telephone once reviewed by your cardiologist.    If you need a medication refill, please contact your pharmacy.  Please allow 3 business days for your refill to be completed.    As always, thank you for trusting us with your health care needs!

## 2023-04-05 NOTE — LETTER
4/5/2023      RE: Porsche Manning  [address]  [facility] [address]       Dear Colleague,    Thank you for the opportunity to participate in the care of your patient, Porsche Manning, at the [facility] at [facility]. Please see a copy of my visit note below.                                                                                               [facility] Cardiology [facility]    HPI: Ms. Porsche Manning is a 73 year old  female with PMH significant for    -Active smoker  -Hypertension.   -CAD (1st MI 1987, BMS to mRCA 2003)  -PAD (s/p bilateral aorto-iliac stenting 2005, repeat stenting to left common iliac artery 2006)  -Left subclavian artery subclavian stenosis  -COPD  -T2DM  -CKD stage III  -hx of cervical cancer.   She is being seen today for annual follow-up. Last seen by in cardiology a year ago.   Since her last visit, she has been at her baseline.  Reports shortness of breath with stairs but this is not new to her.  She tells me she has COPD.  Denies chest pain.  No dizziness, palpitations, lower extremity edema.  She tells me she has a sedentary lifestyle.  Recently started smoking again over the last 1-1/2-month.  She smokes 4 to 5 cigarettes a day.   She follows with vascular surgery with regards to PAD.  Unfortunately she fell on her driveway today when she was coming here.  She has some mild pain in her back.   Current medications:  Clopidogrel 75 mg  Aspirin 81 mg  Diltiazem 300 mg  Jardiance 10 mg  Lisinopril 10 mg  Crestor 40 mg  Metoprolol 100 mg    Medications, personal, family, and social history reviewed with patient and revised.    PAST MEDICAL HISTORY:  Past Medical History:   Diagnosis Date    Brachial neuritis or radiculitis NOS     Chronic obstructive pulmonary disease, unspecified COPD type (H) 3/8/2016    Coronary artery disease     Doing fine    H/O tobacco use, presenting hazards  to health     Hyperlipidemia LDL goal < 100     Hypertension goal BP (blood pressure) < 140/90     Malignant neoplasm of other specified sites of cervix     Old myocardial infarction     Osteopenia     Peripheral vascular disease, unspecified (H)     Type 2 diabetes, HbA1c goal < 7% (H)        CURRENT MEDICATIONS:  Current Outpatient Medications   Medication Sig Dispense Refill    albuterol (PROAIR HFA/PROVENTIL HFA/VENTOLIN HFA) 108 (90 Base) MCG/ACT inhaler INHALE 2 PUFFS INTO THE LUNGS EVERY 6 HOURS 18 g 2    aspirin 81 MG EC tablet Take 81 mg by mouth daily      BREO ELLIPTA 100-25 MCG/INH inhaler INHALE 1 PUFF BY MOUTH EVERY DAY 90 each 3    Calcium Carbonate-Vit D-Min (CALCIUM 1200 PO) Take 600 mg by mouth daily Once every other day      Cholecalciferol (VITAMIN D) 1000 UNIT capsule Take 1 capsule by mouth daily Take one tablet daily      clopidogrel (PLAVIX) 75 MG tablet Take 1 tablet (75 mg) by mouth daily 90 tablet 1    diltiazem ER (TIAZAC) 300 MG 24 hr ER beaded capsule Take 1 capsule (300 mg) by mouth daily 90 capsule 1    empagliflozin (JARDIANCE) 10 MG TABS tablet Take 1 tablet (10 mg) by mouth daily 90 tablet 3    lisinopril (ZESTRIL) 10 MG tablet Take 1 tablet (10 mg) by mouth daily 90 tablet 1    metoprolol succinate ER (TOPROL XL) 100 MG 24 hr tablet Take 1 tablet (100 mg) by mouth daily No refills without follow up appt in cardiology. 90 tablet 3    rosuvastatin (CRESTOR) 40 MG tablet TAKE 1 TABLET BY MOUTH EVERY DAY 90 tablet 1    tiotropium (SPIRIVA HANDIHALER) 18 MCG inhaled capsule INHALE 1 CAPSULE (18 MCG) INTO THE LUNGS DAILY 90 capsule 3       PAST SURGICAL HISTORY:  Past Surgical History:   Procedure Laterality Date    ANGIOGRAM Left 10/19/2021    Procedure: Left iliac angioplasty and stenting, Right common iliac angioplasty;  Surgeon: Britni Bob MD;  Location: [facility] OR    ANGIOPLASTY N/A 10/19/2021    Procedure: possible intervention;  Surgeon: Britni Bob MD;  Location: [facility] OR    BIOPSY       Sample taken from back    CARDIAC SURGERY      Stents    COLONOSCOPY      Results were ok    HYSTERECTOMY, PAP NO LONGER INDICATED      ovaries only, no cervix per pt    IR OR ANGIOGRAM  10/19/2021    SURGICAL HISTORY OF -       bunionectomy    SURGICAL HISTORY OF -   10/10/03    cardiac stenting    SURGICAL HISTORY OF -       stent placed in both of her legs for pad    VASCULAR SURGERY      PAD       ALLERGIES:     Allergies   Allergen Reactions    Morphine Nausea and Vomiting    Penicillins Nausea and Vomiting       FAMILY HISTORY:  Family History   Problem Relation Age of Onset    C.A.D. Mother     Breast Cancer Mother     C.A.D. Father     Diabetes Father     Hypertension Father     C.A.D. Maternal Grandfather     C.A.D. Paternal Grandfather     Neurologic Disorder Brother         epilepsy         SOCIAL HISTORY:  Social History     Tobacco Use    Smoking status: Former     Packs/day: 0.00     Years: 40.00     Pack years: 0.00     Types: Cigarettes     Quit date: 2018     Years since quittin.9    Smokeless tobacco: Never   Substance Use Topics    Alcohol use: Yes     Comment: 2 to 4 beers or mixed drinks weekly    Drug use: No       ROS:   Constitutional: No fever, chills, or sweats. Weight stable.   Cardiovascular: As per HPI.       Exam:  BP (!) 150/58   Pulse 71   Wt 77.9 kg (171 lb 12.8 oz)   SpO2 92%   BMI 31.63 kg/m    GENERAL APPEARANCE: alert and no distress  HEENT: no icterus, no central cyanosis  LYMPH/NECK: no adenopathy, no asymmetry, JVP not elevated, no carotid bruits.  RESPIRATORY: lungs clear to auscultation - no rales, rhonchi or wheezes, no use of accessory muscles, no retractions, respirations are unlabored, normal respiratory rate  CARDIOVASCULAR: regular rhythm, normal S1, S2, no S3 or S4 and no murmur, click or rub, precordium quiet with normal PMI.  GI: soft, non tender  EXTREMITIES: peripheral pulses normal, no edema  NEURO: alert, normal speech,and  affect  SKIN: no ecchymoses, no rashes     I have reviewed the labs and personally reviewed the imaging below and made my comment in the assessment and plan.    Labs:  CBC RESULTS:   Lab Results   Component Value Date    WBC 10.0 10/14/2022    WBC 9.9 01/28/2021    RBC 4.76 10/14/2022    RBC 4.73 01/28/2021    HGB 14.9 10/14/2022    HGB 14.8 01/28/2021    HCT 44.8 10/14/2022    HCT 45.0 01/28/2021    MCV 94 10/14/2022    MCV 95 01/28/2021    MCH 31.3 10/14/2022    MCH 31.3 01/28/2021    MCHC 33.3 10/14/2022    MCHC 32.9 01/28/2021    RDW 13.0 10/14/2022    RDW 12.6 01/28/2021     10/14/2022     01/28/2021       BMP RESULTS:  Lab Results   Component Value Date     10/14/2022     04/22/2021    POTASSIUM 4.2 10/14/2022    POTASSIUM 4.2 04/18/2022    POTASSIUM 4.4 04/22/2021    CHLORIDE 100 10/14/2022    CHLORIDE 107 04/18/2022    CHLORIDE 104 04/22/2021    CO2 24 10/14/2022    CO2 23 04/18/2022    CO2 23 04/22/2021    ANIONGAP 13 10/14/2022    ANIONGAP 8 04/18/2022    ANIONGAP 10 04/22/2021     (H) 10/14/2022     (H) 04/18/2022     (H) 04/22/2021    BUN 12.9 10/14/2022    BUN 16 04/18/2022    BUN 13 04/22/2021    CR 1.09 (H) 10/14/2022    CR 0.97 04/22/2021    GFRESTIMATED 54 (L) 10/14/2022    GFRESTIMATED 59 (L) 04/22/2021    GFRESTBLACK 69 04/22/2021    VIANEY 10.3 (H) 10/14/2022    VIANEY 10.4 (H) 04/22/2021      Arterial duplex ultrasound, bilateral and at 3 levels 9/13/2021:  1. RIGHT:       A. Resting CLAUDINE is ABNORMAL, 0.73, previously borderline, 0.93.       B. Resting TBI is ABNORMAL, 0.42.       C. Segmental pressures suggest femoropopliteal disease and small  vessel disease.  2. LEFT:       A. Resting CLAUDINE is ABNORMAL, 0.24, previously borderline, 0.93.       B. Resting TBI is ABNORMAL, 0.09.       C. Segmental pressures suggest iliac and small vessel disease.  3. Left brachial pressure now 68 mmHg less than the right (previously  21 mmHg) suggesting a left central  stenosis. If clinically indicated,  further evaluation with CTA could be considered.  4. Exercise study not performed. The patient appeared unstable on her  feet and it did not seem safe to subject her to the treadmill test.     Carotid US 2/2020:  1. RIGHT ICA: 50-69% diameter stenosis by grayscale imaging and  sonographic velocity criteria, unchanged from 2015.  2. LEFT ICA:  Less than 50% diameter narrowing by grayscale imaging  and sonographic velocity criteria, unchanged from 2015.  3. Subclavian steal physiology with retrograde left vertebral artery  flow, unchanged. Correlate for symptoms.      Assessment:     #Current smoker  -Counseled to quit smoking    # HTN  # left subclavian artery stenosis.    - Patient has been checking home blood pressures solely in the right arm.  She brought a log which shows well-controlled blood pressure at home.  - Continue lisinopril 10 mg  - Continue metoprolol 100 mg XL daily  - continue diltiazem to 300 mg ER daily    # CAD  -Continue aspirin and Plavix.  No chest pain.    # HLD  -LDL 75 mg/deciliter.  Patient on Crestor 40 mg daily.  Continue current treatment.     #PAD status post iliac stenting  -No claudication.  -Follows with vascular surgery.  On dual antiplatelet therapy.     # Carotid disease.    -I have heard a significant murmur on the right carotid artery.  -Recommend a repeat Doppler ultrasound.  Previous one in 2020 showed nonobstructive carotid artery disease.     # Left subclavian artery stenosis.  -There was a 57 mm Hg pressure gradient between the right and left arms in past.  CT angiogram showed subtotal occlusion of the ostium of the left subclavian artery.   -Follow-up with vascular surgery.    No medication changes today.  Return to clinic as needed.     Total time spent today for this visit is 40 minutes including precharting, face-to-face clinic visit, review of labs/imaging and medical documentation.    Arleth HATCH MD  [facility] Division  of Cardiology  Pager [phone]

## 2023-04-20 ENCOUNTER — PATIENT OUTREACH (OUTPATIENT)
Dept: CARE COORDINATION | Facility: CLINIC | Age: 73
End: 2023-04-20
Payer: COMMERCIAL

## 2023-05-07 DIAGNOSIS — J44.1 COPD EXACERBATION (H): ICD-10-CM

## 2023-05-08 ENCOUNTER — DOCUMENTATION ONLY (OUTPATIENT)
Dept: LAB | Facility: CLINIC | Age: 73
End: 2023-05-08
Payer: COMMERCIAL

## 2023-05-08 RX ORDER — ALBUTEROL SULFATE 90 UG/1
2 AEROSOL, METERED RESPIRATORY (INHALATION) EVERY 6 HOURS
Qty: 8.5 G | Refills: 0 | Status: SHIPPED | OUTPATIENT
Start: 2023-05-08 | End: 2023-09-11

## 2023-05-08 NOTE — TELEPHONE ENCOUNTER
Medication is being filled for 1 time refill only due to:  Patient needs to be seen because it has been more than one year since last visit.    Due for physical.  Last 4/25/22    [employer] message sent 4/24/23 requesting patient to schedule physical    Kenyatta Chicas RN

## 2023-05-10 DIAGNOSIS — N18.31 STAGE 3A CHRONIC KIDNEY DISEASE (H): Primary | ICD-10-CM

## 2023-05-12 ENCOUNTER — OFFICE VISIT (OUTPATIENT)
Dept: NEPHROLOGY | Facility: CLINIC | Age: 73
End: 2023-05-12
Payer: COMMERCIAL

## 2023-05-12 ENCOUNTER — LAB (OUTPATIENT)
Dept: LAB | Facility: CLINIC | Age: 73
End: 2023-05-12
Payer: COMMERCIAL

## 2023-05-12 ENCOUNTER — ANCILLARY PROCEDURE (OUTPATIENT)
Dept: ULTRASOUND IMAGING | Facility: CLINIC | Age: 73
End: 2023-05-12
Attending: INTERNAL MEDICINE
Payer: COMMERCIAL

## 2023-05-12 ENCOUNTER — ANCILLARY PROCEDURE (OUTPATIENT)
Dept: ULTRASOUND IMAGING | Facility: CLINIC | Age: 73
End: 2023-05-12
Attending: SURGERY
Payer: COMMERCIAL

## 2023-05-12 VITALS
DIASTOLIC BLOOD PRESSURE: 65 MMHG | WEIGHT: 170.3 LBS | SYSTOLIC BLOOD PRESSURE: 151 MMHG | OXYGEN SATURATION: 92 % | BODY MASS INDEX: 31.35 KG/M2 | HEART RATE: 69 BPM | TEMPERATURE: 97.6 F

## 2023-05-12 DIAGNOSIS — N18.31 STAGE 3A CHRONIC KIDNEY DISEASE (H): Primary | ICD-10-CM

## 2023-05-12 DIAGNOSIS — N18.30 TYPE 2 DIABETES MELLITUS WITH STAGE 3 CHRONIC KIDNEY DISEASE, WITHOUT LONG-TERM CURRENT USE OF INSULIN, UNSPECIFIED WHETHER STAGE 3A OR 3B CKD (H): ICD-10-CM

## 2023-05-12 DIAGNOSIS — I10 HYPERTENSION GOAL BP (BLOOD PRESSURE) < 140/90: ICD-10-CM

## 2023-05-12 DIAGNOSIS — N18.31 STAGE 3A CHRONIC KIDNEY DISEASE (H): ICD-10-CM

## 2023-05-12 DIAGNOSIS — I73.9 PAD (PERIPHERAL ARTERY DISEASE) (H): ICD-10-CM

## 2023-05-12 DIAGNOSIS — J44.9 CHRONIC OBSTRUCTIVE PULMONARY DISEASE, UNSPECIFIED COPD TYPE (H): ICD-10-CM

## 2023-05-12 DIAGNOSIS — E78.5 HYPERLIPIDEMIA LDL GOAL <100: ICD-10-CM

## 2023-05-12 DIAGNOSIS — Z95.828 S/P INSERTION OF ILIAC ARTERY STENT: ICD-10-CM

## 2023-05-12 DIAGNOSIS — E11.22 TYPE 2 DIABETES MELLITUS WITH STAGE 3 CHRONIC KIDNEY DISEASE, WITHOUT LONG-TERM CURRENT USE OF INSULIN, UNSPECIFIED WHETHER STAGE 3A OR 3B CKD (H): ICD-10-CM

## 2023-05-12 DIAGNOSIS — R01.1 MURMUR, CARDIAC: ICD-10-CM

## 2023-05-12 DIAGNOSIS — R09.89 BILATERAL CAROTID BRUITS: ICD-10-CM

## 2023-05-12 DIAGNOSIS — E11.21 TYPE 2 DIABETES MELLITUS WITH DIABETIC NEPHROPATHY, WITHOUT LONG-TERM CURRENT USE OF INSULIN (H): ICD-10-CM

## 2023-05-12 LAB
ALBUMIN MFR UR ELPH: 9.9 MG/DL (ref 1–14)
ALBUMIN SERPL BCG-MCNC: 4.5 G/DL (ref 3.5–5.2)
ALBUMIN UR-MCNC: NEGATIVE MG/DL
ANION GAP SERPL CALCULATED.3IONS-SCNC: 12 MMOL/L (ref 7–15)
APPEARANCE UR: CLEAR
BILIRUB UR QL STRIP: NEGATIVE
BUN SERPL-MCNC: 15.6 MG/DL (ref 8–23)
CALCIUM SERPL-MCNC: 10.1 MG/DL (ref 8.8–10.2)
CHLORIDE SERPL-SCNC: 101 MMOL/L (ref 98–107)
CHOLEST SERPL-MCNC: 151 MG/DL
COLOR UR AUTO: ABNORMAL
CREAT SERPL-MCNC: 1.26 MG/DL (ref 0.51–0.95)
CREAT UR-MCNC: 13 MG/DL
DEPRECATED CALCIDIOL+CALCIFEROL SERPL-MC: 49 UG/L (ref 20–75)
DEPRECATED HCO3 PLAS-SCNC: 24 MMOL/L (ref 22–29)
ERYTHROCYTE [DISTWIDTH] IN BLOOD BY AUTOMATED COUNT: 12.8 % (ref 10–15)
GFR SERPL CREATININE-BSD FRML MDRD: 45 ML/MIN/1.73M2
GLUCOSE SERPL-MCNC: 137 MG/DL (ref 70–99)
GLUCOSE UR STRIP-MCNC: 500 MG/DL
HBA1C MFR BLD: 6.6 %
HCT VFR BLD AUTO: 46.8 % (ref 35–47)
HDLC SERPL-MCNC: 58 MG/DL
HGB BLD-MCNC: 15.9 G/DL (ref 11.7–15.7)
HGB UR QL STRIP: NEGATIVE
KETONES UR STRIP-MCNC: NEGATIVE MG/DL
LDLC SERPL CALC-MCNC: 66 MG/DL
LEUKOCYTE ESTERASE UR QL STRIP: NEGATIVE
MCH RBC QN AUTO: 31.8 PG (ref 26.5–33)
MCHC RBC AUTO-ENTMCNC: 34 G/DL (ref 31.5–36.5)
MCV RBC AUTO: 94 FL (ref 78–100)
NITRATE UR QL: NEGATIVE
NONHDLC SERPL-MCNC: 93 MG/DL
PH UR STRIP: 5.5 [PH] (ref 5–7)
PHOSPHATE SERPL-MCNC: 3.1 MG/DL (ref 2.5–4.5)
PLATELET # BLD AUTO: 254 10E3/UL (ref 150–450)
POTASSIUM SERPL-SCNC: 3.9 MMOL/L (ref 3.4–5.3)
PROT/CREAT 24H UR: 0.76 MG/MG CR (ref 0–0.2)
PTH-INTACT SERPL-MCNC: 45 PG/ML (ref 15–65)
RBC # BLD AUTO: 5 10E6/UL (ref 3.8–5.2)
RBC URINE: 2 /HPF
SODIUM SERPL-SCNC: 137 MMOL/L (ref 136–145)
SP GR UR STRIP: 1 (ref 1–1.03)
SQUAMOUS EPITHELIAL: 1 /HPF
TRIGL SERPL-MCNC: 137 MG/DL
UROBILINOGEN UR STRIP-MCNC: NORMAL MG/DL
WBC # BLD AUTO: 8.6 10E3/UL (ref 4–11)
WBC URINE: 3 /HPF

## 2023-05-12 PROCEDURE — 93922 UPR/L XTREMITY ART 2 LEVELS: CPT | Mod: GC | Performed by: RADIOLOGY

## 2023-05-12 PROCEDURE — 82306 VITAMIN D 25 HYDROXY: CPT | Mod: 90 | Performed by: PATHOLOGY

## 2023-05-12 PROCEDURE — 84156 ASSAY OF PROTEIN URINE: CPT | Performed by: PATHOLOGY

## 2023-05-12 PROCEDURE — 83036 HEMOGLOBIN GLYCOSYLATED A1C: CPT | Mod: 90 | Performed by: PATHOLOGY

## 2023-05-12 PROCEDURE — 99214 OFFICE O/P EST MOD 30 MIN: CPT | Mod: GC | Performed by: INTERNAL MEDICINE

## 2023-05-12 PROCEDURE — 36415 COLL VENOUS BLD VENIPUNCTURE: CPT | Performed by: PATHOLOGY

## 2023-05-12 PROCEDURE — 80061 LIPID PANEL: CPT | Performed by: PATHOLOGY

## 2023-05-12 PROCEDURE — 85027 COMPLETE CBC AUTOMATED: CPT | Performed by: PATHOLOGY

## 2023-05-12 PROCEDURE — 80069 RENAL FUNCTION PANEL: CPT | Performed by: PATHOLOGY

## 2023-05-12 PROCEDURE — 81001 URINALYSIS AUTO W/SCOPE: CPT | Performed by: PATHOLOGY

## 2023-05-12 PROCEDURE — 93880 EXTRACRANIAL BILAT STUDY: CPT | Mod: GC | Performed by: RADIOLOGY

## 2023-05-12 PROCEDURE — 99000 SPECIMEN HANDLING OFFICE-LAB: CPT | Performed by: PATHOLOGY

## 2023-05-12 PROCEDURE — G0463 HOSPITAL OUTPT CLINIC VISIT: HCPCS | Performed by: INTERNAL MEDICINE

## 2023-05-12 PROCEDURE — 83970 ASSAY OF PARATHORMONE: CPT | Performed by: PATHOLOGY

## 2023-05-12 ASSESSMENT — PAIN SCALES - GENERAL: PAINLEVEL: NO PAIN (0)

## 2023-05-12 NOTE — NURSING NOTE
Chief Complaint   Patient presents with     RECHECK     Return visit.     Blood pressure (!) 151/65, pulse 69, temperature 97.6  F (36.4  C), weight 77.2 kg (170 lb 4.8 oz), SpO2 92 %, not currently breastfeeding.    ELDER HESS

## 2023-05-12 NOTE — PROGRESS NOTES
NEPHROLOGY CLINIC NOTE     Established patient     PCP: Danuta Tan PA-C    October 14, 2022    CC:  CKD stage 3a    HPI: Porsche Manning is a 72 year old female with PMHx of COPD, CAD (s/p stent to RCA in 2003) / PAD / R ICA stenosis / HTN / HLD, smoker (currently not smoking for the last couple of weeks), and CKD.     Blood Pressure control: still checks at home, usually ~ 120s/60-70. Takes meds in am (metoprolol  mg every day, Diltiazem ER 300mg, Lisinopril 10 mg).     Diet / Fluid intake: She notes that since last visit he has decreased her pop consumption to 1 can of Diet Coke per day.  She is trying to keep hydrated with other liquids.    She continues to endorse shortness of breath.  She has a history of asthma/COPD, however shortness of breath seems to be slightly worse now.  She denies any urinary symptoms.  No recent infections or UTIs.    Renal Ultrasound / Other imaging:    No recent.     Allergies   Allergen Reactions     Morphine Nausea and Vomiting     Penicillins Nausea and Vomiting       albuterol (PROAIR HFA/PROVENTIL HFA/VENTOLIN HFA) 108 (90 Base) MCG/ACT inhaler, INHALE 2 PUFFS INTO THE LUNGS EVERY 6 HOURS  aspirin 81 MG EC tablet, Take 81 mg by mouth daily  BREO ELLIPTA 100-25 MCG/ACT inhaler, INHALE 1 PUFF BY MOUTH EVERY DAY  Calcium Carbonate-Vit D-Min (CALCIUM 1200 PO), Take 600 mg by mouth daily Once every other day  Cholecalciferol (VITAMIN D) 1000 UNIT capsule, Take 1 capsule by mouth daily Take one tablet daily  clopidogrel (PLAVIX) 75 MG tablet, Take 1 tablet (75 mg) by mouth daily  diltiazem ER (TIAZAC) 300 MG 24 hr ER beaded capsule, Take 1 capsule (300 mg) by mouth daily  empagliflozin (JARDIANCE) 10 MG TABS tablet, Take 1 tablet (10 mg) by mouth daily  lisinopril (ZESTRIL) 10 MG tablet, Take 1 tablet (10 mg) by mouth daily  metoprolol succinate ER (TOPROL XL) 100 MG 24 hr tablet, Take 1 tablet (100 mg) by mouth daily No refills without follow up appt in  cardiology.  rosuvastatin (CRESTOR) 40 MG tablet, TAKE 1 TABLET BY MOUTH EVERY DAY  tiotropium (SPIRIVA HANDIHALER) 18 MCG inhaled capsule, INHALE 1 CAPSULE (18 MCG) INTO THE LUNGS DAILY    No current facility-administered medications on file prior to visit.      Past Medical History:   Diagnosis Date     Brachial neuritis or radiculitis NOS      Chronic obstructive pulmonary disease, unspecified COPD type (H) 3/8/2016     Coronary artery disease     Doing fine     H/O tobacco use, presenting hazards to health      Hyperlipidemia LDL goal < 100      Hypertension goal BP (blood pressure) < 140/90      Malignant neoplasm of other specified sites of cervix      Old myocardial infarction      Osteopenia      Peripheral vascular disease, unspecified (H)      Type 2 diabetes, HbA1c goal < 7% (H)        Past Surgical History:   Procedure Laterality Date     ANGIOGRAM Left 10/19/2021    Procedure: Left iliac angioplasty and stenting, Right common iliac angioplasty;  Surgeon: Britni Bob MD;  Location: [facility] OR     ANGIOPLASTY N/A 10/19/2021    Procedure: possible intervention;  Surgeon: Britni Bob MD;  Location: [facility] OR     BIOPSY      Sample taken from back     CARDIAC SURGERY      Stents     COLONOSCOPY      Results were ok     HYSTERECTOMY, PAP NO LONGER INDICATED      ovaries only, no cervix per pt     IR OR ANGIOGRAM  10/19/2021     SURGICAL HISTORY OF -       bunionectomy     SURGICAL HISTORY OF -   10/10/03    cardiac stenting     SURGICAL HISTORY OF -       stent placed in both of her legs for pad     VASCULAR SURGERY      PAD       Social History     Tobacco Use     Smoking status: Former     Packs/day: 0.00     Years: 40.00     Pack years: 0.00     Types: Cigarettes     Quit date: 2018     Years since quittin.0     Smokeless tobacco: Never   Substance Use Topics     Alcohol use: Yes     Comment: 2 to 4 beers or mixed drinks weekly     Drug use: No       Family History   Problem Relation  Age of Onset     C.A.D. Mother      Breast Cancer Mother      C.A.D. Father      Diabetes Father      Hypertension Father      C.A.D. Maternal Grandfather      C.A.D. Paternal Grandfather      Neurologic Disorder Brother         epilepsy         ROS: A 12 system review of systems was negative other than noted here or above.       Exam:  BP (!) 151/65   Pulse 69   Temp 97.6  F (36.4  C)   Wt 77.2 kg (170 lb 4.8 oz)   SpO2 92%   BMI 31.35 kg/m      GENERAL APPEARANCE: alert and no distress  EYES: PERRL, no scleral icterus  HENT: mouth without ulcers or lesions  NECK: supple, no adenopathy  RESP: lungs clear to auscultation   CV: regular rhythm, normal rate, no rub  Extremities: no clubbing, cyanosis, or edema  SKIN: no rash  NEURO: mentation intact and speech normal  PSYCH: affect normal/bright      Results:    No visits with results within 1 Day(s) from this visit.   Latest known visit with results is:   Transferred Records on 06/27/2022   Component Date Value Ref Range Status     RETINOPATHY 06/27/2022 NEGATIVE   Final           Assessment/Plan:    # CKD 3a: Cr baseline ~1.0-1.1 follwing prior MICHELLE, renovascular disease, DM and hypertension. Possible hypertensive nephrosclerosis.  Creatinine appears to be slightly elevated today however she has had low fluid consumption prior to today's visit.  She has been on Jardiance 10 mg since last visit.  She has been tolerating it just fine.  Of note, she has a history of vascular disease however it is currently asymptomatic.  At this time the use of Jardiance provides more benefits than risk.  -Continue SGLT2 inhibitor (Jardiance 10 mg every day)  - Continue to follow up with nephrology in 6-12 month  -We have reemphasized the importance to hydrate adequately     # Hypertension:   Controlled.  Blood pressure today in clinic 151/65 however she has a known history of whitecoat hypertension.  She controls her blood pressure at home and all within normal range.    # Diabetes  type 2  Last hemoglobin A1c's have been less than 6.5.  We congratulated Ms. Manning on working on lifestyle changes and being compliant with her medications.     # [place] Bone Disorder:   Ca within normal limits, PTH 46.     # Systolic murmur   During today's visit on physical exam she has a soft systolic ejection murmur.  She noted that at some point somebody has told her about this but never has had an echocardiogram performed.  Due to her history of worsening shortness of breath I think it would be reasonable to reassess this murmur with an echocardiogram.  She will follow-up with her primary care physician on these results.    Follow up: 6 -12 months      I have discussed patient and plan with Dr. Lofton.      Tiffany Helms MD  Nephrology Fellow

## 2023-05-15 ENCOUNTER — VIRTUAL VISIT (OUTPATIENT)
Dept: VASCULAR SURGERY | Facility: CLINIC | Age: 73
End: 2023-05-15
Payer: COMMERCIAL

## 2023-05-15 DIAGNOSIS — I73.9 PAD (PERIPHERAL ARTERY DISEASE) (H): Primary | ICD-10-CM

## 2023-05-15 DIAGNOSIS — I65.29 ASYMPTOMATIC CAROTID ARTERY STENOSIS, UNSPECIFIED LATERALITY: ICD-10-CM

## 2023-05-15 PROCEDURE — 99214 OFFICE O/P EST MOD 30 MIN: CPT | Mod: VID | Performed by: NURSE PRACTITIONER

## 2023-05-15 NOTE — NURSING NOTE
Patient confirms medications and allergies are accurate via patients echeck in completion, and or denies any changes since last reviewed/verified.     Is the patient currently in the state of [place]? YES    Visit mode:VIDEO    If the visit is dropped, the patient can be reconnected by: VIDEO VISIT: Text to cell phone: [phone]    Will anyone else be joining the visit? NO      How would you like to obtain your AVS? MyChart    Are changes needed to the allergy or medication list? NO    Reason for visit: Follow Up            Ashley Diop, Jodie Facilitator

## 2023-05-15 NOTE — PROGRESS NOTES
"[facility]        Patient is here for a virtual visit to discuss 1 vickie follow up    Pt is currently taking Aspirin, Statin and Plavix.  Vitals - Patient Reported  Weight (Patient Reported): 77.1 kg (170 lb)  Height (Patient Reported): 157.5 cm (5' 2\")  BMI (Based on Pt Reported Ht/Wt): 31.09  Pain Score: No Pain (0)      The provider has been notified that the patient has no concerns.     Questions patient would like addressed today are: N/A.    Refills are needed: No    Has homecare services and agency name:  No     Virtual Visit Details    Type of service:  Video Visit   Video Start Time: 11:33 PM  Video End Time:11:43    Originating Location (pt. Location): Home    Distant Location (provider location):  On-site  Platform used for Video Visit: Alise Diop    "

## 2023-05-15 NOTE — LETTER
"5/15/2023       RE: Porsche Manning  [address]  [facility] [address]       Dear Colleague,    Thank you for referring your patient, Porsche Manning, to the [facility] at [facility]. Please see a copy of my visit note below.    [facility]        Patient is here for a virtual visit to discuss 1 vickie follow up    Pt is currently taking Aspirin, Statin and Plavix.  Vitals - Patient Reported  Weight (Patient Reported): 77.1 kg (170 lb)  Height (Patient Reported): 157.5 cm (5' 2\")  BMI (Based on Pt Reported Ht/Wt): 31.09  Pain Score: No Pain (0)      The provider has been notified that the patient has no concerns.     Questions patient would like addressed today are: N/A.    Refills are needed: No    Has homecare services and agency name:  No     Virtual Visit Details    Type of service:  Video Visit   Video Start Time: 11:33 PM  Video End Time:11:43    Originating Location (pt. Location): Home    Distant Location (provider location):  On-site  Platform used for Video Visit: Alise Diop          VASCULAR SURGERY PROGRESS NOTE    LOCATION: [facility]     Porsche Manning  Medical Record #:  [id number]  YOB: 1950  Age:  73 year old     Date of Service: 5/15/2023    PRIMARY CARE PROVIDER: Danuta Tan    Reason for visit: Annual follow up for PAD and carotid stenosis     IMPRESSION / RECOMMENDATION:   Porsche Manning is a 72 YO very pleasant female who underwent iliac artery angioplasty and stenting in 2021 with most recent ABIs of 0.88 on the right and 0.85 in the left.  She continues to do well,  able to do yard work without difficulties, denies claudication.  Denies any strokelike symptoms, the carotid ultrasound today was reviewed with her. She will continue on her antiplatelet and statin therapy and hopes to be able to stop smoking, now down to 5 " cigarettes per day.  We will follow-up with Leann next year.    All questions were answered and support provided. She has our contact information and knows to reach out with any additional questions or concerns.     Thank you for involving vascular surgery in the care of Porsche Manning. Should any questions or concerns arise, please don't hesitate to contact us.    Carolina Bedolla CNP  Vascular Surgery  Pager: [phone]  [email or web address]  Send message or 10 digit call back number Securely via [employer] with the Vocera Web Console (learn more here)    30 minutes spent on the date of the encounter doing chart review, review of outside records, review of test results, interpretation of tests, patient visit and documentation    HPI:  Porsche Manning is a 73 year old female with past medical history significant for PAD.  She presents today for annual follow-up after her left iliac artery angioplasty and stenting and right common iliac artery angioplasty in 2021 for disabling claudication.  She denies any pain with walking or activities in her extremities, able to do extensive yard work and some shoveling of snow.  She states that she feels great and is thankful for the intervention performed by Dr. Bob in 2021 in her left lower extremity.  She continues to smoke but has decreased the number of cigarettes and hopes to be able to quit at some point.       REVIEW OF SYSTEMS:    A 12 point ROS was reviewed and is negative except for what is listed above in HPI.    PHH:    Past Medical History:   Diagnosis Date    Brachial neuritis or radiculitis NOS     Chronic obstructive pulmonary disease, unspecified COPD type (H) 3/8/2016    Coronary artery disease     Doing fine    H/O tobacco use, presenting hazards to health     Hyperlipidemia LDL goal < 100     Hypertension goal BP (blood pressure) < 140/90     Malignant neoplasm of other specified sites of cervix     Old myocardial infarction     Osteopenia      Peripheral vascular disease, unspecified (H)     Type 2 diabetes, HbA1c goal < 7% (H)           Past Surgical History:   Procedure Laterality Date    ANGIOGRAM Left 10/19/2021    Procedure: Left iliac angioplasty and stenting, Right common iliac angioplasty;  Surgeon: Britni Bob MD;  Location: [facility] OR    ANGIOPLASTY N/A 10/19/2021    Procedure: possible intervention;  Surgeon: Britni Bob MD;  Location: [facility] OR    BIOPSY      Sample taken from back    CARDIAC SURGERY      Stents    COLONOSCOPY  1996    Results were ok    HYSTERECTOMY, PAP NO LONGER INDICATED  1989    ovaries only, no cervix per pt    IR OR ANGIOGRAM  10/19/2021    SURGICAL HISTORY OF -   1995    bunionectomy    SURGICAL HISTORY OF -   10/10/03    cardiac stenting    SURGICAL HISTORY OF -     stent placed in both of her legs for pad    VASCULAR SURGERY      PAD       ALLERGIES:  Morphine and Penicillins    MEDS:    Current Outpatient Medications:     albuterol (PROAIR HFA/PROVENTIL HFA/VENTOLIN HFA) 108 (90 Base) MCG/ACT inhaler, INHALE 2 PUFFS INTO THE LUNGS EVERY 6 HOURS, Disp: 8.5 g, Rfl: 0    aspirin 81 MG EC tablet, Take 81 mg by mouth daily, Disp: , Rfl:     BREO ELLIPTA 100-25 MCG/ACT inhaler, INHALE 1 PUFF BY MOUTH EVERY DAY, Disp: 90 each, Rfl: 0    Calcium Carbonate-Vit D-Min (CALCIUM 1200 PO), Take 600 mg by mouth daily Once every other day, Disp: , Rfl:     Cholecalciferol (VITAMIN D) 1000 UNIT capsule, Take 1 capsule by mouth daily Take one tablet daily, Disp: , Rfl:     clopidogrel (PLAVIX) 75 MG tablet, Take 1 tablet (75 mg) by mouth daily, Disp: 90 tablet, Rfl: 1    diltiazem ER (TIAZAC) 300 MG 24 hr ER beaded capsule, Take 1 capsule (300 mg) by mouth daily, Disp: 90 capsule, Rfl: 1    empagliflozin (JARDIANCE) 10 MG TABS tablet, Take 1 tablet (10 mg) by mouth daily, Disp: 90 tablet, Rfl: 3    lisinopril (ZESTRIL) 10 MG tablet, Take 1 tablet (10 mg) by mouth daily, Disp: 90 tablet, Rfl: 1    metoprolol succinate ER (TOPROL XL)  100 MG 24 hr tablet, Take 1 tablet (100 mg) by mouth daily No refills without follow up appt in cardiology., Disp: 90 tablet, Rfl: 3    rosuvastatin (CRESTOR) 40 MG tablet, TAKE 1 TABLET BY MOUTH EVERY DAY, Disp: 90 tablet, Rfl: 1    tiotropium (SPIRIVA HANDIHALER) 18 MCG inhaled capsule, INHALE 1 CAPSULE (18 MCG) INTO THE LUNGS DAILY, Disp: 90 capsule, Rfl: 3    SOCIAL HABITS:    History   Smoking Status    Some Days    Packs/day: 0.00    Years: 40.00    Types: Cigarettes    Last attempt to quit: 4/25/2018   Smokeless Tobacco    Never     Social History    Substance and Sexual Activity      Alcohol use: Yes        Comment: 2 to 4 beers or mixed drinks weekly      History   Drug Use No       FAMILY HISTORY:    Family History   Problem Relation Age of Onset    C.A.D. Mother     Breast Cancer Mother     C.A.D. Father     Diabetes Father     Hypertension Father     C.A.D. Maternal Grandfather     C.A.D. Paternal Grandfather     Neurologic Disorder Brother         epilepsy       PE:  There were no vitals taken for this visit.  Wt Readings from Last 1 Encounters:   05/12/23 77.2 kg (170 lb 4.8 oz)     There is no height or weight on file to calculate BMI.    EXAM:  GENERAL: well-developed 73 year old female who appears her stated age  CARDIAC: normal candidate  CHEST/LUNG: normal respiratory effort   MUSCULOSKELETAL: grossly normal and both lower extremities are intact, no lower extremity edema  NEUROLOGIC: focally intact, alert and oriented x 3  PSYCH: appropriate affect  VASCULAR: Denies edema, no discoloration, no wounds on BLE      DIAGNOSTIC STUDIES:   US Carotid Bilateral Result Date: 5/13/2023  Impression: 1. Right side: Degree of stenosis of the internal carotid artery: 50 to 69% diameter stenosis by gray scale imaging and sonographic velocity criteria, similar to 2015. Elevated velocity in the proximal common carotid artery of 394 cm/s raising question of significant stenosis.   2. Left side: Degree of stenosis  of the internal carotid artery: Less than 50 % diameter narrowing by gray scale imaging and sonographic velocity criteria, not significantly changed since ultrasound February 18, 2020.   3. 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 This is most often seen in the setting of subclavian artery stenosis, although the technologist did not identify stenosis on this examination. Reviewing CT chest: 5/6/2022, there are very dense atherosclerotic calcifications at the origin of proximal left subclavian artery.   4. Increasing velocities in the right innominate and right subclavian arteries suggestive of worsening stenoses. There is antegrade flow in the right vertebral artery.     US CLAUDINE Doppler No Exercise Result Date: 5/13/2023  Right leg: Resting CLAUDINE is 0.88, previously 0.94.  Left leg: Resting CLAUDINE is 0.85, previously 0.93.        Again, thank you for allowing me to participate in the care of your patient.      Sincerely,    AARON Lara CNP

## 2023-05-15 NOTE — PROGRESS NOTES
VASCULAR SURGERY PROGRESS NOTE    LOCATION: [facility]     Porsche Manning  Medical Record #:  [id number]  YOB: 1950  Age:  73 year old     Date of Service: 5/15/2023    PRIMARY CARE PROVIDER: Danuta Tan    Reason for visit: Annual follow up for PAD and carotid stenosis     IMPRESSION / RECOMMENDATION:   Porsche Manning is a 74 YO very pleasant female who underwent iliac artery angioplasty and stenting in 2021 with most recent ABIs of 0.88 on the right and 0.85 in the left.  She continues to do well,  able to do yard work without difficulties, denies claudication.  Denies any strokelike symptoms, the carotid ultrasound today was reviewed with her. She will continue on her antiplatelet and statin therapy and hopes to be able to stop smoking, now down to 5 cigarettes per day.  We will follow-up with Leann next year.    All questions were answered and support provided. She has our contact information and knows to reach out with any additional questions or concerns.     Thank you for involving vascular surgery in the care of Porsche Manning. Should any questions or concerns arise, please don't hesitate to contact us.    Carolina Bedolla, CNP  Vascular Surgery  Pager: [phone]  [email or web address]  Send message or 10 digit call back number Securely via [email or web address] with the [email or web address] Web Console (learn more here)    30 minutes spent on the date of the encounter doing chart review, review of outside records, review of test results, interpretation of tests, patient visit and documentation    HPI:  Porsche Manning is a 73 year old female with past medical history significant for PAD.  She presents today for annual follow-up after her left iliac artery angioplasty and stenting and right common iliac artery angioplasty in 2021 for disabling claudication.  She denies any pain with walking or activities in her extremities, able to do extensive yard work and some shoveling  of snow.  She states that she feels great and is thankful for the intervention performed by Dr. Bob in 2021 in her left lower extremity.  She continues to smoke but has decreased the number of cigarettes and hopes to be able to quit at some point.       REVIEW OF SYSTEMS:    A 12 point ROS was reviewed and is negative except for what is listed above in HPI.    PHH:    Past Medical History:   Diagnosis Date     Brachial neuritis or radiculitis NOS      Chronic obstructive pulmonary disease, unspecified COPD type (H) 3/8/2016     Coronary artery disease     Doing fine     H/O tobacco use, presenting hazards to health      Hyperlipidemia LDL goal < 100      Hypertension goal BP (blood pressure) < 140/90      Malignant neoplasm of other specified sites of cervix      Old myocardial infarction      Osteopenia      Peripheral vascular disease, unspecified (H)      Type 2 diabetes, HbA1c goal < 7% (H)           Past Surgical History:   Procedure Laterality Date     ANGIOGRAM Left 10/19/2021    Procedure: Left iliac angioplasty and stenting, Right common iliac angioplasty;  Surgeon: Britni Bob MD;  Location: [facility] OR     ANGIOPLASTY N/A 10/19/2021    Procedure: possible intervention;  Surgeon: Britni Bob MD;  Location: [facility] OR     BIOPSY      Sample taken from back     CARDIAC SURGERY      Stents     COLONOSCOPY  1996    Results were ok     HYSTERECTOMY, PAP NO LONGER INDICATED  1989    ovaries only, no cervix per pt     IR OR ANGIOGRAM  10/19/2021     SURGICAL HISTORY OF -   1995    bunionectomy     SURGICAL HISTORY OF -   10/10/03    cardiac stenting     SURGICAL HISTORY OF -       stent placed in both of her legs for pad     VASCULAR SURGERY      PAD       ALLERGIES:  Morphine and Penicillins    MEDS:    Current Outpatient Medications:      albuterol (PROAIR HFA/PROVENTIL HFA/VENTOLIN HFA) 108 (90 Base) MCG/ACT inhaler, INHALE 2 PUFFS INTO THE LUNGS EVERY 6 HOURS, Disp: 8.5 g, Rfl: 0     aspirin 81 MG EC tablet,  Take 81 mg by mouth daily, Disp: , Rfl:      BREO ELLIPTA 100-25 MCG/ACT inhaler, INHALE 1 PUFF BY MOUTH EVERY DAY, Disp: 90 each, Rfl: 0     Calcium Carbonate-Vit D-Min (CALCIUM 1200 PO), Take 600 mg by mouth daily Once every other day, Disp: , Rfl:      Cholecalciferol (VITAMIN D) 1000 UNIT capsule, Take 1 capsule by mouth daily Take one tablet daily, Disp: , Rfl:      clopidogrel (PLAVIX) 75 MG tablet, Take 1 tablet (75 mg) by mouth daily, Disp: 90 tablet, Rfl: 1     diltiazem ER (TIAZAC) 300 MG 24 hr ER beaded capsule, Take 1 capsule (300 mg) by mouth daily, Disp: 90 capsule, Rfl: 1     empagliflozin (JARDIANCE) 10 MG TABS tablet, Take 1 tablet (10 mg) by mouth daily, Disp: 90 tablet, Rfl: 3     lisinopril (ZESTRIL) 10 MG tablet, Take 1 tablet (10 mg) by mouth daily, Disp: 90 tablet, Rfl: 1     metoprolol succinate ER (TOPROL XL) 100 MG 24 hr tablet, Take 1 tablet (100 mg) by mouth daily No refills without follow up appt in cardiology., Disp: 90 tablet, Rfl: 3     rosuvastatin (CRESTOR) 40 MG tablet, TAKE 1 TABLET BY MOUTH EVERY DAY, Disp: 90 tablet, Rfl: 1     tiotropium (SPIRIVA HANDIHALER) 18 MCG inhaled capsule, INHALE 1 CAPSULE (18 MCG) INTO THE LUNGS DAILY, Disp: 90 capsule, Rfl: 3    SOCIAL HABITS:    History   Smoking Status     Some Days     Packs/day: 0.00     Years: 40.00     Types: Cigarettes     Last attempt to quit: 4/25/2018   Smokeless Tobacco     Never     Social History    Substance and Sexual Activity      Alcohol use: Yes        Comment: 2 to 4 beers or mixed drinks weekly      History   Drug Use No       FAMILY HISTORY:    Family History   Problem Relation Age of Onset     C.A.D. Mother      Breast Cancer Mother      C.A.D. Father      Diabetes Father      Hypertension Father      C.A.D. Maternal Grandfather      C.A.D. Paternal Grandfather      Neurologic Disorder Brother         epilepsy       PE:  There were no vitals taken for this visit.  Wt Readings from Last 1 Encounters:   05/12/23  77.2 kg (170 lb 4.8 oz)     There is no height or weight on file to calculate BMI.    EXAM:  GENERAL: well-developed 73 year old female who appears her stated age  CARDIAC: normal candidate  CHEST/LUNG: normal respiratory effort   MUSCULOSKELETAL: grossly normal and both lower extremities are intact, no lower extremity edema  NEUROLOGIC: focally intact, alert and oriented x 3  PSYCH: appropriate affect  VASCULAR: Denies edema, no discoloration, no wounds on BLE      DIAGNOSTIC STUDIES:   US Carotid Bilateral Result Date: 5/13/2023  Impression: 1. Right side: Degree of stenosis of the internal carotid artery: 50 to 69% diameter stenosis by gray scale imaging and sonographic velocity criteria, similar to 2015. Elevated velocity in the proximal common carotid artery of 394 cm/s raising question of significant stenosis.   2. Left side: Degree of stenosis of the internal carotid artery: Less than 50 % diameter narrowing by gray scale imaging and sonographic velocity criteria, not significantly changed since ultrasound February 18, 2020.   3. 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 This is most often seen in the setting of subclavian artery stenosis, although the technologist did not identify stenosis on this examination. Reviewing CT chest: 5/6/2022, there are very dense atherosclerotic calcifications at the origin of proximal left subclavian artery.   4. Increasing velocities in the right innominate and right subclavian arteries suggestive of worsening stenoses. There is antegrade flow in the right vertebral artery.     US CLAUDINE Doppler No Exercise Result Date: 5/13/2023  Right leg: Resting CLAUDINE is 0.88, previously 0.94.  Left leg: Resting CLAUDINE is 0.85, previously 0.93.

## 2023-05-23 ENCOUNTER — MYC MEDICAL ADVICE (OUTPATIENT)
Dept: FAMILY MEDICINE | Facility: CLINIC | Age: 73
End: 2023-05-23
Payer: COMMERCIAL

## 2023-05-23 DIAGNOSIS — Z87.891 PERSONAL HISTORY OF TOBACCO USE: Primary | ICD-10-CM

## 2023-05-23 DIAGNOSIS — I73.9 PERIPHERAL VASCULAR DISEASE, UNSPECIFIED (H): ICD-10-CM

## 2023-05-23 DIAGNOSIS — R91.8 ABNORMAL CT SCAN OF LUNG: ICD-10-CM

## 2023-05-23 DIAGNOSIS — I25.10 CORONARY ARTERY DISEASE DUE TO LIPID RICH PLAQUE: ICD-10-CM

## 2023-05-23 DIAGNOSIS — I10 HYPERTENSION GOAL BP (BLOOD PRESSURE) < 140/90: ICD-10-CM

## 2023-05-23 DIAGNOSIS — I25.83 CORONARY ARTERY DISEASE DUE TO LIPID RICH PLAQUE: ICD-10-CM

## 2023-05-24 DIAGNOSIS — I73.9 PERIPHERAL VASCULAR DISEASE, UNSPECIFIED (H): ICD-10-CM

## 2023-05-24 RX ORDER — METOPROLOL SUCCINATE 100 MG/1
100 TABLET, EXTENDED RELEASE ORAL DAILY
Qty: 30 TABLET | Refills: 0 | Status: SHIPPED | OUTPATIENT
Start: 2023-05-24 | End: 2023-06-02

## 2023-05-24 NOTE — TELEPHONE ENCOUNTER
Medication is being filled for 1 time refill only due to:  Patient needs to be seen because due for physical .     Kenyatta Chicas RN

## 2023-05-24 NOTE — TELEPHONE ENCOUNTER
MP,      Please see [employer] message.  Moved up patient's annual wellness appointment to 6/2    Lung cancer screening order T'd up    Thank you,  Kenyatta Chicas RN

## 2023-05-26 RX ORDER — CLOPIDOGREL BISULFATE 75 MG/1
75 TABLET ORAL DAILY
Qty: 90 TABLET | Refills: 1 | OUTPATIENT
Start: 2023-05-26

## 2023-05-26 RX ORDER — CLOPIDOGREL BISULFATE 75 MG/1
75 TABLET ORAL DAILY
Qty: 30 TABLET | Refills: 0 | Status: SHIPPED | OUTPATIENT
Start: 2023-05-26 | End: 2023-05-30

## 2023-05-26 ASSESSMENT — ENCOUNTER SYMPTOMS
SHORTNESS OF BREATH: 0
MYALGIAS: 0
NERVOUS/ANXIOUS: 0
BREAST MASS: 0
COUGH: 0
HEARTBURN: 0
DIZZINESS: 0
EYE PAIN: 0
CONSTIPATION: 0
FREQUENCY: 0
DYSURIA: 0
DIARRHEA: 0
HEMATOCHEZIA: 0
ARTHRALGIAS: 0
JOINT SWELLING: 0
CHILLS: 0
FEVER: 0
PARESTHESIAS: 0
NAUSEA: 0
PALPITATIONS: 0
WEAKNESS: 0
HEMATURIA: 0
HEADACHES: 0
SORE THROAT: 0
ABDOMINAL PAIN: 0

## 2023-05-26 ASSESSMENT — ACTIVITIES OF DAILY LIVING (ADL): CURRENT_FUNCTION: NO ASSISTANCE NEEDED

## 2023-05-26 NOTE — TELEPHONE ENCOUNTER
clopidogrel (PLAVIX) 75 MG tablet    Last Written Prescription Date:  12/6/22  Last Fill Quantity: 90,   # refills: 1  Last Office Visit : 4/5/23  Future Office visit:  none    Routing refill request to provider for review/approval because:  Abnormal HbB  Hemoglobin   Date Value Ref Range Status   05/12/2023 15.9 (H) 11.7 - 15.7 g/dL Final   01/28/2021 14.8 11.7 - 15.7 g/dL Final

## 2023-05-26 NOTE — TELEPHONE ENCOUNTER
Prescription approved per [facility] Refill Protocol.  Has upcoming visit.  Anne CHEEK RN

## 2023-05-27 ENCOUNTER — HEALTH MAINTENANCE LETTER (OUTPATIENT)
Age: 73
End: 2023-05-27

## 2023-05-30 DIAGNOSIS — I73.9 PERIPHERAL VASCULAR DISEASE, UNSPECIFIED (H): ICD-10-CM

## 2023-05-30 RX ORDER — CLOPIDOGREL BISULFATE 75 MG/1
75 TABLET ORAL DAILY
Qty: 90 TABLET | Refills: 3 | Status: SHIPPED | OUTPATIENT
Start: 2023-05-30 | End: 2024-05-15

## 2023-06-02 ENCOUNTER — OFFICE VISIT (OUTPATIENT)
Dept: FAMILY MEDICINE | Facility: CLINIC | Age: 73
End: 2023-06-02
Payer: COMMERCIAL

## 2023-06-02 VITALS
WEIGHT: 162.4 LBS | BODY MASS INDEX: 29.88 KG/M2 | HEART RATE: 74 BPM | HEIGHT: 62 IN | DIASTOLIC BLOOD PRESSURE: 62 MMHG | OXYGEN SATURATION: 91 % | RESPIRATION RATE: 20 BRPM | SYSTOLIC BLOOD PRESSURE: 148 MMHG | TEMPERATURE: 97.9 F

## 2023-06-02 DIAGNOSIS — I25.10 CORONARY ARTERY DISEASE DUE TO LIPID RICH PLAQUE: ICD-10-CM

## 2023-06-02 DIAGNOSIS — N18.30 TYPE 2 DIABETES MELLITUS WITH STAGE 3 CHRONIC KIDNEY DISEASE, WITHOUT LONG-TERM CURRENT USE OF INSULIN, UNSPECIFIED WHETHER STAGE 3A OR 3B CKD (H): ICD-10-CM

## 2023-06-02 DIAGNOSIS — I10 HYPERTENSION GOAL BP (BLOOD PRESSURE) < 140/90: ICD-10-CM

## 2023-06-02 DIAGNOSIS — E11.22 TYPE 2 DIABETES MELLITUS WITH STAGE 3 CHRONIC KIDNEY DISEASE, WITHOUT LONG-TERM CURRENT USE OF INSULIN, UNSPECIFIED WHETHER STAGE 3A OR 3B CKD (H): ICD-10-CM

## 2023-06-02 DIAGNOSIS — I25.83 CORONARY ARTERY DISEASE DUE TO LIPID RICH PLAQUE: ICD-10-CM

## 2023-06-02 DIAGNOSIS — N18.31 STAGE 3A CHRONIC KIDNEY DISEASE (H): ICD-10-CM

## 2023-06-02 DIAGNOSIS — Z00.00 ENCOUNTER FOR MEDICARE ANNUAL WELLNESS EXAM: Primary | ICD-10-CM

## 2023-06-02 DIAGNOSIS — J44.1 COPD EXACERBATION (H): ICD-10-CM

## 2023-06-02 DIAGNOSIS — M85.89 OSTEOPENIA OF MULTIPLE SITES: ICD-10-CM

## 2023-06-02 PROBLEM — E66.01 MORBID OBESITY (H): Status: RESOLVED | Noted: 2018-10-25 | Resolved: 2023-06-02

## 2023-06-02 PROCEDURE — G0439 PPPS, SUBSEQ VISIT: HCPCS | Performed by: PHYSICIAN ASSISTANT

## 2023-06-02 RX ORDER — METOPROLOL SUCCINATE 100 MG/1
100 TABLET, EXTENDED RELEASE ORAL DAILY
Qty: 90 TABLET | Refills: 1 | Status: SHIPPED | OUTPATIENT
Start: 2023-06-02 | End: 2023-12-05

## 2023-06-02 RX ORDER — FLUTICASONE FUROATE AND VILANTEROL 100; 25 UG/1; UG/1
1 POWDER RESPIRATORY (INHALATION) DAILY
Qty: 90 EACH | Refills: 3 | Status: SHIPPED | OUTPATIENT
Start: 2023-06-02 | End: 2024-06-14

## 2023-06-02 ASSESSMENT — ENCOUNTER SYMPTOMS
FEVER: 0
HEMATURIA: 0
DIARRHEA: 0
COUGH: 0
HEMATOCHEZIA: 0
NAUSEA: 0
NERVOUS/ANXIOUS: 0
HEADACHES: 0
CHILLS: 0
ARTHRALGIAS: 0
FREQUENCY: 0
CONSTIPATION: 0
DIZZINESS: 0
SORE THROAT: 0
DYSURIA: 0
HEARTBURN: 0
BREAST MASS: 0
PARESTHESIAS: 0
WEAKNESS: 0
SHORTNESS OF BREATH: 0
MYALGIAS: 0
PALPITATIONS: 0
ABDOMINAL PAIN: 0
JOINT SWELLING: 0
EYE PAIN: 0

## 2023-06-02 ASSESSMENT — ACTIVITIES OF DAILY LIVING (ADL): CURRENT_FUNCTION: NO ASSISTANCE NEEDED

## 2023-06-02 ASSESSMENT — PAIN SCALES - GENERAL: PAINLEVEL: NO PAIN (0)

## 2023-06-02 NOTE — PROGRESS NOTES
"SUBJECTIVE:   Leann is a 73 year old who presents for Preventive Visit.  Are ypu in the first 12 months of your Medicare coverage?  No    Healthy Habits:    In general, how would you rate your overall health?  Good    Frequency of exercise:  2-3 days/week    Duration of exercise:  15-30 minutes    Do you usually eat at least 4 servings of fruit and vegetables a day, include whole grains    & fiber and avoid regularly eating high fat or \"junk\" foods?  No    Taking medications regularly:  Yes    Medication side effects:  None    Ability to successfully perform activities of daily living:  No assistance needed    Home Safety:  No safety concerns identified    Hearing Impairment:  No hearing concerns    In the past 6 months, have you been bothered by leaking of urine?  No    In general, how would you rate your overall mental or emotional health?  Good      PHQ-2 Total Score:    Additional concerns today:  No    Wellness Visit Notes:    -Last mammo done 12/2022, due 12/2023 (impression: negative)  -Last DEXA done 10/2017, due 10/2020 (impression: Osteopenia (low bone mass) with interval worsening suggested), patient agreeable  -Last colon cancer screening done 3/2018, due 3/2028 (impression: Diverticulosis large intestine w/o perforation or abscess w/o bleeding, hemorrhoids w/o complication)  -Immunizations: Covid Bivalent Booster, just received 5/23/2023    -ACP: On file from 4/2000. HCAs are Ayanna, Reviewed With Patient.  -Eye exam scheduled for August, 2023      Have you ever done Advance Care Planning? (For example, a Health Directive, POLST, or a discussion with a medical provider or your loved ones about your wishes): Yes, advance care planning is on file.       Fall risk  Fallen 2 or more times in the past year?: No  Any fall with injury in the past year?: No    Cognitive Screening   1) Repeat 3 items (Leader, Season, Table)    2) Clock draw: NORMAL  3) 3 item recall: Recalls 3 objects  Results: 3 items " recalled: COGNITIVE IMPAIRMENT LESS LIKELY    Mini-CogTM Copyright ELINOR Lima. Licensed by the author for use in [facility]; reprinted with permission (jaime@[email or web address]). All rights reserved.      Do you have sleep apnea, excessive snoring or daytime drowsiness?: no    Reviewed and updated as needed this visit by clinical staff   Tobacco  Allergies  Meds  Problems  Med Hx  Surg Hx  Fam Hx          Reviewed and updated as needed this visit by Provider   Tobacco  Allergies  Meds  Problems  Med Hx  Surg Hx  Fam Hx         Social History     Tobacco Use     Smoking status: Every Day     Packs/day: 0.25     Years: 40.00     Pack years: 10.00     Types: Cigarettes     Smokeless tobacco: Never     Tobacco comments:     5b or less cigareetes day   Vaping Use     Vaping status: Never Used   Substance Use Topics     Alcohol use: Yes     Comment: 2 to 4 beers or mixed drinks weekly           5/26/2023    10:40 AM   Alcohol Use   Prescreen: >3 drinks/day or >7 drinks/week? No          View : No data to display.              Do you have a current opioid prescription? No  Do you use any other controlled substances or medications that are not prescribed by a provider? None        Current providers sharing in care for this patient include:   Patient Care Team:  Danuta Tan PA-C as PCP - General (Physician Assistant)  Danuta Tan PA-C as Assigned PCP  Nelli Kuhn PA-C as Physician Assistant (Interventional Cardiology)  Patience Wellington PA-C as Physician Assistant (Nephrology)  Kathi Omalley RPH as Pharmacist (Pharmacist)  Nalini Ring RPH as Pharmacist (Pharmacist Ambulatory Care)  Nalini Ring RPH as Assigned MTM Pharmacist  Divya Smith MD as Assigned Nephrology Provider  Arleth Renee MD as Assigned Heart and Vascular Provider    The following health maintenance items are reviewed in Epic and correct as of today:  Health Maintenance   Topic Date Due      ANNUAL REVIEW OF HM ORDERS  Never done     DEXA  10/24/2020     MICROALBUMIN  04/22/2022     COVID-19 Vaccine (5 - Moderna series) 01/23/2023     LUNG CANCER SCREENING  05/06/2023     EYE EXAM  06/27/2023     A1C  11/12/2023     DIABETIC FOOT EXAM  11/17/2023     MAMMO SCREENING  12/14/2023     BMP  05/12/2024     LIPID  05/12/2024     HEMOGLOBIN  05/12/2024     NICOTINE/TOBACCO CESSATION COUNSELING Q 1 YR  06/02/2024     MEDICARE ANNUAL WELLNESS VISIT  06/02/2024     FALL RISK ASSESSMENT  06/02/2024     DTAP/TDAP/TD IMMUNIZATION (3 - Td or Tdap) 10/24/2027     COLORECTAL CANCER SCREENING  03/27/2028     ADVANCE CARE PLANNING  06/02/2028     SPIROMETRY  Completed     HEPATITIS C SCREENING  Completed     COPD ACTION PLAN  Completed     PHQ-2 (once per calendar year)  Completed     INFLUENZA VACCINE  Completed     Pneumococcal Vaccine: 65+ Years  Completed     URINALYSIS  Completed     ZOSTER IMMUNIZATION  Completed     IPV IMMUNIZATION  Aged Out     MENINGITIS IMMUNIZATION  Aged Out     Labs reviewed in EPIC  BP Readings from Last 3 Encounters:   06/02/23 (!) 148/62   05/12/23 (!) 151/65   04/05/23 (!) 150/58    Wt Readings from Last 3 Encounters:   06/02/23 73.7 kg (162 lb 6.4 oz)   05/12/23 77.2 kg (170 lb 4.8 oz)   04/05/23 77.9 kg (171 lb 12.8 oz)                  Patient Active Problem List   Diagnosis     Old myocardial infarction     Peripheral vascular disease (H)     HYPERLIPIDEMIA LDL GOAL <100     Hypertension: MEASURE BP IN RIGHT ARM ONLY     Osteopenia     Vitamin D deficiency disease     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)     Advanced directives, counseling/discussion     Subclavian artery stenosis, left (H)     CKD (chronic kidney disease) stage 3, GFR 30-59 ml/min (H)     Chronic obstructive pulmonary disease, unspecified COPD type (H)     Past Surgical History:   Procedure Laterality Date     ANGIOGRAM Left 10/19/2021    Procedure: Left iliac angioplasty and stenting, Right common  iliac angioplasty;  Surgeon: Britni Bob MD;  Location: [facility] OR     ANGIOPLASTY N/A 10/19/2021    Procedure: possible intervention;  Surgeon: Britni Bob MD;  Location: [facility] OR     BIOPSY      Sample taken from back     CARDIAC SURGERY      Stents     COLONOSCOPY  1996    Results were ok     HYSTERECTOMY, PAP NO LONGER INDICATED  1989    ovaries only, no cervix per pt     IR OR ANGIOGRAM  10/19/2021     SURGICAL HISTORY OF -   1995    bunionectomy     SURGICAL HISTORY OF -   10/10/03    cardiac stenting     SURGICAL HISTORY OF -       stent placed in both of her legs for pad     VASCULAR SURGERY      PAD       Social History     Tobacco Use     Smoking status: Every Day     Packs/day: 0.25     Years: 40.00     Pack years: 10.00     Types: Cigarettes     Smokeless tobacco: Never     Tobacco comments:     5b or less cigareetes day   Vaping Use     Vaping status: Never Used   Substance Use Topics     Alcohol use: Yes     Comment: 2 to 4 beers or mixed drinks weekly     Family History   Problem Relation Age of Onset     C.A.D. Mother      Breast Cancer Mother      C.A.D. Father      Diabetes Father      Hypertension Father      C.A.D. Maternal Grandfather      C.A.D. Paternal Grandfather      Neurologic Disorder Brother         epilepsy         Current Outpatient Medications   Medication Sig Dispense Refill     albuterol (PROAIR HFA/PROVENTIL HFA/VENTOLIN HFA) 108 (90 Base) MCG/ACT inhaler INHALE 2 PUFFS INTO THE LUNGS EVERY 6 HOURS 8.5 g 0     aspirin 81 MG EC tablet Take 81 mg by mouth daily       Calcium Carbonate-Vit D-Min (CALCIUM 1200 PO) Take 600 mg by mouth daily Once every other day       Cholecalciferol (VITAMIN D) 1000 UNIT capsule Take 1 capsule by mouth daily Take one tablet daily       clopidogrel (PLAVIX) 75 MG tablet Take 1 tablet (75 mg) by mouth daily 90 tablet 3     diltiazem ER (TIAZAC) 300 MG 24 hr ER beaded capsule Take 1 capsule (300 mg) by mouth daily 90 capsule 1     empagliflozin  (JARDIANCE) 10 MG TABS tablet Take 1 tablet (10 mg) by mouth daily 90 tablet 3     fluticasone-vilanterol (BREO ELLIPTA) 100-25 MCG/ACT inhaler Inhale 1 puff into the lungs daily 90 each 3     lisinopril (ZESTRIL) 10 MG tablet Take 1 tablet (10 mg) by mouth daily 90 tablet 1     metoprolol succinate ER (TOPROL XL) 100 MG 24 hr tablet Take 1 tablet (100 mg) by mouth daily 90 tablet 1     rosuvastatin (CRESTOR) 40 MG tablet TAKE 1 TABLET BY MOUTH EVERY DAY 90 tablet 1     tiotropium (SPIRIVA HANDIHALER) 18 MCG inhaled capsule INHALE 1 CAPSULE (18 MCG) INTO THE LUNGS DAILY 90 capsule 3     Allergies   Allergen Reactions     Morphine Nausea and Vomiting     Penicillins Nausea and Vomiting     Recent Labs   Lab Test 05/12/23  0937 11/17/22  1138 10/14/22  1004 04/18/22  1058 10/19/21  0705 10/15/21  1435 07/27/21  1019 07/20/21  1202 04/22/21  1044 01/28/21  1004 11/12/19  0920 10/22/19  1012   A1C 6.6* 6.1*  --  6.4*  --  6.0*   < >  --  6.7* 6.7*  --  6.5*   LDL 66  --   --  75  --   --   --  63  --  73  --  79   HDL 58  --   --  63  --   --   --  57  --  62  --  53   TRIG 137  --   --  117  --   --   --  172*  --  166*  --  135   ALT  --   --   --   --   --  27  --   --  24 30   < > 18   CR 1.26*  --  1.09* 1.10*   < > 0.90   < >  --  0.97 1.07*   < > 1.10*   GFRESTIMATED 45*  --  54* 53*   < > 65   < >  --  59* 53*   < > 51*   GFRESTBLACK  --   --   --   --   --   --   --   --  69 61   < > 60*   POTASSIUM 3.9  --  4.2 4.2   < > 3.7   < >  --  4.4 3.7   < > 4.3   TSH  --   --   --   --   --   --   --   --  1.14  --   --  1.92    < > = values in this interval not displayed.        Review of Systems   Constitutional: Negative for chills and fever.   HENT: Negative for congestion, ear pain, hearing loss and sore throat.    Eyes: Negative for pain and visual disturbance.   Respiratory: Negative for cough and shortness of breath.    Cardiovascular: Negative for chest pain, palpitations and peripheral edema.  "  Gastrointestinal: Negative for abdominal pain, constipation, diarrhea, heartburn, hematochezia and nausea.   Breasts:  Negative for tenderness, breast mass and discharge.   Genitourinary: Negative for dysuria, frequency, genital sores, hematuria, pelvic pain, urgency, vaginal bleeding and vaginal discharge.   Musculoskeletal: Negative for arthralgias, joint swelling and myalgias.   Skin: Negative for rash.   Neurological: Negative for dizziness, weakness, headaches and paresthesias.   Psychiatric/Behavioral: Negative for mood changes. The patient is not nervous/anxious.        OBJECTIVE:   BP (!) 148/62 (BP Location: Left arm, Patient Position: Sitting, Cuff Size: Adult Regular)   Pulse 74   Temp 97.9  F (36.6  C) (Temporal)   Resp 20   Ht 1.563 m (5' 1.52\")   Wt 73.7 kg (162 lb 6.4 oz)   SpO2 91%   BMI 30.17 kg/m   Estimated body mass index is 30.17 kg/m  as calculated from the following:    Height as of this encounter: 1.563 m (5' 1.52\").    Weight as of this encounter: 73.7 kg (162 lb 6.4 oz).  Physical Exam  GENERAL: healthy, alert and no distress  EYES: Eyes grossly normal to inspection, PERRL and conjunctivae and sclerae normal  HENT: ear canals and TM's normal, nose and mouth without ulcers or lesions  NECK: no adenopathy, no asymmetry, masses, or scars and thyroid normal to palpation  RESP: lungs clear to auscultation - no rales, rhonchi or wheezes  CV: regular rate and rhythm, normal S1 S2, no S3 or S4, no murmur, click or rub, no peripheral edema and peripheral pulses strong  MS: no gross musculoskeletal defects noted, no edema  SKIN: no suspicious lesions or rashes  NEURO: Normal strength and tone, mentation intact and speech normal  PSYCH: mentation appears normal, affect normal/bright    Diagnostic Test Results:  Labs reviewed in Epic    ASSESSMENT / PLAN:       ICD-10-CM    1. Encounter for Medicare annual wellness exam  Z00.00       2. Hypertension: MEASURE BP IN RIGHT ARM ONLY  I10 " "metoprolol succinate ER (TOPROL XL) 100 MG 24 hr tablet      3. Type 2 diabetes mellitus with stage 3 chronic kidney disease, without long-term current use of insulin, unspecified whether stage 3a or 3b CKD (H)  E11.22     N18.30       4. Stage 3a chronic kidney disease (H)  N18.31       5. Osteopenia of multiple sites  M85.89       6. Coronary artery disease due to lipid rich plaque  I25.10 metoprolol succinate ER (TOPROL XL) 100 MG 24 hr tablet    I25.83       7. COPD exacerbation (H)  J44.1 fluticasone-vilanterol (BREO ELLIPTA) 100-25 MCG/ACT inhaler          Patient has been advised of split billing requirements and indicates understanding: Yes      COUNSELING:  Reviewed preventive health counseling, as reflected in patient instructions       Regular exercise       Healthy diet/nutrition       Vision screening       Hearing screening      BMI:   Estimated body mass index is 30.17 kg/m  as calculated from the following:    Height as of this encounter: 1.563 m (5' 1.52\").    Weight as of this encounter: 73.7 kg (162 lb 6.4 oz).   Weight management plan: Discussed healthy diet and exercise guidelines      She reports that she has been smoking cigarettes. She has a 10.00 pack-year smoking history. She has never used smokeless tobacco.  Nicotine/Tobacco Cessation Plan:   Information offered: Patient not interested at this time      Appropriate preventive services were discussed with this patient, including applicable screening as appropriate for cardiovascular disease, diabetes, osteopenia/osteoporosis, and glaucoma.  As appropriate for age/gender, discussed screening for colorectal cancer, prostate cancer, breast cancer, and cervical cancer. Checklist reviewing preventive services available has been given to the patient.    Reviewed patients plan of care and provided an AVS. The Basic Care Plan (routine screening as documented in Health Maintenance) for [place] meets the Care Plan requirement. This Care Plan has " been established and reviewed with the Patient.          Danuta Tan PA-C  [facility]    Identified Health Risks:    I have reviewed Opioid Use Disorder and Substance Use Disorder risk factors and made any needed referrals.       The patient was counseled and encouraged to consider modifying their diet and eating habits. She was provided with information on recommended healthy diet options.

## 2023-06-02 NOTE — PATIENT INSTRUCTIONS
Patient Education   Personalized Prevention Plan  You are due for the preventive services outlined below.  Your care team is available to assist you in scheduling these services.  If you have already completed any of these items, please share that information with your care team to update in your medical record.  Health Maintenance Due   Topic Date Due     ANNUAL REVIEW OF HM ORDERS  Never done     Osteoporosis Screening  10/24/2020     Kidney Microalbumin Urine Test  04/22/2022     COVID-19 Vaccine (5 - Moderna series) 01/23/2023     LUNG CANCER SCREENING  05/06/2023     Eye Exam  06/27/2023       Understanding USDA MyPlate  The USDA has guidelines to help you make healthy food choices. These are called MyPlate. MyPlate shows the food groups that make up healthy meals using the image of a place setting. Before you eat, think about the healthiest choices for what to put on your plate or in your cup or bowl. To learn more about building a healthy plate, visit www.choosemyplate.gov.     The food groups    Fruits. Any fruit or 100% fruit juice counts as part of the Fruit Group. Fruits may be fresh, canned, frozen, or dried, and may be whole, cut-up, or pureed. Make 1/2 of your plate fruits and vegetables.    Vegetables. Any vegetable or 100% vegetable juice counts as a member of the Vegetable Group. Vegetables may be fresh, frozen, canned, or dried. They can be served raw or cooked and may be whole, cut-up, or mashed. Make 1/2 of your plate fruits and vegetables.    Grains. All foods made from grains are part of the Grains Group. These include wheat, rice, oats, cornmeal, and barley. Grains are often used to make foods such as bread, pasta, oatmeal, cereal, tortillas, and grits. Grains should be no more than 1/4 of your plate. At least half of your grains should be whole grains.    Protein. This group includes meat, poultry, seafood, beans and peas, eggs, processed soy products (such as tofu), nuts (including nut  butters), and seeds. Make protein choices no more than 1/4 of your plate. Meat and poultry choices should be lean or low fat.    Dairy. The Dairy Group includes all fluid milk products and foods made from milk that contain calcium, such as yogurt and cheese. (Foods that have little calcium, such as cream, butter, and cream cheese, are not part of this group.) Most dairy choices should be low-fat or fat-free.    Oils. Oils aren't a food group, but they do contain essential nutrients. However it's important to watch your intake of oils. These are fats that are liquid at room temperature. They include canola, corn, olive, soybean, vegetable, and sunflower oil. Foods that are mainly oil include mayonnaise, certain salad dressings, and soft margarines. You likely already get your daily oil allowance from the foods you eat.  Things to limit  Eating healthy also means limiting these things in your diet:    Salt (sodium). Many processed foods have a lot of sodium. To keep sodium intake down, eat fresh vegetables, meats, poultry, and seafood when possible. Purchase low-sodium, reduced-sodium, or no-salt-added food products at the store. And don't add salt to your meals at home. Instead, season them with herbs and spices such as dill, oregano, cumin, and paprika. Or try adding flavor with lemon or lime zest and juice.    Saturated fat. Saturated fats are most often found in animal products such as beef, pork, and chicken. They are often solid at room temperature, such as butter. To reduce your saturated fat intake, choose leaner cuts of meat and poultry. And try healthier cooking methods such as grilling, broiling, roasting, or baking. For a simple lower-fat swap, use plain nonfat yogurt instead of mayonnaise when making potato salad or macaroni salad.    Added sugars. These are sugars added to foods. They are in foods such as ice cream, candy, soda, fruit drinks, sports drinks, energy drinks, cookies, pastries, jams, and  syrups. Cut down on added sugars by sharing sweet treats with a family member or friend. You can also choose fruit for dessert, and drink water or other unsweetened beverages.  [employer] last reviewed this educational content on 6/1/2020 2000-2022 [employer]. All rights reserved. This information is not intended as a substitute for professional medical care. Always follow your healthcare professional's instructions.

## 2023-06-08 ENCOUNTER — MYC MEDICAL ADVICE (OUTPATIENT)
Dept: CARDIOLOGY | Facility: CLINIC | Age: 73
End: 2023-06-08
Payer: COMMERCIAL

## 2023-06-14 ENCOUNTER — ANCILLARY PROCEDURE (OUTPATIENT)
Dept: CARDIOLOGY | Facility: CLINIC | Age: 73
End: 2023-06-14
Payer: COMMERCIAL

## 2023-06-14 ENCOUNTER — ANCILLARY PROCEDURE (OUTPATIENT)
Dept: CT IMAGING | Facility: CLINIC | Age: 73
End: 2023-06-14
Attending: PHYSICIAN ASSISTANT
Payer: COMMERCIAL

## 2023-06-14 DIAGNOSIS — R01.1 MURMUR, CARDIAC: ICD-10-CM

## 2023-06-14 DIAGNOSIS — J44.9 CHRONIC OBSTRUCTIVE PULMONARY DISEASE, UNSPECIFIED COPD TYPE (H): ICD-10-CM

## 2023-06-14 DIAGNOSIS — I10 HYPERTENSION GOAL BP (BLOOD PRESSURE) < 140/90: ICD-10-CM

## 2023-06-14 DIAGNOSIS — N18.31 STAGE 3A CHRONIC KIDNEY DISEASE (H): ICD-10-CM

## 2023-06-14 DIAGNOSIS — E11.21 TYPE 2 DIABETES MELLITUS WITH DIABETIC NEPHROPATHY, WITHOUT LONG-TERM CURRENT USE OF INSULIN (H): ICD-10-CM

## 2023-06-14 DIAGNOSIS — Z87.891 PERSONAL HISTORY OF TOBACCO USE: ICD-10-CM

## 2023-06-14 LAB — LVEF ECHO: NORMAL

## 2023-06-14 PROCEDURE — 71271 CT THORAX LUNG CANCER SCR C-: CPT | Mod: GC | Performed by: RADIOLOGY

## 2023-06-14 PROCEDURE — 93306 TTE W/DOPPLER COMPLETE: CPT | Performed by: INTERNAL MEDICINE

## 2023-06-15 ENCOUNTER — PATIENT OUTREACH (OUTPATIENT)
Dept: ONCOLOGY | Facility: CLINIC | Age: 73
End: 2023-06-15
Payer: COMMERCIAL

## 2023-06-15 NOTE — PROGRESS NOTES
New Patient: Interventional Pulmonary (Lung nodule) Nurse Navigator Note    Referring provider:   Danuta Tan PA-C [facility] [phone]     Referred to (specialty): Interventional Pulmonary (Lung nodule)    Requested provider (if applicable): n/a    Date Referral Received: 6/15/2023    Evaluation for :  Lung nodule--[id number]       Clinical History (per Nurse review of records provided):    **BOOK MARKED**  6/14/23:  CT Chest Lung Cancer Scrn Low Dose wo   The largest and/or fastest 2 of these nodule(s) are as follows:     -6 mm part solid part groundglass nodule in the right upper lobe with approximate 3 mm solid component:  4 image:  53. This previously measured 4 mm with 2 mm solid component.   - 3 mm solid nodule in the posterior right lower lobe on series:  4 image:  112.     Unchanged pleural-based nodule in the posterior left lower lobe (series 4, image 221)  IMPRESSION:   1. ACR Assessment Category (v1.1):  Lung-RADS Category 4A. Suspicious.    Records Location (Care Everywhere, Media, etc.): [facility]     Records Needed: none    Additional testing needed prior to consult: NONE

## 2023-06-15 NOTE — RESULT ENCOUNTER NOTE
"Daisy Noriega,  Your attached lung CT scan showed some changes. We need to repeat this in 3 months, but I also want you to see our pulmonary nodule clinic. They are phenomenal and thorough.  Please also make sure to contact your cardiology team regarding the aortic atherosclerosis and coronary artery calcium noted.  Please contact the office with any questions or concerns.    Danuta Martinez \"Eduard\" PHILL Tan    "

## 2023-06-15 NOTE — TELEPHONE ENCOUNTER
Patient returned message with update blood pressure reading.      BP Readings from Last 3 Encounters:   06/02/23 (!) 148/62   05/12/23 (!) 151/65   04/05/23 (!) 150/58     Blood Pressure:   6/13- 114/53  6/10- 116/51  6/11- 127/63  6/12- 132/64  6/13-  118/61    Location: Home BP    Patient reported blood pressure updated in Epic. Blood pressure falls within [place] Community Measures guidelines.  Patient will follow up as previously advised.

## 2023-06-18 ENCOUNTER — TELEPHONE (OUTPATIENT)
Dept: FAMILY MEDICINE | Facility: CLINIC | Age: 73
End: 2023-06-18

## 2023-06-18 DIAGNOSIS — R91.8 PULMONARY NODULES: ICD-10-CM

## 2023-06-18 NOTE — TELEPHONE ENCOUNTER
"[facility] Radiology Lung Cancer Screening CT result notification:     LDCT/Lung Cancer Screening CT Exam date: 6/14/23  Radiologist Impression AND Recommendations:    ACR Assessment Category (v1.1):  Lung-RADS Category 4A. Suspicious.        Recommendation:  Lung-RADS Category 4A. Suspicious. 3 month LDCT;  PET/CT may be used when there is a ? 8 mm (?268.1 mm3  ) solid component consider 3 month follow-up chest CT.   Pertinent Findings:  FINDINGS:   [All follow up of nodules are based on ACR guidelines for lung cancer  screening and measurements of each nodule size must be the mean of the  longest axial plane measurement by its perpendicular measured to the  nearest decimal and rounded up to the nearest whole number.]     NODULES: >10  The largest and/or fastest 2 of these nodule(s) are as follows:     -6 mm part solid part groundglass nodule in the right upper lobe with  approximate 3 mm solid component:  4 image:  53. This previously  measured 4 mm with 2 mm solid component.     - 3 mm solid nodule in the posterior right lower lobe on series:  4  image:  112.     Unchanged pleural-based nodule in the posterior left lower lobe  (series 4, image 221)     Ordering Provider: Danuta Manning did receive the remaining radiology results from their PCP.   Message to Patient from PCP, , regarding chest CT results (copied and pasted):     Daisy Noriega,  Your attached lung CT scan showed some changes. We need to repeat this in 3 months, but I also want you to see our pulmonary nodule clinic. They are phenomenal and thorough.  Please also make sure to contact your cardiology team regarding the aortic atherosclerosis and coronary artery calcium noted.  Please contact the office with any questions or concerns.     Danuta Martinez \"Eduard\" PHILL Tan   Written by Danuta Tan PA-C on 6/15/2023  9:39 AM CDT  Seen by patient Leann Manning on 6/18/2023 11:32 " "AM      Lung RADS 4A Protocol:  Results RN to notify Patient of results/recommendations and offer a referral to [facility] within 4 to 6 weeks  o Offer referral to [facility] Lung Nodule clinic instead of the 3 month CT. (Yes/No/NA):  Provider set this up and patient has been reached out by the team  o If Patient agrees to a referral to [facility] [9023 - Adult Oncology/Hematology  referral with reason for referral \"Interventional pulmonology (Lung Nodule)\"], place referral to [facility] to be seen within 4 to 6 weeks.  (Yes/No/NA):  NA  o If Patient refuses referral, place order for 3 month CT ([id number]).   (Yes/No/NA): provider ordered  o Relay information to Patient:  [facility] Scheduling team, [phone], will be calling Patient within 1 week to schedule appt - appt only M-F. (Yes/No/NA): NA  o Results RN routed telephone encounter as FYI to CNS team (Yes/No): Yes      RN communicated the lung nodule finding to the patient (Yes/No):  NO  The patient had the following questions: NA  Correct letter sent as per [facility] Lung nodule protocol (Yes/No):  Yes  Did Patient have any CT's of chest previous? (Yes/No/NA) Yes and were used  If no comparisons used, inquire if patient has had any chest CT's in the past and if so, request priors.    If scheduling LDCT only, RN will contact Image Scheduling Team  Hours available (all sites below):  Mon-Fri 7A to 7P; Sat 7A to 3:30P.  No schedulers available on Sunday.    [facility]): [phone]    North region ([place], [place]): [phone]    South region ([facility]): [phone]    East region ([facility]): [phone]    Lung Nodule Clinics    Pulomonary (Lung Care) Clinic at the [facility]  Floor 2, Check-In D, [address], [place], [place]  Hours: Mon - Fri 7:00 AM to 5:00 PM    [facility] [place] [facility] and [facility] Center   Floor 2, [address], [place]  [phone]  Hours: Mon - Fri 7:00 AM - 7:00 PM;  Sat 8:00 AM to 12:00 PM (noon)    [facility]  [address] [place], [place], [phone]  Hours: Mon - Fri 8:00 AM - 4:30 PM    [facility]  [address] [address]  Hours: Mon-Thurs 7:00 AM- 6:30 PM;  Friday 7:00 AM- 5:30 PM    [facility] [address] [address] [phone]  Hours: Mon -Thurs 7:00 AM-7:00 PM;  Friday 12:00 PM (noon) - 4 PM      Mary Beth Burgos RN  [facility] [facility] [place]  Lung Nodule and Emergency Dept Lab Result RN  Ph# [phone]

## 2023-06-22 NOTE — TELEPHONE ENCOUNTER
RECORDS STATUS - ALL OTHER DIAGNOSIS      RECORDS RECEIVED FROM: Epic   DATE RECEIVED:    NOTES STATUS DETAILS   OFFICE NOTE from referring provider Epic 06/02/23: Danuta Tan PA-C   OFFICE NOTE from other specialist Epic 01/13/20: Dr. Lori Lorenzo   DISCHARGE SUMMARY from hospital [facility] 05/09/18: [facility]   OPERATIVE REPORT Epic 01/13/20, 09/20/18, 06/21/18, 08/22/16: PFT   MEDICATION LIST [facility]    LABS     PATHOLOGY REPORTS     ANYTHING RELATED TO DIAGNOSIS Epic Most recent 05/12/23   IMAGING (NEED IMAGES & REPORT)     CT SCANS PACS 06/14/23-05/09/18: CT Chest   XRAYS PACS 05/09/18-09/10/13: XR Chest

## 2023-07-08 DIAGNOSIS — I73.9 PERIPHERAL VASCULAR DISEASE (H): ICD-10-CM

## 2023-07-10 RX ORDER — DILTIAZEM HYDROCHLORIDE 300 MG/1
CAPSULE, EXTENDED RELEASE ORAL
Qty: 90 CAPSULE | Refills: 1 | Status: SHIPPED | OUTPATIENT
Start: 2023-07-10 | End: 2024-01-08

## 2023-07-10 NOTE — TELEPHONE ENCOUNTER
MP,    Routing refill request to provider for review/approval because:  Labs not current:  ALT and Cr      Thanks!  DONNA Schmidt

## 2023-07-12 NOTE — TELEPHONE ENCOUNTER
SCOTT Ortiz - Patient on your schedule today.    PATIENT COMPLAINS OF :  --See MyChart msg in this encounter.  --I called patient to assess.  --She says she is not worse than at her visit 5/1 but she is not better.  --She does not think the prednisone or albuterol nebs helped.  --She is using the albuterol nebs every six hours.  --She is breathing ok when she is sitting but she reports being very sob with any exertion.  --This has been going on for two weeks.  --She has COPD and per her chart she has diabetes but she denies having diabetes.  --She says she is not eating right.    DENIES:  --No chest pain.  --Does not sound sob over phone and is speaking in full sentences.  --She says she does not have blue lips or tongue.  --Denies wheezing.  --No fever.  --Urine output normal.    --Says she is sleeping fairly well.  --Can breathe fine when lying down.    PLAN :   ---Appointment today with .  --When patient was in on 5/1 her 02 sats were initially 86.  --Because patient has hx of COPD, diabetes and not getting better guideline recommends office visit within 2-4 hrs.    Protocol consulted - Breathing problems.  Jazzy Woodard Telephone Triage Protocols For Nurses 5th Edition guideline.    Kenyatta Ott RN           [facility]

## 2023-08-07 ENCOUNTER — TRANSFERRED RECORDS (OUTPATIENT)
Dept: HEALTH INFORMATION MANAGEMENT | Facility: CLINIC | Age: 73
End: 2023-08-07
Payer: COMMERCIAL

## 2023-08-07 LAB — RETINOPATHY: NEGATIVE

## 2023-08-16 DIAGNOSIS — E78.5 HYPERLIPIDEMIA LDL GOAL <70: ICD-10-CM

## 2023-08-16 RX ORDER — ROSUVASTATIN CALCIUM 40 MG/1
TABLET, COATED ORAL
Qty: 90 TABLET | Refills: 1 | Status: SHIPPED | OUTPATIENT
Start: 2023-08-16 | End: 2024-02-08

## 2023-08-17 ENCOUNTER — PRE VISIT (OUTPATIENT)
Dept: PULMONOLOGY | Facility: CLINIC | Age: 73
End: 2023-08-17
Payer: COMMERCIAL

## 2023-09-09 DIAGNOSIS — J44.1 COPD EXACERBATION (H): ICD-10-CM

## 2023-09-11 RX ORDER — ALBUTEROL SULFATE 90 UG/1
2 AEROSOL, METERED RESPIRATORY (INHALATION) EVERY 6 HOURS
Qty: 8.5 G | Refills: 0 | Status: SHIPPED | OUTPATIENT
Start: 2023-09-11 | End: 2023-11-07

## 2023-09-12 NOTE — TELEPHONE ENCOUNTER
RECORDS STATUS - ALL OTHER DIAGNOSIS    Abnormal CT scan of lung.   RECORDS RECEIVED FROM:    Appt Date: 9/19/2023 with Dr. Mcfarland    NOTES STATUS DETAILS   OFFICE NOTE from referring provider Complete Refer by Danuta Tan PA-C    DISCHARGE SUMMARY from hospital     DISCHARGE REPORT from the ER     OPERATIVE REPORT     MEDICATION LIST Complete [facility]   CLINICAL TRIAL TREATMENTS TO DATE     LABS     PATHOLOGY REPORTS     ANYTHING RELATED TO DIAGNOSIS Complete Labs last updated on 5/12/2023    GENONOMIC TESTING     TYPE:     IMAGING (NEED IMAGES & REPORT)     CT SCANS Complete 6/14/2023 CT Chest Lung    MRI     MAMMO     ULTRASOUND     PET

## 2023-09-14 DIAGNOSIS — J44.9 CHRONIC OBSTRUCTIVE PULMONARY DISEASE, UNSPECIFIED COPD TYPE (H): ICD-10-CM

## 2023-09-14 RX ORDER — TIOTROPIUM BROMIDE 18 UG/1
CAPSULE ORAL; RESPIRATORY (INHALATION)
Qty: 90 CAPSULE | Refills: 2 | Status: SHIPPED | OUTPATIENT
Start: 2023-09-14 | End: 2024-06-05

## 2023-09-18 ENCOUNTER — ANCILLARY PROCEDURE (OUTPATIENT)
Dept: CT IMAGING | Facility: CLINIC | Age: 73
End: 2023-09-18
Attending: PHYSICIAN ASSISTANT
Payer: COMMERCIAL

## 2023-09-18 DIAGNOSIS — R91.8 ABNORMAL CT SCAN OF LUNG: ICD-10-CM

## 2023-09-18 DIAGNOSIS — Z87.891 PERSONAL HISTORY OF TOBACCO USE: ICD-10-CM

## 2023-09-18 PROCEDURE — 71271 CT THORAX LUNG CANCER SCR C-: CPT | Performed by: RADIOLOGY

## 2023-09-18 NOTE — PROGRESS NOTES
"Virtual Visit Details    Type of service:  Video Visit  See note      LUNG NODULE & INTERVENTIONAL PULMONARY CLINIC  CLINICS & SURGERY CENTER, [facility], [facility]   VIDEO VISIT    Porsche Manning MRN# [id number]   Age: 73 year old YOB: 1950     Reason for Consultation: Lung Nodule    Requesting Physician: Danuta Tan PA-C  [address] [facility] [address]  [place] [address]    The patient has been notified of following:   \"This video visit will be conducted via a call between you and your physician/provider. We have found that certain health care needs can be provided without the need for an in-person physical exam.  This service lets us provide the care you need with a video conversation.  If a prescription is necessary we can send it directly to your pharmacy.  If lab work is needed we can place an order for that and you can then stop by our lab to have the test done at a later time.  Video visits are billed at different rates depending on your insurance coverage.  Please reach out to your insurance provider with any questions.  If during the course of the call the physician/provider feels a video visit is not appropriate, you will not be charged for this service.\"  Patient has given verbal consent for Video visit? Yes  How would you like to obtain your AVS? Please refer to rooming staff note  Patient would like the video invitation sent by: Please refer to rooming staff note   Will anyone else be joining your video visit? Please refer to rooming staff note       Video-Visit Details     Type of service:  Video Visit  Video Start Time: 1030  Video/note editing End Time: 1045  Provider Name: Blayne Mcfarland MD, MHA   Originating Location (pt. Location): Home  Provider Location: Off campus   Distant Location (provider location): Home/Clinic  Platform used for Video Visit: AmWell/Doximity    Assessment and Plan:    1. New multiple pulmonary lung nodule(s). " Given the characteristics on current/previous imaging and risk factors; I would classify this to be Intermediate (6-65%) risk for cancer. Likely low intermediate risk. Would cont 2yr close surveillance. Next CT in 6mo    Billing: I spent a total of 45min spent on date of encounter which includes prep time, visit with the patient and post visit work including documentation and nursing communication     Blayne Mcfarland MD, MHA  Associate Professor of Medicine  Section of Interventional Pulmonology   Division of Pulmonary, Allergy, Critical Care and Sleep Medicine   [facility]  Pager: [phone]   Office: [phone]  Email: [email or web address]    Alis Rivero RN   Interventional Pulmonary Care Coordinator   Office: [phone]  Email: [email or web address]     Roque Osborn  Interventional Pulmonary Surgery Scheduler   Office: [phone]  Email: [email or web address]         History:     Posrche Manning is a 73 year old female with sig h/o for CAD, COPD, DM2, PAD, cervical cancer who is here for evaluation/followup of Lung Nodule.    - No new resp sx or complaints. Denies dyspnea or cough.   - LDCT performed and found nodules cat 4A. Here for eval.   - Personal hx of cancer: no  - Family hx of cancer: no  - Exposure hx: Denies asbestos or radon exposure   - Tobacco hx: Current Smoker, 0.25ppd for 40 years. .   - My interpretation of the images relevant for this visit includes: nodules   - My interpretation of the PFT's relevant for this visit includes: Obstructive pattern     Culprit Nodule(s):   1. 11 x 6  mm pleural-based solid nodule in the left lower lobe on series:  4 image:  212. Unchanged since 6/2023     2. 3  mm solid nodule in the right upper lobe on series:  4 image: 52.. This solid component of the previous mixed density nodule is unchanged. The previous peripheral groundglass opacity is similar to slightly decreased as well.    Other active medical  problems include:   - has COPD on breo-ellipta and spiriva. Prn albuterol    - has CAD. stable   - had cervical cancer THANG in 1989          Past Medical History:      Past Medical History:   Diagnosis Date    Brachial neuritis or radiculitis NOS     Chronic obstructive pulmonary disease, unspecified COPD type (H) 3/8/2016    Coronary artery disease     Doing fine    H/O tobacco use, presenting hazards to health     Hyperlipidemia LDL goal < 100     Hypertension goal BP (blood pressure) < 140/90     Malignant neoplasm of other specified sites of cervix     Old myocardial infarction     Osteopenia     Peripheral vascular disease, unspecified (H)     Type 2 diabetes, HbA1c goal < 7% (H)              Past Surgical History:      Past Surgical History:   Procedure Laterality Date    ANGIOGRAM Left 10/19/2021    Procedure: Left iliac angioplasty and stenting, Right common iliac angioplasty;  Surgeon: Britni Bob MD;  Location: [facility] OR    ANGIOPLASTY N/A 10/19/2021    Procedure: possible intervention;  Surgeon: Britni Bob MD;  Location: [facility] OR    BIOPSY      Sample taken from back    CARDIAC SURGERY      Stents    COLONOSCOPY  1996    Results were ok    HYSTERECTOMY, PAP NO LONGER INDICATED  1989    ovaries only, no cervix per pt    IR OR ANGIOGRAM  10/19/2021    SURGICAL HISTORY OF -   1995    bunionectomy    SURGICAL HISTORY OF -   10/10/03    cardiac stenting    SURGICAL HISTORY OF -     stent placed in both of her legs for pad    VASCULAR SURGERY      PAD            Social History:     Social History     Tobacco Use    Smoking status: Every Day     Packs/day: 0.25     Years: 40.00     Pack years: 10.00     Types: Cigarettes    Smokeless tobacco: Never    Tobacco comments:     5b or less cigareetes day   Substance Use Topics    Alcohol use: Yes     Comment: 2 to 4 beers or mixed drinks weekly            Family History:     Family History   Problem Relation Age of Onset    C.A.D. Mother     Breast Cancer Mother      INDIOACHERIE. Father     Diabetes Father     Hypertension Father     C.A.JOE. Maternal Grandfather     DIANN. Paternal Grandfather     Neurologic Disorder Brother         epilepsy             Allergies:      Allergies   Allergen Reactions    Morphine Nausea and Vomiting    Penicillins Nausea and Vomiting            Medications:     Current Outpatient Medications   Medication Sig    TIADYLT  MG 24 hr ER beaded capsule TAKE 1 CAPSULE BY MOUTH EVERY DAY    albuterol (PROAIR HFA/PROVENTIL HFA/VENTOLIN HFA) 108 (90 Base) MCG/ACT inhaler INHALE 2 PUFFS INTO THE LUNGS EVERY 6 HOURS    aspirin 81 MG EC tablet Take 81 mg by mouth daily    Calcium Carbonate-Vit D-Min (CALCIUM 1200 PO) Take 600 mg by mouth daily Once every other day    Cholecalciferol (VITAMIN D) 1000 UNIT capsule Take 1 capsule by mouth daily Take one tablet daily    clopidogrel (PLAVIX) 75 MG tablet Take 1 tablet (75 mg) by mouth daily    empagliflozin (JARDIANCE) 10 MG TABS tablet Take 1 tablet (10 mg) by mouth daily    fluticasone-vilanterol (BREO ELLIPTA) 100-25 MCG/ACT inhaler Inhale 1 puff into the lungs daily    lisinopril (ZESTRIL) 10 MG tablet TAKE 1 TABLET (10 MG) BY MOUTH DAILY.    metoprolol succinate ER (TOPROL XL) 100 MG 24 hr tablet Take 1 tablet (100 mg) by mouth daily    rosuvastatin (CRESTOR) 40 MG tablet TAKE 1 TABLET BY MOUTH EVERY DAY    tiotropium (SPIRIVA HANDIHALER) 18 MCG inhaled capsule INHALE 1 CAPSULE (18 MCG) INTO THE LUNGS DAILY     No current facility-administered medications for this visit.            Review of Systems:     CONSTITUTIONAL: negative for fever, chills, change in weight  INTEGUMENTARY/SKIN: no rash or obvious new lesions  ENT/MOUTH: no sore throat, new sinus pain or nasal drainage  RESP: see interval history  CV: negative for chest pain, palpitations or peripheral edema  GI: no nausea, vomiting, change in stools  : no dysuria  MUSCULOSKELETAL: no myalgias, arthralgias  ENDOCRINE: blood sugars with adequate  control  PSYCHIATRIC: mood stable  LYMPHATIC: no new lymphadenopathy  HEME: no bleeding or easy bruisability  NEURO: no numbness, weakness, headaches         Physical Exam:     Constitutional - looks well, in no apparent distress  Eyes - no redness or discharge  Respiratory -breathing appears comfortable.  No cough.  Skin - No appreciable discoloration or lesions (very limited exam)  Neurological - No apparent tremors. Speech fluent and articlate  Psychiatric - no signs of delirium or anxiety     Exam limited to that easily identified on a virtual visit. The rest of a comprehensive physical examination is deferred due to PHE (public health emergency) video visit restrictions.         Current Laboratory Data:   All laboratory and imaging data reviewed.          Latest Ref Rng & Units 1/13/2020    10:54 AM   PFT   FVC L 2.24    FEV1 L 1.33    FVC% % 82    FEV1% % 62

## 2023-09-19 ENCOUNTER — PRE VISIT (OUTPATIENT)
Dept: PULMONOLOGY | Facility: CLINIC | Age: 73
End: 2023-09-19
Payer: COMMERCIAL

## 2023-09-19 ENCOUNTER — VIRTUAL VISIT (OUTPATIENT)
Dept: PULMONOLOGY | Facility: CLINIC | Age: 73
End: 2023-09-19
Attending: PHYSICIAN ASSISTANT
Payer: COMMERCIAL

## 2023-09-19 VITALS — HEIGHT: 62 IN | BODY MASS INDEX: 29.44 KG/M2 | WEIGHT: 160 LBS

## 2023-09-19 DIAGNOSIS — R91.8 ABNORMAL CT SCAN OF LUNG: ICD-10-CM

## 2023-09-19 DIAGNOSIS — R91.8 PULMONARY NODULES: Primary | ICD-10-CM

## 2023-09-19 DIAGNOSIS — Z87.891 PERSONAL HISTORY OF TOBACCO USE: ICD-10-CM

## 2023-09-19 PROCEDURE — 99204 OFFICE O/P NEW MOD 45 MIN: CPT | Mod: VID | Performed by: INTERNAL MEDICINE

## 2023-09-19 NOTE — NURSING NOTE
Is the patient currently in the state of [place]? YES    Visit mode:VIDEO    If the visit is dropped, the patient can be reconnected by: TELEPHONE VISIT: Phone number: [phone]    Will anyone else be joining the visit? NO  (If patient encounters technical issues they should call [phone]    How would you like to obtain your AVS? MyChart    Are changes needed to the allergy or medication list? No    Reason for visit: Consult (Video visit )    Nasrin DIAZ

## 2023-09-19 NOTE — RESULT ENCOUNTER NOTE
"Daisy Noriega  Your attached CT scan of your lungs is within normal limits and stable. Mke sure you mention the scan to cardiology next time you see them - just to make sure they are aware/updated on some of the arthrosclerotic calcifications noted.    Please contact the office with any questions or concerns.    Danuta Martinez \"Eduard\" PHILL Tan    "

## 2023-09-19 NOTE — LETTER
"9/19/2023       RE: Porsche Manning  [address]  [facility] [address]     Dear Colleague,    Thank you for referring your patient, Porsche Manning[facility]. Please see a copy of my visit note below.    Virtual Visit Details    Type of service:  Video Visit  See note      LUNG NODULE & INTERVENTIONAL PULMONARY CLINIC  CLINICS & SURGERY CENTER, [facility]   VIDEO VISIT    Porsche Manning MRN# [id number]   Age: 73 year old YOB: 1950     Reason for Consultation: Lung Nodule    Requesting Physician: Danuta Tan PA-C  [address] [facility] [address]  [place] [address]    The patient has been notified of following:   \"This video visit will be conducted via a call between you and your physician/provider. We have found that certain health care needs can be provided without the need for an in-person physical exam.  This service lets us provide the care you need with a video conversation.  If a prescription is necessary we can send it directly to your pharmacy.  If lab work is needed we can place an order for that and you can then stop by our lab to have the test done at a later time.  Video visits are billed at different rates depending on your insurance coverage.  Please reach out to your insurance provider with any questions.  If during the course of the call the physician/provider feels a video visit is not appropriate, you will not be charged for this service.\"  Patient has given verbal consent for Video visit? Yes  How would you like to obtain your AVS? Please refer to rooming staff note  Patient would like the video invitation sent by: Please refer to rooming staff note   Will anyone else be joining your video visit? Please refer to rooming staff note       Video-Visit Details     Type of service:  Video Visit  Video Start " Time: 1030  Video/note editing End Time: 1045  Provider Name: Blayne Mcfarland MD, MHA   Originating Location (pt. Location): Home  Provider Location: Off campus   Distant Location (provider location): Home/Clinic  Platform used for Video Visit: Alise/Luis Felipe    Assessment and Plan:    1. New multiple pulmonary lung nodule(s). Given the characteristics on current/previous imaging and risk factors; I would classify this to be Intermediate (6-65%) risk for cancer. Likely low intermediate risk. Would cont 2yr close surveillance. Next CT in 6mo    Billing: I spent a total of 45min spent on date of encounter which includes prep time, visit with the patient and post visit work including documentation and nursing communication     Blayne Mcfarland MD, MHA  Associate Professor of Medicine  Section of Interventional Pulmonology   Division of Pulmonary, Allergy, Critical Care and Sleep Medicine   [facility]  Pager: [phone]   Office: [phone]  Email: [email or web address]    Alis Rivero RN   Interventional Pulmonary Care Coordinator   Office: [phone]  Email: [email or web address]     Roque Osborn  Interventional Pulmonary Surgery Scheduler   Office: [phone]  Email: [email or web address]         History:     Porsche Manning is a 73 year old female with sig h/o for CAD, COPD, DM2, PAD, cervical cancer who is here for evaluation/followup of Lung Nodule.    - No new resp sx or complaints. Denies dyspnea or cough.   - LDCT performed and found nodules cat 4A. Here for eval.   - Personal hx of cancer: no  - Family hx of cancer: no  - Exposure hx: Denies asbestos or radon exposure   - Tobacco hx: Current Smoker, 0.25ppd for 40 years. .   - My interpretation of the images relevant for this visit includes: nodules   - My interpretation of the PFT's relevant for this visit includes: Obstructive pattern     Culprit Nodule(s):   1. 11 x 6  mm pleural-based solid nodule in the  left lower lobe on series:  4 image:  212. Unchanged since 6/2023     2. 3  mm solid nodule in the right upper lobe on series:  4 image: 52.. This solid component of the previous mixed density nodule is unchanged. The previous peripheral groundglass opacity is similar to slightly decreased as well.    Other active medical problems include:   - has COPD on breo-ellipta and spiriva. Prn albuterol    - has CAD. stable   - had cervical cancer THANG in 1989          Past Medical History:      Past Medical History:   Diagnosis Date     Brachial neuritis or radiculitis NOS      Chronic obstructive pulmonary disease, unspecified COPD type (H) 3/8/2016     Coronary artery disease     Doing fine     H/O tobacco use, presenting hazards to health      Hyperlipidemia LDL goal < 100      Hypertension goal BP (blood pressure) < 140/90      Malignant neoplasm of other specified sites of cervix      Old myocardial infarction      Osteopenia      Peripheral vascular disease, unspecified (H)      Type 2 diabetes, HbA1c goal < 7% (H)              Past Surgical History:      Past Surgical History:   Procedure Laterality Date     ANGIOGRAM Left 10/19/2021    Procedure: Left iliac angioplasty and stenting, Right common iliac angioplasty;  Surgeon: Britni Bob MD;  Location: [facility] OR     ANGIOPLASTY N/A 10/19/2021    Procedure: possible intervention;  Surgeon: Britni Bob MD;  Location: [facility] OR     BIOPSY      Sample taken from back     CARDIAC SURGERY      Stents     COLONOSCOPY  1996    Results were ok     HYSTERECTOMY, PAP NO LONGER INDICATED  1989    ovaries only, no cervix per pt     IR OR ANGIOGRAM  10/19/2021     SURGICAL HISTORY OF -   1995    bunionectomy     SURGICAL HISTORY OF -   10/10/03    cardiac stenting     SURGICAL HISTORY OF -       stent placed in both of her legs for pad     VASCULAR SURGERY      PAD            Social History:     Social History     Tobacco Use     Smoking status: Every Day     Packs/day: 0.25      Years: 40.00     Pack years: 10.00     Types: Cigarettes     Smokeless tobacco: Never     Tobacco comments:     5b or less cigareetes day   Substance Use Topics     Alcohol use: Yes     Comment: 2 to 4 beers or mixed drinks weekly            Family History:     Family History   Problem Relation Age of Onset     C.A.D. Mother      Breast Cancer Mother      C.A.D. Father      Diabetes Father      Hypertension Father      C.A.D. Maternal Grandfather      C.A.D. Paternal Grandfather      Neurologic Disorder Brother         epilepsy             Allergies:      Allergies   Allergen Reactions     Morphine Nausea and Vomiting     Penicillins Nausea and Vomiting            Medications:     Current Outpatient Medications   Medication Sig     TIADYLT  MG 24 hr ER beaded capsule TAKE 1 CAPSULE BY MOUTH EVERY DAY     albuterol (PROAIR HFA/PROVENTIL HFA/VENTOLIN HFA) 108 (90 Base) MCG/ACT inhaler INHALE 2 PUFFS INTO THE LUNGS EVERY 6 HOURS     aspirin 81 MG EC tablet Take 81 mg by mouth daily     Calcium Carbonate-Vit D-Min (CALCIUM 1200 PO) Take 600 mg by mouth daily Once every other day     Cholecalciferol (VITAMIN D) 1000 UNIT capsule Take 1 capsule by mouth daily Take one tablet daily     clopidogrel (PLAVIX) 75 MG tablet Take 1 tablet (75 mg) by mouth daily     empagliflozin (JARDIANCE) 10 MG TABS tablet Take 1 tablet (10 mg) by mouth daily     fluticasone-vilanterol (BREO ELLIPTA) 100-25 MCG/ACT inhaler Inhale 1 puff into the lungs daily     lisinopril (ZESTRIL) 10 MG tablet TAKE 1 TABLET (10 MG) BY MOUTH DAILY.     metoprolol succinate ER (TOPROL XL) 100 MG 24 hr tablet Take 1 tablet (100 mg) by mouth daily     rosuvastatin (CRESTOR) 40 MG tablet TAKE 1 TABLET BY MOUTH EVERY DAY     tiotropium (SPIRIVA HANDIHALER) 18 MCG inhaled capsule INHALE 1 CAPSULE (18 MCG) INTO THE LUNGS DAILY     No current facility-administered medications for this visit.            Review of Systems:     CONSTITUTIONAL: negative for fever,  chills, change in weight  INTEGUMENTARY/SKIN: no rash or obvious new lesions  ENT/MOUTH: no sore throat, new sinus pain or nasal drainage  RESP: see interval history  CV: negative for chest pain, palpitations or peripheral edema  GI: no nausea, vomiting, change in stools  : no dysuria  MUSCULOSKELETAL: no myalgias, arthralgias  ENDOCRINE: blood sugars with adequate control  PSYCHIATRIC: mood stable  LYMPHATIC: no new lymphadenopathy  HEME: no bleeding or easy bruisability  NEURO: no numbness, weakness, headaches         Physical Exam:     Constitutional - looks well, in no apparent distress  Eyes - no redness or discharge  Respiratory -breathing appears comfortable.  No cough.  Skin - No appreciable discoloration or lesions (very limited exam)  Neurological - No apparent tremors. Speech fluent and articlate  Psychiatric - no signs of delirium or anxiety     Exam limited to that easily identified on a virtual visit. The rest of a comprehensive physical examination is deferred due to PHE (public health emergency) video visit restrictions.         Current Laboratory Data:   All laboratory and imaging data reviewed.          Latest Ref Rng & Units 1/13/2020    10:54 AM   PFT   FVC L 2.24    FEV1 L 1.33    FVC% % 82    FEV1% % 62                      Again, thank you for allowing me to participate in the care of your patient.      Sincerely,    Blayne Mcfarland MD

## 2023-09-28 DIAGNOSIS — I10 ESSENTIAL (PRIMARY) HYPERTENSION: ICD-10-CM

## 2023-09-29 RX ORDER — LISINOPRIL 10 MG/1
10 TABLET ORAL DAILY
Qty: 90 TABLET | Refills: 0 | Status: SHIPPED | OUTPATIENT
Start: 2023-09-29 | End: 2024-01-17

## 2023-10-04 ENCOUNTER — MYC MEDICAL ADVICE (OUTPATIENT)
Dept: FAMILY MEDICINE | Facility: CLINIC | Age: 73
End: 2023-10-04
Payer: COMMERCIAL

## 2023-11-07 DIAGNOSIS — J44.1 COPD EXACERBATION (H): ICD-10-CM

## 2023-11-07 DIAGNOSIS — I73.9 PERIPHERAL VASCULAR DISEASE (H): ICD-10-CM

## 2023-11-07 RX ORDER — ALBUTEROL SULFATE 90 UG/1
2 AEROSOL, METERED RESPIRATORY (INHALATION) EVERY 6 HOURS
Qty: 18 G | Refills: 1 | Status: SHIPPED | OUTPATIENT
Start: 2023-11-07 | End: 2024-05-17

## 2023-11-07 RX ORDER — DILTIAZEM HYDROCHLORIDE 300 MG/1
CAPSULE, EXTENDED RELEASE ORAL
Qty: 90 CAPSULE | Refills: 1 | OUTPATIENT
Start: 2023-11-07

## 2023-11-09 ENCOUNTER — MYC MEDICAL ADVICE (OUTPATIENT)
Dept: FAMILY MEDICINE | Facility: CLINIC | Age: 73
End: 2023-11-09
Payer: COMMERCIAL

## 2023-11-09 DIAGNOSIS — N18.31 STAGE 3A CHRONIC KIDNEY DISEASE (H): ICD-10-CM

## 2023-11-09 NOTE — TELEPHONE ENCOUNTER
Dr. Helms,      Patient called requesting refill for Jardiance which was last prescribed by you.    Thank you,  Kenyatta Chicas RN

## 2023-11-09 NOTE — TELEPHONE ENCOUNTER
See Mychart message.  Lisinopril: Too early to refill.  Last prescription was 9/29/23 for 90 tablets (3 month supply)  Tiadylt ER: Too early to refill. Last prescription was 7/10/23 for 90 tablets with 1 refill (6 month supply).      Jardiance sent on to Dr. Helms.    Kenyatta Chicas RN

## 2023-11-14 ENCOUNTER — PATIENT OUTREACH (OUTPATIENT)
Dept: CARE COORDINATION | Facility: CLINIC | Age: 73
End: 2023-11-14
Payer: COMMERCIAL

## 2023-12-05 DIAGNOSIS — I25.10 CORONARY ARTERY DISEASE DUE TO LIPID RICH PLAQUE: ICD-10-CM

## 2023-12-05 DIAGNOSIS — I25.83 CORONARY ARTERY DISEASE DUE TO LIPID RICH PLAQUE: ICD-10-CM

## 2023-12-05 DIAGNOSIS — I10 HYPERTENSION GOAL BP (BLOOD PRESSURE) < 140/90: ICD-10-CM

## 2023-12-05 RX ORDER — METOPROLOL SUCCINATE 100 MG/1
100 TABLET, EXTENDED RELEASE ORAL DAILY
Qty: 90 TABLET | Refills: 1 | Status: SHIPPED | OUTPATIENT
Start: 2023-12-05 | End: 2024-05-17

## 2023-12-05 NOTE — TELEPHONE ENCOUNTER
"Signed, but please get patient updated BP (at home or nurse only), thanks  Danuta \"Eduard\" PHILL Tan     "

## 2023-12-07 NOTE — LETTER
8/20/2021      RE: Porsche Manning  [address]  [facility] [address]       Dear Colleague,    Thank you for the opportunity to participate in the care of your patient, Porsche Manning, at the [facility] CLINIC [place] at [facility]. Please see a copy of my visit note below.      [facility] - Video Visit  Cardiovascular Clinic Note      Referring Provider: Brayan Uriostegui   Primary Care Provider: Danuta Tan     Patient Name: Porsche Manning   MRN: [id number]       History of Present Illness:  Porsche Manning is a 71 year old female with PMH notable or HTN, HLD, hx of tobacco use, CAD (1st MI 1987, BMS to mRCA 2003), PAD (s/p bilateral aorto-iliac stenting 2005, repeat stenting to left common iliac artery 2006), subclavian stenosis, COPD, T2DM, CKD stage III, and hx of cervical cancer. She presents for follow up.    The patient was last seen by Dr. Uriostegui 2/2020. At that visit, she had mild claudication. She was started on spironolactone for hypertension. This was stopped per nephrology and the patient was started on lisinopril.     In the last month, the patient has had 2 separate issues.  First, she has noted that she has some hypotension associated with dizziness in the middle of the day.  She notes that when she first awakens in the morning, her systolic blood pressure is typically between 130 and 160.  She takes 20 mg of lisinopril, 360 mg of diltiazem, and 150 mg of metoprolol all at once between the hours of 7:30 AM and 9 AM.  She has noted over the last week that she has had dizziness/hypotension at approximately noon every day.  She notes that she will feel lightheaded, check her blood pressure, and that systolic blood pressures are typically between 95 and 115.  This typically slowly resolves throughout the afternoon. In addition, she notes that her resting HR is typically between 40-70 on machines  "at home. She has been checking her BP on her right arm only.    Second, the patient has noted that over the last month or so her \"left leg is worse\".  She feels as though she has lost muscle mass in this leg.  She feels as though she has more frequent and bothersome pain of this leg.  She notes that pain is most prevalent when she has her leg up on the ottoman or if she stands for long periods of time.  She has some loss of sensation just above and below her knee with onset of pain.  Pain/decreased sensation typically resolve with shifting her weight if she is sitting or by sitting if she is standing/walking.  She feels as though it is more difficult to perform activities of daily living.  She has not noted profound decrease pallor of her left lower extremity.  She does feel as though the left lower extremity is \"a bit\" cooler than the right.  She has been diagnosed with sciatica in the past and wonders if this is a recurrence of her sciatica or if this is related to her peripheral arterial disease.    The patient otherwise denies chest pain, shortness of breath, orthopnea, new lower extremity swelling, and palpitations.  She was recently seen by nephrology and was found to have worsening renal function.  The presumed diagnosis is secondary to midday hypotension.  She has been compliant with all medications.    Pertinent Cardiac Medications:  75mg Plavix daily  150mg metoprolol succinate daily  20mg lisinopril daily  40mg rosuvastatin daily  360mg diltiazem ER daily  81mg ASA daily      Past Medical History:  Pertinent past medical history as above.      Past Surgical History:  Past Surgical History:   Procedure Laterality Date     BIOPSY      Sample taken from back     CARDIAC SURGERY      Stents     COLONOSCOPY  1996    Results were ok     HYSTERECTOMY, PAP NO LONGER INDICATED  1989    ovaries only, no cervix per pt     SURGICAL HISTORY OF -   1995    bunionectomy     SURGICAL HISTORY OF -   10/10/03    cardiac " stenting     SURGICAL HISTORY OF -     stent placed in both of her legs for pad     VASCULAR SURGERY      PAD         Family History:  Family History   Problem Relation Age of Onset     C.A.D. Mother      Breast Cancer Mother      C.A.JOE. Father      Diabetes Father      Hypertension Father      OREN.A.JOE. Maternal Grandfather      DIANN. Paternal Grandfather      Neurologic Disorder Brother         epilepsy         Current Medications:  Current Outpatient Medications   Medication Sig Dispense Refill     albuterol (PROAIR HFA/PROVENTIL HFA/VENTOLIN HFA) 108 (90 Base) MCG/ACT inhaler INHALE 2 PUFFS INTO THE LUNGS EVERY 6 HOURS 18 g 0     aspirin 81 MG EC tablet Take 81 mg by mouth daily       BREO ELLIPTA 100-25 MCG/INH inhaler INHALE 1 PUFF BY MOUTH EVERY DAY 60 each 2     buPROPion (WELLBUTRIN XL) 150 MG 24 hr tablet Take 1 tablet (150 mg) by mouth every morning 90 tablet 1     Calcium Carbonate-Vit D-Min (CALCIUM 1200 PO) Take 600 mg by mouth daily        Cholecalciferol (VITAMIN D) 1000 UNIT capsule Take 1 capsule by mouth daily. Take one tablet daily       clopidogrel (PLAVIX) 75 MG tablet Take 1 tablet (75 mg) by mouth daily Call clinic to schedule follow up appointment. 90 tablet 0     diltiazem ER COATED BEADS (CARDIZEM CD) 360 MG 24 hr capsule Take 1 capsule (360 mg) by mouth daily 90 capsule 3     lisinopril (ZESTRIL) 20 MG tablet Take 1 tablet (20 mg) by mouth daily 90 tablet 3     metoprolol succinate ER (TOPROL-XL) 100 MG 24 hr tablet Take 1.5 tablets (150 mg) by mouth daily Please call Cardiology at [phone] to schedule annual visit. Thank you. 135 tablet 0     PROAIR  (90 Base) MCG/ACT inhaler INHALE 2 PUFFS INTO THE LUNGS EVERY 6 HOURS AS NEEDED FOR SHORTNESS OF BREATH / DYSPNEA OR WHEEZING 8.5 Inhaler 1     rosuvastatin (CRESTOR) 40 MG tablet Take 1 tablet (40 mg) by mouth daily For additional refills, please schedule a follow-up appointment at [phone] 90 tablet 3     tiotropium  (SPIRIVA HANDIHALER) 18 MCG inhaled capsule INHALE 1 CAPSULE (18 MCG) INTO THE LUNGS DAILY 90 capsule 3         Social History:  Noncontributory    Physical Exam:  No vitals collected as this was a video visit  Wt Readings from Last 2 Encounters:   04/22/21 86.6 kg (191 lb)   02/18/20 86.2 kg (190 lb)     Constitutional: no acute distress, pleasant and cooperative, appears overall well.  Eyes: sclera white, conjunctiva clear, without icterus or pallor   Cardiovascular: Visible extremities without edema/cyanosis.  Unable to visualize bilateral lower extremities due to video visit  Respiratory: Respirations nonlabored  Gastrointestinal: soft, nontender, non distended  Musculoskeletal: Visible muscle/joints normal  Skin: Visible skin without worrisome lesions  Neurologic: AOx3, gait smooth and symmetric  Psychiatric: appropriate affect, eye contact, intact thought and speech      Laboratory Data:  LIPID RESULTS:  Recent Labs   Lab Test 07/20/21  1202 01/28/21  1004 11/04/15  1142 07/09/15  1013   CHOL 154 168 141 125   HDL 57 62 71 61   LDL 63 73 48 35   TRIG 172* 166* 112 145   CHOLHDLRATIO  --   --  2.0 2.0        LIVER ENZYME RESULTS:  Recent Labs   Lab Test 04/22/21  1044 01/28/21  1004   AST 15 16   ALT 24 30       CBC RESULTS:  Recent Labs   Lab Test 01/28/21  1004 10/22/19  1012   WBC 9.9 8.4   HGB 14.8 12.9   HCT 45.0 40.0    360       BMP RESULTS:  Recent Labs   Lab Test 04/22/21  1044 01/28/21  1004    137   POTASSIUM 4.4 3.7   CHLORIDE 104 107   CO2 23 21   ANIONGAP 10 9   * 160*   BUN 13 15   CR 0.97 1.07*   VIANEY 10.4* 10.2*       A1C RESULTS:  Lab Results   Component Value Date    A1C 6.7 (H) 04/22/2021     Carotid US 2/2020:  1. RIGHT ICA: 50-69% diameter stenosis by grayscale imaging and  sonographic velocity criteria, unchanged from 2015.  2. LEFT ICA:  Less than 50% diameter narrowing by grayscale imaging  and sonographic velocity criteria, unchanged from 2015.  3. Subclavian steal  physiology with retrograde left vertebral artery  flow, unchanged. Correlate for symptoms.    Assessment:  Porsche Manning is a 71 year old female with PMH notable or HTN, HLD, hx of tobacco use, CAD (1st MI 1987, BMS to mRCA 2003), PAD (s/p bilateral aorto-iliac stenting 2005, repeat stenting to left common iliac artery 2006), subclavian stenosis, COPD, T2DM, CKD stage III, and hx of cervical cancer. She presents for follow up.    71-year-old female presenting to cardiology clinic with 2 separate issues.  1.  Worsening left lower extremity pain.  She will sometimes lose sensation just below and just above her left knee.  She notices intermittent coolness of the left lower extremity as compared to the right lower extremity.  Paresthesias/pain are typically resolved with changing bodily positions (if she is standing, she will sit down.  If she is sitting, she will adjust her seat).  I am concerned that this may represent worsening left lower extremity PAD given the known presence of stents and previous disease.  However, it may be concerning for sciatica as the patient has a history of this  2.  Midday hypotension/bradycardia      Plan:  # HTN  # HLD  BP in left arm is FALSELY low, presumably due to left subclavian artery stenosis. LDL 63 7/2021.  Patient has been checking home blood pressures solely in the left arm  - transition lisinopril to PM dose  - Reduce metoprolol to 100 mg daily  - For now continue diltiazem  - continue crestor    # LE PAD:   Stable mild claudication s/p bilateral aorto-iliac kissing stens years ago complicated by stent thrombosis at later date requiring urgent intervention.   - message sent to Dr. Uriostegui/peripheral team given new left lower extremity pain/paresthesia  - We will discuss recommendations at visit next month  - Patient instructed to present to emergency department urgently should she develop total loss of sensation, extreme pain, or pallor of the left foot  - Recommend IN  "PERSON PCP follow up for assessment of LLE for possible sciatica?    # CAD:   Single vessel CAD, dating back to 2003 at which time she had PCI of RCA. CTA with heavy calcification  - continue ASA, Plavix  - continue statin  - continue BB; reduce dose as above    # Carotid disease.    Testing 2/2020 without stenosis > 70%  - continue to monitor    # Left subclavian artery stenosis.   There was a 57 mm Hg pressure gradient between the right and left arms.  CT angiogram showed subtotal occlusion of the ostium of the left subclavian artery.   She is asymptomatic and there is no evidence of a subclavian steal phenomenon, no need for intervention at this time  - continue to monitor       Follow-up: 1 month with me via virtual visit    A total of 27 minutes spent face to face with patient. An additional 15 minutes was spent providing coordination of care, chart review, and documentation    Nelli Kuhn PA-C  Interventional/Critical Care Cardiology  [phone]        CC  Patient Care Team:  Danuta Tan PA-C as PCP - General (Physician Assistant)  Brayan Uriostegui MD as Assigned Heart and Vascular Provider  Danuta Tan PA-C as Assigned PCP  Nelli Kuhn PA-C as Physician Assistant (Interventional Cardiology)      Leann is a 71 year old who is being evaluated via a billable video visit.      How would you like to obtain your AVS? MyChart  If the video visit is dropped, the invitation should be resent by: Text to cell phone: [phone]  Will anyone else be joining your video visit? No      Vitals - Patient Reported  Systolic (Patient Reported): (!) 157  Diastolic (Patient Reported): 60  Weight (Patient Reported): 88.5 kg (195 lb)  Height (Patient Reported): 158.8 cm (5' 2.5\")  BMI (Based on Pt Reported Ht/Wt): 35.1  Pain Score: No Pain (0) (No SOB)    Vitals were taken and medications where reconciled.   Vasu Shirley, EMT  11:30 AM      Please do not hesitate to contact me if you " have any questions/concerns.     Sincerely,     Nelli Kuhn PA-C     78

## 2023-12-12 ENCOUNTER — PATIENT OUTREACH (OUTPATIENT)
Dept: CARE COORDINATION | Facility: CLINIC | Age: 73
End: 2023-12-12
Payer: COMMERCIAL

## 2023-12-15 ENCOUNTER — ANCILLARY PROCEDURE (OUTPATIENT)
Dept: MAMMOGRAPHY | Facility: CLINIC | Age: 73
End: 2023-12-15
Attending: PHYSICIAN ASSISTANT
Payer: COMMERCIAL

## 2023-12-15 DIAGNOSIS — Z12.31 VISIT FOR SCREENING MAMMOGRAM: ICD-10-CM

## 2023-12-15 PROCEDURE — 77067 SCR MAMMO BI INCL CAD: CPT | Mod: TC | Performed by: RADIOLOGY

## 2023-12-15 PROCEDURE — 77063 BREAST TOMOSYNTHESIS BI: CPT | Mod: TC | Performed by: RADIOLOGY

## 2024-01-06 ENCOUNTER — HEALTH MAINTENANCE LETTER (OUTPATIENT)
Age: 74
End: 2024-01-06

## 2024-01-08 DIAGNOSIS — I73.9 PERIPHERAL VASCULAR DISEASE (H): ICD-10-CM

## 2024-01-08 RX ORDER — DILTIAZEM HYDROCHLORIDE 300 MG/1
CAPSULE, EXTENDED RELEASE ORAL
Qty: 90 CAPSULE | Refills: 1 | Status: SHIPPED | OUTPATIENT
Start: 2024-01-08 | End: 2024-06-27

## 2024-01-10 ENCOUNTER — TELEPHONE (OUTPATIENT)
Dept: VASCULAR SURGERY | Facility: CLINIC | Age: 74
End: 2024-01-10
Payer: COMMERCIAL

## 2024-01-10 NOTE — TELEPHONE ENCOUNTER
1/10 spoke with patient to schedule follow up with imaging. Patient did not wish to schedule until she spoke to a clinical team member to go over questions she has regarding why these appointments are needed

## 2024-01-11 ENCOUNTER — TELEPHONE (OUTPATIENT)
Dept: VASCULAR SURGERY | Facility: CLINIC | Age: 74
End: 2024-01-11
Payer: COMMERCIAL

## 2024-01-11 NOTE — TELEPHONE ENCOUNTER
Called and spoke with Pt regarding message received from scheduling team regarding Pt concerns with scheduling follow up. Discussed recommended plan and purpose of imaging. Noted there is a CT under Dr Mcfarland, but that is separate. Pt verbalized understanding, agreed to current plan and denied any further questions. Scheduling will reach back out to coordinate.     Keesha Wilkinson LPN      No answer- unable to leave message- voicemail not set up.

## 2024-01-16 DIAGNOSIS — I10 ESSENTIAL (PRIMARY) HYPERTENSION: ICD-10-CM

## 2024-01-17 RX ORDER — LISINOPRIL 10 MG/1
10 TABLET ORAL DAILY
Qty: 90 TABLET | Refills: 0 | Status: SHIPPED | OUTPATIENT
Start: 2024-01-17 | End: 2024-04-15

## 2024-02-07 DIAGNOSIS — E78.5 HYPERLIPIDEMIA LDL GOAL <70: ICD-10-CM

## 2024-02-08 RX ORDER — ROSUVASTATIN CALCIUM 40 MG/1
TABLET, COATED ORAL
Qty: 90 TABLET | Refills: 1 | Status: SHIPPED | OUTPATIENT
Start: 2024-02-08 | End: 2024-09-03

## 2024-03-18 ENCOUNTER — ANCILLARY PROCEDURE (OUTPATIENT)
Dept: CT IMAGING | Facility: CLINIC | Age: 74
End: 2024-03-18
Attending: INTERNAL MEDICINE
Payer: COMMERCIAL

## 2024-03-18 ENCOUNTER — TELEPHONE (OUTPATIENT)
Dept: PHARMACY | Facility: CLINIC | Age: 74
End: 2024-03-18

## 2024-03-18 DIAGNOSIS — R91.8 PULMONARY NODULES: ICD-10-CM

## 2024-03-18 PROCEDURE — 71250 CT THORAX DX C-: CPT | Mod: GC | Performed by: RADIOLOGY

## 2024-03-18 NOTE — TELEPHONE ENCOUNTER
Called to schedule MTM appt,  no answer, LVM.     Sarah Beth Ring, PharmD  Medication Therapy Management Pharmacist  [phone]  [facility]: [phone]

## 2024-04-11 ENCOUNTER — VIRTUAL VISIT (OUTPATIENT)
Dept: PULMONOLOGY | Facility: CLINIC | Age: 74
End: 2024-04-11
Attending: INTERNAL MEDICINE
Payer: COMMERCIAL

## 2024-04-11 DIAGNOSIS — R91.8 PULMONARY NODULES: Primary | ICD-10-CM

## 2024-04-11 PROCEDURE — 99215 OFFICE O/P EST HI 40 MIN: CPT | Mod: 95 | Performed by: PHYSICIAN ASSISTANT

## 2024-04-11 NOTE — NURSING NOTE
Is the patient currently in the state of [place]? YES    Visit mode:VIDEO    If the visit is dropped, the patient can be reconnected by: VIDEO VISIT: Text to cell phone:   Telephone Information:   Mobile [phone]       Will anyone else be joining the visit? NO  (If patient encounters technical issues they should call [phone]    How would you like to obtain your AVS? MyChart    Are changes needed to the allergy or medication list? N/A    Are refills needed on medications prescribed by this physician? NO    Reason for visit: RECHECK    Jocelin DIAZ

## 2024-04-12 ENCOUNTER — MYC MEDICAL ADVICE (OUTPATIENT)
Dept: FAMILY MEDICINE | Facility: CLINIC | Age: 74
End: 2024-04-12
Payer: COMMERCIAL

## 2024-04-12 DIAGNOSIS — I10 ESSENTIAL (PRIMARY) HYPERTENSION: ICD-10-CM

## 2024-04-15 RX ORDER — LISINOPRIL 10 MG/1
10 TABLET ORAL DAILY
Qty: 90 TABLET | Refills: 1 | Status: SHIPPED | OUTPATIENT
Start: 2024-04-15 | End: 2024-09-18

## 2024-04-16 ENCOUNTER — TELEPHONE (OUTPATIENT)
Dept: PHARMACY | Facility: CLINIC | Age: 74
End: 2024-04-16
Payer: COMMERCIAL

## 2024-04-16 NOTE — TELEPHONE ENCOUNTER
We have been unable to reach this patient for MTM follow-up after several attempts. We will stop reaching out to the patient at this time. Please let us know if we can assist in this patient's care in the future.     Routing to PCP as ETHAN Ring, PharmD  Medication Therapy Management Pharmacist  [phone]

## 2024-04-24 ENCOUNTER — TELEPHONE (OUTPATIENT)
Dept: PHARMACY | Facility: OTHER | Age: 74
End: 2024-04-24
Payer: COMMERCIAL

## 2024-04-24 NOTE — TELEPHONE ENCOUNTER
MTM Recruitment: [facility] insurance     Referral outreach attempt #1 on April 24, 2024      Outcome: patient opted out    Ariadne Valenzuela PharmD, BCACP  Medication Therapy Management Pharmacist

## 2024-05-03 ENCOUNTER — PATIENT OUTREACH (OUTPATIENT)
Dept: CARE COORDINATION | Facility: CLINIC | Age: 74
End: 2024-05-03
Payer: COMMERCIAL

## 2024-05-07 ENCOUNTER — OFFICE VISIT (OUTPATIENT)
Dept: VASCULAR SURGERY | Facility: CLINIC | Age: 74
End: 2024-05-07
Payer: COMMERCIAL

## 2024-05-07 ENCOUNTER — ANCILLARY PROCEDURE (OUTPATIENT)
Dept: ULTRASOUND IMAGING | Facility: CLINIC | Age: 74
End: 2024-05-07
Attending: NURSE PRACTITIONER
Payer: COMMERCIAL

## 2024-05-07 VITALS — DIASTOLIC BLOOD PRESSURE: 81 MMHG | HEART RATE: 71 BPM | OXYGEN SATURATION: 91 % | SYSTOLIC BLOOD PRESSURE: 166 MMHG

## 2024-05-07 DIAGNOSIS — Z95.828 S/P INSERTION OF ILIAC ARTERY STENT: ICD-10-CM

## 2024-05-07 DIAGNOSIS — I73.9 PAD (PERIPHERAL ARTERY DISEASE) (H): Primary | ICD-10-CM

## 2024-05-07 DIAGNOSIS — I73.9 PAD (PERIPHERAL ARTERY DISEASE) (H): ICD-10-CM

## 2024-05-07 DIAGNOSIS — I65.29 ASYMPTOMATIC CAROTID ARTERY STENOSIS, UNSPECIFIED LATERALITY: ICD-10-CM

## 2024-05-07 DIAGNOSIS — I65.23 CAROTID STENOSIS, ASYMPTOMATIC, BILATERAL: ICD-10-CM

## 2024-05-07 PROCEDURE — 93922 UPR/L XTREMITY ART 2 LEVELS: CPT | Mod: GC | Performed by: RADIOLOGY

## 2024-05-07 PROCEDURE — 99215 OFFICE O/P EST HI 40 MIN: CPT | Performed by: NURSE PRACTITIONER

## 2024-05-07 PROCEDURE — 93880 EXTRACRANIAL BILAT STUDY: CPT | Performed by: RADIOLOGY

## 2024-05-07 NOTE — LETTER
5/7/2024       RE: Porsche Manning  [address]  [facility] [address]       Dear Colleague,    Thank you for referring your patient, Porsche Manning, to the [facility] [place] at [facility]. Please see a copy of my visit note below.        VASCULAR SURGERY PROGRESS NOTE    LOCATION: [facility]     Porsche Manning  Medical Record #:  [id number]  YOB: 1950  Age:  74 year old     Date of Service: 5/7/2024    PRIMARY CARE PROVIDER: Danuta Tan    Reason for visit: PAD, carotid stenosis     IMPRESSION / RECOMMENDATION:   Porsche Manning is a 74 years old patient with PAD, current smoker, compliant with ASA, plavix and statin s/p  iliac artery angioplasty and stenting in 2021 here for annual surveillance. ABIs last year were 0.88 on R and 0.85 on L, asymptomatic. Repeat ABIs are stable. Carotid stenosis stable, velocities without significant changes from last year. Of note, carotid duplex from 2020 demonstrated KENTRELL 50 to 69% stenosis and LICA less than 50% stenosis with subclavian steal physiology. Neurologically intact, asymptomatic. We will plan to continue with best medical therapy, recommend smoking cessation, increased walking regimen, and repeat ABIs and carotid duplex in 12 months.     Discussed warning signs of limb ischemia such as new leg pain, motor or sensory deficits, rest pain, and nonhealing wounds. We also discussed signs and symptoms of TIAs and stroke. If she experiences any of these, he has been advised to seek treatment and contact our team, patient verbalized clear understanding of the above. Information/Education: patient able to teach back. Patient agreeable to plan of care: yes    All questions were answered and support provided. She has our contact information and knows to reach out with any additional questions or concerns.     Carolina Bedolla, CNP  Vascular  Surgery  Pager: [phone]  [email or web address]  Send message or 10 digit call back number Securely via ZOOM TV with the ZOOM TV Web Console (learn more here)    45 minutes spent on the date of the encounter doing chart review, review of outside records, review of test results, interpretation of tests, patient visit, documentation and discussion with other provider(s)          HPI:  Porsche Manning is a 74 year old female with past medical history significant for PAD and carotid stenosis. Asymptomatic in upper extremities, asymptomatic in lower extremities and remains neurologically intact. Smokes approximately 5 cigarettes per day, has had episodes when was able to completely stop but unfortunately resumed. Excellent palpable radial pulses bilaterally, excellent doppler signals in ulnars and palmar arch bilaterally. Denies rest pain, claudication, numbness or tingling in upper or lower extremities, very active at baseline.     REVIEW OF SYSTEMS:    A 12 point ROS was reviewed and is negative except for what is listed above in HPI.    PHH:    Past Medical History:   Diagnosis Date    Brachial neuritis or radiculitis NOS     Chronic obstructive pulmonary disease, unspecified COPD type (H) 3/8/2016    Coronary artery disease     Doing fine    H/O tobacco use, presenting hazards to health     Hyperlipidemia LDL goal < 100     Hypertension goal BP (blood pressure) < 140/90     Malignant neoplasm of other specified sites of cervix     Old myocardial infarction     Osteopenia     Peripheral vascular disease, unspecified (H24)     Type 2 diabetes, HbA1c goal < 7% (H)           Past Surgical History:   Procedure Laterality Date    ANGIOGRAM Left 10/19/2021    Procedure: Left iliac angioplasty and stenting, Right common iliac angioplasty;  Surgeon: Britni Bob MD;  Location: [facility] OR    ANGIOPLASTY N/A 10/19/2021    Procedure: possible intervention;  Surgeon: Britni Bob MD;  Location: [facility] OR    BIOPSY      Sample  taken from back    CARDIAC SURGERY      Stents    COLONOSCOPY  1996    Results were ok    HYSTERECTOMY, PAP NO LONGER INDICATED  1989    ovaries only, no cervix per pt    IR OR ANGIOGRAM  10/19/2021    SURGICAL HISTORY OF -   1995    bunionectomy    SURGICAL HISTORY OF -   10/10/03    cardiac stenting    SURGICAL HISTORY OF -       stent placed in both of her legs for pad    VASCULAR SURGERY      PAD       ALLERGIES:  Morphine and Penicillins    MEDS:    Current Outpatient Medications:     albuterol (PROAIR HFA/PROVENTIL HFA/VENTOLIN HFA) 108 (90 Base) MCG/ACT inhaler, INHALE 2 PUFFS INTO THE LUNGS EVERY 6 HOURS, Disp: 18 g, Rfl: 1    aspirin 81 MG EC tablet, Take 81 mg by mouth daily, Disp: , Rfl:     Calcium Carbonate-Vit D-Min (CALCIUM 1200 PO), Take 600 mg by mouth daily Once every other day, Disp: , Rfl:     Cholecalciferol (VITAMIN D) 1000 UNIT capsule, Take 1 capsule by mouth daily Take one tablet daily, Disp: , Rfl:     clopidogrel (PLAVIX) 75 MG tablet, Take 1 tablet (75 mg) by mouth daily, Disp: 90 tablet, Rfl: 3    empagliflozin (JARDIANCE) 10 MG TABS tablet, Take 1 tablet (10 mg) by mouth daily, Disp: 90 tablet, Rfl: 3    fluticasone-vilanterol (BREO ELLIPTA) 100-25 MCG/ACT inhaler, Inhale 1 puff into the lungs daily, Disp: 90 each, Rfl: 3    lisinopril (ZESTRIL) 10 MG tablet, Take 1 tablet (10 mg) by mouth daily, Disp: 90 tablet, Rfl: 1    metoprolol succinate ER (TOPROL XL) 100 MG 24 hr tablet, TAKE 1 TABLET BY MOUTH EVERY DAY, Disp: 90 tablet, Rfl: 1    rosuvastatin (CRESTOR) 40 MG tablet, TAKE 1 TABLET BY MOUTH EVERY DAY, Disp: 90 tablet, Rfl: 1    TIADYLT  MG 24 hr ER beaded capsule, TAKE 1 CAPSULE BY MOUTH EVERY DAY, Disp: 90 capsule, Rfl: 1    tiotropium (SPIRIVA HANDIHALER) 18 MCG inhaled capsule, INHALE 1 CAPSULE (18 MCG) INTO THE LUNGS DAILY, Disp: 90 capsule, Rfl: 2    SOCIAL HABITS:    History   Smoking Status    Some Days    Types: Cigarettes   Smokeless Tobacco    Never      Social History    Substance and Sexual Activity      Alcohol use: Yes        Comment: 2 to 4 beers or mixed drinks weekly      History   Drug Use No       FAMILY HISTORY:    Family History   Problem Relation Age of Onset    C.A.D. Mother     Breast Cancer Mother     C.A.D. Father     Diabetes Father     Hypertension Father     C.A.D. Maternal Grandfather     C.A.JOE. Paternal Grandfather     Neurologic Disorder Brother         epilepsy       PE:  BP (!) 166/81 (BP Location: Right arm, Patient Position: Sitting, Cuff Size: Adult Regular)   Pulse 71   SpO2 91%   Wt Readings from Last 1 Encounters:   09/19/23 72.6 kg (160 lb)     There is no height or weight on file to calculate BMI.    EXAM:  GENERAL: well-developed 74 year old female who appears her stated age  CARDIAC: normal   CHEST/LUNG: normal respiratory effort   MUSCULOSKELETAL: grossly normal and both lower extremities are intact, no lower extremity edema  NEUROLOGIC: focally intact, alert and oriented x 3  PSYCH: appropriate affect  VASCULAR: Palpable radial pulses bilaterally, excellent Doppler signals over ulnar and palmar arch bilaterally.  Doppler DP and PT bilaterally. No edema, no open wounds, motor and sensory function intact all extremities (upper and lower extremities).      DIAGNOSTIC STUDIES:     Images:  US CLAUDINE Doppler No Exercise    Result Date: 5/7/2024  Exam: Bilateral lower extremity resting ankle brachial indices dated 5/7/2024 10:25 AM Comparison study: 5/12/2023 Clinical history: PAD (peripheral artery disease) (H24); Asymptomatic carotid artery stenosis, unspecified laterality Ordering provider: RED ROBERTS Technique: Bilateral lower extremity resting ankle brachial indices obtained. Findings: Right:  Arm: 169 mmHg  PT at ankle: 142 mmHg  DP at foot: 143 mmHg  CLAUDINE: 0.85 Right pulse volume recordings or VPR:  High thigh: Normal  Lower thigh: Normal  Proximal calf: Normal  Ankle: Mildly abnormal  Left:  Arm: 95 mmHg  PT at ankle: 151 mmHg  DP  at foot: 150 mmHg  CLAUDINE: 0.89 Left pulse volume recordings or VPR:  High thigh: Normal  Lower thigh: Normal  Proximal calf: Normal  Ankle: Mild to moderately abnormal     Impression: Right leg: Resting CLAUDINE is 0.85. Abnormal. VPR do no specifically localize disease. Left leg: Resting CLAUDINE is 0.89. Abnormal. Mild to moderately abnormal VPR at the level of the ankle. Guidelines: CLAUDINE Diagnostic Criteria (Based on criteria published in Circulation 2011; 124: 5728-9034):   > 1.4: Non compressible   1.00 - 1.40: Normal   0.91 - 0.99: Borderline   At or below 0.90: Abnormal I have personally reviewed the examination and initial interpretation and I agree with the findings. [employer]   SYSTEM ID:  [id number]    US Carotid Bilateral    Result Date: 5/7/2024  Exam: Bilateral carotid duplex Doppler ultrasound dated 5/7/2024 10:25 AM Clinical history: PAD (peripheral artery disease) (H24); Asymptomatic carotid artery stenosis, unspecified laterality Comparison Study: 5/12/2023 Ordering provider: RED ROBERTS Technique: Grayscale (B-mode) and duplex and spectral Doppler ultrasound of the extracranial internal carotid, external carotid, vertebral artery origins, right brachiocephalic/subclavian and left subclavian arteries. Velocity measurements obtained with angle correction at or less than 60 degrees. Findings: Right side: Plaque Morphology: Irregular predominantly hyperechoic, plaque throughout the entirety of the visualized common carotid artery with extension into the proximal internal carotid artery.    Proximal CCA: 402/22 cm/sec    Mid CCA: 78/14 cm/sec    Distal CCA: 133/22 cm/sec    Carotid bifurcation: 154/22 cm/sec    External CA: 255/20 cm/sec    Proximal ICA: 162/34 cm/sec    Mid ICA: 97/25 cm/sec    Distal ICA: 82/17 cm/sec    Vertebral artery: 113/19 cm/sec    Innominate artery: 242 cm/sec    Proximal subclavian: 15 cm/sec ICA/CCA ratio: 1.2 Left side: Plaque Morphology: Irregular predominantly hyperechoic,  plaque throughout the entirety of the visualized common carotid artery with extension into the proximal internal carotid artery.    Proximal CCA: 131/15 cm/sec    Mid CCA: 90/13 cm/sec    Distal CCA: 95/15 cm/sec    Carotid bifurcation: 124/17 cm/sec    External CA: 213/16 cm/sec    Proximal ICA: 178/30 cm/sec    Mid ICA: 74/16 cm/sec    Distal ICA: 71/13 cm/sec    Vertebral artery: Retrograde    Subclavian origin: 323 cm/sec    Proximal subclavian: 93 cm/sec ICA/CCA ratio: 1.9     Impression: 1. Right side:     Degree of stenosis of the internal carotid artery: Less than 50 %, peak velocity measures up to 162/34 cm/s previously 157/35 cm/s. Given the presence of a high-grade proximal common carotid artery stenosis, estimation of stenosis based off peak internal carotid artery velocities are likely underestimated. Overall, the velocities are not significantly changed compared to last year's study. 2. Left side:     Degree of stenosis of the internal carotid artery: 50 to 69%, based off the peak systolic velocity 178 cm/s an ICA/CCA ratio approaching 2.0 3. Continued retrograde flow in the left vertebral artery consistent with subclavian steal physiology, likely due to an occlusion at the origin based off prior CT scans. [employer] Commission Updated Recommendations for Carotid Stenosis Interpretation Criteria - October 2021. https://[email or web address].org/wp-content/uploads/2021/10/IAC-Vascular-Test nu-Rrieojvpkpldx_Fdelntl-Tqmwwqbnabybmcj-for-Carotid-Stenosis-Interpre ation-Criteria.pdf Society for Vascular Surgery Clinical Practice Guidelines for Management of Extracranial Cerebrovascular Disease. Journal of Vascular Surgery Vol. 75, Issue 1, Supplement [id number] Published online: June 18, 2021 (additional criteria adjustment for >70% ICA stenosis)      Normal           ICA PSV: < 180 cm/s           Plaque: None           ICA/CCA PSV Ratio: < 2.0           ICA EDV: < 40 cm/s      < 50%           ICA  PSV: < 180 cm/s           Plaque: < 50%           ICA/CCA PSV Ratio: < 2.0           ICA EDV: < 40 cm/s      50 - 69%           ICA PSV: < 180 - 230 cm/s           ICA/CCA PSV Ratio: 2.0 - 4.0           ICA EDV: 40 - 100 cm/s      > 70%           ICA PSV: > 230 cm/s AND           ICA/CCA PSV Ratio: > 4.0 or ICA EDV: > 100 cm/s      Total occlusion           ICA PSV: Undetectable           ICA/CCA PSV Ratio: N/A           ICA EDV: N/A [facility]   SYSTEM ID:  [id number]    LABS:      Sodium   Date Value Ref Range Status   05/12/2023 137 136 - 145 mmol/L Final   10/14/2022 137 136 - 145 mmol/L Final   04/18/2022 138 133 - 144 mmol/L Final   04/22/2021 137 133 - 144 mmol/L Final   01/28/2021 137 133 - 144 mmol/L Final   03/10/2020 134 133 - 144 mmol/L Final     Urea Nitrogen   Date Value Ref Range Status   05/12/2023 15.6 8.0 - 23.0 mg/dL Final   10/14/2022 12.9 8.0 - 23.0 mg/dL Final   04/18/2022 16 7 - 30 mg/dL Final   10/26/2021 9 7 - 30 mg/dL Final   10/19/2021 11 7 - 30 mg/dL Final   04/22/2021 13 7 - 30 mg/dL Final   01/28/2021 15 7 - 30 mg/dL Final   03/10/2020 19 7 - 30 mg/dL Final     Hemoglobin   Date Value Ref Range Status   05/12/2023 15.9 (H) 11.7 - 15.7 g/dL Final   10/14/2022 14.9 11.7 - 15.7 g/dL Final   04/18/2022 14.2 11.7 - 15.7 g/dL Final   01/28/2021 14.8 11.7 - 15.7 g/dL Final   10/22/2019 12.9 11.7 - 15.7 g/dL Final   10/25/2018 14.1 11.7 - 15.7 g/dL Final     Platelet Count   Date Value Ref Range Status   05/12/2023 254 150 - 450 10e3/uL Final   10/14/2022 270 150 - 450 10e3/uL Final   04/18/2022 253 150 - 450 10e3/uL Final   01/28/2021 260 150 - 450 10e9/L Final   10/22/2019 360 150 - 450 10e9/L Final   10/25/2018 253 150 - 450 10e9/L Final     INR   Date Value Ref Range Status   10/18/2006 0.97 0.86 - 1.14 Final   10/12/2006 1.00 0.86 - 1.14 Final   04/29/2005 1.02 0.86 - 1.14 Final         Again, thank you for allowing me to participate in the care of your patient.       Sincerely,    AARON Lara CNP

## 2024-05-07 NOTE — PROGRESS NOTES
VASCULAR SURGERY PROGRESS NOTE    LOCATION: [facility]     Porsche Manning  Medical Record #:  [id number]  YOB: 1950  Age:  74 year old     Date of Service: 5/7/2024    PRIMARY CARE PROVIDER: Danuta Tan    Reason for visit: PAD, carotid stenosis     IMPRESSION / RECOMMENDATION:   Porsche Manning is a 74 years old patient with PAD, current smoker, compliant with ASA, plavix and statin s/p  iliac artery angioplasty and stenting in 2021 here for annual surveillance. ABIs last year were 0.88 on R and 0.85 on L, asymptomatic. Repeat ABIs are stable. Carotid stenosis stable, velocities without significant changes from last year. Of note, carotid duplex from 2020 demonstrated KENTRELL 50 to 69% stenosis and LICA less than 50% stenosis with subclavian steal physiology. Neurologically intact, asymptomatic. We will plan to continue with best medical therapy, recommend smoking cessation, increased walking regimen, and repeat ABIs and carotid duplex in 12 months.     Discussed warning signs of limb ischemia such as new leg pain, motor or sensory deficits, rest pain, and nonhealing wounds. We also discussed signs and symptoms of TIAs and stroke. If she experiences any of these, he has been advised to seek treatment and contact our team, patient verbalized clear understanding of the above. Information/Education: patient able to teach back. Patient agreeable to plan of care: yes    All questions were answered and support provided. She has our contact information and knows to reach out with any additional questions or concerns.     Carolina Bedolla, [facility]  Vascular Surgery  Pager: [phone]  [email or web address]  Send message or 10 digit call back number Securely via [employer] with the Vocera Web Console (learn more here)    45 minutes spent on the date of the encounter doing chart review, review of outside records, review of test results, interpretation of tests, patient visit,  documentation and discussion with other provider(s)          HPI:  Porsche Manning is a 74 year old female with past medical history significant for PAD and carotid stenosis. Asymptomatic in upper extremities, asymptomatic in lower extremities and remains neurologically intact. Smokes approximately 5 cigarettes per day, has had episodes when was able to completely stop but unfortunately resumed. Excellent palpable radial pulses bilaterally, excellent doppler signals in ulnars and palmar arch bilaterally. Denies rest pain, claudication, numbness or tingling in upper or lower extremities, very active at baseline.     REVIEW OF SYSTEMS:    A 12 point ROS was reviewed and is negative except for what is listed above in HPI.    PHH:    Past Medical History:   Diagnosis Date    Brachial neuritis or radiculitis NOS     Chronic obstructive pulmonary disease, unspecified COPD type (H) 3/8/2016    Coronary artery disease     Doing fine    H/O tobacco use, presenting hazards to health     Hyperlipidemia LDL goal < 100     Hypertension goal BP (blood pressure) < 140/90     Malignant neoplasm of other specified sites of cervix     Old myocardial infarction     Osteopenia     Peripheral vascular disease, unspecified (H24)     Type 2 diabetes, HbA1c goal < 7% (H)           Past Surgical History:   Procedure Laterality Date    ANGIOGRAM Left 10/19/2021    Procedure: Left iliac angioplasty and stenting, Right common iliac angioplasty;  Surgeon: Britni Bob MD;  Location: [facility] OR    ANGIOPLASTY N/A 10/19/2021    Procedure: possible intervention;  Surgeon: Britni Bob MD;  Location: [facility] OR    BIOPSY      Sample taken from back    CARDIAC SURGERY      Stents    COLONOSCOPY  1996    Results were ok    HYSTERECTOMY, PAP NO LONGER INDICATED  1989    ovaries only, no cervix per pt    IR OR ANGIOGRAM  10/19/2021    SURGICAL HISTORY OF -   1995    bunionectomy    SURGICAL HISTORY OF -   10/10/03    cardiac stenting    SURGICAL HISTORY OF -        stent placed in both of her legs for pad    VASCULAR SURGERY      PAD       ALLERGIES:  Morphine and Penicillins    MEDS:    Current Outpatient Medications:     albuterol (PROAIR HFA/PROVENTIL HFA/VENTOLIN HFA) 108 (90 Base) MCG/ACT inhaler, INHALE 2 PUFFS INTO THE LUNGS EVERY 6 HOURS, Disp: 18 g, Rfl: 1    aspirin 81 MG EC tablet, Take 81 mg by mouth daily, Disp: , Rfl:     Calcium Carbonate-Vit D-Min (CALCIUM 1200 PO), Take 600 mg by mouth daily Once every other day, Disp: , Rfl:     Cholecalciferol (VITAMIN D) 1000 UNIT capsule, Take 1 capsule by mouth daily Take one tablet daily, Disp: , Rfl:     clopidogrel (PLAVIX) 75 MG tablet, Take 1 tablet (75 mg) by mouth daily, Disp: 90 tablet, Rfl: 3    empagliflozin (JARDIANCE) 10 MG TABS tablet, Take 1 tablet (10 mg) by mouth daily, Disp: 90 tablet, Rfl: 3    fluticasone-vilanterol (BREO ELLIPTA) 100-25 MCG/ACT inhaler, Inhale 1 puff into the lungs daily, Disp: 90 each, Rfl: 3    lisinopril (ZESTRIL) 10 MG tablet, Take 1 tablet (10 mg) by mouth daily, Disp: 90 tablet, Rfl: 1    metoprolol succinate ER (TOPROL XL) 100 MG 24 hr tablet, TAKE 1 TABLET BY MOUTH EVERY DAY, Disp: 90 tablet, Rfl: 1    rosuvastatin (CRESTOR) 40 MG tablet, TAKE 1 TABLET BY MOUTH EVERY DAY, Disp: 90 tablet, Rfl: 1    TIADYLT  MG 24 hr ER beaded capsule, TAKE 1 CAPSULE BY MOUTH EVERY DAY, Disp: 90 capsule, Rfl: 1    tiotropium (SPIRIVA HANDIHALER) 18 MCG inhaled capsule, INHALE 1 CAPSULE (18 MCG) INTO THE LUNGS DAILY, Disp: 90 capsule, Rfl: 2    SOCIAL HABITS:    History   Smoking Status    Some Days    Types: Cigarettes   Smokeless Tobacco    Never     Social History    Substance and Sexual Activity      Alcohol use: Yes        Comment: 2 to 4 beers or mixed drinks weekly      History   Drug Use No       FAMILY HISTORY:    Family History   Problem Relation Age of Onset    C.A.D. Mother     Breast Cancer Mother     C.A.D. Father     Diabetes Father     Hypertension Father      CHRISSY Maternal Grandfather     CHRISSY Paternal Grandfather     Neurologic Disorder Brother         epilepsy       PE:  BP (!) 166/81 (BP Location: Right arm, Patient Position: Sitting, Cuff Size: Adult Regular)   Pulse 71   SpO2 91%   Wt Readings from Last 1 Encounters:   09/19/23 72.6 kg (160 lb)     There is no height or weight on file to calculate BMI.    EXAM:  GENERAL: well-developed 74 year old female who appears her stated age  CARDIAC: normal   CHEST/LUNG: normal respiratory effort   MUSCULOSKELETAL: grossly normal and both lower extremities are intact, no lower extremity edema  NEUROLOGIC: focally intact, alert and oriented x 3  PSYCH: appropriate affect  VASCULAR: Palpable radial pulses bilaterally, excellent Doppler signals over ulnar and palmar arch bilaterally.  Doppler DP and PT bilaterally. No edema, no open wounds, motor and sensory function intact all extremities (upper and lower extremities).      DIAGNOSTIC STUDIES:     Images:  US CLAUDINE Doppler No Exercise    Result Date: 5/7/2024  Exam: Bilateral lower extremity resting ankle brachial indices dated 5/7/2024 10:25 AM Comparison study: 5/12/2023 Clinical history: PAD (peripheral artery disease) (H24); Asymptomatic carotid artery stenosis, unspecified laterality Ordering provider: RED ROBERTS Technique: Bilateral lower extremity resting ankle brachial indices obtained. Findings: Right:  Arm: 169 mmHg  PT at ankle: 142 mmHg  DP at foot: 143 mmHg  CLAUDINE: 0.85 Right pulse volume recordings or VPR:  High thigh: Normal  Lower thigh: Normal  Proximal calf: Normal  Ankle: Mildly abnormal  Left:  Arm: 95 mmHg  PT at ankle: 151 mmHg  DP at foot: 150 mmHg  CLAUDINE: 0.89 Left pulse volume recordings or VPR:  High thigh: Normal  Lower thigh: Normal  Proximal calf: Normal  Ankle: Mild to moderately abnormal     Impression: Right leg: Resting CLAUDINE is 0.85. Abnormal. VPR do no specifically localize disease. Left leg: Resting CLAUDINE is 0.89. Abnormal. Mild to moderately  abnormal VPR at the level of the ankle. Guidelines: CLAUDINE Diagnostic Criteria (Based on criteria published in Circulation 2011; 124: 1010-9491):   > 1.4: Non compressible   1.00 - 1.40: Normal   0.91 - 0.99: Borderline   At or below 0.90: Abnormal I have personally reviewed the examination and initial interpretation and I agree with the findings. DIONISIO JUAREZ   SYSTEM ID:  [id number]    US Carotid Bilateral    Result Date: 5/7/2024  Exam: Bilateral carotid duplex Doppler ultrasound dated 5/7/2024 10:25 AM Clinical history: PAD (peripheral artery disease) (H24); Asymptomatic carotid artery stenosis, unspecified laterality Comparison Study: 5/12/2023 Ordering provider: RED ROBERTS Technique: Grayscale (B-mode) and duplex and spectral Doppler ultrasound of the extracranial internal carotid, external carotid, vertebral artery origins, right brachiocephalic/subclavian and left subclavian arteries. Velocity measurements obtained with angle correction at or less than 60 degrees. Findings: Right side: Plaque Morphology: Irregular predominantly hyperechoic, plaque throughout the entirety of the visualized common carotid artery with extension into the proximal internal carotid artery.    Proximal CCA: 402/22 cm/sec    Mid CCA: 78/14 cm/sec    Distal CCA: 133/22 cm/sec    Carotid bifurcation: 154/22 cm/sec    External CA: 255/20 cm/sec    Proximal ICA: 162/34 cm/sec    Mid ICA: 97/25 cm/sec    Distal ICA: 82/17 cm/sec    Vertebral artery: 113/19 cm/sec    Innominate artery: 242 cm/sec    Proximal subclavian: 15 cm/sec ICA/CCA ratio: 1.2 Left side: Plaque Morphology: Irregular predominantly hyperechoic, plaque throughout the entirety of the visualized common carotid artery with extension into the proximal internal carotid artery.    Proximal CCA: 131/15 cm/sec    Mid CCA: 90/13 cm/sec    Distal CCA: 95/15 cm/sec    Carotid bifurcation: 124/17 cm/sec    External CA: 213/16 cm/sec    Proximal ICA: 178/30 cm/sec    Mid ICA: 74/16  cm/sec    Distal ICA: 71/13 cm/sec    Vertebral artery: Retrograde    Subclavian origin: 323 cm/sec    Proximal subclavian: 93 cm/sec ICA/CCA ratio: 1.9     Impression: 1. Right side:     Degree of stenosis of the internal carotid artery: Less than 50 %, peak velocity measures up to 162/34 cm/s previously 157/35 cm/s. Given the presence of a high-grade proximal common carotid artery stenosis, estimation of stenosis based off peak internal carotid artery velocities are likely underestimated. Overall, the velocities are not significantly changed compared to last year's study. 2. Left side:     Degree of stenosis of the internal carotid artery: 50 to 69%, based off the peak systolic velocity 178 cm/s an ICA/CCA ratio approaching 2.0 3. Continued retrograde flow in the left vertebral artery consistent with subclavian steal physiology, likely due to an occlusion at the origin based off prior CT scans. [facility] Accreditation Commission Updated Recommendations for Carotid Stenosis Interpretation Criteria - October 2021. [email or web address] [email or web address]-Jjyrbsktjjtznqn-for-Carotid-Stenosis-Interpre ation-Criteria.[email or web address] Society for Vascular Surgery Clinical Practice Guidelines for Management of Extracranial Cerebrovascular Disease. Journal of Vascular Surgery Vol. 75, Issue 1, Supplement [id number] Published online: June 18, 2021 (additional criteria adjustment for >70% ICA stenosis)      Normal           ICA PSV: < 180 cm/s           Plaque: None           ICA/CCA PSV Ratio: < 2.0           ICA EDV: < 40 cm/s      < 50%           ICA PSV: < 180 cm/s           Plaque: < 50%           ICA/CCA PSV Ratio: < 2.0           ICA EDV: < 40 cm/s      50 - 69%           ICA PSV: < 180 - 230 cm/s           ICA/CCA PSV Ratio: 2.0 - 4.0           ICA EDV: 40 - 100 cm/s      > 70%           ICA PSV: > 230 cm/s AND           ICA/CCA PSV Ratio: > 4.0 or ICA EDV: > 100 cm/s       Total occlusion           ICA PSV: Undetectable           ICA/CCA PSV Ratio: N/A           ICA EDV: N/A DIONISIO JUAREZ   SYSTEM ID:  [id number]    LABS:      Sodium   Date Value Ref Range Status   05/12/2023 137 136 - 145 mmol/L Final   10/14/2022 137 136 - 145 mmol/L Final   04/18/2022 138 133 - 144 mmol/L Final   04/22/2021 137 133 - 144 mmol/L Final   01/28/2021 137 133 - 144 mmol/L Final   03/10/2020 134 133 - 144 mmol/L Final     Urea Nitrogen   Date Value Ref Range Status   05/12/2023 15.6 8.0 - 23.0 mg/dL Final   10/14/2022 12.9 8.0 - 23.0 mg/dL Final   04/18/2022 16 7 - 30 mg/dL Final   10/26/2021 9 7 - 30 mg/dL Final   10/19/2021 11 7 - 30 mg/dL Final   04/22/2021 13 7 - 30 mg/dL Final   01/28/2021 15 7 - 30 mg/dL Final   03/10/2020 19 7 - 30 mg/dL Final     Hemoglobin   Date Value Ref Range Status   05/12/2023 15.9 (H) 11.7 - 15.7 g/dL Final   10/14/2022 14.9 11.7 - 15.7 g/dL Final   04/18/2022 14.2 11.7 - 15.7 g/dL Final   01/28/2021 14.8 11.7 - 15.7 g/dL Final   10/22/2019 12.9 11.7 - 15.7 g/dL Final   10/25/2018 14.1 11.7 - 15.7 g/dL Final     Platelet Count   Date Value Ref Range Status   05/12/2023 254 150 - 450 10e3/uL Final   10/14/2022 270 150 - 450 10e3/uL Final   04/18/2022 253 150 - 450 10e3/uL Final   01/28/2021 260 150 - 450 10e9/L Final   10/22/2019 360 150 - 450 10e9/L Final   10/25/2018 253 150 - 450 10e9/L Final     INR   Date Value Ref Range Status   10/18/2006 0.97 0.86 - 1.14 Final   10/12/2006 1.00 0.86 - 1.14 Final   04/29/2005 1.02 0.86 - 1.14 Final

## 2024-05-08 NOTE — PATIENT INSTRUCTIONS
Thank you so much for choosing us for your care. It was a pleasure to see you at the vascular clinic today.     Follow-up recommendations: We will see you back in 1 year. Please do not hesitate to reach out to us in the meantime with any concerns. Our schedulers will get in touch with you to set up your follow-up visit.     Additional testing/imaging ordered today: Repeat carotid duplex and ABIs in 1 year.        Our scheduling team will get in touch with you to set up any follow-up testing/imaging and/or appointments. Please be aware that any testing/imaging recommended today will need to completed prior to your next visit with the provider. If testing/imaging is not completed prior to your next visit, your visit may be rescheduled.        If you have any questions, please contact our clinic directly at [phone] and ask for the nurse. We also encourage the use of [employer] to communicate with your HealthCare Provider.        If you have an urgent need after business hours (8:00 am to 4:30 pm) please call [phone], option 4, and ask for the vascular attending on call. For non-urgent after hours needs, please call the vascular nurse at [phone] and leave a detailed voicemail. For scheduling needs, please call our clinic directly at [phone].    [facility] is recognized by the [facility] as a comprehensive stroke center. As part of our commitment to better patient outcomes and excellent stroke education, we attach the below stroke education materials to ALL of the after visit summaries in our vascular clinic.        Learning About BE FAST: Stroke Warning Signs  BE FAST is a simple way to remember the main symptoms of stroke. These symptoms happen suddenly. So learning what to look for helps you know when to call for medical help. BE FAST stands for:    B - Balance.  Loss of balance or trouble walking.     E - Eyes.  Trouble seeing out of one or both eyes.     F - Face.   Weakness or drooping on one side of the face.     A - Arm.  Weakness or numbness in an arm or leg.     S - Speech.  Trouble speaking.     T - Time to call 911.  Also call 911 if you have other stroke symptoms. They include:  Sudden confusion.  Sudden trouble understanding simple statements.  Fainting.  A seizure.  A sudden, severe headache.     A stroke happens when a blood vessel in the brain bursts or is blocked by a blood clot. The blood supply to part of the brain--and the oxygen the blood carries--is reduced. This damages the brain.   If you have a stroke, quick treatment may save your life. And it may reduce the damage in your brain so that you have fewer problems after the stroke.   Current as of: December 18, 2022               Content Version: 13.8    [id number] [employer].   Care instructions adapted under license by your healthcare professional. If you have questions about a medical condition or this instruction, always ask your healthcare professional. [employer] disclaims any warranty or liability for your use of this information.    Learning About How to Prevent a Stroke  What is a stroke?  A stroke is damage to the brain that occurs when a blood vessel in the brain bursts or is blocked by a blood clot. Without blood and the oxygen it carries, part of the brain starts to die. The part of the body controlled by the damaged area of the brain can't work properly.  Brain damage can start within minutes of a stroke. But quick treatment can help limit the damage and increase the chance of a full recovery.  What puts you at risk for stroke?  A risk factor is anything that makes you more likely to have a particular health problem.  Risk factors for stroke that you can manage or change include:  Health problems like atrial fibrillation, diabetes, high blood pressure, high cholesterol, hardening of the arteries (atherosclerosis), and sickle cell disease.  Smoking.  Drinking more than 2  alcoholic drinks a day for men and 1 drink a day for women.  Being overweight.  Not eating healthy foods.  Not getting enough physical activity.  Risk factors you can't change include:  Having a previous stroke.  Family history of stroke.  Being older.  Being , [place] Native, , or South  American.  Being female.  Having certain problems during pregnancy, such as preeclampsia.  Being past menopause.  Your doctor can help you know your risk. Then you and your doctor can talk about whether to take steps to lower it.  How can you help prevent a stroke?  Here are some things you can do to help prevent a stroke.  Manage health problems that raise your risk. These include atrial fibrillation, diabetes, high blood pressure, and high cholesterol.  Have a heart-healthy lifestyle.  Don't smoke. If you need help quitting, talk to your doctor about stop-smoking programs and medicines. These can increase your chances of quitting for good.  Limit alcohol to 2 drinks a day for men and 1 drink a day for women.  Stay at a healthy weight. Lose weight if you need to.  Be active. Get at least 30 minutes of exercise on most days of the week. Walking is a good choice. You also may want to do other activities, such as running, swimming, cycling, or playing tennis or team sports.  Eat heart-healthy foods. These include vegetables, fruits, nuts, beans, lean meat, fish, and whole grains. Limit sodium and sugar.  If you think you may have a problem with alcohol or drug use, talk to your doctor.  If you use hormone therapy for menopause or hormonal birth control, talk with your doctor. Ask if these are right for you. They may raise the risk of stroke in some people.  Decide with your doctor whether you will also take medicines to help lower your risk. For example, you and your doctor may decide you will take a medicine that prevents blood clots.  What are the symptoms of a stroke?  Symptoms of a stroke  happen quickly. A stroke may cause:  Sudden numbness, tingling, weakness, or loss of movement in your face, arm, or leg, especially on only one side of your body.  Sudden vision changes.  Sudden trouble speaking.  Sudden confusion or trouble understanding simple statements.  Sudden problems with walking or balance.  A sudden, severe headache that is different from past headaches.  Fainting.  A seizure.  It's important to call for medical help if you have stroke symptoms. Quick treatment may save your life. And it may reduce the damage in your brain so that you have fewer problems after the stroke.  Follow-up care is a key part of your treatment and safety. Be sure to make and go to all appointments, and call your doctor if you are having problems. It's also a good idea to know your test results and keep a list of the medicines you take.    Current as of: December 18, 2022               Content Version: 13.8    [id number] [employer].   Care instructions adapted under license by your healthcare professional. If you have questions about a medical condition or this instruction, always ask your healthcare professional. [employer] disclaims any warranty or liability for your use of this information.

## 2024-05-11 DIAGNOSIS — I73.9 PERIPHERAL VASCULAR DISEASE, UNSPECIFIED (H): ICD-10-CM

## 2024-05-13 DIAGNOSIS — I10 ESSENTIAL (PRIMARY) HYPERTENSION: ICD-10-CM

## 2024-05-13 RX ORDER — LISINOPRIL 10 MG/1
10 TABLET ORAL DAILY
Qty: 90 TABLET | Refills: 1 | OUTPATIENT
Start: 2024-05-13

## 2024-05-15 ENCOUNTER — MYC MEDICAL ADVICE (OUTPATIENT)
Dept: CARDIOLOGY | Facility: CLINIC | Age: 74
End: 2024-05-15
Payer: COMMERCIAL

## 2024-05-15 DIAGNOSIS — I25.10 CORONARY ARTERY DISEASE INVOLVING NATIVE CORONARY ARTERY OF NATIVE HEART WITHOUT ANGINA PECTORIS: Primary | ICD-10-CM

## 2024-05-15 DIAGNOSIS — I73.9 PERIPHERAL VASCULAR DISEASE, UNSPECIFIED (H): ICD-10-CM

## 2024-05-15 RX ORDER — CLOPIDOGREL BISULFATE 75 MG/1
75 TABLET ORAL DAILY
Qty: 90 TABLET | Refills: 3 | Status: SHIPPED | OUTPATIENT
Start: 2024-05-15

## 2024-05-15 NOTE — TELEPHONE ENCOUNTER
Dr. Renee, your last OV states follow-up as needed.  She is needing a refill on Plavix (CAD) do you want to see her on a yearly basis?      Sari Whelan RN  Cardiology Care Coordinator  [facility]  [phone] option 1

## 2024-05-16 ENCOUNTER — MYC MEDICAL ADVICE (OUTPATIENT)
Dept: FAMILY MEDICINE | Facility: CLINIC | Age: 74
End: 2024-05-16
Payer: COMMERCIAL

## 2024-05-16 DIAGNOSIS — I10 HYPERTENSION GOAL BP (BLOOD PRESSURE) < 140/90: ICD-10-CM

## 2024-05-16 DIAGNOSIS — I25.10 CORONARY ARTERY DISEASE DUE TO LIPID RICH PLAQUE: ICD-10-CM

## 2024-05-16 DIAGNOSIS — J44.1 COPD EXACERBATION (H): ICD-10-CM

## 2024-05-16 DIAGNOSIS — I25.83 CORONARY ARTERY DISEASE DUE TO LIPID RICH PLAQUE: ICD-10-CM

## 2024-05-17 RX ORDER — METOPROLOL SUCCINATE 100 MG/1
100 TABLET, EXTENDED RELEASE ORAL DAILY
Qty: 30 TABLET | Refills: 0 | Status: SHIPPED | OUTPATIENT
Start: 2024-05-17 | End: 2024-06-13

## 2024-05-17 RX ORDER — ALBUTEROL SULFATE 90 UG/1
2 AEROSOL, METERED RESPIRATORY (INHALATION) EVERY 6 HOURS
Qty: 8.5 G | Refills: 0 | Status: SHIPPED | OUTPATIENT
Start: 2024-05-17 | End: 2024-07-16

## 2024-05-22 RX ORDER — CLOPIDOGREL BISULFATE 75 MG/1
75 TABLET ORAL DAILY
Qty: 90 TABLET | Refills: 3 | OUTPATIENT
Start: 2024-05-22

## 2024-06-05 DIAGNOSIS — J44.9 CHRONIC OBSTRUCTIVE PULMONARY DISEASE, UNSPECIFIED COPD TYPE (H): ICD-10-CM

## 2024-06-05 RX ORDER — TIOTROPIUM BROMIDE 18 UG/1
CAPSULE ORAL; RESPIRATORY (INHALATION)
Qty: 90 CAPSULE | Refills: 0 | Status: SHIPPED | OUTPATIENT
Start: 2024-06-05 | End: 2024-08-26

## 2024-06-11 DIAGNOSIS — I25.10 CORONARY ARTERY DISEASE DUE TO LIPID RICH PLAQUE: ICD-10-CM

## 2024-06-11 DIAGNOSIS — I25.83 CORONARY ARTERY DISEASE DUE TO LIPID RICH PLAQUE: ICD-10-CM

## 2024-06-11 DIAGNOSIS — I10 HYPERTENSION GOAL BP (BLOOD PRESSURE) < 140/90: ICD-10-CM

## 2024-06-11 RX ORDER — METOPROLOL SUCCINATE 100 MG/1
100 TABLET, EXTENDED RELEASE ORAL DAILY
Qty: 90 TABLET | Refills: 1 | OUTPATIENT
Start: 2024-06-11

## 2024-06-12 DIAGNOSIS — I25.83 CORONARY ARTERY DISEASE DUE TO LIPID RICH PLAQUE: ICD-10-CM

## 2024-06-12 DIAGNOSIS — I25.10 CORONARY ARTERY DISEASE DUE TO LIPID RICH PLAQUE: ICD-10-CM

## 2024-06-12 DIAGNOSIS — I10 HYPERTENSION GOAL BP (BLOOD PRESSURE) < 140/90: ICD-10-CM

## 2024-06-13 RX ORDER — METOPROLOL SUCCINATE 100 MG/1
100 TABLET, EXTENDED RELEASE ORAL DAILY
Qty: 30 TABLET | Refills: 0 | Status: SHIPPED | OUTPATIENT
Start: 2024-06-13 | End: 2024-06-27

## 2024-06-14 DIAGNOSIS — J44.1 COPD EXACERBATION (H): ICD-10-CM

## 2024-06-14 RX ORDER — FLUTICASONE FUROATE AND VILANTEROL TRIFENATATE 100; 25 UG/1; UG/1
1 POWDER RESPIRATORY (INHALATION) DAILY
Qty: 90 EACH | Refills: 0 | Status: SHIPPED | OUTPATIENT
Start: 2024-06-14 | End: 2024-09-01

## 2024-06-24 SDOH — HEALTH STABILITY: PHYSICAL HEALTH: ON AVERAGE, HOW MANY DAYS PER WEEK DO YOU ENGAGE IN MODERATE TO STRENUOUS EXERCISE (LIKE A BRISK WALK)?: 2 DAYS

## 2024-06-24 SDOH — HEALTH STABILITY: PHYSICAL HEALTH: ON AVERAGE, HOW MANY MINUTES DO YOU ENGAGE IN EXERCISE AT THIS LEVEL?: 30 MIN

## 2024-06-24 ASSESSMENT — SOCIAL DETERMINANTS OF HEALTH (SDOH): HOW OFTEN DO YOU GET TOGETHER WITH FRIENDS OR RELATIVES?: MORE THAN THREE TIMES A WEEK

## 2024-06-27 ENCOUNTER — OFFICE VISIT (OUTPATIENT)
Dept: FAMILY MEDICINE | Facility: CLINIC | Age: 74
End: 2024-06-27
Payer: COMMERCIAL

## 2024-06-27 VITALS
HEIGHT: 61 IN | RESPIRATION RATE: 18 BRPM | WEIGHT: 154 LBS | OXYGEN SATURATION: 93 % | DIASTOLIC BLOOD PRESSURE: 58 MMHG | BODY MASS INDEX: 29.07 KG/M2 | SYSTOLIC BLOOD PRESSURE: 150 MMHG | TEMPERATURE: 97.2 F | HEART RATE: 65 BPM

## 2024-06-27 DIAGNOSIS — I25.83 CORONARY ARTERY DISEASE DUE TO LIPID RICH PLAQUE: ICD-10-CM

## 2024-06-27 DIAGNOSIS — N18.32 STAGE 3B CHRONIC KIDNEY DISEASE (H): ICD-10-CM

## 2024-06-27 DIAGNOSIS — I25.10 CORONARY ARTERY DISEASE DUE TO LIPID RICH PLAQUE: ICD-10-CM

## 2024-06-27 DIAGNOSIS — I10 HYPERTENSION GOAL BP (BLOOD PRESSURE) < 140/90: ICD-10-CM

## 2024-06-27 DIAGNOSIS — N18.31 TYPE 2 DIABETES MELLITUS WITH STAGE 3A CHRONIC KIDNEY DISEASE, WITHOUT LONG-TERM CURRENT USE OF INSULIN (H): ICD-10-CM

## 2024-06-27 DIAGNOSIS — Z00.00 ENCOUNTER FOR MEDICARE ANNUAL WELLNESS EXAM: Primary | ICD-10-CM

## 2024-06-27 DIAGNOSIS — M85.88 OSTEOPENIA OF LUMBAR SPINE: ICD-10-CM

## 2024-06-27 DIAGNOSIS — I70.228 ATHEROSCLEROSIS OF NATIVE ARTERIES OF EXTREMITIES WITH REST PAIN, OTHER EXTREMITY (H): ICD-10-CM

## 2024-06-27 DIAGNOSIS — E11.22 TYPE 2 DIABETES MELLITUS WITH STAGE 3A CHRONIC KIDNEY DISEASE, WITHOUT LONG-TERM CURRENT USE OF INSULIN (H): ICD-10-CM

## 2024-06-27 DIAGNOSIS — I73.9 PERIPHERAL VASCULAR DISEASE (H): ICD-10-CM

## 2024-06-27 LAB
ALBUMIN SERPL BCG-MCNC: 4.4 G/DL (ref 3.5–5.2)
ALP SERPL-CCNC: 88 U/L (ref 40–150)
ALT SERPL W P-5'-P-CCNC: 12 U/L (ref 0–50)
ANION GAP SERPL CALCULATED.3IONS-SCNC: 13 MMOL/L (ref 7–15)
AST SERPL W P-5'-P-CCNC: 17 U/L (ref 0–45)
BASOPHILS # BLD AUTO: 0 10E3/UL (ref 0–0.2)
BASOPHILS NFR BLD AUTO: 0 %
BILIRUB SERPL-MCNC: 0.4 MG/DL
BUN SERPL-MCNC: 12.6 MG/DL (ref 8–23)
CALCIUM SERPL-MCNC: 10.5 MG/DL (ref 8.8–10.2)
CHLORIDE SERPL-SCNC: 101 MMOL/L (ref 98–107)
CHOLEST SERPL-MCNC: 132 MG/DL
CREAT SERPL-MCNC: 1.4 MG/DL (ref 0.51–0.95)
CREAT UR-MCNC: 18.5 MG/DL
DEPRECATED HCO3 PLAS-SCNC: 24 MMOL/L (ref 22–29)
EGFRCR SERPLBLD CKD-EPI 2021: 40 ML/MIN/1.73M2
EOSINOPHIL # BLD AUTO: 0.3 10E3/UL (ref 0–0.7)
EOSINOPHIL NFR BLD AUTO: 3 %
ERYTHROCYTE [DISTWIDTH] IN BLOOD BY AUTOMATED COUNT: 12.5 % (ref 10–15)
FASTING STATUS PATIENT QL REPORTED: YES
FASTING STATUS PATIENT QL REPORTED: YES
GLUCOSE SERPL-MCNC: 131 MG/DL (ref 70–99)
HBA1C MFR BLD: 6.2 % (ref 0–5.6)
HCT VFR BLD AUTO: 47.3 % (ref 35–47)
HDLC SERPL-MCNC: 51 MG/DL
HGB BLD-MCNC: 15.6 G/DL (ref 11.7–15.7)
IMM GRANULOCYTES # BLD: 0 10E3/UL
IMM GRANULOCYTES NFR BLD: 0 %
LDLC SERPL CALC-MCNC: 54 MG/DL
LYMPHOCYTES # BLD AUTO: 1.6 10E3/UL (ref 0.8–5.3)
LYMPHOCYTES NFR BLD AUTO: 15 %
MCH RBC QN AUTO: 30.8 PG (ref 26.5–33)
MCHC RBC AUTO-ENTMCNC: 33 G/DL (ref 31.5–36.5)
MCV RBC AUTO: 94 FL (ref 78–100)
MICROALBUMIN UR-MCNC: 43.9 MG/L
MICROALBUMIN/CREAT UR: 237.3 MG/G CR (ref 0–25)
MONOCYTES # BLD AUTO: 0.8 10E3/UL (ref 0–1.3)
MONOCYTES NFR BLD AUTO: 7 %
NEUTROPHILS # BLD AUTO: 8 10E3/UL (ref 1.6–8.3)
NEUTROPHILS NFR BLD AUTO: 75 %
NONHDLC SERPL-MCNC: 81 MG/DL
PLATELET # BLD AUTO: 276 10E3/UL (ref 150–450)
POTASSIUM SERPL-SCNC: 4.1 MMOL/L (ref 3.4–5.3)
PROT SERPL-MCNC: 6.9 G/DL (ref 6.4–8.3)
RBC # BLD AUTO: 5.06 10E6/UL (ref 3.8–5.2)
SODIUM SERPL-SCNC: 138 MMOL/L (ref 135–145)
TRIGL SERPL-MCNC: 137 MG/DL
WBC # BLD AUTO: 10.7 10E3/UL (ref 4–11)

## 2024-06-27 PROCEDURE — G0439 PPPS, SUBSEQ VISIT: HCPCS | Performed by: PHYSICIAN ASSISTANT

## 2024-06-27 PROCEDURE — 82570 ASSAY OF URINE CREATININE: CPT | Performed by: PHYSICIAN ASSISTANT

## 2024-06-27 PROCEDURE — 85025 COMPLETE CBC W/AUTO DIFF WBC: CPT | Performed by: PHYSICIAN ASSISTANT

## 2024-06-27 PROCEDURE — 36415 COLL VENOUS BLD VENIPUNCTURE: CPT | Performed by: PHYSICIAN ASSISTANT

## 2024-06-27 PROCEDURE — 82043 UR ALBUMIN QUANTITATIVE: CPT | Performed by: PHYSICIAN ASSISTANT

## 2024-06-27 PROCEDURE — 80061 LIPID PANEL: CPT | Performed by: PHYSICIAN ASSISTANT

## 2024-06-27 PROCEDURE — 83036 HEMOGLOBIN GLYCOSYLATED A1C: CPT | Performed by: PHYSICIAN ASSISTANT

## 2024-06-27 PROCEDURE — 80053 COMPREHEN METABOLIC PANEL: CPT | Performed by: PHYSICIAN ASSISTANT

## 2024-06-27 RX ORDER — METOPROLOL SUCCINATE 100 MG/1
100 TABLET, EXTENDED RELEASE ORAL DAILY
Qty: 90 TABLET | Refills: 1 | Status: SHIPPED | OUTPATIENT
Start: 2024-06-27

## 2024-06-27 RX ORDER — DILTIAZEM HYDROCHLORIDE 300 MG/1
300 CAPSULE, EXTENDED RELEASE ORAL DAILY
Qty: 90 CAPSULE | Refills: 1 | Status: SHIPPED | OUTPATIENT
Start: 2024-06-27

## 2024-06-27 ASSESSMENT — PAIN SCALES - GENERAL: PAINLEVEL: NO PAIN (0)

## 2024-06-27 NOTE — PATIENT INSTRUCTIONS
"Patient Education   Preventive Care Advice   This is general advice we often give to help people stay healthy. Your care team may have specific advice just for you. Please talk to your care team about your own preventive care needs.  Lifestyle  Exercise at least 150 minutes each week (30 minutes a day, 5 days a week).  Do muscle strengthening activities 2 days a week. These help control your weight and prevent disease.  No smoking.  Wear sunscreen to prevent skin cancer.  Have your home tested for radon every 2 to 5 years. Radon is a colorless, odorless gas that can harm your lungs. To learn more, go to www.health.[facility].[place].us and search for \"Radon in Homes.\"  Keep guns unloaded and locked up in a safe place like a safe or gun vault, or, use a gun lock and hide the keys. Always lock away bullets separately. To learn more, visit [employer].[place].gov and search for \"safe gun storage.\"  Nutrition  Eat 5 or more servings of fruits and vegetables each day.  Try wheat bread, brown rice and whole grain pasta (instead of white bread, rice, and pasta).  Get enough calcium and vitamin D. Check the label on foods and aim for 100% of the RDA (recommended daily allowance).  Regular exams  Have a dental exam and cleaning every 6 months.  See your health care team every year to talk about:  Any changes in your health.  Any medicines your care team has prescribed.  Preventive care, family planning, and ways to prevent chronic diseases.  Shots (vaccines)   HPV shots (up to age 26), if you've never had them before.  Hepatitis B shots (up to age 59), if you've never had them before.  COVID-19 shot: Get this shot when it's due.  Flu shot: Get a flu shot every year.  Tetanus shot: Get a tetanus shot every 10 years.  Pneumococcal, hepatitis A, and RSV shots: Ask your care team if you need these based on your risk.  Shingles shot (for age 50 and up).  General health tests  Diabetes screening:  Starting at age 35, Get screened for diabetes at least " every 3 years.  If you are younger than age 35, ask your care team if you should be screened for diabetes.  Cholesterol test: At age 39, start having a cholesterol test every 5 years, or more often if advised.  Bone density scan (DEXA): At age 50, ask your care team if you should have this scan for osteoporosis (brittle bones).  Hepatitis C: Get tested at least once in your life.  Abdominal aortic aneurysm screening: Talk to your doctor about having this screening if you:  Have ever smoked; and  Are biologically male; and  Are between the ages of 65 and 75.  STIs (sexually transmitted infections)  Before age 24: Ask your care team if you should be screened for STIs.  After age 24: Get screened for STIs if you're at risk. You are at risk for STIs (including HIV) if:  You are sexually active with more than one person.  You don't use condoms every time.  You or a partner was diagnosed with a sexually transmitted infection.  If you are at risk for HIV, ask about PrEP medicine to prevent HIV.  Get tested for HIV at least once in your life, whether you are at risk for HIV or not.  Cancer screening tests  Cervical cancer screening: If you have a cervix, begin getting regular cervical cancer screening tests at age 21. Most people who have regular screenings with normal results can stop after age 65. Talk about this with your provider.  Breast cancer scan (mammogram): If you've ever had breasts, begin having regular mammograms starting at age 40. This is a scan to check for breast cancer.  Colon cancer screening: It is important to start screening for colon cancer at age 45.  Have a colonoscopy test every 10 years (or more often if you're at risk) Or, ask your provider about stool tests like a FIT test every year or Cologuard test every 3 years.  To learn more about your testing options, visit: www.[employer]/528057.pdf.  For help making a decision, visit: ta/yr79645.  Prostate cancer screening test: If you have a  prostate and are age 55 to 69, ask your provider if you would benefit from a yearly prostate cancer screening test.  Lung cancer screening: If you are a current or former smoker age 50 to 80, ask your care team if ongoing lung cancer screenings are right for you.  For informational purposes only. Not to replace the advice of your health care provider. Copyright   2023 [facility]. All rights reserved. Clinically reviewed by the [facility] Transitions Program. [employer] [id number] - REV 04/24.

## 2024-06-27 NOTE — PROGRESS NOTES
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)    Relevant Orders    HEMOGLOBIN A1C (Completed)    Lipid panel reflex to direct LDL Non-fasting    CBC with Platelets & Differential    Comprehensive metabolic panel    Hypertension: MEASURE BP IN RIGHT ARM ONLY    Relevant Medications    metoprolol succinate ER (TOPROL XL) 100 MG 24 hr tablet    Atherosclerosis of native arteries of extremities with rest pain, other extremity (H)    Peripheral vascular disease (H24)    Relevant Medications    diltiazem ER (TIADYLT ER) 300 MG 24 hr ER beaded capsule    Osteopenia    Stage 3b chronic kidney disease (H)    Relevant Orders    Albumin Random Urine Quantitative with Creat Ratio     Other Visit Diagnoses       Encounter for Medicare annual wellness exam    -  Primary    Relevant Orders    PRIMARY CARE FOLLOW-UP SCHEDULING    REVIEW OF HEALTH MAINTENANCE PROTOCOL ORDERS (Completed)    Coronary artery disease due to lipid rich plaque        Relevant Medications    metoprolol succinate ER (TOPROL XL) 100 MG 24 hr tablet             Patient has been advised of split billing requirements and indicates understanding: Yes       Nicotine/Tobacco Cessation  She reports that she has been smoking cigarettes. She has a 10 pack-year smoking history. She has never used smokeless tobacco.  Nicotine/Tobacco Cessation Plan  Information offered: Patient not interested at this time      BMI  Estimated body mass index is 29.1 kg/m  as calculated from the following:    Height as of this encounter: 1.549 m (5' 1\").    Weight as of this encounter: 69.9 kg (154 lb).       Counseling  Appropriate preventive services were discussed with this patient, including applicable screening as appropriate for fall prevention, nutrition, physical activity, Tobacco-use " cessation, weight loss and cognition.  Checklist reviewing preventive services available has been given to the patient.  Reviewed patient's diet, addressing concerns and/or questions.   She is at risk for lack of exercise and has been provided with information to increase physical activity for the benefit of her well-being.     See Patient Instructions      Subjective   Leann is a 74 year old, presenting for the following:  Physical (AWV)        6/27/2024    10:10 AM   Additional Questions   Roomed by Gabriela THOMPSON         6/27/2024   Forms   Any forms needing to be completed Yes          Health Care Directive  Patient has a Health Care Directive on file  Advance care planning document is on file and is current.    HPI    Wellness Visit Notes:    -Last mammo done 12/2023, due 12/2024 (impression: Negative)  -Last DEXA done 12/2017, due 10/2020 (impression: Osteopenia) Patient declines.   -Last colon cancer screening done 3/2018, due 3/2028 (impression: Normal, no specimens collected. Repeat 10 years.)  -Diabetic Foot Exam: Provider to perform  -Eye exam: Last done 8/2023  -A1C: Due Today  -Immunizations: Covid Booster - Patient wants to wait until new vaccine   RSV: Patient aware to receive at a pharmacy   - ACP: Health Care POA on file from 2000. HCAs are Britta, Reviewed With Patient.    BP readings at home within normal limits and even low.        6/24/2024   General Health   How would you rate your overall physical health? Good   Feel stress (tense, anxious, or unable to sleep) Not at all            6/24/2024   Nutrition   Diet: Regular (no restrictions)            6/24/2024   Exercise   Days per week of moderate/strenous exercise 2 days   Average minutes spent exercising at this level 30 min      (!) EXERCISE CONCERN      6/24/2024   Social Factors   Frequency of gathering with friends or relatives More than three times a week   Worry food won't last until get money to buy more No   Food not last or  not have enough money for food? No   Do you have housing? (Housing is defined as stable permanent housing and does not include staying ouside in a car, in a tent, in an abandoned building, in an overnight shelter, or couch-surfing.) Yes   Are you worried about losing your housing? No   Lack of transportation? No   Unable to get utilities (heat,electricity)? No            6/24/2024   Fall Risk   Fallen 2 or more times in the past year? No    No   Trouble with walking or balance? No    No       Multiple values from one day are sorted in reverse-chronological order          6/24/2024   Activities of Daily Living- Home Safety   Needs help with the following daily activites None of the above   Safety concerns in the home None of the above            6/24/2024   Dental   Dentist two times every year? Yes            6/24/2024   Hearing Screening   Hearing concerns? None of the above            6/24/2024   Driving Risk Screening   Patient/family members have concerns about driving No            6/24/2024   General Alertness/Fatigue Screening   Have you been more tired than usual lately? No            6/24/2024   Urinary Incontinence Screening   Bothered by leaking urine in past 6 months No            6/24/2024   TB Screening   Were you born outside of the [place]? No            Today's PHQ-2 Score:       6/26/2024    10:40 AM   PHQ-2 ( 1999 Pf[employer])   Q1: Little interest or pleasure in doing things 0   Q2: Feeling down, depressed or hopeless 0   PHQ-2 Score 0   Q1: Little interest or pleasure in doing things Not at all   Q2: Feeling down, depressed or hopeless Not at all   PHQ-2 Score 0           6/24/2024   Substance Use   If I could quit smoking, I would Completely agree   I want to quit somking, worry about health affects Completely agree   Willing to make a plan to quit smoking Completely agree   Willing to cut down before quitting Completely agree   Alcohol more than 3/day or more than 7/wk No   Do you have a current opioid  prescription? No   How severe/bad is pain from 1 to 10? 0/10 (No Pain)   Do you use any other substances recreationally? No        Social History     Tobacco Use     Smoking status: Every Day     Current packs/day: 0.00     Average packs/day: 0.3 packs/day for 40.0 years (10.0 ttl pk-yrs)     Types: Cigarettes     Last attempt to quit: 2018     Years since quittin.1     Smokeless tobacco: Never     Tobacco comments:     5b or less cigareetes day   Vaping Use     Vaping status: Never Used   Substance Use Topics     Alcohol use: Yes     Comment: 2 to 4 beers or mixed drinks weekly     Drug use: No           12/15/2023   LAST FHS-7 RESULTS   1st degree relative breast or ovarian cancer Yes   Any relative bilateral breast cancer No   Any male have breast cancer No   Any ONE woman have BOTH breast AND ovarian cancer No   Any woman with breast cancer before 50yrs No   2 or more relatives with breast AND/OR ovarian cancer No   2 or more relatives with breast AND/OR bowel cancer No           Mammogram Screening - Mammogram every 1-2 years updated in Health Maintenance based on mutual decision making    ASCVD Risk   The ASCVD Risk score (Phyllis PARRA, et al., 2019) failed to calculate for the following reasons:    The patient has a prior MI or stroke diagnosis            Reviewed and updated as needed this visit by Provider   Tobacco  Allergies  Meds  Problems  Med Hx  Surg Hx  Fam Hx            Past Medical History:   Diagnosis Date     Brachial neuritis or radiculitis NOS      Chronic obstructive pulmonary disease, unspecified COPD type (H) 3/8/2016     Coronary artery disease     Doing fine     H/O tobacco use, presenting hazards to health      Hyperlipidemia LDL goal < 100      Hypertension goal BP (blood pressure) < 140/90      Malignant neoplasm of other specified sites of cervix      Old myocardial infarction      Osteopenia      Peripheral vascular disease, unspecified (H24)      Type 2  diabetes, HbA1c goal < 7% (H)      Past Surgical History:   Procedure Laterality Date     ANGIOGRAM Left 10/19/2021    Procedure: Left iliac angioplasty and stenting, Right common iliac angioplasty;  Surgeon: Britni Bob MD;  Location: [facility] OR     ANGIOPLASTY N/A 10/19/2021    Procedure: possible intervention;  Surgeon: Britni Bob MD;  Location: [facility] OR     BIOPSY      Sample taken from back     CARDIAC SURGERY      Stents     COLONOSCOPY  1996    Results were ok     HYSTERECTOMY, PAP NO LONGER INDICATED  1989    ovaries only, no cervix per pt     IR OR ANGIOGRAM  10/19/2021     SURGICAL HISTORY OF -   1995    bunionectomy     SURGICAL HISTORY OF -   10/10/03    cardiac stenting     SURGICAL HISTORY OF -       stent placed in both of her legs for pad     VASCULAR SURGERY      PAD     Labs reviewed in EPIC  BP Readings from Last 3 Encounters:   06/27/24 (!) 150/58   05/07/24 (!) 166/81   06/02/23 (!) 148/62    Wt Readings from Last 3 Encounters:   06/27/24 69.9 kg (154 lb)   09/19/23 72.6 kg (160 lb)   06/02/23 73.7 kg (162 lb 6.4 oz)                  Patient Active Problem List   Diagnosis     Old myocardial infarction     Peripheral vascular disease (H24)     HYPERLIPIDEMIA LDL GOAL <100     Hypertension: MEASURE BP IN RIGHT ARM ONLY     Osteopenia     Vitamin D deficiency disease     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)     Subclavian artery stenosis, left (H24)     Stage 3b chronic kidney disease (H)     Chronic obstructive pulmonary disease, unspecified COPD type (H)     Pulmonary nodules     Atherosclerosis of native arteries of extremities with rest pain, other extremity (H)     Past Surgical History:   Procedure Laterality Date     ANGIOGRAM Left 10/19/2021    Procedure: Left iliac angioplasty and stenting, Right common iliac angioplasty;  Surgeon: Britni Bob MD;  Location: [facility] OR     ANGIOPLASTY N/A 10/19/2021    Procedure: possible intervention;  Surgeon: Britni Bob MD;  Location:  [facility] OR     BIOPSY      Sample taken from back     CARDIAC SURGERY      Stents     COLONOSCOPY      Results were ok     HYSTERECTOMY, PAP NO LONGER INDICATED      ovaries only, no cervix per pt     IR OR ANGIOGRAM  10/19/2021     SURGICAL HISTORY OF -       bunionectomy     SURGICAL HISTORY OF -   10/10/03    cardiac stenting     SURGICAL HISTORY OF -       stent placed in both of her legs for pad     VASCULAR SURGERY      PAD       Social History     Tobacco Use     Smoking status: Every Day     Current packs/day: 0.00     Average packs/day: 0.3 packs/day for 40.0 years (10.0 ttl pk-yrs)     Types: Cigarettes     Last attempt to quit: 2018     Years since quittin.1     Smokeless tobacco: Never     Tobacco comments:     5b or less cigareetes day   Substance Use Topics     Alcohol use: Yes     Comment: 2 to 4 beers or mixed drinks weekly     Family History   Problem Relation Age of Onset     C.A.D. Mother      Breast Cancer Mother      C.A.D. Father      Diabetes Father      Hypertension Father      C.A.D. Maternal Grandfather      C.A.D. Paternal Grandfather      Neurologic Disorder Brother         epilepsy         Current Outpatient Medications   Medication Sig Dispense Refill     albuterol (PROAIR HFA/PROVENTIL HFA/VENTOLIN HFA) 108 (90 Base) MCG/ACT inhaler INHALE 2 PUFFS INTO THE LUNGS EVERY 6 HOURS 8.5 g 0     aspirin 81 MG EC tablet Take 81 mg by mouth daily       Calcium Carbonate-Vit D-Min (CALCIUM 1200 PO) Take 600 mg by mouth daily Once every other day       Cholecalciferol (VITAMIN D) 1000 UNIT capsule Take 1 capsule by mouth daily Take one tablet daily       clopidogrel (PLAVIX) 75 MG tablet Take 1 tablet (75 mg) by mouth daily 90 tablet 3     diltiazem ER (TIADYLT ER) 300 MG 24 hr ER beaded capsule Take 1 capsule (300 mg) by mouth daily 90 capsule 1     empagliflozin (JARDIANCE) 10 MG TABS tablet Take 1 tablet (10 mg) by mouth daily 90 tablet 3     fluticasone-vilanterol (BREO  ELLIPTA) 100-25 MCG/ACT inhaler TAKE 1 PUFF BY MOUTH EVERY DAY 90 each 0     lisinopril (ZESTRIL) 10 MG tablet Take 1 tablet (10 mg) by mouth daily 90 tablet 1     metoprolol succinate ER (TOPROL XL) 100 MG 24 hr tablet Take 1 tablet (100 mg) by mouth daily 90 tablet 1     rosuvastatin (CRESTOR) 40 MG tablet TAKE 1 TABLET BY MOUTH EVERY DAY 90 tablet 1     tiotropium (SPIRIVA HANDIHALER) 18 MCG inhaled capsule INHALE 1 CAPSULE (18 MCG) INTO THE LUNGS DAILY 90 capsule 0     Allergies   Allergen Reactions     Morphine Nausea and Vomiting     Penicillins Nausea and Vomiting     Recent Labs   Lab Test 06/27/24  1052 05/12/23  0937 11/17/22  1138 10/14/22  1004 04/18/22  1058 10/19/21  0705 10/15/21  1435 07/27/21  1019 07/20/21  1202 04/22/21  1044 01/28/21  1004 11/12/19  0920 10/22/19  1012   A1C 6.2* 6.6* 6.1*  --  6.4*  --  6.0*   < >  --  6.7* 6.7*  --  6.5*   LDL  --  66  --   --  75  --   --   --  63  --  73  --  79   HDL  --  58  --   --  63  --   --   --  57  --  62  --  53   TRIG  --  137  --   --  117  --   --   --  172*  --  166*  --  135   ALT  --   --   --   --   --   --  27  --   --  24 30   < > 18   CR  --  1.26*  --  1.09* 1.10*   < > 0.90   < >  --  0.97 1.07*   < > 1.10*   GFRESTIMATED  --  45*  --  54* 53*   < > 65   < >  --  59* 53*   < > 51*   GFRESTBLACK  --   --   --   --   --   --   --   --   --  69 61   < > 60*   POTASSIUM  --  3.9  --  4.2 4.2   < > 3.7   < >  --  4.4 3.7   < > 4.3   TSH  --   --   --   --   --   --   --   --   --  1.14  --   --  1.92    < > = values in this interval not displayed.      Current providers sharing in care for this patient include:  Patient Care Team:  Danuta Tan PA-C as PCP - General (Physician Assistant)  Danuta Tan PA-C as Assigned PCP  Nelli Kuhn PA-C as Physician Assistant (Interventional Cardiology)  Patience Wellington PA-C as Physician Assistant (Nephrology)  Nalini Ring [facility] as Assigned MTM Pharmacist  Can,  MD Arleth as Assigned Heart and Vascular Provider  Blayne Mcfarland MD as MD (Critical Care)  Carolina Bedolla APRN CNP as Assigned Surgical Provider  Blayne Mcfarland MD as Assigned Pulmonology Provider  Kira Welch PA-C as Assigned Cancer Care Provider    The following health maintenance items are reviewed in Epic and correct as of today:  Health Maintenance   Topic Date Due     RSV VACCINE (Pregnancy & 60+) (1 - 1-dose 60+ series) Never done     DEXA  10/24/2020     MICROALBUMIN  10/22/2021     DIABETIC FOOT EXAM  11/17/2023     Medicare Annual MTM Pharmacist Visit (once per calendar year)  01/01/2024     COVID-19 Vaccine (6 - 2023-24 season) 02/04/2024     BMP  05/12/2024     LIPID  05/12/2024     HEMOGLOBIN  05/12/2024     EYE EXAM  08/07/2024     MAMMO SCREENING  12/15/2024     A1C  12/27/2024     LUNG CANCER SCREENING  03/18/2025     NICOTINE/TOBACCO CESSATION COUNSELING Q 1 YR  06/27/2025     MEDICARE ANNUAL WELLNESS VISIT  06/27/2025     ANNUAL REVIEW OF HM ORDERS  06/27/2025     FALL RISK ASSESSMENT  06/27/2025     DTAP/TDAP/TD IMMUNIZATION (3 - Td or Tdap) 10/24/2027     COLORECTAL CANCER SCREENING  03/27/2028     ADVANCE CARE PLANNING  06/27/2029     PARATHYROID  Completed     PHOSPHORUS  Completed     SPIROMETRY  Completed     HEPATITIS C SCREENING  Completed     COPD ACTION PLAN  Completed     PHQ-2 (once per calendar year)  Completed     INFLUENZA VACCINE  Completed     Pneumococcal Vaccine: 65+ Years  Completed     URINALYSIS  Completed     ALK PHOS  Completed     ZOSTER IMMUNIZATION  Completed     IPV IMMUNIZATION  Aged Out     HPV IMMUNIZATION  Aged Out     MENINGITIS IMMUNIZATION  Aged Out     RSV MONOCLONAL ANTIBODY  Aged Out         Review of Systems  Constitutional, neuro, ENT, endocrine, pulmonary, cardiac, gastrointestinal, genitourinary, musculoskeletal, integument and psychiatric systems are negative, except as otherwise noted.     Objective    Exam  BP (!) 150/58 (BP Location:  "Right arm, Patient Position: Sitting, Cuff Size: Adult Regular)   Pulse 65   Temp 97.2  F (36.2  C) (Temporal)   Resp 18   Ht 1.549 m (5' 1\")   Wt 69.9 kg (154 lb)   SpO2 93%   BMI 29.10 kg/m     Estimated body mass index is 29.1 kg/m  as calculated from the following:    Height as of this encounter: 1.549 m (5' 1\").    Weight as of this encounter: 69.9 kg (154 lb).    Physical Exam  GENERAL: alert and no distress  EYES: Eyes grossly normal to inspection, PERRL and conjunctivae and sclerae normal  HENT: ear canals and TM's normal, nose and mouth without ulcers or lesions  NECK: no adenopathy, no asymmetry, masses, or scars  RESP: lungs clear to auscultation - no rales, rhonchi or wheezes  CV: regular rate and rhythm, normal S1 S2, no S3 or S4, no murmur, click or rub, no peripheral edema  MS: no gross musculoskeletal defects noted, no edema  SKIN: no suspicious lesions or rashes  NEURO: Normal strength and tone, mentation intact and speech normal  PSYCH: mentation appears normal, affect normal/bright         6/27/2024   Mini Cog   Clock Draw Score 2 Normal   3 Item Recall 3 objects recalled   Mini Cog Total Score 5                 Signed Electronically by: Eduard Tan PA-C    "

## 2024-06-28 NOTE — RESULT ENCOUNTER NOTE
"Daisy Noriega  Your attached labs are overall stable, but it make sure you are drinking plenty of water/fluids to protect your kidneys. It may be a good idea to follow up with nephrology (kidney specialists) as well. Looks like your last visit with them was 1 year ago with plans to follow up in 6-12 months.    Please contact the office with any questions or concerns.    Danuta Martinez \"Eduard\" PHILL Tan    "

## 2024-07-16 ENCOUNTER — MYC MEDICAL ADVICE (OUTPATIENT)
Dept: CARDIOLOGY | Facility: CLINIC | Age: 74
End: 2024-07-16
Payer: COMMERCIAL

## 2024-07-16 DIAGNOSIS — J44.1 COPD EXACERBATION (H): ICD-10-CM

## 2024-07-16 RX ORDER — ALBUTEROL SULFATE 90 UG/1
2 AEROSOL, METERED RESPIRATORY (INHALATION) EVERY 6 HOURS
Qty: 18 G | Refills: 2 | Status: SHIPPED | OUTPATIENT
Start: 2024-07-16

## 2024-07-18 NOTE — TELEPHONE ENCOUNTER
Patient returned message with update blood pressure reading.      BP Readings from Last 3 Encounters:   06/27/24 (!) 150/58   05/07/24 (!) 166/81   06/02/23 (!) 148/62       Today's Blood Pressure: 118/83  Today's Heart Rate: 62  Location: Home BP    Patient reported blood pressure updated in Epic. Blood pressure falls within [place] Community Measures guidelines.  Patient will follow up as previously advised.    SAGE Delvalle

## 2024-08-08 ENCOUNTER — TRANSFERRED RECORDS (OUTPATIENT)
Dept: HEALTH INFORMATION MANAGEMENT | Facility: CLINIC | Age: 74
End: 2024-08-08
Payer: COMMERCIAL

## 2024-08-08 LAB — RETINOPATHY: NEGATIVE

## 2024-08-26 DIAGNOSIS — J44.9 CHRONIC OBSTRUCTIVE PULMONARY DISEASE, UNSPECIFIED COPD TYPE (H): ICD-10-CM

## 2024-08-26 RX ORDER — TIOTROPIUM BROMIDE 18 UG/1
CAPSULE ORAL; RESPIRATORY (INHALATION)
Qty: 90 CAPSULE | Refills: 0 | Status: SHIPPED | OUTPATIENT
Start: 2024-08-26

## 2024-08-31 DIAGNOSIS — J44.1 COPD EXACERBATION (H): ICD-10-CM

## 2024-09-01 RX ORDER — FLUTICASONE FUROATE AND VILANTEROL TRIFENATATE 100; 25 UG/1; UG/1
1 POWDER RESPIRATORY (INHALATION) DAILY
Qty: 180 EACH | Refills: 1 | Status: SHIPPED | OUTPATIENT
Start: 2024-09-01

## 2024-09-03 ENCOUNTER — MYC MEDICAL ADVICE (OUTPATIENT)
Dept: FAMILY MEDICINE | Facility: CLINIC | Age: 74
End: 2024-09-03
Payer: COMMERCIAL

## 2024-09-03 DIAGNOSIS — E78.5 HYPERLIPIDEMIA LDL GOAL <70: ICD-10-CM

## 2024-09-03 RX ORDER — ROSUVASTATIN CALCIUM 40 MG/1
40 TABLET, COATED ORAL DAILY
Qty: 90 TABLET | Refills: 1 | Status: SHIPPED | OUTPATIENT
Start: 2024-09-03

## 2024-09-18 DIAGNOSIS — I10 ESSENTIAL (PRIMARY) HYPERTENSION: ICD-10-CM

## 2024-09-18 RX ORDER — LISINOPRIL 10 MG/1
10 TABLET ORAL DAILY
Qty: 90 TABLET | Refills: 1 | Status: SHIPPED | OUTPATIENT
Start: 2024-09-18

## 2024-10-01 DIAGNOSIS — I73.9 PERIPHERAL VASCULAR DISEASE (H): ICD-10-CM

## 2024-10-01 DIAGNOSIS — I10 HYPERTENSION GOAL BP (BLOOD PRESSURE) < 140/90: ICD-10-CM

## 2024-10-01 DIAGNOSIS — I25.10 CORONARY ARTERY DISEASE DUE TO LIPID RICH PLAQUE: ICD-10-CM

## 2024-10-01 DIAGNOSIS — I25.83 CORONARY ARTERY DISEASE DUE TO LIPID RICH PLAQUE: ICD-10-CM

## 2024-10-01 RX ORDER — METOPROLOL SUCCINATE 100 MG/1
100 TABLET, EXTENDED RELEASE ORAL DAILY
Qty: 90 TABLET | Refills: 1 | OUTPATIENT
Start: 2024-10-01

## 2024-10-01 RX ORDER — DILTIAZEM HYDROCHLORIDE 300 MG/1
300 CAPSULE, EXTENDED RELEASE ORAL DAILY
Qty: 90 CAPSULE | Refills: 1 | OUTPATIENT
Start: 2024-10-01

## 2024-10-23 DIAGNOSIS — N18.31 STAGE 3A CHRONIC KIDNEY DISEASE (H): ICD-10-CM

## 2024-10-29 ENCOUNTER — MYC MEDICAL ADVICE (OUTPATIENT)
Dept: FAMILY MEDICINE | Facility: CLINIC | Age: 74
End: 2024-10-29
Payer: COMMERCIAL

## 2024-10-30 NOTE — TELEPHONE ENCOUNTER
Duplicate encounter. Refill request already sent to Tiffany Vann MD and is awaiting response.    Judith BURNETT RN

## 2024-11-15 ENCOUNTER — PATIENT OUTREACH (OUTPATIENT)
Dept: CARE COORDINATION | Facility: CLINIC | Age: 74
End: 2024-11-15
Payer: COMMERCIAL

## 2024-11-26 ENCOUNTER — OFFICE VISIT (OUTPATIENT)
Dept: URGENT CARE | Facility: URGENT CARE | Age: 74
End: 2024-11-26
Payer: COMMERCIAL

## 2024-11-26 ENCOUNTER — MYC MEDICAL ADVICE (OUTPATIENT)
Dept: FAMILY MEDICINE | Facility: CLINIC | Age: 74
End: 2024-11-26

## 2024-11-26 ENCOUNTER — ANCILLARY PROCEDURE (OUTPATIENT)
Dept: GENERAL RADIOLOGY | Facility: CLINIC | Age: 74
End: 2024-11-26
Attending: PHYSICIAN ASSISTANT
Payer: COMMERCIAL

## 2024-11-26 VITALS
HEART RATE: 68 BPM | BODY MASS INDEX: 27 KG/M2 | DIASTOLIC BLOOD PRESSURE: 79 MMHG | RESPIRATION RATE: 20 BRPM | TEMPERATURE: 97.8 F | WEIGHT: 142.9 LBS | OXYGEN SATURATION: 100 % | SYSTOLIC BLOOD PRESSURE: 115 MMHG

## 2024-11-26 DIAGNOSIS — J18.9 PNEUMONIA OF BOTH LOWER LOBES DUE TO INFECTIOUS ORGANISM: Primary | ICD-10-CM

## 2024-11-26 DIAGNOSIS — Z87.898 H/O MULTIPLE PULMONARY NODULES: ICD-10-CM

## 2024-11-26 DIAGNOSIS — N18.32 STAGE 3B CHRONIC KIDNEY DISEASE (H): ICD-10-CM

## 2024-11-26 DIAGNOSIS — E11.65 TYPE 2 DIABETES MELLITUS WITH HYPERGLYCEMIA, UNSPECIFIED WHETHER LONG TERM INSULIN USE (H): ICD-10-CM

## 2024-11-26 DIAGNOSIS — R05.1 ACUTE COUGH: ICD-10-CM

## 2024-11-26 DIAGNOSIS — J44.9 CHRONIC OBSTRUCTIVE PULMONARY DISEASE, UNSPECIFIED COPD TYPE (H): ICD-10-CM

## 2024-11-26 PROCEDURE — 99214 OFFICE O/P EST MOD 30 MIN: CPT | Performed by: PHYSICIAN ASSISTANT

## 2024-11-26 PROCEDURE — 71046 X-RAY EXAM CHEST 2 VIEWS: CPT | Mod: TC | Performed by: RADIOLOGY

## 2024-11-26 RX ORDER — CEFDINIR 300 MG/1
300 CAPSULE ORAL DAILY
Qty: 10 CAPSULE | Refills: 0 | Status: SHIPPED | OUTPATIENT
Start: 2024-11-26 | End: 2024-12-06

## 2024-11-26 RX ORDER — AZITHROMYCIN 250 MG/1
TABLET, FILM COATED ORAL
Qty: 6 TABLET | Refills: 0 | Status: SHIPPED | OUTPATIENT
Start: 2024-11-26

## 2024-11-26 ASSESSMENT — PAIN SCALES - GENERAL: PAINLEVEL_OUTOF10: NO PAIN (0)

## 2024-11-26 NOTE — PROGRESS NOTES
Chief Complaint   Patient presents with    URI     Wet cough for the past week, sob, runny nose, no fever, fatigue        Chest x-ray-I see increased haziness    Results for orders placed or performed in visit on 11/26/24   XR Chest 2 Views     Status: None    Narrative    XR CHEST 2 VIEWS 11/26/2024 4:15 PM    HISTORY: Acute cough; H/O multiple pulmonary nodules; Chronic  obstructive pulmonary disease, unspecified COPD type (H)    COMPARISON: 3/18/2024        Impression    IMPRESSION: Hyperexpanded lungs. Increased interstitial opacities in  the lung bases may be due to pulmonary edema or pneumonia. No pleural  effusion or pneumothorax. Normal heart size.    EDWIN WINSLOW MD         SYSTEM ID:  [id number]               ICD-10-CM    1. Pneumonia of both lower lobes due to infectious organism  J18.9 cefdinir (OMNICEF) 300 MG capsule     azithromycin (ZITHROMAX Z-LESIA) 250 MG tablet      2. Acute cough  R05.1 XR Chest 2 Views     cefdinir (OMNICEF) 300 MG capsule     azithromycin (ZITHROMAX Z-LESIA) 250 MG tablet      3. Chronic obstructive pulmonary disease, unspecified COPD type (H)  J44.9 XR Chest 2 Views     cefdinir (OMNICEF) 300 MG capsule     azithromycin (ZITHROMAX Z-LESIA) 250 MG tablet      4. Type 2 diabetes mellitus with hyperglycemia, unspecified whether long term insulin use (H)  E11.65 cefdinir (OMNICEF) 300 MG capsule     azithromycin (ZITHROMAX Z-LESIA) 250 MG tablet      5. Stage 3b chronic kidney disease (H)  N18.32 cefdinir (OMNICEF) 300 MG capsule     azithromycin (ZITHROMAX Z-LESIA) 250 MG tablet      6. H/O multiple pulmonary nodules  Z87.898 XR Chest 2 Views     cefdinir (OMNICEF) 300 MG capsule     azithromycin (ZITHROMAX Z-LESIA) 250 MG tablet            PLAN: With her age and comorbidities I recommend ER for evaluation and treatment of pneumonia, possible admittance.  Last time she had pneumonia she states she had to be admitted to the hospital.  Her vital signs are stable here today.  She would like  to try outpatient oral treatment.  Prescription for cefdinir and azithromycin given.  Software flagged that I needed to decrease the dose of cefdinir from twice daily to once daily due to kidney function.  Penicillins cause severe nausea and vomiting, she will not take it   I have discussed clinical findings with patient.  Side effects of medications discussed.  I have discussed the possibility of  worsening symptoms and indication to RTC or go to the ER if they occur.  All questions are answered, patient indicates understanding of these issues and is in agreement with plan.   Patient care instructions are discussed/given at the end of visit.   Lots of rest and fluids.  Sips of Pedialyte or Gatorade.    Tashia Garcia PA-C      SUBJECTIVE:  74-year-old female presents for acute wet cough for 1 week.  Chest discomfort with coughing and fatigue.  No fever.  Has had pneumonia, this feels similar.  Negative home COVID test yesterday.  No vomiting or diarrhea.  No rash.  Nasal discharge present.  History of COPD, pulmonary nodules and diabetes.      Allergies   Allergen Reactions    Morphine Nausea and Vomiting    Penicillins Nausea and Vomiting       Past Medical History:   Diagnosis Date    Brachial neuritis or radiculitis NOS     Chronic obstructive pulmonary disease, unspecified COPD type (H) 3/8/2016    Coronary artery disease     Doing fine    H/O tobacco use, presenting hazards to health     Hyperlipidemia LDL goal < 100     Hypertension goal BP (blood pressure) < 140/90     Malignant neoplasm of other specified sites of cervix     Old myocardial infarction     Osteopenia     Peripheral vascular disease, unspecified (H)     Type 2 diabetes, HbA1c goal < 7% (H)        Current Outpatient Medications   Medication Sig Dispense Refill    albuterol (PROAIR HFA/PROVENTIL HFA/VENTOLIN HFA) 108 (90 Base) MCG/ACT inhaler INHALE 2 PUFFS INTO THE LUNGS EVERY 6 HOURS 18 g 2    aspirin 81 MG EC tablet Take 81 mg by mouth  daily      Calcium Carbonate-Vit D-Min (CALCIUM 1200 PO) Take 600 mg by mouth daily Once every other day      Cholecalciferol (VITAMIN D) 1000 UNIT capsule Take 1 capsule by mouth daily Take one tablet daily      clopidogrel (PLAVIX) 75 MG tablet Take 1 tablet (75 mg) by mouth daily 90 tablet 3    diltiazem ER (TIADYLT ER) 300 MG 24 hr ER beaded capsule Take 1 capsule (300 mg) by mouth daily 90 capsule 1    empagliflozin (JARDIANCE) 10 MG TABS tablet Take 1 tablet (10 mg) by mouth daily. 90 tablet 3    fluticasone-vilanterol (BREO ELLIPTA) 100-25 MCG/ACT inhaler INHALE 1 PUFF BY MOUTH EVERY  each 1    lisinopril (ZESTRIL) 10 MG tablet TAKE 1 TABLET (10 MG) BY MOUTH DAILY. 90 tablet 1    metoprolol succinate ER (TOPROL XL) 100 MG 24 hr tablet Take 1 tablet (100 mg) by mouth daily 90 tablet 1    rosuvastatin (CRESTOR) 40 MG tablet Take 1 tablet (40 mg) by mouth daily. 90 tablet 1    tiotropium (SPIRIVA HANDIHALER) 18 MCG inhaled capsule INHALE 1 CAPSULE (18 MCG) INTO THE LUNGS DAILY 90 capsule 0     No current facility-administered medications for this visit.       Social History     Tobacco Use    Smoking status: Every Day     Current packs/day: 0.00     Average packs/day: 0.3 packs/day for 40.0 years (10.0 ttl pk-yrs)     Types: Cigarettes     Last attempt to quit: 2018     Years since quittin.5    Smokeless tobacco: Never    Tobacco comments:     5b or less cigareetes day   Substance Use Topics    Alcohol use: Yes     Comment: 2 to 4 beers or mixed drinks weekly       ROS:  CONSTITUTIONAL: Negative for fatigue or fever.  EYES: Negative for eye problems.  ENT: As above.  RESP: As above.  CV: Negative for chest pains.  GI: Negative for vomiting.  MUSCULOSKELETAL:  Negative for significant muscle or joint pains.  NEUROLOGIC: Negative for headaches.  SKIN: Negative for rash.  PSYCH: Normal mentation for age.    OBJECTIVE:  /79 (BP Location: Right arm, Patient Position: Sitting, Cuff Size: Adult  Regular)   Pulse 68   Temp 97.8  F (36.6  C) (Tympanic)   Resp 20   Wt 64.8 kg (142 lb 14.4 oz)   SpO2 100%   BMI 27.00 kg/m    GENERAL APPEARANCE: Healthy, alert and no distress.  EYES:Conjunctiva/sclera clear.  EARS: No cerumen.   Ear canals w/o erythema.  TM's intact w/o erythema.    THROAT: No erythema w/o tonsillar enlargement . No exudates.  NECK: Supple, nontender, no lymphadenopathy.  RESP: Lungs clear to auscultation - no rales, rhonchi or wheezes  CV: Regular rate and rhythm, normal S1 S2, no murmur noted.  NEURO: Awake, alert    SKIN: No rashes      Tashia Garcia PA-C

## 2024-12-12 DIAGNOSIS — I25.10 CORONARY ARTERY DISEASE DUE TO LIPID RICH PLAQUE: ICD-10-CM

## 2024-12-12 DIAGNOSIS — J44.9 CHRONIC OBSTRUCTIVE PULMONARY DISEASE, UNSPECIFIED COPD TYPE (H): ICD-10-CM

## 2024-12-12 DIAGNOSIS — I73.9 PERIPHERAL VASCULAR DISEASE (H): ICD-10-CM

## 2024-12-12 DIAGNOSIS — I10 HYPERTENSION GOAL BP (BLOOD PRESSURE) < 140/90: ICD-10-CM

## 2024-12-12 DIAGNOSIS — I25.83 CORONARY ARTERY DISEASE DUE TO LIPID RICH PLAQUE: ICD-10-CM

## 2024-12-12 DIAGNOSIS — J44.1 COPD EXACERBATION (H): ICD-10-CM

## 2024-12-12 RX ORDER — UMECLIDINIUM 62.5 UG/1
AEROSOL, POWDER ORAL
Refills: 0 | OUTPATIENT
Start: 2024-12-12

## 2024-12-12 RX ORDER — DILTIAZEM HYDROCHLORIDE 300 MG/1
300 CAPSULE, EXTENDED RELEASE ORAL DAILY
Qty: 90 CAPSULE | Refills: 1 | Status: SHIPPED | OUTPATIENT
Start: 2024-12-12

## 2024-12-12 RX ORDER — METOPROLOL SUCCINATE 100 MG/1
100 TABLET, EXTENDED RELEASE ORAL DAILY
Qty: 90 TABLET | Refills: 1 | Status: SHIPPED | OUTPATIENT
Start: 2024-12-12

## 2024-12-12 RX ORDER — TIOTROPIUM BROMIDE 18 UG/1
CAPSULE ORAL; RESPIRATORY (INHALATION)
Qty: 90 CAPSULE | Refills: 0 | Status: SHIPPED | OUTPATIENT
Start: 2024-12-12

## 2024-12-12 RX ORDER — FLUTICASONE FUROATE AND VILANTEROL TRIFENATATE 100; 25 UG/1; UG/1
1 POWDER RESPIRATORY (INHALATION) DAILY
Qty: 60 EACH | Refills: 1 | Status: SHIPPED | OUTPATIENT
Start: 2024-12-12

## 2024-12-16 ENCOUNTER — ANCILLARY PROCEDURE (OUTPATIENT)
Dept: MAMMOGRAPHY | Facility: CLINIC | Age: 74
End: 2024-12-16
Attending: PHYSICIAN ASSISTANT
Payer: COMMERCIAL

## 2024-12-16 DIAGNOSIS — Z12.31 VISIT FOR SCREENING MAMMOGRAM: ICD-10-CM

## 2024-12-16 PROCEDURE — 77063 BREAST TOMOSYNTHESIS BI: CPT | Mod: TC | Performed by: RADIOLOGY

## 2024-12-16 PROCEDURE — 77067 SCR MAMMO BI INCL CAD: CPT | Mod: TC | Performed by: RADIOLOGY

## 2025-01-19 DIAGNOSIS — J44.1 COPD EXACERBATION (H): ICD-10-CM

## 2025-01-20 RX ORDER — ALBUTEROL SULFATE 90 UG/1
2 INHALANT RESPIRATORY (INHALATION) EVERY 6 HOURS
Qty: 8.5 G | Refills: 2 | Status: SHIPPED | OUTPATIENT
Start: 2025-01-20

## 2025-01-21 RX ORDER — EMPAGLIFLOZIN 10 MG/1
10 TABLET, FILM COATED ORAL DAILY
Qty: 90 TABLET | Refills: 3 | OUTPATIENT
Start: 2025-01-21

## 2025-01-25 ENCOUNTER — HEALTH MAINTENANCE LETTER (OUTPATIENT)
Age: 75
End: 2025-01-25

## 2025-02-20 ENCOUNTER — VIRTUAL VISIT (OUTPATIENT)
Dept: PHARMACY | Facility: CLINIC | Age: 75
End: 2025-02-20
Payer: COMMERCIAL

## 2025-02-20 DIAGNOSIS — E78.5 HYPERLIPIDEMIA LDL GOAL <100: ICD-10-CM

## 2025-02-20 DIAGNOSIS — J44.9 CHRONIC OBSTRUCTIVE PULMONARY DISEASE, UNSPECIFIED COPD TYPE (H): ICD-10-CM

## 2025-02-20 DIAGNOSIS — I25.2 OLD MYOCARDIAL INFARCTION: ICD-10-CM

## 2025-02-20 DIAGNOSIS — I10 ESSENTIAL (PRIMARY) HYPERTENSION: ICD-10-CM

## 2025-02-20 DIAGNOSIS — I73.9 PERIPHERAL VASCULAR DISEASE: ICD-10-CM

## 2025-02-20 DIAGNOSIS — E11.21 TYPE 2 DIABETES MELLITUS WITH DIABETIC NEPHROPATHY, WITHOUT LONG-TERM CURRENT USE OF INSULIN (H): Primary | ICD-10-CM

## 2025-02-20 PROCEDURE — 99605 MTMS BY PHARM NP 15 MIN: CPT | Mod: 93

## 2025-02-20 NOTE — PROGRESS NOTES
Medication Therapy Management (MTM) Encounter    ASSESSMENT:                            Medication Adherence/Access: No issues identified.    Diabetes   A1c at goal <7%. Jardiance unlikely to be causing appetite suppression, would encourage patient to follow up with PCP regarding this concern to assess for other causes.  UACR not at goal <30 mg/g and due for recheck, appropriately taking SGLT2 and ACE inhibitors. Could recommend increasing ACE inhibitor to max tolerated dose, but patient declines. Would recommend patient continue follow up with nephrology.    Hypertension /PAD/H/o MI  Home blood pressures at goal <130/80 mmHg. Plan in place to continue follow up with cardiology.     Hyperlipidemia   LDL at goal <55 mg/dL, will want to continue to monitor kidney function and if CrCl drops below 30 mL/min would need to reduce rosuvastatin dose or change to atorvastatin.     COPD   Stable, white in mouth does not seem to be thrush-related.     PLAN:                            Plan to talk with Eduard about your low appetite at your next visit with her  Call to get a follow up appointment scheduled with nephrology, if they are planning to draw labs let us know and Eduard or IRENA can order additional lab work to be done at the same time.    Follow-up: 1 year, sooner if needed    SUBJECTIVE/OBJECTIVE:                          Leann Manning is a 74 year old female called for an initial visit with me. She was referred to me from her healthcare plan. Patient previously followed with [facility] pharmacy until 1/2023.    Reason for visit: Comprehensive medication review.    Allergies/ADRs: Reviewed in chart  Past Medical History: Reviewed in chart  Tobacco: She reports that she has been smoking cigarettes. She has a 10 pack-year smoking history. She has never used smokeless tobacco.Nicotine/Tobacco Cessation Plan  Information offered: Patient not interested at this time  Alcohol: socially  Caffeine: 1-2 cups of coffee per  day  Medication Adherence/Access: Patient uses a pill dispenser.  Patient takes medications 1 time(s) per day.   Per patient, misses medication 0 time(s) per week.     Diabetes   Jardiance 10 mg daily  Current diabetes symptoms: none  Last summer, started not having an appetite and has lost about 40 pounds since then, is wondering if this could be because of Jadiance     Blood sugar monitoring: never  Eye exam is up to date  Foot exam: due    Hypertension /PAD/H/o MI  Metoprolol  mg daily  Diltiazem  mg daily  Lisinopril 10 mg daily  Plavix 75 mg daily (lifelong)  Aspirin 81 mg daily for secondary prevention  Patient reports  she does bruise easily but these heal up on their own easily  Patient self monitors blood pressure.  Home BP monitoring 124/64, 127/63, 106/58 .      Previously discussed discontinuing diltiazem and increasing lisinopril dose for renal protection, but patient declined.     Hyperlipidemia   Rosuvastatin 40 mg daily  Patient reports no significant myalgias or other side effects.     COPD   Breo Ellipta 100/25 mcg 1 puff daily  Spiriva Handihaler 18 mcg 1 capsule daily  Albuterol HFA as needed - uses occasionally if she knows she will be moving around a lot  Patient rinses their mouth after using steroid inhaler.    Patient reports she does sometimes get white on her mouth during the winter, but thinks this may be due to her mouth being dry. Resolves on its own  Patient reports the following symptoms: occasional shortness of breath if she is out and moving around a lot, but is not concerned about this.     ----------------  I spent 21 minutes with this patient today. All changes were made via collaborative practice agreement with Eduard Tan PA-C. A copy of the visit note was provided to the patient's provider(s).    A summary of these recommendations was sent via [employer].    Britni Edwards, PharmD  Medication Therapy Management Pharmacy Resident  [facility]    Preceptor cosignature: Porsche I Nigel was seen independently by Dr. Edwards. I have reviewed the assessment and plan. Milena Nguyen, PharmD, BRITTNI, BCACP    Telemedicine Visit Details  The patient's medications can be safely assessed via a telemedicine encounter.  Type of service:  Telephone visit  Originating Location (pt. Location): Home    Distant Location (provider location):  On-site  Start Time:  10:30 AM  End Time:  10:51 AM     Medication Therapy Recommendations  No medication therapy recommendations to display

## 2025-02-20 NOTE — LETTER
_  Medication List        Prepared on: Feb 20, 2025     Bring your Medication List when you go to the doctor, hospital, or   emergency room. And, share it with your family or caregivers.     Note any changes to how you take your medications.  Cross out medications when you no longer use them.    Medication How I take it Why I use it Prescriber   albuterol (PROAIR HFA/PROVENTIL HFA/VENTOLIN HFA) 108 (90 Base) MCG/ACT inhaler INHALE 2 PUFFS INTO THE LUNGS EVERY 6 HOURS COPD Exacerbation (H) Danuta Tan PA-C   aspirin 81 MG EC tablet Take 81 mg by mouth daily  Peripheral Vascular Disease Patient Reported   clopidogrel (PLAVIX) 75 MG tablet Take 1 tablet (75 mg) by mouth daily Unspecified Peripheral Vascular Disease Arleth Renee MD   diltiazem ER (TIAZAC) 300 MG 24 hr ER beaded capsule TAKE 1 CAPSULE BY MOUTH EVERY DAY Peripheral Vascular Disease Danuta Tan PA-C   empagliflozin (JARDIANCE) 10 MG TABS tablet Take 1 tablet (10 mg) by mouth daily. Stage 3a chronic kidney disease (H) Danuta Tan PA-C   fluticasone-vilanterol (BREO ELLIPTA) 100-25 MCG/ACT inhaler INHALE 1 PUFF BY MOUTH EVERY DAY COPD Exacerbation (H) Danuta Tan PA-C   lisinopril (ZESTRIL) 10 MG tablet TAKE 1 TABLET (10 MG) BY MOUTH DAILY. Essential (Primary) Hypertension Danuta Tan PA-C   metoprolol succinate ER (TOPROL XL) 100 MG 24 hr tablet TAKE 1 TABLET BY MOUTH EVERY DAY Hypertension Goal BP (Blood Pressure) < 140/90; Coronary artery disease due to lipid rich plaque Danuta Tan PA-C   rosuvastatin (CRESTOR) 40 MG tablet Take 1 tablet (40 mg) by mouth daily. Hyperlipidemia LDL Goal <70 Danuta Tan PA-C   tiotropium (SPIRIVA HANDIHALER) 18 MCG inhaled capsule INHALE 1 CAPSULE (18 MCG) INTO THE LUNGS DAILY Chronic obstructive pulmonary disease, unspecified COPD type (H) Danuta Tan PA-C         Add new medications, over-the-counter drugs, herbals,  vitamins, or  minerals in the blank rows below.    Medication How I take it Why I use it Prescriber                                      Allergies:      - Morphine - Nausea and Vomiting  - Penicillins - Nausea and Vomiting        Side effects I have had:      Not on File        Other Information:              My notes and questions:

## 2025-02-20 NOTE — LETTER
"Recommended To-Do List      Prepared on: Feb 20, 2025       You can get the best results from your medications by completing the items on this \"To-Do List.\"      Bring your To-Do List when you go to your doctor. And, share it with your family or caregivers.    My To-Do List:  What we talked about: What I should do:    What my medicines are for, how to know if my medicines are working, made sure my medicines are safe for me and reviewed how to take my medicines.      Take my medicines every day                "

## 2025-02-20 NOTE — PATIENT INSTRUCTIONS
"Recommendations from today's MTM visit:                                                    MTM (medication therapy management) is a service provided by a clinical pharmacist designed to help you get the most of out of your medicines.   Today we reviewed what your medicines are for, how to know if they are working, that your medicines are safe and how to make your medicine regimen as easy as possible.      Plan to talk with Eduard about your low appetite at your next visit with her  Call to get a follow up appointment scheduled with nephrology, if they are planning to draw labs let us know and Eduard or I can order additional lab work to be done at the same time.    Follow-up: 1 year, sooner if needed    It was great speaking with you today.  I value your experience and would be very thankful for your time in providing feedback in our clinic survey. In the next few days, you may receive an email or text message from [employer] with a link to a survey related to your  clinical pharmacist.\"     To schedule another MTM appointment, please call the clinic directly or you may call the MTM scheduling line at [phone] or toll-free at [phone].     My Clinical Pharmacist's contact information:                                                      Please feel free to contact me with any questions or concerns you have.      Britni Edwards, Arnold  Medication Therapy Management Resident, [facility]  Phone: [phone]    Milena Nguyen, Pharm.D., M.B.A., [facility]  Medication Therapy Management Pharmacist, [facility]  Phone: [phone]   "

## 2025-02-20 NOTE — LETTER
February 25, 2025  Porsche Manning  [address]  [facility] [address]    Dear Ms. Manning, [facility]     Thank you for talking with me on Feb 20, 2025 about your health and medications. As a follow-up to our conversation, I have included two documents:      Your Recommended To-Do List has steps you should take to get the best results from your medications.  Your Medication List will help you keep track of your medications and how to take them.    If you want to talk about these documents, please call Britni Edwards RPH at phone: [phone], Monday-Friday 8-4:30pm.    I look forward to working with you and your doctors to make sure your medications work well for you.    Sincerely,  Britni Edwards RPH  [facility] Pharmacist, [facility]

## 2025-02-23 DIAGNOSIS — J44.1 COPD EXACERBATION (H): ICD-10-CM

## 2025-02-24 RX ORDER — FLUTICASONE PROPIONATE AND SALMETEROL 250; 50 UG/1; UG/1
1 POWDER RESPIRATORY (INHALATION) 2 TIMES DAILY
Qty: 60 EACH | Refills: 2 | Status: SHIPPED | OUTPATIENT
Start: 2025-02-24

## 2025-03-09 DIAGNOSIS — E78.5 HYPERLIPIDEMIA LDL GOAL <70: ICD-10-CM

## 2025-03-10 ENCOUNTER — MYC MEDICAL ADVICE (OUTPATIENT)
Dept: FAMILY MEDICINE | Facility: CLINIC | Age: 75
End: 2025-03-10
Payer: COMMERCIAL

## 2025-03-10 DIAGNOSIS — E11.21 TYPE 2 DIABETES MELLITUS WITH DIABETIC NEPHROPATHY, WITHOUT LONG-TERM CURRENT USE OF INSULIN (H): Primary | ICD-10-CM

## 2025-03-10 RX ORDER — ROSUVASTATIN CALCIUM 40 MG/1
40 TABLET, COATED ORAL DAILY
Qty: 90 TABLET | Refills: 0 | Status: SHIPPED | OUTPATIENT
Start: 2025-03-10

## 2025-03-10 NOTE — TELEPHONE ENCOUNTER
MP,  Please see below MyChart message and advise.  Pended labs completed at previous annual 06/27 if approved  Thanks,  Katarzyna DELACRUZ RN

## 2025-03-11 ENCOUNTER — DOCUMENTATION ONLY (OUTPATIENT)
Dept: NEPHROLOGY | Facility: CLINIC | Age: 75
End: 2025-03-11
Payer: COMMERCIAL

## 2025-03-11 NOTE — PROGRESS NOTES
Porsche Manning has an upcoming lab appointment:Please place lab order in epic     Thank you    [facility] lab team  [phone]     Future Appointments   Date Time Provider Department Center   3/14/2025 11:30 AM  LAB [facility]   3/20/2025 10:00 AM Tiffany Vann MD [facility]

## 2025-03-12 DIAGNOSIS — N18.31 STAGE 3A CHRONIC KIDNEY DISEASE (H): Primary | ICD-10-CM

## 2025-03-14 DIAGNOSIS — I73.9 PERIPHERAL VASCULAR DISEASE, UNSPECIFIED: ICD-10-CM

## 2025-03-14 DIAGNOSIS — I25.10 CORONARY ARTERY DISEASE INVOLVING NATIVE CORONARY ARTERY OF NATIVE HEART WITHOUT ANGINA PECTORIS: Primary | ICD-10-CM

## 2025-03-19 RX ORDER — CLOPIDOGREL BISULFATE 75 MG/1
75 TABLET ORAL DAILY
Qty: 90 TABLET | Refills: 0 | Status: SHIPPED | OUTPATIENT
Start: 2025-03-19

## 2025-03-19 NOTE — TELEPHONE ENCOUNTER
Name from pharmacy: CLOPIDOGREL 75 MG TABLET          Will file in chart as: clopidogrel (PLAVIX) 75 MG tablet    Sig: TAKE 1 TABLET BY MOUTH EVERY DAY    Disp: 90 tablet    Refills: Not specified (Pharmacy requested: 3)    Start: 3/19/2025    Class: E-Prescribe    Non-formulary For: Peripheral vascular disease, unspecified    Last ordered: 10 months ago (5/15/2024) by Arleth Renee MD    Last refill: 2/6/2025    Rx #: [id number]    Plavix [id number] 01:11 PM   Protocol Details Recent (12 mo) or future (90 days) visit within the authorizing provider's specialty    Medication indicated for associated diagnosis    Normal HGB on file in past 12 months    Normal Platelets on file in past 12 months    Medication is active on med list and the sig matches. RN to manually verify dose and sig if red X/fail.    Patient is age 18 or older    No active pregnancy on record    No positive pregnancy test in past 12 months      To be filled at: [id number] [place], [address]     Patient saw Dr. Renee on 4/5/23. Patient was to continue plavix. Patient was to follow up with Cardiology JACKLYN in 1 year. See mychart encounter from 5/15/24. Patient scheduled for follow up on 5/20/25. Gloria refill sent in to patient to cover until patient is seen.    Maritza Blanchard, RN, BSN  Cardiology RN Care Coordinator   [place]   Phone: [phone]  Fax: [phone] ([place]) [phone] ([place])

## 2025-03-19 NOTE — TELEPHONE ENCOUNTER
Last Written Prescription:     clopidogrel (PLAVIX) 75 MG tablet 90 tablet 3 5/15/2024 -- No   Sig - Route: Take 1 tablet (75 mg) by mouth daily - Oral     ----------------------  Last Visit Date: 4/23/23 Dr Renee [facility]  Future Visit Date: 5/20/25 [facility]  ----------------------         Refill decision: Medication unable to be refilled by RN due to: Pt not seen within past 12 months, No FOV or FOV exceeds timeframe per protocol          Request from pharmacy:  Requested Prescriptions   Pending Prescriptions Disp Refills    clopidogrel (PLAVIX) 75 MG tablet [Pharmacy Med Name: CLOPIDOGREL 75 MG TABLET] 90 tablet 3     Sig: TAKE 1 TABLET BY MOUTH EVERY DAY       Plavix Failed - 3/19/2025  1:09 PM        Failed - Recent (12 mo) or future (90 days) visit within the authorizing provider's specialty     The patient must have completed an in-person or virtual visit within the past 12 months or has a future visit scheduled within the next 90 days with the authorizing provider s specialty.  Urgent care and e-visits do not qualify as an office visit for this protocol.          Failed - Medication indicated for associated diagnosis     Medication is associated with one or more of the following diagnoses:  Cerebrovascular accident  Myocardial infarction  Percutaneous coronary intervention  Peripheral arterial occlusive disease  Transient cerebral ischemia          Passed - Normal HGB on file in past 12 months     Recent Labs   Lab Test 03/14/25  1118   HGB 14.3               Passed - Normal Platelets on file in past 12 months     Recent Labs   Lab Test 03/14/25  1118                  Passed - Medication is active on med list and the sig matches. RN to manually verify dose and sig if red X/fail.     If the protocol passes (green check), you do not need to verify med dose and sig.    A prescription matches if they are the same clinical intention.    For Example: once daily and every morning are the same.    The  protocol can not identify upper and lower case letters as matching and will fail.     For Example: Take 1 tablet (50 mg) by mouth daily     TAKE 1 TABLET (50 MG) BY MOUTH DAILY    For all fails (red x), verify dose and sig.    If the refill does match what is on file, the RN can still proceed to approve the refill request.       If they do not match, route to the appropriate provider.             Passed - Patient is age 18 or older        Passed - No active pregnancy on record        Passed - No positive pregnancy test in past 12 months

## 2025-03-20 ENCOUNTER — VIRTUAL VISIT (OUTPATIENT)
Dept: NEPHROLOGY | Facility: CLINIC | Age: 75
End: 2025-03-20
Attending: INTERNAL MEDICINE
Payer: COMMERCIAL

## 2025-03-20 ENCOUNTER — MYC MEDICAL ADVICE (OUTPATIENT)
Dept: FAMILY MEDICINE | Facility: CLINIC | Age: 75
End: 2025-03-20

## 2025-03-20 VITALS — BODY MASS INDEX: 24.55 KG/M2 | WEIGHT: 130 LBS | HEIGHT: 61 IN

## 2025-03-20 DIAGNOSIS — N18.32 CKD STAGE 3B, GFR 30-44 ML/MIN (H): Primary | ICD-10-CM

## 2025-03-20 DIAGNOSIS — I25.10 CORONARY ARTERY DISEASE INVOLVING NATIVE CORONARY ARTERY OF NATIVE HEART WITHOUT ANGINA PECTORIS: ICD-10-CM

## 2025-03-20 DIAGNOSIS — I73.9 PERIPHERAL VASCULAR DISEASE, UNSPECIFIED: ICD-10-CM

## 2025-03-20 DIAGNOSIS — I10 HYPERTENSION GOAL BP (BLOOD PRESSURE) < 140/90: ICD-10-CM

## 2025-03-20 DIAGNOSIS — R80.1 PERSISTENT PROTEINURIA: ICD-10-CM

## 2025-03-20 RX ORDER — CLOPIDOGREL BISULFATE 75 MG/1
75 TABLET ORAL DAILY
Qty: 90 TABLET | Refills: 0 | OUTPATIENT
Start: 2025-03-20

## 2025-03-20 ASSESSMENT — PAIN SCALES - GENERAL: PAINLEVEL_OUTOF10: NO PAIN (0)

## 2025-03-20 NOTE — PROGRESS NOTES
Video-Visit Details  Patient evaluated by billable video visit  Video Start Time:  10:00  Type of service:  Video Visit  Video End Time:10:26 AM  Originating Location (pt. Location): Home  Distant Location (provider location):  On-site  Platform used for Video Visit: Alise     Assessment and Plan:     # CKD 3b: Cr baseline ~1.0-1.1 follwing prior MICHELLE, renovascular disease, DM and hypertension. Possible hypertensive nephrosclerosis.  Since last seen in 2023, creatinine has progressively increased to 1.6. She has been on Jardiance 10 mg for 2 years now.  She has been tolerating it just fine. Last UA on 3/14 has significant WBC and RBCs. She continues to have proteinuria that has worsened to 1.34. Likely worsening of kidney function is secondary to sustained decreased PO intake.   - Continue SGLT2 inhibitor (increased Jardiance to 25 mg every day)  - BMP and UA in 2 weeks  - Continue to follow up with nephrology in 6 month  - We have reemphasized the importance to hydrate adequately  - Increase PO intake (solids and fluids)     # Hypertension:   Controlled.  Blood pressure today in clinic 151/65 however she has a known history of whitecoat hypertension.  She controls her blood pressure at home and all within normal range.  - Increase Lisinopril to 10 mg every day     #Acid/Base:   Bicarb 23     # Diabetes type 2  Last hemoglobin A1c's have been less than 6.5.  We congratulated Ms. Manning on working on lifestyle changes and being compliant with her medications.     # Mineral Bone Disorder:   Ca within normal limits, PTH 45 in 2023.   -recheck PTH, vit D    #Anemia  Hgb 14.3    Follow up: 6 -12 months    Reason for Visit:  Porsche Manning is a 74 year old female with CKD, who presents for follow up.     HPI:  Porsche Manning is a 72 year old female with CKS stage 3a, COPD, CAD (s/p stent to RCA in 2003) / PAD / R ICA stenosis / HTN / HLD, and smoker (currently not smoking for the last couple of weeks).    "  Blood Pressure control: still checks at home, usually ~ 120s/60-70. Takes meds in am (metoprolol  mg every day, Diltiazem ER 300mg, Lisinopril 10 mg).      Diet / Fluid intake: She notes that since last visit he has decreased her pop consumption to 1 can of Diet Coke per day.  She is trying to keep hydrated with other liquids.     Interval events: Last summer, she began to lose her appetite \"just not hungry\" and sipped water throughout the day. Currently appetite has not improved much, so her PO intake has not been the best. In November she visited urgent care due to a URI and diagnosed with bilateral pneumonia. Was treated and got better. Her brother who is 76, had significant water damage in his house and has been living with her, so she's has been cooking more and eating with her brother.     ROS:  A comprehensive review of systems was obtained and negative, except as noted in the HPI or PMH.    Active Medical Problems:  Patient Active Problem List   Diagnosis    Old myocardial infarction    Peripheral vascular disease    HYPERLIPIDEMIA LDL GOAL <100    Hypertension: MEASURE BP IN RIGHT ARM ONLY    Osteopenia    Vitamin D deficiency disease    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)    Subclavian artery stenosis, left    Stage 3b chronic kidney disease (H)    Chronic obstructive pulmonary disease, unspecified COPD type (H)    Pulmonary nodules    Atherosclerosis of native arteries of extremities with rest pain, other extremity (H)       Personal Hx:   Social History     Tobacco Use    Smoking status: Every Day     Current packs/day: 0.00     Average packs/day: 0.3 packs/day for 40.0 years (10.0 ttl pk-yrs)     Types: Cigarettes     Last attempt to quit: 2018     Years since quittin.9    Smokeless tobacco: Never    Tobacco comments:     5b or less cigareetes day   Substance Use Topics    Alcohol use: Yes     Comment: 2 to 4 beers or mixed drinks weekly       Allergies: Reviewed by " provider.     Medications:  Current Outpatient Medications   Medication Sig Dispense Refill    albuterol (PROAIR HFA/PROVENTIL HFA/VENTOLIN HFA) 108 (90 Base) MCG/ACT inhaler INHALE 2 PUFFS INTO THE LUNGS EVERY 6 HOURS 8.5 g 2    aspirin 81 MG EC tablet Take 81 mg by mouth daily      clopidogrel (PLAVIX) 75 MG tablet TAKE 1 TABLET BY MOUTH EVERY DAY 90 tablet 0    diltiazem ER (TIAZAC) 300 MG 24 hr ER beaded capsule TAKE 1 CAPSULE BY MOUTH EVERY DAY 90 capsule 1    empagliflozin (JARDIANCE) 10 MG TABS tablet Take 1 tablet (10 mg) by mouth daily. 90 tablet 3    fluticasone-salmeterol (ADVAIR) 250-50 MCG/ACT inhaler Inhale 1 puff into the lungs 2 times daily. 60 each 2    lisinopril (ZESTRIL) 10 MG tablet Take 1 tablet (10 mg) by mouth daily. 90 tablet 0    metoprolol succinate ER (TOPROL XL) 100 MG 24 hr tablet Take 1 tablet (100 mg) by mouth daily. 90 tablet 0    rosuvastatin (CRESTOR) 40 MG tablet Take 1 tablet (40 mg) by mouth daily. 90 tablet 0    tiotropium (SPIRIVA RESPIMAT) 2.5 MCG/ACT inhaler Inhale 2 puffs into the lungs daily. 4 g 2     No current facility-administered medications for this visit.       Vitals:  There were no vitals taken for this visit.    Exam:  GENERAL: alert and no distress  EYES: Eyes grossly normal to inspection.  No discharge or erythema, or obvious scleral/conjunctival abnormalities.  RESP: No audible wheeze, cough, or visible cyanosis.    NEURO: Cranial nerves grossly intact.  Mentation and speech appropriate for age.  PSYCH: Appropriate affect, tone, and pace of words    LABS:   CMP  Recent Labs   Lab Test 03/14/25  1118 06/27/24  1052 05/12/23  0937 10/14/22  1004 07/27/21  1019 04/22/21  1044 01/28/21  1004 03/10/20  0946 11/12/19  0920    138 137 137   < > 137 137 134 137   POTASSIUM 2.7* 4.1 3.9 4.2   < > 4.4 3.7 4.5 4.3   CHLORIDE 103 101 101 100   < > 104 107 103 104   CO2 23 24 24 24   < > 23 21 23 25   ANIONGAP 16* 13 12 13   < > 10 9 8 8   * 131* 137* 156*   <  > 164* 160* 153* 178*   BUN 11.8 12.6 15.6 12.9   < > 13 15 19 12   CR 1.63* 1.40* 1.26* 1.09*   < > 0.97 1.07* 1.31* 0.96   GFRESTIMATED 33* 40* 45* 54*   < > 59* 53* 41* 61   GFRESTBLACK  --   --   --   --   --  69 61 48* 70   VIANEY 9.6 10.5* 10.1 10.3*   < > 10.4* 10.2* 9.5 9.0    < > = values in this interval not displayed.     Recent Labs   Lab Test 03/14/25  1118 06/27/24  1052 10/15/21  1435 04/22/21  1044   BILITOTAL 0.4 0.4 0.4 0.5   ALKPHOS 68 88 98 112   ALT 12 12 27 24   AST 22 17 19 15     CBC  Recent Labs   Lab Test 03/14/25  1118 06/27/24  1052 05/12/23  0937 10/14/22  1004   HGB 14.3 15.6 15.9* 14.9   WBC 9.4 10.7 8.6 10.0   RBC 4.59 5.06 5.00 4.76   HCT 43.8 47.3* 46.8 44.8   MCV 95 94 94 94   MCH 31.2 30.8 31.8 31.3   MCHC 32.6 33.0 34.0 33.3   RDW 13.4 12.5 12.8 13.0    276 254 270     URINE STUDIES  Recent Labs   Lab Test 03/14/25  1118 05/12/23  1019 10/14/22  1021 04/18/22  1058   COLOR Yellow Straw Yellow Yellow   APPEARANCE Clear Clear Clear Clear   URINEGLC >=1000* 500* Negative Negative   URINEBILI Negative Negative Negative Negative   URINEKETONE Negative Negative Negative Negative   SG 1.010 1.003 1.010 <=1.005   UBLD Small* Negative Negative Trace*   URINEPH 6.0 5.5 6.5 7.0   PROTEIN 100* Negative Trace* Negative   UROBILINOGEN 0.2  --   --  0.2   NITRITE Negative Negative Negative Negative   LEUKEST Moderate* Negative Trace* Trace*   RBCU 2-5* 2 1 0-2   WBCU 25-50* 3 4 0-5     Recent Labs   Lab Test 04/18/22  1058 10/26/21  1008 08/17/21  1221 07/27/21  1022   UTPG 1.50* 1.06* 1.03* 1.05*     PTH  Recent Labs   Lab Test 05/12/23  0937 07/27/21  1019   PTHI 45 24     IRON STUDIES  Recent Labs   Lab Test 04/18/22  1058   IRON 83      IRONSAT 26   BRI 62       Tiffany Lawton MD

## 2025-03-20 NOTE — LETTER
3/20/2025       RE: Porsche Manning  [address]  [facility] [address]     Dear Colleague,    Thank you for referring your patient, Porsche Manning, to the [facility]. Please see a copy of my visit note below.    Again, thank you for allowing me to participate in the care of your patient.      Sincerely,    Tiffany Lawton MD

## 2025-03-20 NOTE — PATIENT INSTRUCTIONS
Increase Jardiance to 25 mg every day   Labs in 2 weeks after jardiance dose increase  Follow up in 6 months   Increase oral intake

## 2025-03-20 NOTE — NURSING NOTE
Current patient location: [address]  [facility] [address]    Is the patient currently in the state of [place]? YES    Visit mode: VIDEO    If the visit is dropped, the patient can be reconnected by:VIDEO VISIT: Send to e-mail at: [email or web address]    Will anyone else be joining the visit? NO  (If patient encounters technical issues they should call [phone])    Are changes needed to the allergy or medication list? No    Are refills needed on medications prescribed by this physician? NO    Rooming Documentation:  Questionnaire(s) completed    Reason for visit: DAVID DIAZ

## 2025-03-20 NOTE — PROGRESS NOTES
"Virtual Visit Details    Type of service:  Video Visit   Video Start Time: {video visit start/end time for provider to select:979727}  Video End Time:{video visit start/end time for provider to select:545941}    Originating Location (pt. Location): {video visit patient location:504790::\"Home\"}  {PROVIDER LOCATION On-site should be selected for visits conducted from your clinic location or adjoining [facility] hospital, academic office, or other nearby [facility] building. Off-site should be selected for all other provider locations, including home:502787}  Distant Location (provider location):  {virtual location provider:717150}  Platform used for Video Visit: {Virtual Visit Platforms:013895::\"[employer]\"}    "

## 2025-03-25 ENCOUNTER — MYC MEDICAL ADVICE (OUTPATIENT)
Dept: FAMILY MEDICINE | Facility: CLINIC | Age: 75
End: 2025-03-25
Payer: COMMERCIAL

## 2025-03-25 DIAGNOSIS — J44.1 COPD EXACERBATION (H): ICD-10-CM

## 2025-03-25 RX ORDER — FLUTICASONE FUROATE AND VILANTEROL 200; 25 UG/1; UG/1
1 POWDER RESPIRATORY (INHALATION) DAILY
Qty: 60 EACH | Refills: 2 | Status: SHIPPED | OUTPATIENT
Start: 2025-03-25

## 2025-03-25 RX ORDER — FLUTICASONE FUROATE AND VILANTEROL 100; 25 UG/1; UG/1
1 POWDER RESPIRATORY (INHALATION) DAILY
Qty: 60 EACH | Refills: 1 | Status: CANCELLED | OUTPATIENT
Start: 2025-03-25

## 2025-03-25 NOTE — TELEPHONE ENCOUNTER
"These changes are usually pharmacy/insurance dependant, but let's for sure try the original faves.  Spiriva seems to be on file/filled recently.  Will sign for KIT, thanks  Danuta \"Eduard\" PHILL Tan   "

## 2025-03-25 NOTE — TELEPHONE ENCOUNTER
MP,  Please see below "[employer]hart message and advise.  Appears spiriva has already been sent and should have refills  Pended breo if approved  Thanks,  Katarzyna DELACRUZ RN

## 2025-04-10 ENCOUNTER — ANCILLARY PROCEDURE (OUTPATIENT)
Dept: CT IMAGING | Facility: CLINIC | Age: 75
End: 2025-04-10
Attending: PHYSICIAN ASSISTANT
Payer: COMMERCIAL

## 2025-04-10 DIAGNOSIS — R91.8 PULMONARY NODULES: ICD-10-CM

## 2025-04-10 LAB — RADIOLOGIST FLAGS: NORMAL

## 2025-04-10 PROCEDURE — 71250 CT THORAX DX C-: CPT | Performed by: RADIOLOGY

## 2025-04-12 ENCOUNTER — HEALTH MAINTENANCE LETTER (OUTPATIENT)
Age: 75
End: 2025-04-12

## 2025-04-15 ENCOUNTER — VIRTUAL VISIT (OUTPATIENT)
Dept: PULMONOLOGY | Facility: CLINIC | Age: 75
End: 2025-04-15
Attending: PHYSICIAN ASSISTANT
Payer: COMMERCIAL

## 2025-04-15 DIAGNOSIS — R91.8 PULMONARY NODULES: Primary | ICD-10-CM

## 2025-04-15 PROCEDURE — 98006 SYNCH AUDIO-VIDEO EST MOD 30: CPT | Performed by: PHYSICIAN ASSISTANT

## 2025-04-15 PROCEDURE — 1126F AMNT PAIN NOTED NONE PRSNT: CPT | Mod: 95 | Performed by: PHYSICIAN ASSISTANT

## 2025-04-15 NOTE — NURSING NOTE
Current patient location: [address]  [facility] [address]    Is the patient currently in the state of [place]? YES    Visit mode: VIDEO    If the visit is dropped, the patient can be reconnected by:VIDEO VISIT: Text to cell phone:   Telephone Information:   Mobile [phone]       Will anyone else be joining the visit? NO  (If patient encounters technical issues they should call [phone])    Are changes needed to the allergy or medication list? Pt stated no changes to allergies and Pt stated no med changes    Are refills needed on medications prescribed by this physician? Discuss with provider    Rooming Documentation:  Questionnaire(s) completed    Reason for visit: RECHECK    Leeanna DIAZ

## 2025-04-15 NOTE — LETTER
4/15/2025      Porsche Manning  [address]  [facility] [address]      Dear Colleague,    Thank you for referring your patient, Porsche Manning, to the [facility] [place]. Please see a copy of my visit note below.    Virtual Visit Details    Type of service:  Video Visit   Originating Location (pt. Location): Home    Distant Location (provider location):  Off-site  Platform used for Video Visit: [facility]      LUNG NODULE & INTERVENTIONAL PULMONARY CLINIC  [facility]     Porsche Manning MRN# [id number]   Age: 75 year old YOB: 1950     Reason for Consultation: lung nodule(s)    Requesting Physician: Kira Welch PA-C  [address] [place] [address]       Assessment and Plan:    1. New 7mm RLL nodule, new 9mm RUL nodule.   Noted on CT chest 4/2025, new from 3/2025. Could be infectious/inflammatory from pneumonia 11/2024 vs malignancy. Recommend CT chest 3 months.     2. LLL 13mm nodule  Initially noted on lung cancer screen CT 1/2020. Stable on CT chest 4/2025, benign given >2 years stability.     3. Tobacco use. Quit 5 days ago, formerly down to 4-5 cigarettes per day (but was up to 2ppd at her worst). Nicotine lozenges help. She declined chantix, recalls that it wasn't helpful when she tried it a few years ago. Encouraged ongoing efforts to quit. She is a candidate for yearly lung cancer screening.      4. COPD. Well controlled with triple inhaler therapy (Breo and Spiriva). No AECOPD in the past year. Able to still do ADL's and garden without restriction.     Follow up 3 months with repeat CT chest w/o    Kira Welch PA-C  Interventional and General Pulmonology  Department of Pulmonary, Allergy, Critical Care and Sleep Medicine   [facility]     35 minutes spent reviewing chart, reviewing test results, talking with and examining patient, formulating plan, and documentation on the day of the  encounter.          History:     Porsche Manning is a 75 year old female with who is here for lung nodule(s).    LDCT performed and found nodules. Initially saw Dr. Mcfarland, I saw him 1 year ago.     Went to  11/2024 and was diagnosed with pneumonia. Prescribed zpack and cefdinir, did not receive prednisone. Took a long time to require    She lost her appetite last summer and has subsequently lost 40lb in 2 years unintentionally. Attributes this to her brother making her eat healthier. No fevers, chills, night sweats.     - Personal hx of cancer: no  - Family hx of cancer: no  - Exposure hx: Denies asbestos or radon exposure   - Tobacco hx: was up to 2ppd at her worst, has been smoking for 50 years. was smoking 4-5 cigarettes per day, but stopped 5 days ago. Uses nicotine lozenges. Tried chantix a couple of years ago, didn't work for her.   - My interpretation of the images relevant for this visit includes: lung cancer screen CT chest  - My interpretation of the PFT's relevant for this visit includes: Obstructive pattern      Other active medical problems include:   - has COPD on breo-ellipta and spiriva. Prn albuterol    - has CAD. stable   - had cervical cancer THANG in 1989          Past Medical History:      Past Medical History:   Diagnosis Date     Brachial neuritis or radiculitis NOS      Chronic obstructive pulmonary disease, unspecified COPD type (H) 3/8/2016     Coronary artery disease     Doing fine     H/O tobacco use, presenting hazards to health      Hyperlipidemia LDL goal < 100      Hypertension goal BP (blood pressure) < 140/90      Malignant neoplasm of other specified sites of cervix      Old myocardial infarction      Osteopenia      Peripheral vascular disease, unspecified      Type 2 diabetes, HbA1c goal < 7% (H)            Past Surgical History:      Past Surgical History:   Procedure Laterality Date     ANGIOGRAM Left 10/19/2021    Procedure: Left iliac angioplasty and stenting, Right common  iliac angioplasty;  Surgeon: Britni Bob MD;  Location: [facility] OR     ANGIOPLASTY N/A 10/19/2021    Procedure: possible intervention;  Surgeon: Britni Bob MD;  Location: [facility] OR     BIOPSY      Sample taken from back     CARDIAC SURGERY      Stents     COLONOSCOPY      Results were ok     HYSTERECTOMY, PAP NO LONGER INDICATED      ovaries only, no cervix per pt     IR OR ANGIOGRAM  10/19/2021     SURGICAL HISTORY OF -       bunionectomy     SURGICAL HISTORY OF -   10/10/03    cardiac stenting     SURGICAL HISTORY OF -       stent placed in both of her legs for pad     VASCULAR SURGERY      PAD          Social History:     Social History     Tobacco Use     Smoking status: Former     Current packs/day: 0.00     Average packs/day: 0.3 packs/day for 40.0 years (10.0 ttl pk-yrs)     Types: Cigarettes     Quit date: 2018     Years since quittin.9     Smokeless tobacco: Never     Tobacco comments:     5b or less cigareetes day   Substance Use Topics     Alcohol use: Yes     Comment: 2 to 4 beers or mixed drinks weekly          Family History:     Family History   Problem Relation Age of Onset     C.A.D. Mother      Breast Cancer Mother      C.A.D. Father      Diabetes Father      Hypertension Father      C.A.D. Maternal Grandfather      C.A.D. Paternal Grandfather      Neurologic Disorder Brother         epilepsy           Allergies:      Allergies   Allergen Reactions     Morphine Nausea and Vomiting     Penicillins Nausea and Vomiting          Medications:     Current Outpatient Medications   Medication Sig Dispense Refill     albuterol (PROAIR HFA/PROVENTIL HFA/VENTOLIN HFA) 108 (90 Base) MCG/ACT inhaler INHALE 2 PUFFS INTO THE LUNGS EVERY 6 HOURS 8.5 g 2     aspirin 81 MG EC tablet Take 81 mg by mouth daily       clopidogrel (PLAVIX) 75 MG tablet TAKE 1 TABLET BY MOUTH EVERY DAY 90 tablet 0     diltiazem ER (TIAZAC) 300 MG 24 hr ER beaded capsule TAKE 1 CAPSULE BY MOUTH EVERY DAY 90 capsule 1      empagliflozin (JARDIANCE) 10 MG TABS tablet Take 1 tablet (10 mg) by mouth daily. 90 tablet 3     empagliflozin (JARDIANCE) 25 MG TABS tablet Take 1 tablet (25 mg) by mouth daily. 90 tablet 0     fluticasone-vilanterol (BREO ELLIPTA) 200-25 MCG/ACT inhaler Inhale 1 puff into the lungs daily. 60 each 2     lisinopril (ZESTRIL) 10 MG tablet Take 1 tablet (10 mg) by mouth daily. 90 tablet 0     metoprolol succinate ER (TOPROL XL) 100 MG 24 hr tablet Take 1 tablet (100 mg) by mouth daily. 90 tablet 0     rosuvastatin (CRESTOR) 40 MG tablet Take 1 tablet (40 mg) by mouth daily. 90 tablet 0     tiotropium (SPIRIVA RESPIMAT) 2.5 MCG/ACT inhaler Inhale 2 puffs into the lungs daily. 4 g 2     No current facility-administered medications for this visit.            Physical Exam:   There were no vitals taken for this visit.  Wt Readings from Last 4 Encounters:   03/20/25 59 kg (130 lb)   11/26/24 64.8 kg (142 lb 14.4 oz)   06/27/24 69.9 kg (154 lb)   09/19/23 72.6 kg (160 lb)     General: Well appearing  Lungs: Nonlabored breathing  Neuro: Answering questions appropriately  Psych: Normal affect       Imaging/Lab Data   All laboratory and imaging data reviewed.    No results found for this or any previous visit (from the past 24 hours).       Again, thank you for allowing me to participate in the care of your patient.        Sincerely,        Kira Welch PA-C    Electronically signed

## 2025-04-15 NOTE — PROGRESS NOTES
Virtual Visit Details    Type of service:  Video Visit   Originating Location (pt. Location): Home    Distant Location (provider location):  Off-site  Platform used for Video Visit: Scottie      LUNG NODULE & INTERVENTIONAL PULMONARY CLINIC  [facility]     Porsche Manning MRN# [id number]   Age: 75 year old YOB: 1950     Reason for Consultation: lung nodule(s)    Requesting Physician: Kira Welch PA-C  [address] [place] [address]       Assessment and Plan:    1. New 7mm RLL nodule, new 9mm RUL nodule.   Noted on CT chest 4/2025, new from 3/2025. Could be infectious/inflammatory from pneumonia 11/2024 vs malignancy. Recommend CT chest 3 months.     2. LLL 13mm nodule  Initially noted on lung cancer screen CT 1/2020. Stable on CT chest 4/2025, benign given >2 years stability.     3. Tobacco use. Quit 5 days ago, formerly down to 4-5 cigarettes per day (but was up to 2ppd at her worst). Nicotine lozenges help. She declined chantix, recalls that it wasn't helpful when she tried it a few years ago. Encouraged ongoing efforts to quit. She is a candidate for yearly lung cancer screening.      4. COPD. Well controlled with triple inhaler therapy (Breo and Spiriva). No AECOPD in the past year. Able to still do ADL's and garden without restriction.     Follow up 3 months with repeat CT chest w/o    Kira Welch PA-C  Interventional and General Pulmonology  Department of Pulmonary, Allergy, Critical Care and Sleep Medicine   [facility]     35 minutes spent reviewing chart, reviewing test results, talking with and examining patient, formulating plan, and documentation on the day of the encounter.          History:     Porsche Manning is a 75 year old female with who is here for lung nodule(s).    LDCT performed and found nodules. Initially saw Dr. Mcfarland, I saw him 1 year ago.     Went to  11/2024 and was diagnosed with pneumonia.  Prescribed zpack and cefdinir, did not receive prednisone. Took a long time to require    She lost her appetite last summer and has subsequently lost 40lb in 2 years unintentionally. Attributes this to her brother making her eat healthier. No fevers, chills, night sweats.     - Personal hx of cancer: no  - Family hx of cancer: no  - Exposure hx: Denies asbestos or radon exposure   - Tobacco hx: was up to 2ppd at her worst, has been smoking for 50 years. was smoking 4-5 cigarettes per day, but stopped 5 days ago. Uses nicotine lozenges. Tried chantix a couple of years ago, didn't work for her.   - My interpretation of the images relevant for this visit includes: lung cancer screen CT chest  - My interpretation of the PFT's relevant for this visit includes: Obstructive pattern      Other active medical problems include:   - has COPD on breo-ellipta and spiriva. Prn albuterol    - has CAD. stable   - had cervical cancer THANG in 1989          Past Medical History:      Past Medical History:   Diagnosis Date    Brachial neuritis or radiculitis NOS     Chronic obstructive pulmonary disease, unspecified COPD type (H) 3/8/2016    Coronary artery disease     Doing fine    H/O tobacco use, presenting hazards to health     Hyperlipidemia LDL goal < 100     Hypertension goal BP (blood pressure) < 140/90     Malignant neoplasm of other specified sites of cervix     Old myocardial infarction     Osteopenia     Peripheral vascular disease, unspecified     Type 2 diabetes, HbA1c goal < 7% (H)            Past Surgical History:      Past Surgical History:   Procedure Laterality Date    ANGIOGRAM Left 10/19/2021    Procedure: Left iliac angioplasty and stenting, Right common iliac angioplasty;  Surgeon: Britni Bob MD;  Location: [facility] OR    ANGIOPLASTY N/A 10/19/2021    Procedure: possible intervention;  Surgeon: Britni Bob MD;  Location: [facility] OR    BIOPSY      Sample taken from back    CARDIAC SURGERY      Stents    COLONOSCOPY  1996     Results were ok    HYSTERECTOMY, PAP NO LONGER INDICATED      ovaries only, no cervix per pt    IR OR ANGIOGRAM  10/19/2021    SURGICAL HISTORY OF -       bunionectomy    SURGICAL HISTORY OF -   10/10/03    cardiac stenting    SURGICAL HISTORY OF -       stent placed in both of her legs for pad    VASCULAR SURGERY      PAD          Social History:     Social History     Tobacco Use    Smoking status: Former     Current packs/day: 0.00     Average packs/day: 0.3 packs/day for 40.0 years (10.0 ttl pk-yrs)     Types: Cigarettes     Quit date: 2018     Years since quittin.9    Smokeless tobacco: Never    Tobacco comments:     5b or less cigareetes day   Substance Use Topics    Alcohol use: Yes     Comment: 2 to 4 beers or mixed drinks weekly          Family History:     Family History   Problem Relation Age of Onset    C.A.D. Mother     Breast Cancer Mother     C.A.D. Father     Diabetes Father     Hypertension Father     C.A.D. Maternal Grandfather     C.A.D. Paternal Grandfather     Neurologic Disorder Brother         epilepsy           Allergies:      Allergies   Allergen Reactions    Morphine Nausea and Vomiting    Penicillins Nausea and Vomiting          Medications:     Current Outpatient Medications   Medication Sig Dispense Refill    albuterol (PROAIR HFA/PROVENTIL HFA/VENTOLIN HFA) 108 (90 Base) MCG/ACT inhaler INHALE 2 PUFFS INTO THE LUNGS EVERY 6 HOURS 8.5 g 2    aspirin 81 MG EC tablet Take 81 mg by mouth daily      clopidogrel (PLAVIX) 75 MG tablet TAKE 1 TABLET BY MOUTH EVERY DAY 90 tablet 0    diltiazem ER (TIAZAC) 300 MG 24 hr ER beaded capsule TAKE 1 CAPSULE BY MOUTH EVERY DAY 90 capsule 1    empagliflozin (JARDIANCE) 10 MG TABS tablet Take 1 tablet (10 mg) by mouth daily. 90 tablet 3    empagliflozin (JARDIANCE) 25 MG TABS tablet Take 1 tablet (25 mg) by mouth daily. 90 tablet 0    fluticasone-vilanterol (BREO ELLIPTA) 200-25 MCG/ACT inhaler Inhale 1 puff into the lungs  daily. 60 each 2    lisinopril (ZESTRIL) 10 MG tablet Take 1 tablet (10 mg) by mouth daily. 90 tablet 0    metoprolol succinate ER (TOPROL XL) 100 MG 24 hr tablet Take 1 tablet (100 mg) by mouth daily. 90 tablet 0    rosuvastatin (CRESTOR) 40 MG tablet Take 1 tablet (40 mg) by mouth daily. 90 tablet 0    tiotropium (SPIRIVA RESPIMAT) 2.5 MCG/ACT inhaler Inhale 2 puffs into the lungs daily. 4 g 2     No current facility-administered medications for this visit.            Physical Exam:   There were no vitals taken for this visit.  Wt Readings from Last 4 Encounters:   03/20/25 59 kg (130 lb)   11/26/24 64.8 kg (142 lb 14.4 oz)   06/27/24 69.9 kg (154 lb)   09/19/23 72.6 kg (160 lb)     General: Well appearing  Lungs: Nonlabored breathing  Neuro: Answering questions appropriately  Psych: Normal affect       Imaging/Lab Data   All laboratory and imaging data reviewed.    No results found for this or any previous visit (from the past 24 hours).

## 2025-04-28 ENCOUNTER — LAB (OUTPATIENT)
Dept: LAB | Facility: CLINIC | Age: 75
End: 2025-04-28
Payer: COMMERCIAL

## 2025-04-28 DIAGNOSIS — N18.32 CKD STAGE 3B, GFR 30-44 ML/MIN (H): ICD-10-CM

## 2025-04-28 DIAGNOSIS — I10 HYPERTENSION GOAL BP (BLOOD PRESSURE) < 140/90: ICD-10-CM

## 2025-04-28 DIAGNOSIS — R80.1 PERSISTENT PROTEINURIA: ICD-10-CM

## 2025-04-28 LAB
ALBUMIN UR-MCNC: 30 MG/DL
ANION GAP SERPL CALCULATED.3IONS-SCNC: 13 MMOL/L (ref 7–15)
APPEARANCE UR: CLEAR
BACTERIA #/AREA URNS HPF: ABNORMAL /HPF
BILIRUB UR QL STRIP: NEGATIVE
BUN SERPL-MCNC: 12.5 MG/DL (ref 8–23)
CALCIUM SERPL-MCNC: 9.3 MG/DL (ref 8.8–10.4)
CHLORIDE SERPL-SCNC: 102 MMOL/L (ref 98–107)
COLOR UR AUTO: YELLOW
CREAT SERPL-MCNC: 1.52 MG/DL (ref 0.51–0.95)
EGFRCR SERPLBLD CKD-EPI 2021: 36 ML/MIN/1.73M2
GLUCOSE SERPL-MCNC: 143 MG/DL (ref 70–99)
GLUCOSE UR STRIP-MCNC: 250 MG/DL
HCO3 SERPL-SCNC: 24 MMOL/L (ref 22–29)
HGB UR QL STRIP: ABNORMAL
KETONES UR STRIP-MCNC: NEGATIVE MG/DL
LEUKOCYTE ESTERASE UR QL STRIP: NEGATIVE
NITRATE UR QL: NEGATIVE
PH UR STRIP: 6 [PH] (ref 5–7)
POTASSIUM SERPL-SCNC: 3.1 MMOL/L (ref 3.4–5.3)
PTH-INTACT SERPL-MCNC: 49 PG/ML (ref 15–65)
RBC #/AREA URNS AUTO: ABNORMAL /HPF
SODIUM SERPL-SCNC: 139 MMOL/L (ref 135–145)
SP GR UR STRIP: <=1.005 (ref 1–1.03)
SQUAMOUS #/AREA URNS AUTO: ABNORMAL /LPF
UROBILINOGEN UR STRIP-ACNC: 0.2 E.U./DL
VIT D+METAB SERPL-MCNC: 59 NG/ML (ref 20–50)
WBC #/AREA URNS AUTO: ABNORMAL /HPF

## 2025-04-28 PROCEDURE — 82306 VITAMIN D 25 HYDROXY: CPT

## 2025-04-28 PROCEDURE — 80048 BASIC METABOLIC PNL TOTAL CA: CPT

## 2025-04-28 PROCEDURE — 36415 COLL VENOUS BLD VENIPUNCTURE: CPT

## 2025-04-28 PROCEDURE — 81001 URINALYSIS AUTO W/SCOPE: CPT

## 2025-04-28 PROCEDURE — 83970 ASSAY OF PARATHORMONE: CPT

## 2025-05-07 ENCOUNTER — ANCILLARY PROCEDURE (OUTPATIENT)
Dept: ULTRASOUND IMAGING | Facility: CLINIC | Age: 75
End: 2025-05-07
Attending: NURSE PRACTITIONER
Payer: COMMERCIAL

## 2025-05-07 ENCOUNTER — OFFICE VISIT (OUTPATIENT)
Dept: VASCULAR SURGERY | Facility: CLINIC | Age: 75
End: 2025-05-07
Payer: COMMERCIAL

## 2025-05-07 VITALS
BODY MASS INDEX: 25.34 KG/M2 | DIASTOLIC BLOOD PRESSURE: 59 MMHG | SYSTOLIC BLOOD PRESSURE: 138 MMHG | WEIGHT: 134.1 LBS | OXYGEN SATURATION: 90 % | HEART RATE: 67 BPM

## 2025-05-07 DIAGNOSIS — F17.200 NICOTINE USE DISORDER: ICD-10-CM

## 2025-05-07 DIAGNOSIS — Z95.828 S/P INSERTION OF ILIAC ARTERY STENT: ICD-10-CM

## 2025-05-07 DIAGNOSIS — I73.9 PAD (PERIPHERAL ARTERY DISEASE): Primary | ICD-10-CM

## 2025-05-07 DIAGNOSIS — E11.9 TYPE 2 DIABETES, HBA1C GOAL < 7% (H): ICD-10-CM

## 2025-05-07 DIAGNOSIS — I65.23 CAROTID STENOSIS, ASYMPTOMATIC, BILATERAL: ICD-10-CM

## 2025-05-07 DIAGNOSIS — I73.9 PAD (PERIPHERAL ARTERY DISEASE): ICD-10-CM

## 2025-05-07 DIAGNOSIS — I25.2 OLD MYOCARDIAL INFARCTION: ICD-10-CM

## 2025-05-07 DIAGNOSIS — J44.9 CHRONIC OBSTRUCTIVE PULMONARY DISEASE, UNSPECIFIED COPD TYPE (H): ICD-10-CM

## 2025-05-07 DIAGNOSIS — E78.5 HYPERLIPIDEMIA LDL GOAL <70: ICD-10-CM

## 2025-05-07 DIAGNOSIS — I10 HYPERTENSION GOAL BP (BLOOD PRESSURE) < 140/90: ICD-10-CM

## 2025-05-07 DIAGNOSIS — I77.1 SUBCLAVIAN ARTERY STENOSIS, LEFT: ICD-10-CM

## 2025-05-07 DIAGNOSIS — N18.32 STAGE 3B CHRONIC KIDNEY DISEASE (H): ICD-10-CM

## 2025-05-07 PROCEDURE — 93924 LWR XTR VASC STDY BILAT: CPT | Performed by: RADIOLOGY

## 2025-05-07 PROCEDURE — 93880 EXTRACRANIAL BILAT STUDY: CPT | Performed by: STUDENT IN AN ORGANIZED HEALTH CARE EDUCATION/TRAINING PROGRAM

## 2025-05-07 ASSESSMENT — PAIN SCALES - GENERAL: PAINLEVEL_OUTOF10: NO PAIN (0)

## 2025-05-07 NOTE — PATIENT INSTRUCTIONS
Thank you so much for choosing us for your care. It was a pleasure to see you at the vascular clinic today.     Follow-up recommendations: We will see you back in 1 year. Please do not hesitate to reach out to us in the meantime with any concerns. Our schedulers will get in touch with you to set up your follow-up visit.     Additional testing/imaging ordered today: Carotid US and CLAUDINE        Our scheduling team will get in touch with you to set up any follow-up testing/imaging and/or appointments. Please be aware that any testing/imaging recommended today will need to completed prior to your next visit with the provider. If testing/imaging is not completed prior to your next visit, your visit may be rescheduled.        If you have any questions, please contact our clinic directly at [phone] and ask for the nurse. We also encourage the use of [employer] to communicate with your HealthCare Provider.        If you have an urgent need after business hours (8:00 am to 4:30 pm) please call [phone], option 4, and ask for the vascular attending on call. For non-urgent after hours needs, please call the vascular nurse at [phone] and leave a detailed voicemail. For scheduling needs, please call our clinic directly at [phone].

## 2025-05-07 NOTE — PROGRESS NOTES
"    VASCULAR SURGERY PROGRESS NOTE    LOCATION:  [facility]     Porsche Manning  Medical Record #:  [id number]  YOB: 1950  Age:  75 year old     Date of Service: 5/7/2025    PRIMARY CARE PROVIDER: Danuta Tan    Reason for visit:  Annual surveillance    Impression/Shared Medical Decision Making:     PAD  Carotid stenosis   Nicotine use disorder (quit 3 weeks ago)  Stage 3 CKD  Type 2 DM A1C goal <7%  Left subclavian stenosis  COPD    Porsche Manning is a pleasant 75 years old patient who presents for annual surveillance of her PAD and carotid stenosis. ABIs have now normalized however carotid ultrasound demonstrates increased stenosis on R ICA with stability in L ICA. R ICA with now 50 to 69% stenosis. Neurologically intact. Denies TIAs.    Recommendations/Plan:   Continue smoking avoidance  Optimal medical therapy with dual antiplatelet therapy with ASA, Plavix, and statin  Follow-up in 1 year or sooner with repeat carotid duplex and ABIs    Discussed warning signs including, but not limited to, sudden numbness or weakness, especially on one side of the body (face, arm, or leg), sudden confusion, trouble speaking or understanding speech, sudden trouble seeing in one or both eyes (blurred or complete vision loss), sudden trouble walking, new dizziness, loss of balance, or lack of coordination, sudden severe headache with no known cause. We discussed that a TIA is often called a \"mini-stroke,\" but it is a major warning sign. Even if symptoms go away quickly, immediate medical evaluation is critical. If she experiences any of these, she has been advised to seek treatment and contact our team, patient verbalized clear understanding of the above. Information/Education: patient able to teach back. Patient agreeable to plan of care: yes.    All questions were answered and support provided. She has our contact information and knows to reach out with any additional " questions or concerns.     It was a pleasure seeing Ms Manning in clinic today.    Carolina Bedolla, [facility]  Vascular Surgery  Pager: [phone]  [email or web address]  Send message or 10 digit call back number Securely via Hively with the Hively Web Console (learn more here)    30 minutes spent on the date of the encounter doing chart review, review of outside records, review of test results, interpretation of tests, patient visit, documentation and discussion with other provider(s).    HPI:  Porsche Manning is a 75 year old female who presents to vascular clinic for annual surveillance. Carotid duplex from 2020 demonstrated R ICA stenosis at 50 to 69%, and L ICA with less than 50% stenosis. This was bilaterally less than 50% last year, however most recent study completed today demonstrates increased velocities in R ICA with stenosis at 50 to 69%. ABIs have normalized, patient denies rest pain, claudication or any other lower extremity symptoms. No open wounds, no edema, no discoloration, motor and sensory function intact.    More recently patient has undergone several stressful situations, including her brother moved in with her for approximately 3 months when she had to cook more, be more active than previously has. Denies TIA symptoms, amaurosis fugax or any other strokelike symptoms. Compliant with aspirin, Plavix and statin.  No other concerns    REVIEW OF SYSTEMS:    A 12 point ROS was reviewed and is negative except for what is listed above in HPI.    PHH:    Past Medical History:   Diagnosis Date    Brachial neuritis or radiculitis NOS     Chronic obstructive pulmonary disease, unspecified COPD type (H) 3/8/2016    Coronary artery disease     Doing fine    H/O tobacco use, presenting hazards to health     Hyperlipidemia LDL goal < 100     Hypertension goal BP (blood pressure) < 140/90     Malignant neoplasm of other specified sites of cervix     Old myocardial infarction     Osteopenia     Peripheral  vascular disease, unspecified     Type 2 diabetes, HbA1c goal < 7% (H)           Past Surgical History:   Procedure Laterality Date    ANGIOGRAM Left 10/19/2021    Procedure: Left iliac angioplasty and stenting, Right common iliac angioplasty;  Surgeon: Britni Bob MD;  Location: [facility] OR    ANGIOPLASTY N/A 10/19/2021    Procedure: possible intervention;  Surgeon: Britni Bob MD;  Location: [facility] OR    BIOPSY      Sample taken from back    CARDIAC SURGERY      Stents    COLONOSCOPY  1996    Results were ok    HYSTERECTOMY, PAP NO LONGER INDICATED  1989    ovaries only, no cervix per pt    IR OR ANGIOGRAM  10/19/2021    SURGICAL HISTORY OF -   1995    bunionectomy    SURGICAL HISTORY OF -   10/10/03    cardiac stenting    SURGICAL HISTORY OF -     stent placed in both of her legs for pad    VASCULAR SURGERY      PAD       ALLERGIES:  Morphine and Penicillins    MEDS:    Current Outpatient Medications:     albuterol (PROAIR HFA/PROVENTIL HFA/VENTOLIN HFA) 108 (90 Base) MCG/ACT inhaler, INHALE 2 PUFFS INTO THE LUNGS EVERY 6 HOURS, Disp: 8.5 g, Rfl: 2    aspirin 81 MG EC tablet, Take 81 mg by mouth daily, Disp: , Rfl:     clopidogrel (PLAVIX) 75 MG tablet, TAKE 1 TABLET BY MOUTH EVERY DAY, Disp: 90 tablet, Rfl: 0    diltiazem ER (TIAZAC) 300 MG 24 hr ER beaded capsule, TAKE 1 CAPSULE BY MOUTH EVERY DAY, Disp: 90 capsule, Rfl: 1    empagliflozin (JARDIANCE) 10 MG TABS tablet, Take 1 tablet (10 mg) by mouth daily., Disp: 90 tablet, Rfl: 3    empagliflozin (JARDIANCE) 25 MG TABS tablet, Take 1 tablet (25 mg) by mouth daily., Disp: 90 tablet, Rfl: 0    fluticasone-vilanterol (BREO ELLIPTA) 200-25 MCG/ACT inhaler, Inhale 1 puff into the lungs daily., Disp: 60 each, Rfl: 2    lisinopril (ZESTRIL) 10 MG tablet, Take 1 tablet (10 mg) by mouth daily., Disp: 90 tablet, Rfl: 0    metoprolol succinate ER (TOPROL XL) 100 MG 24 hr tablet, Take 1 tablet (100 mg) by mouth daily., Disp: 90 tablet, Rfl: 0    rosuvastatin (CRESTOR)  40 MG tablet, Take 1 tablet (40 mg) by mouth daily., Disp: 90 tablet, Rfl: 0    tiotropium (SPIRIVA RESPIMAT) 2.5 MCG/ACT inhaler, Inhale 2 puffs into the lungs daily., Disp: 4 g, Rfl: 2    SOCIAL HABITS:    History   Smoking Status    Former    Types: Cigarettes   Smokeless Tobacco    Never     Social History    Substance and Sexual Activity      Alcohol use: Yes        Comment: 2 to 4 beers or mixed drinks weekly      History   Drug Use No       FAMILY HISTORY:    Family History   Problem Relation Age of Onset    C.A.D. Mother     Breast Cancer Mother     C.A.D. Father     Diabetes Father     Hypertension Father     C.A.D. Maternal Grandfather     C.A.D. Paternal Grandfather     Neurologic Disorder Brother         epilepsy       PE:  /59 (BP Location: Right arm, Patient Position: Sitting, Cuff Size: Adult Regular)   Pulse 67   Wt 134 lb 1.6 oz   SpO2 (!) 90%   BMI 25.34 kg/m    Wt Readings from Last 1 Encounters:   05/07/25 134 lb 1.6 oz     Body mass index is 25.34 kg/m .    EXAM:  General: No apparent distress. Answers questions appropriately.   Neurologic: Focally intact, Alert and oriented x 3.   Eyes: Grossly normal.   Cardiac:  RRR  Pulmonary: Non labored breathing with normal respiratory effort  Abdominal: Soft, non distended, non tender to palpation. No pulsatile mass.   Musculoskeletal/Extremity: Doppler DP and PT bilaterally, warm, motor and sensory function intact, 5/5 gross bilateral upper and lower extremity strength. no edema. No open wounds or abrasions.       DIAGNOSTIC STUDIES:     Images:    Carotid duplex demonstrates KENTRELL with 2.5 ICA/CCA ratio and LICA 1.9 ICA/CCA ratio    US CLAUDINE Doppler with Exercise Bilateral  Result Date: 5/7/2025  Exam: Bilateral lower extremity resting ankle brachial indices dated 5/7/2025 10:29 AM Comparison study: 9/13/2021 and CT chest 4/10/2025 Clinical history: Peripheral arterial disease, status post iliac artery stenting. Patient was feeling subjective  dyspnea/trouble breathing and did not no comfortable walking on the treadmill for the exercise portion of this test. Ordering provider: RED ROBERTS Technique: Bilateral lower extremity resting ankle brachial indices obtained. Findings: Right:  Arm: 145 mmHg  PT at ankle: 145 mmHg  DP at foot: 136 mmHg  CLAUDINE: 1.00, previously 0.73 Right pulse volume recordings or VPR:  High thigh: Normal  Lower thigh: Normal  Proximal calf: Normal  Ankle: Normal Left:  Arm: 93 mmHg  PT at ankle: 140 mmHg  DP at foot: 130 mmHg  CLAUDINE: 0.97 Left pulse volume recordings or VPR:  High thigh: Normal  Lower thigh: Normal  Proximal calf: Normal  Ankle: Normal     Impression: 1. Right leg: Resting CLAUDINE is 1.00, previously 0.73 on 9/13/2021 prior to intervention. Normal. PVRs are within normal limits. 2. Left leg: Resting CLAUDINE is 0.97, previously 0.24 prior to intervention. Borderline (0.91 to 0.99). PVRs are within normal limits. 3. Reduced left brachial blood pressure relative to the right arm, consistent with left proximal subclavian artery occlusion as demonstrated by very heavily calcified atherosclerotic plaque on noncontrast CT from 4/10/2025. Guidelines: CLAUDINE Diagnostic Criteria (Based on criteria published in Circulation 2011; 124: 6224-5479):   > 1.4: Non compressible   1.00 - 1.40: Normal   0.91 - 0.99: Borderline   At or below 0.90: Abnormal CLAUDINE Diagnostic Criteria (Based on ACC/AHA guideline 2008):   >/=1.3 - non compressible vessels   1.00  -1.29 - Normal   0.91 - 0.99 - Borderline   0.41 - 0.90 - Mild to moderate PAD   0.00 - 0.40 - Severe PAD DIONISIO JUAREZ   SYSTEM ID:  [id number]      LABS:      Sodium   Date Value Ref Range Status   04/28/2025 139 135 - 145 mmol/L Final   03/14/2025 142 135 - 145 mmol/L Final   06/27/2024 138 135 - 145 mmol/L Final   04/22/2021 137 133 - 144 mmol/L Final   01/28/2021 137 133 - 144 mmol/L Final   03/10/2020 134 133 - 144 mmol/L Final     Urea Nitrogen   Date Value Ref Range Status   04/28/2025  12.5 8.0 - 23.0 mg/dL Final   03/14/2025 11.8 8.0 - 23.0 mg/dL Final   06/27/2024 12.6 8.0 - 23.0 mg/dL Final   04/18/2022 16 7 - 30 mg/dL Final   10/26/2021 9 7 - 30 mg/dL Final   10/19/2021 11 7 - 30 mg/dL Final   04/22/2021 13 7 - 30 mg/dL Final   01/28/2021 15 7 - 30 mg/dL Final   03/10/2020 19 7 - 30 mg/dL Final     Hemoglobin   Date Value Ref Range Status   03/14/2025 14.3 11.7 - 15.7 g/dL Final   06/27/2024 15.6 11.7 - 15.7 g/dL Final   05/12/2023 15.9 (H) 11.7 - 15.7 g/dL Final   01/28/2021 14.8 11.7 - 15.7 g/dL Final   10/22/2019 12.9 11.7 - 15.7 g/dL Final   10/25/2018 14.1 11.7 - 15.7 g/dL Final     Platelet Count   Date Value Ref Range Status   03/14/2025 248 150 - 450 10e3/uL Final   06/27/2024 276 150 - 450 10e3/uL Final   05/12/2023 254 150 - 450 10e3/uL Final   01/28/2021 260 150 - 450 10e9/L Final   10/22/2019 360 150 - 450 10e9/L Final   10/25/2018 253 150 - 450 10e9/L Final     INR   Date Value Ref Range Status   10/18/2006 0.97 0.86 - 1.14 Final   10/12/2006 1.00 0.86 - 1.14 Final   04/29/2005 1.02 0.86 - 1.14 Final

## 2025-05-07 NOTE — LETTER
"5/7/2025       RE: Porsche Manning  [address]  [facility] [address]     Dear Colleague,    Thank you for referring your patient, Porsche Manning, to the [facility] [place] [facility]. Please see a copy of my visit note below.        VASCULAR SURGERY PROGRESS NOTE    LOCATION:  [facility]     Porsche Manning  Medical Record #:  [id number]  YOB: 1950  Age:  75 year old     Date of Service: 5/7/2025    PRIMARY CARE PROVIDER: Danuta Tan    Reason for visit:  Annual surveillance    Impression/Shared Medical Decision Making:     PAD  Carotid stenosis   Nicotine use disorder (quit 3 weeks ago)  Stage 3 CKD  Type 2 DM A1C goal <7%  Left subclavian stenosis  COPD    Porsche Manning is a pleasant 75 years old patient who presents for annual surveillance of her PAD and carotid stenosis. ABIs have now normalized however carotid ultrasound demonstrates increased stenosis on R ICA with stability in L ICA. R ICA with now 50 to 69% stenosis. Neurologically intact. Denies TIAs.    Recommendations/Plan:   Continue smoking avoidance  Optimal medical therapy with dual antiplatelet therapy with ASA, Plavix, and statin  Follow-up in 1 year or sooner with repeat carotid duplex and ABIs    Discussed warning signs including, but not limited to, sudden numbness or weakness, especially on one side of the body (face, arm, or leg), sudden confusion, trouble speaking or understanding speech, sudden trouble seeing in one or both eyes (blurred or complete vision loss), sudden trouble walking, new dizziness, loss of balance, or lack of coordination, sudden severe headache with no known cause. We discussed that a TIA is often called a \"mini-stroke,\" but it is a major warning sign. Even if symptoms go away quickly, immediate medical evaluation is critical. If she experiences any of these, she has been " advised to seek treatment and contact our team, patient verbalized clear understanding of the above. Information/Education: patient able to teach back. Patient agreeable to plan of care: yes.    All questions were answered and support provided. She has our contact information and knows to reach out with any additional questions or concerns.     It was a pleasure seeing Ms Manning in clinic today.    Carolina Bedolla, [facility]  Vascular Surgery  Pager: [phone]  [email or web address]  Send message or 10 digit call back number Securely via [email or web address] with the Vocera Web Console (learn more here)    30 minutes spent on the date of the encounter doing chart review, review of outside records, review of test results, interpretation of tests, patient visit, documentation and discussion with other provider(s).    HPI:  Porsche Manning is a 75 year old female who presents to vascular clinic for annual surveillance. Carotid duplex from 2020 demonstrated R ICA stenosis at 50 to 69%, and L ICA with less than 50% stenosis. This was bilaterally less than 50% last year, however most recent study completed today demonstrates increased velocities in R ICA with stenosis at 50 to 69%. ABIs have normalized, patient denies rest pain, claudication or any other lower extremity symptoms. No open wounds, no edema, no discoloration, motor and sensory function intact.    More recently patient has undergone several stressful situations, including her brother moved in with her for approximately 3 months when she had to cook more, be more active than previously has. Denies TIA symptoms, amaurosis fugax or any other strokelike symptoms. Compliant with aspirin, Plavix and statin.  No other concerns    REVIEW OF SYSTEMS:    A 12 point ROS was reviewed and is negative except for what is listed above in HPI.    PHH:    Past Medical History:   Diagnosis Date     Brachial neuritis or radiculitis NOS      Chronic obstructive pulmonary disease,  unspecified COPD type (H) 3/8/2016     Coronary artery disease     Doing fine     H/O tobacco use, presenting hazards to health      Hyperlipidemia LDL goal < 100      Hypertension goal BP (blood pressure) < 140/90      Malignant neoplasm of other specified sites of cervix      Old myocardial infarction      Osteopenia      Peripheral vascular disease, unspecified      Type 2 diabetes, HbA1c goal < 7% (H)           Past Surgical History:   Procedure Laterality Date     ANGIOGRAM Left 10/19/2021    Procedure: Left iliac angioplasty and stenting, Right common iliac angioplasty;  Surgeon: Britni Bob MD;  Location: [facility] OR     ANGIOPLASTY N/A 10/19/2021    Procedure: possible intervention;  Surgeon: Britni Bob MD;  Location: [facility] OR     BIOPSY      Sample taken from back     CARDIAC SURGERY      Stents     COLONOSCOPY  1996    Results were ok     HYSTERECTOMY, PAP NO LONGER INDICATED  1989    ovaries only, no cervix per pt     IR OR ANGIOGRAM  10/19/2021     SURGICAL HISTORY OF -   1995    bunionectomy     SURGICAL HISTORY OF -   10/10/03    cardiac stenting     SURGICAL HISTORY OF -     stent placed in both of her legs for pad     VASCULAR SURGERY      PAD       ALLERGIES:  Morphine and Penicillins    MEDS:    Current Outpatient Medications:      albuterol (PROAIR HFA/PROVENTIL HFA/VENTOLIN HFA) 108 (90 Base) MCG/ACT inhaler, INHALE 2 PUFFS INTO THE LUNGS EVERY 6 HOURS, Disp: 8.5 g, Rfl: 2     aspirin 81 MG EC tablet, Take 81 mg by mouth daily, Disp: , Rfl:      clopidogrel (PLAVIX) 75 MG tablet, TAKE 1 TABLET BY MOUTH EVERY DAY, Disp: 90 tablet, Rfl: 0     diltiazem ER (TIAZAC) 300 MG 24 hr ER beaded capsule, TAKE 1 CAPSULE BY MOUTH EVERY DAY, Disp: 90 capsule, Rfl: 1     empagliflozin (JARDIANCE) 10 MG TABS tablet, Take 1 tablet (10 mg) by mouth daily., Disp: 90 tablet, Rfl: 3     empagliflozin (JARDIANCE) 25 MG TABS tablet, Take 1 tablet (25 mg) by mouth daily., Disp: 90 tablet, Rfl: 0      fluticasone-vilanterol (BREO ELLIPTA) 200-25 MCG/ACT inhaler, Inhale 1 puff into the lungs daily., Disp: 60 each, Rfl: 2     lisinopril (ZESTRIL) 10 MG tablet, Take 1 tablet (10 mg) by mouth daily., Disp: 90 tablet, Rfl: 0     metoprolol succinate ER (TOPROL XL) 100 MG 24 hr tablet, Take 1 tablet (100 mg) by mouth daily., Disp: 90 tablet, Rfl: 0     rosuvastatin (CRESTOR) 40 MG tablet, Take 1 tablet (40 mg) by mouth daily., Disp: 90 tablet, Rfl: 0     tiotropium (SPIRIVA RESPIMAT) 2.5 MCG/ACT inhaler, Inhale 2 puffs into the lungs daily., Disp: 4 g, Rfl: 2    SOCIAL HABITS:    History   Smoking Status     Former     Types: Cigarettes   Smokeless Tobacco     Never     Social History    Substance and Sexual Activity      Alcohol use: Yes        Comment: 2 to 4 beers or mixed drinks weekly      History   Drug Use No       FAMILY HISTORY:    Family History   Problem Relation Age of Onset     C.A.D. Mother      Breast Cancer Mother      C.A.D. Father      Diabetes Father      Hypertension Father      C.A.D. Maternal Grandfather      C.A.D. Paternal Grandfather      Neurologic Disorder Brother         epilepsy       PE:  /59 (BP Location: Right arm, Patient Position: Sitting, Cuff Size: Adult Regular)   Pulse 67   Wt 134 lb 1.6 oz   SpO2 (!) 90%   BMI 25.34 kg/m    Wt Readings from Last 1 Encounters:   05/07/25 134 lb 1.6 oz     Body mass index is 25.34 kg/m .    EXAM:  General: No apparent distress. Answers questions appropriately.   Neurologic: Focally intact, Alert and oriented x 3.   Eyes: Grossly normal.   Cardiac:  RRR  Pulmonary: Non labored breathing with normal respiratory effort  Abdominal: Soft, non distended, non tender to palpation. No pulsatile mass.   Musculoskeletal/Extremity: Doppler DP and PT bilaterally, warm, motor and sensory function intact, 5/5 gross bilateral upper and lower extremity strength. no edema. No open wounds or abrasions.       DIAGNOSTIC STUDIES:     Images:    Carotid duplex  demonstrates KENTRELL with 2.5 ICA/CCA ratio and LICA 1.9 ICA/CCA ratio    US CLAUDINE Doppler with Exercise Bilateral  Result Date: 5/7/2025  Exam: Bilateral lower extremity resting ankle brachial indices dated 5/7/2025 10:29 AM Comparison study: 9/13/2021 and CT chest 4/10/2025 Clinical history: Peripheral arterial disease, status post iliac artery stenting. Patient was feeling subjective dyspnea/trouble breathing and did not no comfortable walking on the treadmill for the exercise portion of this test. Ordering provider: RED ROBERTS Technique: Bilateral lower extremity resting ankle brachial indices obtained. Findings: Right:  Arm: 145 mmHg  PT at ankle: 145 mmHg  DP at foot: 136 mmHg  CLAUDINE: 1.00, previously 0.73 Right pulse volume recordings or VPR:  High thigh: Normal  Lower thigh: Normal  Proximal calf: Normal  Ankle: Normal Left:  Arm: 93 mmHg  PT at ankle: 140 mmHg  DP at foot: 130 mmHg  CLAUDINE: 0.97 Left pulse volume recordings or VPR:  High thigh: Normal  Lower thigh: Normal  Proximal calf: Normal  Ankle: Normal     Impression: 1. Right leg: Resting CLAUDINE is 1.00, previously 0.73 on 9/13/2021 prior to intervention. Normal. PVRs are within normal limits. 2. Left leg: Resting CLAUDINE is 0.97, previously 0.24 prior to intervention. Borderline (0.91 to 0.99). PVRs are within normal limits. 3. Reduced left brachial blood pressure relative to the right arm, consistent with left proximal subclavian artery occlusion as demonstrated by very heavily calcified atherosclerotic plaque on noncontrast CT from 4/10/2025. Guidelines: CLAUDINE Diagnostic Criteria (Based on criteria published in Circulation 2011; 124: 4882-2862):   > 1.4: Non compressible   1.00 - 1.40: Normal   0.91 - 0.99: Borderline   At or below 0.90: Abnormal CLAUDINE Diagnostic Criteria (Based on ACC/AHA guideline 2008):   >/=1.3 - non compressible vessels   1.00  -1.29 - Normal   0.91 - 0.99 - Borderline   0.41 - 0.90 - Mild to moderate PAD   0.00 - 0.40 - Severe PAD DIONISIO  JUAREZ   SYSTEM ID:  [id number]      LABS:      Sodium   Date Value Ref Range Status   04/28/2025 139 135 - 145 mmol/L Final   03/14/2025 142 135 - 145 mmol/L Final   06/27/2024 138 135 - 145 mmol/L Final   04/22/2021 137 133 - 144 mmol/L Final   01/28/2021 137 133 - 144 mmol/L Final   03/10/2020 134 133 - 144 mmol/L Final     Urea Nitrogen   Date Value Ref Range Status   04/28/2025 12.5 8.0 - 23.0 mg/dL Final   03/14/2025 11.8 8.0 - 23.0 mg/dL Final   06/27/2024 12.6 8.0 - 23.0 mg/dL Final   04/18/2022 16 7 - 30 mg/dL Final   10/26/2021 9 7 - 30 mg/dL Final   10/19/2021 11 7 - 30 mg/dL Final   04/22/2021 13 7 - 30 mg/dL Final   01/28/2021 15 7 - 30 mg/dL Final   03/10/2020 19 7 - 30 mg/dL Final     Hemoglobin   Date Value Ref Range Status   03/14/2025 14.3 11.7 - 15.7 g/dL Final   06/27/2024 15.6 11.7 - 15.7 g/dL Final   05/12/2023 15.9 (H) 11.7 - 15.7 g/dL Final   01/28/2021 14.8 11.7 - 15.7 g/dL Final   10/22/2019 12.9 11.7 - 15.7 g/dL Final   10/25/2018 14.1 11.7 - 15.7 g/dL Final     Platelet Count   Date Value Ref Range Status   03/14/2025 248 150 - 450 10e3/uL Final   06/27/2024 276 150 - 450 10e3/uL Final   05/12/2023 254 150 - 450 10e3/uL Final   01/28/2021 260 150 - 450 10e9/L Final   10/22/2019 360 150 - 450 10e9/L Final   10/25/2018 253 150 - 450 10e9/L Final     INR   Date Value Ref Range Status   10/18/2006 0.97 0.86 - 1.14 Final   10/12/2006 1.00 0.86 - 1.14 Final   04/29/2005 1.02 0.86 - 1.14 Final         Again, thank you for allowing me to participate in the care of your patient.      Sincerely,    AARON Lara CNP

## 2025-05-07 NOTE — NURSING NOTE
Chief Complaint   Patient presents with    Follow Up     5/7/2025 visit with Carolina Bedolla APRN CNP for RETURN VASCULAR PATIENT - Follow up PAD, carotid stenosis US prior       Vitals were taken and medications reconciled.    Justin Gipson, Visit Facilitator  11:21 AM

## 2025-05-20 ENCOUNTER — OFFICE VISIT (OUTPATIENT)
Dept: CARDIOLOGY | Facility: CLINIC | Age: 75
End: 2025-05-20
Payer: COMMERCIAL

## 2025-05-20 VITALS
HEART RATE: 71 BPM | OXYGEN SATURATION: 90 % | DIASTOLIC BLOOD PRESSURE: 48 MMHG | WEIGHT: 131 LBS | SYSTOLIC BLOOD PRESSURE: 128 MMHG | BODY MASS INDEX: 24.75 KG/M2

## 2025-05-20 DIAGNOSIS — I25.10 CORONARY ARTERY DISEASE INVOLVING NATIVE CORONARY ARTERY OF NATIVE HEART WITHOUT ANGINA PECTORIS: ICD-10-CM

## 2025-05-20 DIAGNOSIS — R60.0 BILATERAL LOWER EXTREMITY EDEMA: ICD-10-CM

## 2025-05-20 DIAGNOSIS — E78.5 HYPERLIPIDEMIA LDL GOAL <70: ICD-10-CM

## 2025-05-20 DIAGNOSIS — R06.09 DYSPNEA ON EXERTION: Primary | ICD-10-CM

## 2025-05-20 NOTE — LETTER
2025      RE: Porsche Manning  [address]  [facility] [address]       Dear Colleague,    Thank you for the opportunity to participate in the care of your patient, Porsche Manning, at the [facility]. Please see a copy of my visit note below.        [facility]    Patient Name: Porsche Manning   : 1950   Date of Visit: 2025  Primary Care Physician: Eduard Tan PA-C         Porsche Manning is a pleasant 75 year old female, who presents for general follow up. Prior history significant for CAD (1st MI , BMS to mRCA ), HTN, T2DM, PAD (s/p bilateral aorto-iliac stenting , repeat stenting to left common iliac artery ), left subclavian artery subclavian stenosis, carotid stenosis, CKD, history of cervical cancer, COPD, and current smoker.    Last seen in cardiology clinic on 23 by Dr. Arleth Renee. Reported baseline SOB with stairs, denied any other cardiac symptoms. Also reported she resumed smoking 4-5 cigarettes/day in the past couple of months. A murmur was auscultated in the right carotid, and patient was referred for doppler U/S and recommended to follow up with vascular surgery (already following for PAD and subclavian stenosis.    Seen by vascular last month for annual surveillance of her PAD and carotid stenosis. Recommended to avoid smoking, continue ASA, Plavix, and statin.    Today in clinic, Leann continues to endorse her baseline SOB with exertional activity. She has known stable COPD, and does follow with pulmonary. She tells me she has a new pulmonary nodule they saw on imaging last month, as well.    She reports some BLE edema that started this past winter and has been intermittent.     She is somewhat active in her garden. No routine exercise.    Home BPs: 's    Social history: intermittent, currently smoking 5/day    Current Cardiac  Medications  ASA 81 mg daily  Plavix 75 mg daily  Diltiazem 300 mg daily  Jardiance 25 mg daily  Lisinopril 10 mg daily  Toprol 100 mg daily  Rosuvastatin 40 mg daily      PAST MEDICAL HISTORY:  Past Medical History:   Diagnosis Date     Brachial neuritis or radiculitis NOS      Chronic obstructive pulmonary disease, unspecified COPD type (H) 3/8/2016     Coronary artery disease     Doing fine     H/O tobacco use, presenting hazards to health      Hyperlipidemia LDL goal < 100      Hypertension goal BP (blood pressure) < 140/90      Malignant neoplasm of other specified sites of cervix      Old myocardial infarction      Osteopenia      Peripheral vascular disease, unspecified      Type 2 diabetes, HbA1c goal < 7% (H)        PAST SURGICAL HISTORY:  Past Surgical History:   Procedure Laterality Date     ANGIOGRAM Left 10/19/2021    Procedure: Left iliac angioplasty and stenting, Right common iliac angioplasty;  Surgeon: Britni Bob MD;  Location: [facility] OR     ANGIOPLASTY N/A 10/19/2021    Procedure: possible intervention;  Surgeon: Britni Bob MD;  Location: [facility] OR     BIOPSY      Sample taken from back     CARDIAC SURGERY      Stents     COLONOSCOPY  1996    Results were ok     HYSTERECTOMY, PAP NO LONGER INDICATED  1989    ovaries only, no cervix per pt     IR OR ANGIOGRAM  10/19/2021     SURGICAL HISTORY OF -   1995    bunionectomy     SURGICAL HISTORY OF -   10/10/03    cardiac stenting     SURGICAL HISTORY OF -     stent placed in both of her legs for pad     VASCULAR SURGERY      PAD       FAMILY HISTORY:  Family History   Problem Relation Age of Onset     C.A.D. Mother      Breast Cancer Mother      C.A.D. Father      Diabetes Father      Hypertension Father      C.A.D. Maternal Grandfather      C.A.D. Paternal Grandfather      Neurologic Disorder Brother         epilepsy       SOCIAL HISTORY:  Social History     Socioeconomic History     Marital status: Single     Number of children: 0     Years of  education: 12   Occupational History     Occupation: [employer]     Comment: [employer]   Tobacco Use     Smoking status: Former     Current packs/day: 0.00     Average packs/day: 0.3 packs/day for 40.0 years (10.0 ttl pk-yrs)     Types: Cigarettes     Quit date: 2018     Years since quittin.0     Smokeless tobacco: Never     Tobacco comments:     5 or less cigareetes day   Vaping Use     Vaping status: Never Used   Substance and Sexual Activity     Alcohol use: Yes     Comment: 2 to 4 beers or mixed drinks weekly     Drug use: No     Sexual activity: Not Currently     Partners: Male   Other Topics Concern     Parent/sibling w/ CABG, MI or angioplasty before 65F 55M? No   Social History Narrative    09    Balanced Diet - No    Osteoporosis Prevention Measures - Dairy servings per day: 0-1 and Medication/Supplements (See current meds)    Regular Exercise -  No Describe None    Dental Exam - YES - Date: 2009    Eye Exam - YES - Date: 2 years ago    Self Breast Exam - Yes, on a monthly basis    Abuse: Current or Past (Physical, Sexual or Emotional)- No    Do you feel safe in your environment - Yes    Guns stored in the home - No    Sunscreen used - Yes    Seatbelts used - Yes    Lipids -  YES - Date: 10/23/08    Glucose -  YES - Date: 10/23/08    Colon Cancer Screening - Colonoscopy 06(date completed)    Hemoccults - YES - Date: 06    Pap Test -  YES - Date: 10/23/08, Pt has Hx of abnormal paps.    Do you have any concerns about STD's -  No    Mammography - YES - Date: 10/23/08    DEXA - YES - Date: 07    Immunizations reviewed and up to date - Yes, Tdap given 10/18/07    Phillip Loo MA                     Social Drivers of Health     Financial Resource Strain: Low Risk  (2024)    Financial Resource Strain      Within the past 12 months, have you or your family members you live with been unable to get utilities (heat, electricity) when it was really needed?: No    Food Insecurity: Low Risk  (6/24/2024)    Food Insecurity      Within the past 12 months, did you worry that your food would run out before you got money to buy more?: No      Within the past 12 months, did the food you bought just not last and you didn t have money to get more?: No   Transportation Needs: Low Risk  (6/24/2024)    Transportation Needs      Within the past 12 months, has lack of transportation kept you from medical appointments, getting your medicines, non-medical meetings or appointments, work, or from getting things that you need?: No   Physical Activity: Insufficiently Active (6/24/2024)    Exercise Vital Sign      Days of Exercise per Week: 2 days      Minutes of Exercise per Session: 30 min   Stress: No Stress Concern Present (6/24/2024)    [place] [employer] - Occupational Stress Questionnaire      Feeling of Stress : Not at all   Social Connections: Unknown (6/24/2024)    Social Connection and Isolation Panel [NHANES]      Frequency of Social Gatherings with Friends and Family: More than three times a week   Interpersonal Safety: Low Risk  (6/27/2024)    Interpersonal Safety      Do you feel physically and emotionally safe where you currently live?: Yes      Within the past 12 months, have you been hit, slapped, kicked or otherwise physically hurt by someone?: No      Within the past 12 months, have you been humiliated or emotionally abused in other ways by your partner or ex-partner?: No   Housing Stability: Low Risk  (6/24/2024)    Housing Stability      Do you have housing? : Yes      Are you worried about losing your housing?: No       MEDICATIONS:  Current Outpatient Medications   Medication Sig Dispense Refill     albuterol (PROAIR HFA/PROVENTIL HFA/VENTOLIN HFA) 108 (90 Base) MCG/ACT inhaler INHALE 2 PUFFS INTO THE LUNGS EVERY 6 HOURS 8.5 g 2     aspirin 81 MG EC tablet Take 81 mg by mouth daily       clopidogrel (PLAVIX) 75 MG tablet TAKE 1 TABLET BY MOUTH EVERY  DAY 90 tablet 0     diltiazem ER (TIAZAC) 300 MG 24 hr ER beaded capsule TAKE 1 CAPSULE BY MOUTH EVERY DAY 90 capsule 1     empagliflozin (JARDIANCE) 10 MG TABS tablet Take 1 tablet (10 mg) by mouth daily. 90 tablet 3     empagliflozin (JARDIANCE) 25 MG TABS tablet Take 1 tablet (25 mg) by mouth daily. 90 tablet 0     fluticasone-vilanterol (BREO ELLIPTA) 200-25 MCG/ACT inhaler Inhale 1 puff into the lungs daily. 60 each 2     lisinopril (ZESTRIL) 10 MG tablet Take 1 tablet (10 mg) by mouth daily. 90 tablet 0     metoprolol succinate ER (TOPROL XL) 100 MG 24 hr tablet Take 1 tablet (100 mg) by mouth daily. 90 tablet 0     rosuvastatin (CRESTOR) 40 MG tablet Take 1 tablet (40 mg) by mouth daily. 90 tablet 0     tiotropium (SPIRIVA RESPIMAT) 2.5 MCG/ACT inhaler Inhale 2 puffs into the lungs daily. 4 g 2     No current facility-administered medications for this visit.        ALLERGIES:     Allergies   Allergen Reactions     Morphine Nausea and Vomiting     Penicillins Nausea and Vomiting       ROS:  A complete 10-point ROS was negative except as above.    PHYSICAL EXAM:  /48 (BP Location: Right arm, Patient Position: Chair, Cuff Size: Adult Regular)   Pulse 71   Wt 59.4 kg (131 lb)   SpO2 (!) 90%   BMI 24.75 kg/m    Gen: alert, interactive, NAD  Neck: supple, no JVD  CV: RRR, [+] murmur  Chest: CTAB, no wheezes or crackles  Abd: soft, NT, ND  Ext: 1+ BLE edema    LABS:    CMP  Sodium   Date Value Ref Range Status   04/28/2025 139 135 - 145 mmol/L Final   04/22/2021 137 133 - 144 mmol/L Final     Potassium   Date Value Ref Range Status   04/28/2025 3.1 (L) 3.4 - 5.3 mmol/L Final   04/18/2022 4.2 3.4 - 5.3 mmol/L Final   04/22/2021 4.4 3.4 - 5.3 mmol/L Final     Chloride   Date Value Ref Range Status   04/28/2025 102 98 - 107 mmol/L Final   04/18/2022 107 94 - 109 mmol/L Final   04/22/2021 104 94 - 109 mmol/L Final     Carbon Dioxide   Date Value Ref Range Status   04/22/2021 23 20 - 32 mmol/L Final     Carbon  Dioxide (CO2)   Date Value Ref Range Status   04/28/2025 24 22 - 29 mmol/L Final   04/18/2022 23 20 - 32 mmol/L Final     Anion Gap   Date Value Ref Range Status   04/28/2025 13 7 - 15 mmol/L Final   04/18/2022 8 3 - 14 mmol/L Final   04/22/2021 10 3 - 14 mmol/L Final     Glucose   Date Value Ref Range Status   04/28/2025 143 (H) 70 - 99 mg/dL Final   04/18/2022 143 (H) 70 - 99 mg/dL Final   04/22/2021 164 (H) 70 - 99 mg/dL Final     Urea Nitrogen   Date Value Ref Range Status   04/28/2025 12.5 8.0 - 23.0 mg/dL Final   04/18/2022 16 7 - 30 mg/dL Final   04/22/2021 13 7 - 30 mg/dL Final     Creatinine   Date Value Ref Range Status   04/28/2025 1.52 (H) 0.51 - 0.95 mg/dL Final   04/22/2021 0.97 0.52 - 1.04 mg/dL Final     GFR Estimate   Date Value Ref Range Status   04/28/2025 36 (L) >60 mL/min/1.73m2 Final     Comment:     eGFR calculated using 2021 CKD-EPI equation.   04/22/2021 59 (L) >60 mL/min/[1.73_m2] Final     Comment:     Non  GFR Calc  Starting 12/18/2018, serum creatinine based estimated GFR (eGFR) will be   calculated using the Chronic Kidney Disease Epidemiology Collaboration   (CKD-EPI) equation.       Calcium   Date Value Ref Range Status   04/28/2025 9.3 8.8 - 10.4 mg/dL Final   04/22/2021 10.4 (H) 8.5 - 10.1 mg/dL Final     Bilirubin Total   Date Value Ref Range Status   03/14/2025 0.4 <=1.2 mg/dL Final   04/22/2021 0.5 0.2 - 1.3 mg/dL Final     Alkaline Phosphatase   Date Value Ref Range Status   03/14/2025 68 40 - 150 U/L Final   04/22/2021 112 40 - 150 U/L Final     ALT   Date Value Ref Range Status   03/14/2025 12 0 - 50 U/L Final   04/22/2021 24 0 - 50 U/L Final     AST   Date Value Ref Range Status   03/14/2025 22 0 - 45 U/L Final   04/22/2021 15 0 - 45 U/L Final       CBC  CBC RESULTS:   Recent Labs   Lab Test 03/14/25  1118   WBC 9.4   RBC 4.59   HGB 14.3   HCT 43.8   MCV 95   MCH 31.2   MCHC 32.6   RDW 13.4          TROP  Lab Results   Component Value Date    TROPI  <0.015 05/09/2018       LIPIDS  Recent Labs   Lab Test 03/14/25  1118 06/27/24  1052   CHOL 148 132   HDL 66 51   LDL 59 54   TRIG 115 137       TSH  TSH   Date Value Ref Range Status   04/22/2021 1.14 0.40 - 4.00 mU/L Final       HBA1C  Lab Results   Component Value Date    A1C 5.9 03/14/2025    A1C 6.2 06/27/2024    A1C 6.6 05/12/2023    A1C 6.1 11/17/2022    A1C 6.4 04/18/2022    A1C 6.7 04/22/2021    A1C 6.7 01/28/2021    A1C 6.5 10/22/2019    A1C 6.9 05/02/2019    A1C 6.1 08/16/2018         CARDIAC DATA:    Echo 6/14/23  Global and regional left ventricular function is normal with an EF of 60-65%.  Right ventricular function, chamber size, wall motion, and thickness are  normal.  Trace mitral insufficiency is present.  Mild aortic insufficiency is present.  Pulmonary artery systolic pressure cannot be assessed.  The inferior vena cava is normal.  No pericardial effusion is present.  There is no prior study for direct comparison.    U/S Carotid 5/7/25  1. Right side:      Degree of stenosis: Less than 50 %. Unchanged elevated ICA/CCA  ratios again noted; however, systolic velocity of 153cm/s in the  proximal internal carotid artery, similar to prior exam..     2. Left side:       Degree of stenosis: 50 to 69%. Unchanged since prior exam..  ICA/CCA ratio of 1.9     3. Persistent retrograde flow in the left vertebral artery consistent  with subclavian steal pathology.    CLAUDINE 5/7/25  1. Right leg: Resting CLAUDINE is 1.00, previously 0.73 on 9/13/2021 prior  to intervention. Normal. PVRs are within normal limits.     2. Left leg: Resting CLAUDINE is 0.97, previously 0.24 prior to  intervention. Borderline (0.91 to 0.99). PVRs are within normal  limits.     3. Reduced left brachial blood pressure relative to the right arm,  consistent with left proximal subclavian artery occlusion as  demonstrated by very heavily calcified atherosclerotic plaque on  noncontrast CT from 4/10/2025.        ASSESSMENT/PLAN:  In summary, Porsche  IRENA Manning is here today with the following -      # CAD (1st MI 1987, BMS to mRCA 2003)  # HTN  # HLD  # BLE Edema    Primary cardiologist: Dr. Reinoso Can    Blood pressures well controlled. Most recent lipid panel on 3/14/25 with LDL 59, . Patient reports ongoing MORALES at her baseline. Endorses new BLE edema since this past winter. To further evaluate her new edema and ongoing MORALES, will obtain updated echo to assess for any changes from prior (last imaging 2023). If she has any findings concerning for HFpEF, would recommend starting spironolactone, as she is already on an ACE and SGLT2. If HFpEF, would also consider reducing Toprol and optimizing other GDMT for HTN control, as BB can have harm in HFpEF. If reduced EF, could maintain BB.     - CAD/HLD: ASA 81 mg daily, Plavix 75 mg daily, Rosuvastatin 40 mg daily  - HTN: Diltiazem 300 mg daily, Lisinopril 10 mg daily, Toprol 100 mg daily  - Encourage smoking cessation     # PAD  # Carotid stenosis  # Left subclavian stenosis  Follows with vascular surgery. On DAPT and statin as above.    # T2DM  Most recent A1C 5.9. On Jardiance.      Follow up:  - Update echocardiogram  - Follow up in 1 year or sooner if indicated      AARON De Paz, FNP-C  [facility]  Securely message with [employer]         Chart review time: 10 minutes  Visit time: 22 minutes  Chart completion/documentation: 5 minutes  Total time spent: 37 minutes     Please do not hesitate to contact me if you have any questions/concerns.     Sincerely,     AARON De Paz CNP

## 2025-05-20 NOTE — NURSING NOTE
"Chief Complaint   Patient presents with    RECHECK     Return general cardiology for follow-up      Shortness of Breath       Initial /48 (BP Location: Right arm, Patient Position: Chair, Cuff Size: Adult Regular)   Pulse 71   Wt 59.4 kg (131 lb)   SpO2 (!) 90%   BMI 24.75 kg/m   Estimated body mass index is 24.75 kg/m  as calculated from the following:    Height as of 3/20/25: 1.549 m (5' 1\").    Weight as of this encounter: 59.4 kg (131 lb)..  BP completed using cuff size: regular    Antonino LOTT, EMT    "

## 2025-05-20 NOTE — PATIENT INSTRUCTIONS
Thank you for coming to the [facility] @ [place]; please note the following instructions:    1. Schedule echocardiogram  2. I will let you know if we should start a medication called spironolactone based on your results    Follow up in 1 year with lipids prior        If you have any questions regarding your visit please contact your care team:     Cardiology  Telephone Number   Sari PEREZ., RN  Maritza WARD, RN  Uzma BARILLAS, RN  Emily RIVAS, SAGE [place]ORJAS, [place], [place]  Katia WARD, VF  Antonino BORJAS, [phone] (option [phone])   For scheduling appts:     [phone] (select option 1)       For the Device Clinic (Pacemakers and ICD's)  RN's :  Jazzy Mohan   During business hours: [phone]    *After business hours:  [phone] (select option 4)      Normal test result notifications will be released via Runa or mailed within 7 business days.  All other test results, will be communicated via telephone once reviewed by your cardiologist.    If you need a medication refill please contact your pharmacy.  Please allow 3 business days for your refill to be completed.    As always, thank you for trusting us with your health care needs!

## 2025-05-20 NOTE — PROGRESS NOTES
[facility]    Patient Name: Porsche Manning   : 1950   Date of Visit: 2025  Primary Care Physician: Eduard Tan PA-C         Porsche Manning is a pleasant 75 year old female, who presents for general follow up. Prior history significant for CAD (1st MI , BMS to mRCA ), HTN, T2DM, PAD (s/p bilateral aorto-iliac stenting , repeat stenting to left common iliac artery ), left subclavian artery subclavian stenosis, carotid stenosis, CKD, history of cervical cancer, COPD, and current smoker.    Last seen in cardiology clinic on 23 by Dr. Arleth Renee. Reported baseline SOB with stairs, denied any other cardiac symptoms. Also reported she resumed smoking 4-5 cigarettes/day in the past couple of months. A murmur was auscultated in the right carotid, and patient was referred for doppler U/S and recommended to follow up with vascular surgery (already following for PAD and subclavian stenosis.    Seen by vascular last month for annual surveillance of her PAD and carotid stenosis. Recommended to avoid smoking, continue ASA, Plavix, and statin.    Today in clinic, Leann continues to endorse her baseline SOB with exertional activity. She has known stable COPD, and does follow with pulmonary. She tells me she has a new pulmonary nodule they saw on imaging last month, as well.    She reports some BLE edema that started this past winter and has been intermittent.     She is somewhat active in her garden. No routine exercise.    Home BPs: 's    Social history: intermittent, currently smoking 5/day    Current Cardiac Medications  ASA 81 mg daily  Plavix 75 mg daily  Diltiazem 300 mg daily  Jardiance 25 mg daily  Lisinopril 10 mg daily  Toprol 100 mg daily  Rosuvastatin 40 mg daily      PAST MEDICAL HISTORY:  Past Medical History:   Diagnosis Date    Brachial neuritis or radiculitis NOS     Chronic obstructive pulmonary disease, unspecified COPD type (H) 3/8/2016    Coronary  artery disease     Doing fine    H/O tobacco use, presenting hazards to health     Hyperlipidemia LDL goal < 100     Hypertension goal BP (blood pressure) < 140/90     Malignant neoplasm of other specified sites of cervix     Old myocardial infarction     Osteopenia     Peripheral vascular disease, unspecified     Type 2 diabetes, HbA1c goal < 7% (H)        PAST SURGICAL HISTORY:  Past Surgical History:   Procedure Laterality Date    ANGIOGRAM Left 10/19/2021    Procedure: Left iliac angioplasty and stenting, Right common iliac angioplasty;  Surgeon: Britni Bob MD;  Location: [facility] OR    ANGIOPLASTY N/A 10/19/2021    Procedure: possible intervention;  Surgeon: Britni Bob MD;  Location: [facility] OR    BIOPSY      Sample taken from back    CARDIAC SURGERY      Stents    COLONOSCOPY      Results were ok    HYSTERECTOMY, PAP NO LONGER INDICATED      ovaries only, no cervix per pt    IR OR ANGIOGRAM  10/19/2021    SURGICAL HISTORY OF -       bunionectomy    SURGICAL HISTORY OF -   10/10/03    cardiac stenting    SURGICAL HISTORY OF -       stent placed in both of her legs for pad    VASCULAR SURGERY      PAD       FAMILY HISTORY:  Family History   Problem Relation Age of Onset    C.A.D. Mother     Breast Cancer Mother     C.A.D. Father     Diabetes Father     Hypertension Father     C.A.D. Maternal Grandfather     C.A.D. Paternal Grandfather     Neurologic Disorder Brother         epilepsy       SOCIAL HISTORY:  Social History     Socioeconomic History    Marital status: Single    Number of children: 0    Years of education: 12   Occupational History    Occupation: [employer]     Comment: [employer] and [employer].   Tobacco Use    Smoking status: Former     Current packs/day: 0.00     Average packs/day: 0.3 packs/day for 40.0 years (10.0 ttl pk-yrs)     Types: Cigarettes     Quit date: 2018     Years since quittin.0    Smokeless tobacco: Never    Tobacco comments:     5 or less cigareetes day    Vaping Use    Vaping status: Never Used   Substance and Sexual Activity    Alcohol use: Yes     Comment: 2 to 4 beers or mixed drinks weekly    Drug use: No    Sexual activity: Not Currently     Partners: Male   Other Topics Concern    Parent/sibling w/ CABG, MI or angioplasty before 65F 55M? No   Social History Narrative    12/22/09    Balanced Diet - No    Osteoporosis Prevention Measures - Dairy servings per day: 0-1 and Medication/Supplements (See current meds)    Regular Exercise -  No Describe None    Dental Exam - YES - Date: 12/2009    Eye Exam - YES - Date: 2 years ago    Self Breast Exam - Yes, on a monthly basis    Abuse: Current or Past (Physical, Sexual or Emotional)- No    Do you feel safe in your environment - Yes    Guns stored in the home - No    Sunscreen used - Yes    Seatbelts used - Yes    Lipids -  YES - Date: 10/23/08    Glucose -  YES - Date: 10/23/08    Colon Cancer Screening - Colonoscopy 7/27/06(date completed)    Hemoccults - YES - Date: 7/11/06    Pap Test -  YES - Date: 10/23/08, Pt has Hx of abnormal paps.    Do you have any concerns about STD's -  No    Mammography - YES - Date: 10/23/08    DEXA - YES - Date: 11/29/07    Immunizations reviewed and up to date - Yes, Tdap given 10/18/07    Phillip Loo MA                     Social Drivers of Health     Financial Resource Strain: Low Risk  (6/24/2024)    Financial Resource Strain     Within the past 12 months, have you or your family members you live with been unable to get utilities (heat, electricity) when it was really needed?: No   Food Insecurity: Low Risk  (6/24/2024)    Food Insecurity     Within the past 12 months, did you worry that your food would run out before you got money to buy more?: No     Within the past 12 months, did the food you bought just not last and you didn t have money to get more?: No   Transportation Needs: Low Risk  (6/24/2024)    Transportation Needs     Within the past 12 months, has lack of  transportation kept you from medical appointments, getting your medicines, non-medical meetings or appointments, work, or from getting things that you need?: No   Physical Activity: Insufficiently Active (6/24/2024)    Exercise Vital Sign     Days of Exercise per Week: 2 days     Minutes of Exercise per Session: 30 min   Stress: No Stress Concern Present (6/24/2024)    [employer] - Occupational Stress Questionnaire     Feeling of Stress : Not at all   Social Connections: Unknown (6/24/2024)    Social Connection and Isolation Panel [NHANES]     Frequency of Social Gatherings with Friends and Family: More than three times a week   Interpersonal Safety: Low Risk  (6/27/2024)    Interpersonal Safety     Do you feel physically and emotionally safe where you currently live?: Yes     Within the past 12 months, have you been hit, slapped, kicked or otherwise physically hurt by someone?: No     Within the past 12 months, have you been humiliated or emotionally abused in other ways by your partner or ex-partner?: No   Housing Stability: Low Risk  (6/24/2024)    Housing Stability     Do you have housing? : Yes     Are you worried about losing your housing?: No       MEDICATIONS:  Current Outpatient Medications   Medication Sig Dispense Refill    albuterol (PROAIR HFA/PROVENTIL HFA/VENTOLIN HFA) 108 (90 Base) MCG/ACT inhaler INHALE 2 PUFFS INTO THE LUNGS EVERY 6 HOURS 8.5 g 2    aspirin 81 MG EC tablet Take 81 mg by mouth daily      clopidogrel (PLAVIX) 75 MG tablet TAKE 1 TABLET BY MOUTH EVERY DAY 90 tablet 0    diltiazem ER (TIAZAC) 300 MG 24 hr ER beaded capsule TAKE 1 CAPSULE BY MOUTH EVERY DAY 90 capsule 1    empagliflozin (JARDIANCE) 10 MG TABS tablet Take 1 tablet (10 mg) by mouth daily. 90 tablet 3    empagliflozin (JARDIANCE) 25 MG TABS tablet Take 1 tablet (25 mg) by mouth daily. 90 tablet 0    fluticasone-vilanterol (BREO ELLIPTA) 200-25 MCG/ACT inhaler Inhale 1 puff into the lungs  daily. 60 each 2    lisinopril (ZESTRIL) 10 MG tablet Take 1 tablet (10 mg) by mouth daily. 90 tablet 0    metoprolol succinate ER (TOPROL XL) 100 MG 24 hr tablet Take 1 tablet (100 mg) by mouth daily. 90 tablet 0    rosuvastatin (CRESTOR) 40 MG tablet Take 1 tablet (40 mg) by mouth daily. 90 tablet 0    tiotropium (SPIRIVA RESPIMAT) 2.5 MCG/ACT inhaler Inhale 2 puffs into the lungs daily. 4 g 2     No current facility-administered medications for this visit.        ALLERGIES:     Allergies   Allergen Reactions    Morphine Nausea and Vomiting    Penicillins Nausea and Vomiting       ROS:  A complete 10-point ROS was negative except as above.    PHYSICAL EXAM:  /48 (BP Location: Right arm, Patient Position: Chair, Cuff Size: Adult Regular)   Pulse 71   Wt 59.4 kg (131 lb)   SpO2 (!) 90%   BMI 24.75 kg/m    Gen: alert, interactive, NAD  Neck: supple, no JVD  CV: RRR, [+] murmur  Chest: CTAB, no wheezes or crackles  Abd: soft, NT, ND  Ext: 1+ BLE edema    LABS:    CMP  Sodium   Date Value Ref Range Status   04/28/2025 139 135 - 145 mmol/L Final   04/22/2021 137 133 - 144 mmol/L Final     Potassium   Date Value Ref Range Status   04/28/2025 3.1 (L) 3.4 - 5.3 mmol/L Final   04/18/2022 4.2 3.4 - 5.3 mmol/L Final   04/22/2021 4.4 3.4 - 5.3 mmol/L Final     Chloride   Date Value Ref Range Status   04/28/2025 102 98 - 107 mmol/L Final   04/18/2022 107 94 - 109 mmol/L Final   04/22/2021 104 94 - 109 mmol/L Final     Carbon Dioxide   Date Value Ref Range Status   04/22/2021 23 20 - 32 mmol/L Final     Carbon Dioxide (CO2)   Date Value Ref Range Status   04/28/2025 24 22 - 29 mmol/L Final   04/18/2022 23 20 - 32 mmol/L Final     Anion Gap   Date Value Ref Range Status   04/28/2025 13 7 - 15 mmol/L Final   04/18/2022 8 3 - 14 mmol/L Final   04/22/2021 10 3 - 14 mmol/L Final     Glucose   Date Value Ref Range Status   04/28/2025 143 (H) 70 - 99 mg/dL Final   04/18/2022 143 (H) 70 - 99 mg/dL Final   04/22/2021 164 (H) 70  - 99 mg/dL Final     Urea Nitrogen   Date Value Ref Range Status   04/28/2025 12.5 8.0 - 23.0 mg/dL Final   04/18/2022 16 7 - 30 mg/dL Final   04/22/2021 13 7 - 30 mg/dL Final     Creatinine   Date Value Ref Range Status   04/28/2025 1.52 (H) 0.51 - 0.95 mg/dL Final   04/22/2021 0.97 0.52 - 1.04 mg/dL Final     GFR Estimate   Date Value Ref Range Status   04/28/2025 36 (L) >60 mL/min/1.73m2 Final     Comment:     eGFR calculated using 2021 CKD-EPI equation.   04/22/2021 59 (L) >60 mL/min/[1.73_m2] Final     Comment:     Non  GFR Calc  Starting 12/18/2018, serum creatinine based estimated GFR (eGFR) will be   calculated using the Chronic Kidney Disease Epidemiology Collaboration   (CKD-EPI) equation.       Calcium   Date Value Ref Range Status   04/28/2025 9.3 8.8 - 10.4 mg/dL Final   04/22/2021 10.4 (H) 8.5 - 10.1 mg/dL Final     Bilirubin Total   Date Value Ref Range Status   03/14/2025 0.4 <=1.2 mg/dL Final   04/22/2021 0.5 0.2 - 1.3 mg/dL Final     Alkaline Phosphatase   Date Value Ref Range Status   03/14/2025 68 40 - 150 U/L Final   04/22/2021 112 40 - 150 U/L Final     ALT   Date Value Ref Range Status   03/14/2025 12 0 - 50 U/L Final   04/22/2021 24 0 - 50 U/L Final     AST   Date Value Ref Range Status   03/14/2025 22 0 - 45 U/L Final   04/22/2021 15 0 - 45 U/L Final       CBC  CBC RESULTS:   Recent Labs   Lab Test 03/14/25  1118   WBC 9.4   RBC 4.59   HGB 14.3   HCT 43.8   MCV 95   MCH 31.2   MCHC 32.6   RDW 13.4          TROP  Lab Results   Component Value Date    TROPI <0.015 05/09/2018       LIPIDS  Recent Labs   Lab Test 03/14/25  1118 06/27/24  1052   CHOL 148 132   HDL 66 51   LDL 59 54   TRIG 115 137       TSH  TSH   Date Value Ref Range Status   04/22/2021 1.14 0.40 - 4.00 mU/L Final       HBA1C  Lab Results   Component Value Date    A1C 5.9 03/14/2025    A1C 6.2 06/27/2024    A1C 6.6 05/12/2023    A1C 6.1 11/17/2022    A1C 6.4 04/18/2022    A1C 6.7 04/22/2021    A1C 6.7  01/28/2021    A1C 6.5 10/22/2019    A1C 6.9 05/02/2019    A1C 6.1 08/16/2018         CARDIAC DATA:    Echo 6/14/23  Global and regional left ventricular function is normal with an EF of 60-65%.  Right ventricular function, chamber size, wall motion, and thickness are  normal.  Trace mitral insufficiency is present.  Mild aortic insufficiency is present.  Pulmonary artery systolic pressure cannot be assessed.  The inferior vena cava is normal.  No pericardial effusion is present.  There is no prior study for direct comparison.    U/S Carotid 5/7/25  1. Right side:      Degree of stenosis: Less than 50 %. Unchanged elevated ICA/CCA  ratios again noted; however, systolic velocity of 153cm/s in the  proximal internal carotid artery, similar to prior exam..     2. Left side:       Degree of stenosis: 50 to 69%. Unchanged since prior exam..  ICA/CCA ratio of 1.9     3. Persistent retrograde flow in the left vertebral artery consistent  with subclavian steal pathology.    CLAUDINE 5/7/25  1. Right leg: Resting CLAUDINE is 1.00, previously 0.73 on 9/13/2021 prior  to intervention. Normal. PVRs are within normal limits.     2. Left leg: Resting CLAUDINE is 0.97, previously 0.24 prior to  intervention. Borderline (0.91 to 0.99). PVRs are within normal  limits.     3. Reduced left brachial blood pressure relative to the right arm,  consistent with left proximal subclavian artery occlusion as  demonstrated by very heavily calcified atherosclerotic plaque on  noncontrast CT from 4/10/2025.        ASSESSMENT/PLAN:  In summary, Porsche Manning is here today with the following -      # CAD (1st MI 1987, BMS to mRCA 2003)  # HTN  # HLD  # BLE Edema    Primary cardiologist: Dr. Reinoso Can    Blood pressures well controlled. Most recent lipid panel on 3/14/25 with LDL 59, . Patient reports ongoing MORALSE at her baseline. Endorses new BLE edema since this past winter. To further evaluate her new edema and ongoing MORALES, will obtain updated echo  to assess for any changes from prior (last imaging 2023). If she has any findings concerning for HFpEF, would recommend starting spironolactone, as she is already on an ACE and SGLT2. If HFpEF, would also consider reducing Toprol and optimizing other GDMT for HTN control, as BB can have harm in HFpEF. If reduced EF, could maintain BB.     - CAD/HLD: ASA 81 mg daily, Plavix 75 mg daily, Rosuvastatin 40 mg daily  - HTN: Diltiazem 300 mg daily, Lisinopril 10 mg daily, Toprol 100 mg daily  - Encourage smoking cessation     # PAD  # Carotid stenosis  # Left subclavian stenosis  Follows with vascular surgery. On DAPT and statin as above.    # T2DM  Most recent A1C 5.9. On Jardiance.      Follow up:  - Update echocardiogram  - Follow up in 1 year or sooner if indicated      AARON De Paz, LISETP-C  [facility] General Cardiology  Securely message with Meetme[employer]         Chart review time: 10 minutes  Visit time: 22 minutes  Chart completion/documentation: 5 minutes  Total time spent: 37 minutes

## 2025-06-08 DIAGNOSIS — I73.9 PERIPHERAL VASCULAR DISEASE: ICD-10-CM

## 2025-06-09 RX ORDER — DILTIAZEM HYDROCHLORIDE 300 MG/1
300 CAPSULE, EXTENDED RELEASE ORAL DAILY
Qty: 90 CAPSULE | Refills: 1 | Status: SHIPPED | OUTPATIENT
Start: 2025-06-09

## 2025-06-11 DIAGNOSIS — I10 ESSENTIAL (PRIMARY) HYPERTENSION: ICD-10-CM

## 2025-06-11 RX ORDER — LISINOPRIL 10 MG/1
10 TABLET ORAL DAILY
Qty: 90 TABLET | Refills: 3 | Status: SHIPPED | OUTPATIENT
Start: 2025-06-11

## 2025-06-16 ENCOUNTER — ANCILLARY PROCEDURE (OUTPATIENT)
Dept: CARDIOLOGY | Facility: CLINIC | Age: 75
End: 2025-06-16
Payer: COMMERCIAL

## 2025-06-16 DIAGNOSIS — R06.09 DYSPNEA ON EXERTION: ICD-10-CM

## 2025-06-16 DIAGNOSIS — R60.0 BILATERAL LOWER EXTREMITY EDEMA: ICD-10-CM

## 2025-06-16 LAB — LVEF ECHO: NORMAL

## 2025-06-16 PROCEDURE — 93306 TTE W/DOPPLER COMPLETE: CPT | Performed by: INTERNAL MEDICINE

## 2025-06-18 DIAGNOSIS — J44.1 COPD EXACERBATION (H): ICD-10-CM

## 2025-06-18 DIAGNOSIS — J44.9 CHRONIC OBSTRUCTIVE PULMONARY DISEASE, UNSPECIFIED COPD TYPE (H): ICD-10-CM

## 2025-06-18 RX ORDER — TIOTROPIUM BROMIDE INHALATION SPRAY 3.12 UG/1
SPRAY, METERED RESPIRATORY (INHALATION)
Qty: 4 G | Refills: 5 | Status: SHIPPED | OUTPATIENT
Start: 2025-06-18

## 2025-06-18 RX ORDER — FLUTICASONE FUROATE AND VILANTEROL TRIFENATATE 200; 25 UG/1; UG/1
1 POWDER RESPIRATORY (INHALATION) DAILY
Qty: 180 EACH | Refills: 0 | Status: SHIPPED | OUTPATIENT
Start: 2025-06-18

## 2025-06-25 ENCOUNTER — MYC MEDICAL ADVICE (OUTPATIENT)
Dept: NEPHROLOGY | Facility: CLINIC | Age: 75
End: 2025-06-25
Payer: COMMERCIAL

## 2025-06-25 DIAGNOSIS — R80.1 PERSISTENT PROTEINURIA: ICD-10-CM

## 2025-06-25 DIAGNOSIS — N18.32 CKD STAGE 3B, GFR 30-44 ML/MIN (H): Primary | ICD-10-CM

## 2025-06-25 DIAGNOSIS — E11.21 TYPE 2 DIABETES MELLITUS WITH DIABETIC NEPHROPATHY, WITHOUT LONG-TERM CURRENT USE OF INSULIN (H): ICD-10-CM

## 2025-06-30 ENCOUNTER — MYC MEDICAL ADVICE (OUTPATIENT)
Dept: FAMILY MEDICINE | Facility: CLINIC | Age: 75
End: 2025-06-30
Payer: COMMERCIAL

## 2025-06-30 NOTE — TELEPHONE ENCOUNTER
Provider Note 3/20/25  - Continue SGLT2 inhibitor (increased Jardiance to 25 mg every day)   Nephrology Note: Medication Refill Request    Medication Refill Request:     Medication/Dose/Frequency: Jardiance 25 mg daily  Preferred Pharmacy: [facility]   Provider: Dr. Helms                           SITUATION/BACKROUND:                 Last office visit: 03/20/25        Future office visit: 09/19/25     ASSESSMENT:     Neph Assessments:    Recent Labs:  CBC Results:  Recent Labs   Lab Test 03/14/25  1118   WBC 9.4   RBC 4.59   HGB 14.3   HCT 43.8   MCV 95   MCH 31.2   MCHC 32.6   RDW 13.4        Last Renal Panel:  Sodium   Date Value Ref Range Status   04/28/2025 139 135 - 145 mmol/L Final   04/22/2021 137 133 - 144 mmol/L Final     Potassium   Date Value Ref Range Status   04/28/2025 3.1 (L) 3.4 - 5.3 mmol/L Final   04/18/2022 4.2 3.4 - 5.3 mmol/L Final   04/22/2021 4.4 3.4 - 5.3 mmol/L Final     Chloride   Date Value Ref Range Status   04/28/2025 102 98 - 107 mmol/L Final   04/18/2022 107 94 - 109 mmol/L Final   04/22/2021 104 94 - 109 mmol/L Final     Carbon Dioxide   Date Value Ref Range Status   04/22/2021 23 20 - 32 mmol/L Final     Carbon Dioxide (CO2)   Date Value Ref Range Status   04/28/2025 24 22 - 29 mmol/L Final   04/18/2022 23 20 - 32 mmol/L Final     Anion Gap   Date Value Ref Range Status   04/28/2025 13 7 - 15 mmol/L Final   04/18/2022 8 3 - 14 mmol/L Final   04/22/2021 10 3 - 14 mmol/L Final     Glucose   Date Value Ref Range Status   04/28/2025 143 (H) 70 - 99 mg/dL Final   04/18/2022 143 (H) 70 - 99 mg/dL Final   04/22/2021 164 (H) 70 - 99 mg/dL Final     Urea Nitrogen   Date Value Ref Range Status   04/28/2025 12.5 8.0 - 23.0 mg/dL Final   04/18/2022 16 7 - 30 mg/dL Final   04/22/2021 13 7 - 30 mg/dL Final     Creatinine   Date Value Ref Range Status   04/28/2025 1.52 (H) 0.51 - 0.95 mg/dL Final   04/22/2021 0.97 0.52 - 1.04 mg/dL Final     GFR Estimate   Date Value Ref Range Status    04/28/2025 36 (L) >60 mL/min/1.73m2 Final     Comment:     eGFR calculated using 2021 CKD-EPI equation.   04/22/2021 59 (L) >60 mL/min/[1.73_m2] Final     Comment:     Non  GFR Calc  Starting 12/18/2018, serum creatinine based estimated GFR (eGFR) will be   calculated using the Chronic Kidney Disease Epidemiology Collaboration   (CKD-EPI) equation.       Calcium   Date Value Ref Range Status   04/28/2025 9.3 8.8 - 10.4 mg/dL Final   04/22/2021 10.4 (H) 8.5 - 10.1 mg/dL Final     Phosphorus   Date Value Ref Range Status   03/14/2025 2.6 2.5 - 4.5 mg/dL Final     Albumin   Date Value Ref Range Status   03/14/2025 3.8 3.5 - 5.2 g/dL Final   04/18/2022 3.9 3.4 - 5.0 g/dL Final   04/22/2021 4.2 3.4 - 5.0 g/dL Final       Current Medication List:   Current Outpatient Medications (Antihypertensive, Cardiovascular, Diuretics, Beta blockers, Calcium blockers, Anticoagulants)   Medication Sig    lisinopril (ZESTRIL) 10 MG tablet TAKE 1 TABLET (10 MG) BY MOUTH DAILY.    metoprolol succinate ER (TOPROL XL) 100 MG 24 hr tablet Take 1 tablet (100 mg) by mouth daily.    TIADYLT  MG 24 hr ER beaded capsule TAKE 1 CAPSULE BY MOUTH EVERY DAY     Current Outpatient Medications (Other)   Medication Sig    albuterol (PROAIR HFA/PROVENTIL HFA/VENTOLIN HFA) 108 (90 Base) MCG/ACT inhaler INHALE 2 PUFFS INTO THE LUNGS EVERY 6 HOURS    aspirin 81 MG EC tablet Take 81 mg by mouth daily    clopidogrel (PLAVIX) 75 MG tablet TAKE 1 TABLET BY MOUTH EVERY DAY    fluticasone-vilanterol (BREO ELLIPTA) 200-25 MCG/ACT inhaler TAKE 1 PUFF BY MOUTH EVERY DAY    rosuvastatin (CRESTOR) 40 MG tablet Take 1 tablet (40 mg) by mouth daily.    tiotropium (SPIRIVA RESPIMAT) 2.5 MCG/ACT inhaler INHALE 2 PUFFS BY MOUTH INTO THE LUNGS DAILY       Has patient had kidney transplant in the prior 1 year: no.   Has patient been seen the last 12 months: Yes.  Associated labs reviewed for medication: Yes    PLAN:     Medication refilled per  protocol: Yes    Zeny Siddiqui RN

## 2025-07-01 ENCOUNTER — RESULTS FOLLOW-UP (OUTPATIENT)
Dept: CARDIOLOGY | Facility: CLINIC | Age: 75
End: 2025-07-01

## 2025-07-03 ENCOUNTER — MYC MEDICAL ADVICE (OUTPATIENT)
Dept: CARDIOLOGY | Facility: CLINIC | Age: 75
End: 2025-07-03
Payer: COMMERCIAL

## 2025-07-03 NOTE — TELEPHONE ENCOUNTER
Date: 7/3/2025    Time of Call: 3:27 PM     Diagnosis:  heart failure, edema     [ VORB ] Ordering provider: Jessica Allen NP    Order: spironolactone 12.5mg once daily, BMP 1-2 weeks after starting     Order received by: Monalisa Kaiser RN      Follow-up/additional notes:

## 2025-07-09 NOTE — TELEPHONE ENCOUNTER
Patient saw [employer] message. Patient wishing to hold off on changes at this time.    Maritza Blanchard, RN, BSN  Cardiology RN Care Coordinator   [place]   Phone: [phone]  Fax: [phone] ([place]) [phone] ([place])

## 2025-07-24 SDOH — HEALTH STABILITY: PHYSICAL HEALTH: ON AVERAGE, HOW MANY DAYS PER WEEK DO YOU ENGAGE IN MODERATE TO STRENUOUS EXERCISE (LIKE A BRISK WALK)?: 3 DAYS

## 2025-07-24 SDOH — HEALTH STABILITY: PHYSICAL HEALTH: ON AVERAGE, HOW MANY MINUTES DO YOU ENGAGE IN EXERCISE AT THIS LEVEL?: 60 MIN

## 2025-07-24 ASSESSMENT — SOCIAL DETERMINANTS OF HEALTH (SDOH): HOW OFTEN DO YOU GET TOGETHER WITH FRIENDS OR RELATIVES?: MORE THAN THREE TIMES A WEEK

## 2025-07-29 ENCOUNTER — OFFICE VISIT (OUTPATIENT)
Dept: FAMILY MEDICINE | Facility: CLINIC | Age: 75
End: 2025-07-29
Payer: COMMERCIAL

## 2025-07-29 VITALS
WEIGHT: 120.7 LBS | HEART RATE: 67 BPM | BODY MASS INDEX: 22.21 KG/M2 | HEIGHT: 62 IN | OXYGEN SATURATION: 95 % | DIASTOLIC BLOOD PRESSURE: 58 MMHG | TEMPERATURE: 97.2 F | RESPIRATION RATE: 16 BRPM | SYSTOLIC BLOOD PRESSURE: 132 MMHG

## 2025-07-29 DIAGNOSIS — I70.228 ATHEROSCLEROSIS OF NATIVE ARTERIES OF EXTREMITIES WITH REST PAIN, OTHER EXTREMITY (H): ICD-10-CM

## 2025-07-29 DIAGNOSIS — Z00.00 ENCOUNTER FOR MEDICARE ANNUAL WELLNESS EXAM: Primary | ICD-10-CM

## 2025-07-29 DIAGNOSIS — M85.89 OSTEOPENIA OF MULTIPLE SITES: ICD-10-CM

## 2025-07-29 PROCEDURE — 3075F SYST BP GE 130 - 139MM HG: CPT | Performed by: PHYSICIAN ASSISTANT

## 2025-07-29 PROCEDURE — 3078F DIAST BP <80 MM HG: CPT | Performed by: PHYSICIAN ASSISTANT

## 2025-07-29 PROCEDURE — G0439 PPPS, SUBSEQ VISIT: HCPCS | Performed by: PHYSICIAN ASSISTANT

## 2025-07-29 PROCEDURE — 1126F AMNT PAIN NOTED NONE PRSNT: CPT | Performed by: PHYSICIAN ASSISTANT

## 2025-07-29 ASSESSMENT — PAIN SCALES - GENERAL: PAINLEVEL_OUTOF10: NO PAIN (0)

## 2025-07-29 NOTE — PROGRESS NOTES
Pre[facility]  [facility]  Jul 29, 2025        ICD-10-CM    1. Encounter for Medicare annual wellness exam  Z00.00       2. Osteopenia of multiple sites  M85.89       3. Atherosclerosis of native arteries of extremities with rest pain, other extremity (H)  I70.228            Goran Noriega is a 75 year old, presenting for the following:  Physical (AWV)        7/29/2025    10:52 AM   Additional Questions   Roomed by Gabriela THOMPSON         7/29/2025   Forms   Any forms needing to be completed Yes          HPI    Wellness Visit Notes:     -Mammogram: Last done 12/2024 (impression: normal/benign). Due 12/2025.   -DEXA: Last done 10/2017 (impression: osteopenia). Due 10/2020. Plan: Patient wants to complete in [place]  -PAP: Discontinued/Aged Out.   -Colon Cancer Screening: Last done via colonoscopy on 3/2018. (Impression: Diverticulosis, otherwise normal. No specimens collected. Repeat 10 years). Due 3/2028.   -Lung Cancer Screening: Last done April 2025 (impression: New nodule. Follow up 3 months.). Scheduled for July 30, 2025. Smoking history updated/confirmed today.   -Dermatology: Pt verbalized they do not meet with dermatology regularly. .  -Diabetic Foot Exam: Provider to complete.  -Eye Exam: Last done 8/8/2024. Patient has appt scheduled for August.   -A1C: Last done 3/14/2025 . (Result: 5.9)  -Immunizations: Patient is due/able to receive at clinic today: Covid Booster - Declines    Patient concerns:  -Hair loss - feels like more coming out in the shower than usual  -Weight loss - 50# over the past 2 years; decreased appetite  Patient seeing cardiology, vascular and nephrology regularly- yearly follow ups recommended  CT scan of lungs scheduled for tomorrow after 3 month follow up on nodule           Advance Care Planning    Document on file is a Health Care Directive or POLST.        7/24/2025   General Health   How would you rate your overall  physical health? Good   Feel stress (tense, anxious, or unable to sleep) Not at all         7/24/2025   Nutrition   Diet: Regular (no restrictions)         7/24/2025   Exercise   Days per week of moderate/strenous exercise 3 days   Average minutes spent exercising at this level 60 min         7/24/2025   Social Factors   Frequency of gathering with friends or relatives More than three times a week   Worry food won't last until get money to buy more No   Food not last or not have enough money for food? No   Do you have housing? (Housing is defined as stable permanent housing and does not include staying outside in a car, in a tent, in an abandoned building, in an overnight shelter, or couch-surfing.) Yes   Are you worried about losing your housing? No   Lack of transportation? No   Unable to get utilities (heat,electricity)? No         7/24/2025   Fall Risk   Fallen 2 or more times in the past year? No   Trouble with walking or balance? No          7/24/2025   Activities of Daily Living- Home Safety   Needs help with the following daily activites None of the above   Safety concerns in the home None of the above         7/24/2025   Dental   Dentist two times every year? Yes         7/24/2025   Hearing Screening   Hearing concerns? None of the above         7/24/2025   Driving Risk Screening   Patient/family members have concerns about driving No         7/24/2025   General Alertness/Fatigue Screening   Have you been more tired than usual lately? No         7/24/2025   Urinary Incontinence Screening   Bothered by leaking urine in past 6 months No         Today's PHQ-2 Score:       7/29/2025    10:38 AM   PHQ-2 ( 1999 [employer])   Q1: Little interest or pleasure in doing things 0   Q2: Feeling down, depressed or hopeless 0   PHQ-2 Score 0    Q1: Little interest or pleasure in doing things Not at all   Q2: Feeling down, depressed or hopeless Not at all   PHQ-2 Score 0       Patient-reported           7/24/2025   Substance  Use   If I could quit smoking, I would Somewhat agree   I want to quit somking, worry about health affects Somewhat agree   Willing to make a plan to quit smoking Somewhat agree   Willing to cut down before quitting Somewhat agree   Alcohol more than 3/day or more than 7/wk No   Do you have a current opioid prescription? No   How severe/bad is pain from 1 to 10? 0/10 (No Pain)   Do you use any other substances recreationally? No     Social History     Tobacco Use    Smoking status: Every Day     Current packs/day: 0.00     Average packs/day: 0.3 packs/day for 40.0 years (10.0 ttl pk-yrs)     Types: Cigarettes     Last attempt to quit: 2018     Years since quittin.2    Smokeless tobacco: Never    Tobacco comments:     5 or less cigarettes day   Vaping Use    Vaping status: Never Used   Substance Use Topics    Alcohol use: Yes     Comment: 2 to 4 beers or mixed drinks weekly    Drug use: No           2024   LAST FHS-7 RESULTS   1st degree relative breast or ovarian cancer Yes   Any relative bilateral breast cancer No   Any male have breast cancer No   Any ONE woman have BOTH breast AND ovarian cancer No   Any woman with breast cancer before 50yrs No   2 or more relatives with breast AND/OR ovarian cancer No   2 or more relatives with breast AND/OR bowel cancer No        Mammogram Screening - After age 74- determine frequency with patient based on health status, life expectancy and patient goals    ASCVD Risk   The ASCVD Risk score (Phyllis PARRA, et al., 2019) failed to calculate for the following reasons:    Risk score cannot be calculated because patient has a medical history suggesting prior/existing ASCVD            Reviewed and updated as needed this visit by Provider   Tobacco  Allergies  Meds  Problems  Med Hx  Surg Hx  Fam Hx            Past Medical History:   Diagnosis Date    Brachial neuritis or radiculitis NOS     Chronic obstructive pulmonary disease, unspecified COPD type (H)  3/8/2016    Coronary artery disease     Doing fine    H/O tobacco use, presenting hazards to health     Hyperlipidemia LDL goal < 100     Hypertension goal BP (blood pressure) < 140/90     Malignant neoplasm of other specified sites of cervix     Old myocardial infarction     Osteopenia     Peripheral vascular disease, unspecified     Type 2 diabetes, HbA1c goal < 7% (H)      Past Surgical History:   Procedure Laterality Date    ANGIOGRAM Left 10/19/2021    Procedure: Left iliac angioplasty and stenting, Right common iliac angioplasty;  Surgeon: Britni Bob MD;  Location: [facility] OR    ANGIOPLASTY N/A 10/19/2021    Procedure: possible intervention;  Surgeon: Britni Bob MD;  Location: [facility] OR    BIOPSY      Sample taken from back    CARDIAC SURGERY      Stents    COLONOSCOPY  1996    Results were ok    HYSTERECTOMY, PAP NO LONGER INDICATED  1989    ovaries only, no cervix per pt    IR OR ANGIOGRAM  10/19/2021    SURGICAL HISTORY OF -   1995    bunionectomy    SURGICAL HISTORY OF -   10/10/03    cardiac stenting    SURGICAL HISTORY OF -       stent placed in both of her legs for pad    VASCULAR SURGERY      PAD     Labs reviewed in EPIC  BP Readings from Last 3 Encounters:   07/29/25 132/58   05/20/25 128/48   05/07/25 138/59    Wt Readings from Last 3 Encounters:   07/29/25 54.7 kg (120 lb 11.2 oz)   05/20/25 59.4 kg (131 lb)   05/07/25 60.8 kg (134 lb 1.6 oz)                  Patient Active Problem List   Diagnosis    Old myocardial infarction    Peripheral vascular disease    HYPERLIPIDEMIA LDL GOAL <100    Hypertension: MEASURE BP IN RIGHT ARM ONLY    Osteopenia    Vitamin D deficiency disease    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)    Subclavian artery stenosis, left    Stage 3b chronic kidney disease (H)    Chronic obstructive pulmonary disease, unspecified COPD type (H)    Pulmonary nodules    Atherosclerosis of native arteries of extremities with rest pain, other extremity (H)     Past  Surgical History:   Procedure Laterality Date    ANGIOGRAM Left 10/19/2021    Procedure: Left iliac angioplasty and stenting, Right common iliac angioplasty;  Surgeon: Britni Bob MD;  Location: [facility] OR    ANGIOPLASTY N/A 10/19/2021    Procedure: possible intervention;  Surgeon: Britni Bob MD;  Location: [facility] OR    BIOPSY      Sample taken from back    CARDIAC SURGERY      Stents    COLONOSCOPY      Results were ok    HYSTERECTOMY, PAP NO LONGER INDICATED      ovaries only, no cervix per pt    IR OR ANGIOGRAM  10/19/2021    SURGICAL HISTORY OF -       bunionectomy    SURGICAL HISTORY OF -   10/10/03    cardiac stenting    SURGICAL HISTORY OF -       stent placed in both of her legs for pad    VASCULAR SURGERY      PAD       Social History     Tobacco Use    Smoking status: Every Day     Current packs/day: 0.00     Average packs/day: 0.3 packs/day for 40.0 years (10.0 ttl pk-yrs)     Types: Cigarettes     Last attempt to quit: 2018     Years since quittin.2    Smokeless tobacco: Never    Tobacco comments:     5 or less cigarettes day   Substance Use Topics    Alcohol use: Yes     Comment: 2 to 4 beers or mixed drinks weekly     Family History   Problem Relation Age of Onset    C.A.D. Mother     Breast Cancer Mother     C.A.D. Father     Diabetes Father     Hypertension Father     C.A.D. Maternal Grandfather     C.A.D. Paternal Grandfather     Neurologic Disorder Brother         epilepsy         Current Outpatient Medications   Medication Sig Dispense Refill    albuterol (PROAIR HFA/PROVENTIL HFA/VENTOLIN HFA) 108 (90 Base) MCG/ACT inhaler INHALE 2 PUFFS INTO THE LUNGS EVERY 6 HOURS 8.5 g 2    aspirin 81 MG EC tablet Take 81 mg by mouth daily      clopidogrel (PLAVIX) 75 MG tablet TAKE 1 TABLET BY MOUTH EVERY DAY 90 tablet 0    empagliflozin (JARDIANCE) 25 MG TABS tablet Take 1 tablet (25 mg) by mouth daily. 90 tablet 1    fluticasone-vilanterol (BREO ELLIPTA) 200-25 MCG/ACT inhaler TAKE 1  PUFF BY MOUTH EVERY  each 0    lisinopril (ZESTRIL) 10 MG tablet TAKE 1 TABLET (10 MG) BY MOUTH DAILY. 90 tablet 3    metoprolol succinate ER (TOPROL XL) 100 MG 24 hr tablet Take 1 tablet (100 mg) by mouth daily. 90 tablet 0    rosuvastatin (CRESTOR) 40 MG tablet Take 1 tablet (40 mg) by mouth daily. 90 tablet 0    TIADYLT  MG 24 hr ER beaded capsule TAKE 1 CAPSULE BY MOUTH EVERY DAY 90 capsule 1    tiotropium (SPIRIVA RESPIMAT) 2.5 MCG/ACT inhaler INHALE 2 PUFFS BY MOUTH INTO THE LUNGS DAILY 4 g 5     Allergies   Allergen Reactions    Morphine Nausea and Vomiting    Penicillins Nausea and Vomiting     Recent Labs   Lab Test 04/28/25  1013 03/14/25  1118 06/27/24  1052 05/12/23  0937 10/19/21  0705 10/15/21  1435 07/20/21  1202 04/22/21  1044 01/28/21  1004 11/12/19  0920 10/22/19  1012   A1C  --  5.9* 6.2* 6.6*   < > 6.0*  --  6.7* 6.7*  --  6.5*   LDL  --  59 54 66   < >  --    < >  --  73  --  79   HDL  --  66 51 58   < >  --    < >  --  62  --  53   TRIG  --  115 137 137   < >  --    < >  --  166*  --  135   ALT  --  12 12  --   --  27  --  24 30   < > 18   CR 1.52* 1.63* 1.40* 1.26*   < > 0.90   < > 0.97 1.07*   < > 1.10*   GFRESTIMATED 36* 33* 40* 45*   < > 65   < > 59* 53*   < > 51*   GFRESTBLACK  --   --   --   --   --   --   --  69 61   < > 60*   POTASSIUM 3.1* 2.7* 4.1 3.9   < > 3.7   < > 4.4 3.7   < > 4.3   TSH  --   --   --   --   --   --   --  1.14  --   --  1.92    < > = values in this interval not displayed.      Current providers sharing in care for this patient include:  Patient Care Team:  Danuta Tan PA-C as PCP - General (Physician Assistant)  Danuta Tan PA-C as Assigned PCP  Nelli Kuhn PA-C as Physician Assistant (Interventional Cardiology)  Patience Wellington PA-C as Physician Assistant (Nephrology)  Blayne Mcfarland MD as MD (Critical Care)  Carolina Bedolla APRN CNP as Assigned Surgical Provider  Kira Welch PA-C as Assigned Cancer  "Care Provider  Milena Nguyen RP as Pharmacist (Pharmacist)  Britni Edwards RP as Assigned MTM Pharmacist  Tiffany Vann MD as Physician (Nephrology)  Tiffany Vann MD as Assigned Nephrology Provider  Jessica Allen APRN CNP as Assigned Heart and Vascular Provider    The following health maintenance items are reviewed in Epic and correct as of today:  Health Maintenance   Topic Date Due    DEXA  10/24/2020    DIABETIC FOOT EXAM  11/17/2023    COVID-19 VACCINE (7 - 2024-25 season) 04/15/2025    ANNUAL REVIEW OF HM ORDERS  06/27/2025    EYE EXAM  08/08/2025    INFLUENZA VACCINE (1) 09/01/2025    A1C  09/14/2025    MICROALBUMIN  09/14/2025    MAMMO SCREENING  12/16/2025    NICOTINE/TOBACCO CESSATION COUNSELING Q 1 YR  02/20/2026    LIPID  03/14/2026    HEMOGLOBIN  03/14/2026    LUNG CANCER SCREENING  04/10/2026    BMP  04/28/2026    MEDICARE ANNUAL WELLNESS VISIT  07/29/2026    FALL RISK ASSESSMENT  07/29/2026    DTAP/TDAP/TD VACCINE (3 - Td or Tdap) 10/24/2027    COLORECTAL CANCER SCREENING  03/27/2028    ADVANCE CARE PLANNING  07/29/2030    PARATHYROID  Completed    PHOSPHORUS  Completed    SPIROMETRY  Completed    HEPATITIS C SCREENING  Completed    COPD ACTION PLAN  Completed    PHQ-2 (once per calendar year)  Completed    PNEUMOCOCCAL VACCINE 50+ YEARS  Completed    URINALYSIS  Completed    ALK PHOS  Completed    HPV VACCINE (No Doses Required) Completed    ZOSTER VACCINE  Completed    RSV VACCINE  Completed    MENINGITIS VACCINE  Aged Out         Review of Systems  Constitutional, neuro, ENT, endocrine, pulmonary, cardiac, gastrointestinal, genitourinary, musculoskeletal, integument and psychiatric systems are negative, except as otherwise noted.     Objective    Exam  /58   Pulse 67   Temp 97.2  F (36.2  C) (Temporal)   Resp 16   Ht 1.581 m (5' 2.25\")   Wt 54.7 kg (120 lb 11.2 oz)   SpO2 95%   BMI 21.90 kg/m     Estimated body mass index is 21.9 kg/m  as calculated from the " "following:    Height as of this encounter: 1.581 m (5' 2.25\").    Weight as of this encounter: 54.7 kg (120 lb 11.2 oz).    Physical Exam  GENERAL: alert and no distress  EYES: Eyes grossly normal to inspection, PERRL and conjunctivae and sclerae normal  HENT: ear canals and TM's normal, nose and mouth without ulcers or lesions  NECK: no adenopathy, no asymmetry, masses, or scars  RESP: lungs clear to auscultation - no rales, rhonchi or wheezes  CV: regular rate and rhythm, normal S1 S2, no S3 or S4, no murmur, click or rub, no peripheral edema  MS: no gross musculoskeletal defects noted, no edema  SKIN: no suspicious lesions or rashes  NEURO: Normal strength and tone, mentation intact and speech normal  PSYCH: mentation appears normal, affect normal/bright         7/29/2025   Mini Cog   Clock Draw Score 2 Normal   3 Item Recall 3 objects recalled   Mini Cog Total Score 5              Signed Electronically by: Eduard Tan PA-C    "

## 2025-07-29 NOTE — PATIENT INSTRUCTIONS
"Patient Education   Preventive Care Advice   This is general advice we often give to help people stay healthy. Your care team may have specific advice just for you. Please talk to your care team about your own preventive care needs.  Lifestyle  Exercise at least 150 minutes each week (30 minutes a day, 5 days a week).  Do muscle strengthening activities 2 days a week. These help control your weight and prevent disease.  No smoking.  Wear sunscreen to prevent skin cancer.  Take time with family and friends.  Have your home tested for radon every 2 to 5 years. Radon is a colorless, odorless gas that can harm your lungs. To learn more, go to www.health.[facility].[place].us and search for \"Radon in Homes.\"  Keep guns unloaded and locked up in a safe place like a safe or gun vault, or, use a gun lock and hide the keys. Always lock away bullets separately. To learn more, visit [employer].gov and search for \"safe gun storage.\"  Nutrition  Eat 5 or more servings of fruits and vegetables each day.  Try wheat bread, brown rice and whole grain pasta (instead of white bread, rice, and pasta).  Get enough calcium and vitamin D. Check the label on foods and aim for 100% of the RDA (recommended daily allowance).  Regular exams  Have a dental exam and cleaning every 6 months.  Older adults: Ask your care team how often to have memory testing.  See your health care team every year to talk about:  Any changes in your health.  Any medicines your care team has prescribed.  Preventive care, family planning, and ways to prevent chronic diseases.  Shots (vaccines)   HPV shots (up to age 26), if you've never had them before.  Hepatitis B shots (up to age 59), if you've never had them before.  COVID-19 shot: Get this shot when it's due.  Flu shot: Get a flu shot every year.  Tetanus shot: Get a tetanus shot every 10 years.  Pneumococcal, hepatitis A, and RSV shots: Ask your care team if you need these based on your risk.  Shingles shot (for age 50 and " up).  General health tests  Diabetes screening:  Starting at age 35, Get screened for diabetes at least every 3 years.  If you are younger than age 35, ask your care team if you should be screened for diabetes.  Cholesterol test: At age 39, start having a cholesterol test every 5 years, or more often if advised.  Bone density scan (DEXA): At age 50, ask your care team if you should have this scan for osteoporosis (brittle bones).  Hepatitis C: Get tested at least once in your life.  Abdominal aortic aneurysm screening: Talk to your doctor about having this screening if you:  Have ever smoked; and  Are biologically male; and  Are between the ages of 65 and 75.  STIs (sexually transmitted infections)  Before age 24: Ask your care team if you should be screened for STIs.  After age 24: Get screened for STIs if you're at risk. You are at risk for STIs (including HIV) if:  You are sexually active with more than one person.  You don't use condoms every time.  You or a partner was diagnosed with a sexually transmitted infection.  If you are at risk for HIV, ask about PrEP medicine to prevent HIV.  Get tested for HIV at least once in your life, whether you are at risk for HIV or not.  Cancer screening tests  Cervical cancer screening: If you have a cervix, begin getting regular cervical cancer screening tests at age 21. Most people who have regular screenings with normal results can stop after age 65. Talk about this with your provider.  Breast cancer scan (mammogram): If you've ever had breasts, begin having regular mammograms starting at age 40. This is a scan to check for breast cancer.  Colon cancer screening: It is important to start screening for colon cancer at age 45.  Have a colonoscopy test every 10 years (or more often if you're at risk) Or, ask your provider about stool tests like a FIT test every year or Cologuard test every 3 years.  To learn more about your testing options, visit:  www.[employer]231792.pdf.  For help making a decision, visit: cherise.alexandr/yn37680.  Prostate cancer screening test: If you have a prostate and are age 55 to 69, ask your provider if you would benefit from a yearly prostate cancer screening test.  Lung cancer screening: If you are a current or former smoker age 50 to 80, ask your care team if ongoing lung cancer screenings are right for you.    For informational purposes only. Not to replace the advice of your health care provider. Copyright   2023 [facility]. All rights reserved. Clinically reviewed by the [facility]. [facility] 406116 - REV 6/25.

## 2025-07-30 ENCOUNTER — ANCILLARY PROCEDURE (OUTPATIENT)
Dept: CT IMAGING | Facility: CLINIC | Age: 75
End: 2025-07-30
Attending: PHYSICIAN ASSISTANT
Payer: COMMERCIAL

## 2025-07-30 DIAGNOSIS — R91.8 PULMONARY NODULES: ICD-10-CM

## 2025-07-30 PROCEDURE — 71250 CT THORAX DX C-: CPT | Performed by: RADIOLOGY

## 2025-08-13 ENCOUNTER — MYC MEDICAL ADVICE (OUTPATIENT)
Dept: FAMILY MEDICINE | Facility: CLINIC | Age: 75
End: 2025-08-13
Payer: COMMERCIAL

## 2025-08-13 ENCOUNTER — TRANSFERRED RECORDS (OUTPATIENT)
Dept: HEALTH INFORMATION MANAGEMENT | Facility: CLINIC | Age: 75
End: 2025-08-13
Payer: COMMERCIAL

## 2025-08-13 DIAGNOSIS — M85.80 OSTEOPENIA, UNSPECIFIED LOCATION: ICD-10-CM

## 2025-08-13 DIAGNOSIS — Z78.0 ASYMPTOMATIC MENOPAUSAL STATE: ICD-10-CM

## 2025-08-13 DIAGNOSIS — E28.39 ESTROGEN DEFICIENCY: Primary | ICD-10-CM

## 2025-08-13 LAB — RETINOPATHY: NEGATIVE

## 2025-08-19 ENCOUNTER — VIRTUAL VISIT (OUTPATIENT)
Dept: PULMONOLOGY | Facility: CLINIC | Age: 75
End: 2025-08-19
Attending: PHYSICIAN ASSISTANT
Payer: COMMERCIAL

## 2025-08-19 DIAGNOSIS — J44.9 CHRONIC OBSTRUCTIVE PULMONARY DISEASE, UNSPECIFIED COPD TYPE (H): Primary | ICD-10-CM

## 2025-08-19 DIAGNOSIS — R91.8 PULMONARY NODULES: ICD-10-CM

## 2025-08-19 PROCEDURE — 1126F AMNT PAIN NOTED NONE PRSNT: CPT | Mod: 95 | Performed by: PHYSICIAN ASSISTANT

## 2025-08-19 PROCEDURE — 98006 SYNCH AUDIO-VIDEO EST MOD 30: CPT | Performed by: PHYSICIAN ASSISTANT

## 2025-08-19 RX ORDER — FLUTICASONE FUROATE, UMECLIDINIUM BROMIDE AND VILANTEROL TRIFENATATE 200; 62.5; 25 UG/1; UG/1; UG/1
1 POWDER RESPIRATORY (INHALATION) DAILY
Qty: 180 EACH | Refills: 3 | Status: SHIPPED | OUTPATIENT
Start: 2025-08-19

## 2025-09-02 DIAGNOSIS — N18.32 CKD STAGE 3B, GFR 30-44 ML/MIN (H): Primary | ICD-10-CM

## (undated) DEVICE — SOL NACL 0.9% IRRIG 1000ML BOTTLE 2F7124

## (undated) DEVICE — Device

## (undated) DEVICE — INTRO SHEATH BRITE TIP 4FRX11CM 401-411M

## (undated) DEVICE — GUIDEWIRE AMPLATZ SUPER STIFF 0.035"X180CM M001465250

## (undated) DEVICE — GEL ULTRASOUND AQUASONIC 20GM 01-01

## (undated) DEVICE — DRAPE STERI U 1015

## (undated) DEVICE — CATH OMNIFLUSH 5FRX70CM SIZING 13709702

## (undated) DEVICE — DEVICE MULTI TORQUE TD01

## (undated) DEVICE — SYR 10ML LL W/O NDL 302995

## (undated) DEVICE — CATH BALLOON DILATION MUSTANG 8X40X75CM H74939171080470

## (undated) DEVICE — DRAPE SHEET MED 44X70" 9355

## (undated) DEVICE — INFLATION DEVICE ENCORE  26 PRESSURE GAUGE M001151050

## (undated) DEVICE — DRAPE C-ARM W/STRAPS 42X72" 07-CA104

## (undated) DEVICE — COVER ULTRASOUND PROBE W/GEL FLEXI-FEEL 6"X58" LF  25-FF658

## (undated) DEVICE — HOLDER TUBE NASOGASTRIC 160

## (undated) DEVICE — DEVICE HIGH PRESSURE FLOSWITCH FLOW CONTROL M001442010

## (undated) DEVICE — SYR MEDRAD 150ML MARK 7 ARTERION ART700SYR

## (undated) DEVICE — KIT MICRO-INTRODUCER STIFFEN 4FR 7274V

## (undated) DEVICE — CLOSURE DEVICE 6FR VASC PROGLIDE MEDICATED SUTURE 12673-03

## (undated) DEVICE — PREP CHLORAPREP 26ML TINTED ORANGE  260815

## (undated) DEVICE — INTRO SHEATH BRITE TIP 5FRX11CM 401-511M

## (undated) DEVICE — INTRO SHEATH BRITE TIP 6FRX11CM 401-611M

## (undated) DEVICE — SPONGE RAY-TEC 4X8" 7318

## (undated) DEVICE — TUBING MEDRAD 48" HIGH PRESSURE MX694

## (undated) RX ORDER — CLOPIDOGREL BISULFATE 75 MG/1
TABLET ORAL
Status: DISPENSED
Start: 2021-10-19

## (undated) RX ORDER — ALBUTEROL SULFATE 0.83 MG/ML
SOLUTION RESPIRATORY (INHALATION)
Status: DISPENSED
Start: 2018-06-21

## (undated) RX ORDER — IODIXANOL 320 MG/ML
INJECTION, SOLUTION INTRAVASCULAR
Status: DISPENSED
Start: 2021-10-19

## (undated) RX ORDER — HEPARIN SODIUM 1000 [USP'U]/ML
INJECTION, SOLUTION INTRAVENOUS; SUBCUTANEOUS
Status: DISPENSED
Start: 2021-10-19